# Patient Record
Sex: FEMALE | Race: BLACK OR AFRICAN AMERICAN | NOT HISPANIC OR LATINO | Employment: PART TIME | URBAN - METROPOLITAN AREA
[De-identification: names, ages, dates, MRNs, and addresses within clinical notes are randomized per-mention and may not be internally consistent; named-entity substitution may affect disease eponyms.]

---

## 2018-09-06 ENCOUNTER — OFFICE VISIT CONVERTED (OUTPATIENT)
Dept: FAMILY MEDICINE CLINIC | Facility: CLINIC | Age: 48
End: 2018-09-06
Attending: NURSE PRACTITIONER

## 2018-10-18 ENCOUNTER — OFFICE VISIT CONVERTED (OUTPATIENT)
Dept: FAMILY MEDICINE CLINIC | Facility: CLINIC | Age: 48
End: 2018-10-18
Attending: NURSE PRACTITIONER

## 2018-11-08 ENCOUNTER — CONVERSION ENCOUNTER (OUTPATIENT)
Dept: MAMMOGRAPHY | Facility: HOSPITAL | Age: 48
End: 2018-11-08

## 2018-12-06 ENCOUNTER — OFFICE VISIT CONVERTED (OUTPATIENT)
Dept: FAMILY MEDICINE CLINIC | Facility: CLINIC | Age: 48
End: 2018-12-06
Attending: NURSE PRACTITIONER

## 2019-08-08 ENCOUNTER — HOSPITAL ENCOUNTER (OUTPATIENT)
Dept: URGENT CARE | Facility: CLINIC | Age: 49
Discharge: HOME | End: 2019-08-08

## 2019-08-23 ENCOUNTER — OFFICE VISIT CONVERTED (OUTPATIENT)
Dept: FAMILY MEDICINE CLINIC | Facility: CLINIC | Age: 49
End: 2019-08-23
Attending: NURSE PRACTITIONER

## 2019-09-26 ENCOUNTER — OFFICE VISIT CONVERTED (OUTPATIENT)
Dept: FAMILY MEDICINE CLINIC | Facility: CLINIC | Age: 49
End: 2019-09-26
Attending: NURSE PRACTITIONER

## 2019-12-05 ENCOUNTER — OFFICE VISIT CONVERTED (OUTPATIENT)
Dept: FAMILY MEDICINE CLINIC | Facility: CLINIC | Age: 49
End: 2019-12-05
Attending: NURSE PRACTITIONER

## 2019-12-05 ENCOUNTER — CONVERSION ENCOUNTER (OUTPATIENT)
Dept: FAMILY MEDICINE CLINIC | Facility: CLINIC | Age: 49
End: 2019-12-05

## 2020-03-05 ENCOUNTER — OFFICE VISIT CONVERTED (OUTPATIENT)
Dept: FAMILY MEDICINE CLINIC | Facility: CLINIC | Age: 50
End: 2020-03-05
Attending: NURSE PRACTITIONER

## 2020-06-03 ENCOUNTER — HOSPITAL ENCOUNTER (OUTPATIENT)
Dept: GENERAL RADIOLOGY | Facility: HOSPITAL | Age: 50
Discharge: HOME | End: 2020-06-03
Attending: NURSE PRACTITIONER

## 2020-06-08 ENCOUNTER — OFFICE VISIT CONVERTED (OUTPATIENT)
Dept: FAMILY MEDICINE CLINIC | Facility: CLINIC | Age: 50
End: 2020-06-08
Attending: NURSE PRACTITIONER

## 2020-06-16 ENCOUNTER — HOSPITAL ENCOUNTER (OUTPATIENT)
Dept: OTHER | Facility: HOSPITAL | Age: 50
Setting detail: RECURRING SERIES
Discharge: HOME | End: 2020-09-22
Attending: NURSE PRACTITIONER

## 2020-06-29 ENCOUNTER — OFFICE VISIT CONVERTED (OUTPATIENT)
Dept: NEUROLOGY | Facility: CLINIC | Age: 50
End: 2020-06-29
Attending: NURSE PRACTITIONER

## 2020-07-08 ENCOUNTER — HOSPITAL ENCOUNTER (OUTPATIENT)
Dept: RESPIRATORY THERAPY | Facility: HOSPITAL | Age: 50
Discharge: HOME | End: 2020-07-08
Attending: NURSE PRACTITIONER

## 2020-07-09 ENCOUNTER — CONVERSION ENCOUNTER (OUTPATIENT)
Dept: FAMILY MEDICINE CLINIC | Facility: CLINIC | Age: 50
End: 2020-07-09

## 2020-07-09 ENCOUNTER — OFFICE VISIT CONVERTED (OUTPATIENT)
Dept: FAMILY MEDICINE CLINIC | Facility: CLINIC | Age: 50
End: 2020-07-09
Attending: NURSE PRACTITIONER

## 2020-07-20 ENCOUNTER — HOSPITAL ENCOUNTER (OUTPATIENT)
Dept: MRI IMAGING | Facility: HOSPITAL | Age: 50
Discharge: HOME | End: 2020-07-20
Attending: NURSE PRACTITIONER

## 2020-08-31 ENCOUNTER — OFFICE VISIT CONVERTED (OUTPATIENT)
Dept: NEUROLOGY | Facility: CLINIC | Age: 50
End: 2020-08-31
Attending: NURSE PRACTITIONER

## 2020-09-02 ENCOUNTER — HOSPITAL ENCOUNTER (OUTPATIENT)
Dept: URGENT CARE | Facility: CLINIC | Age: 50
Discharge: HOME | End: 2020-09-02
Attending: NURSE PRACTITIONER

## 2020-09-03 ENCOUNTER — TELEMEDICINE CONVERTED (OUTPATIENT)
Dept: FAMILY MEDICINE CLINIC | Facility: CLINIC | Age: 50
End: 2020-09-03
Attending: NURSE PRACTITIONER

## 2020-09-04 LAB
BACTERIA SPEC AEROBE CULT: NORMAL
SARS-COV-2 RNA SPEC QL NAA+PROBE: NOT DETECTED

## 2020-11-11 ENCOUNTER — TELEMEDICINE CONVERTED (OUTPATIENT)
Dept: GASTROENTEROLOGY | Facility: CLINIC | Age: 50
End: 2020-11-11
Attending: NURSE PRACTITIONER

## 2020-11-12 ENCOUNTER — HOSPITAL ENCOUNTER (OUTPATIENT)
Dept: FAMILY MEDICINE CLINIC | Facility: CLINIC | Age: 50
Discharge: HOME | End: 2020-11-12
Attending: NURSE PRACTITIONER

## 2020-11-12 ENCOUNTER — OFFICE VISIT CONVERTED (OUTPATIENT)
Dept: FAMILY MEDICINE CLINIC | Facility: CLINIC | Age: 50
End: 2020-11-12
Attending: NURSE PRACTITIONER

## 2020-11-12 LAB
ALBUMIN SERPL-MCNC: 4.3 G/DL (ref 3.5–5)
ALBUMIN/GLOB SERPL: 1.3 {RATIO} (ref 1.4–2.6)
ALP SERPL-CCNC: 107 U/L (ref 42–98)
ALT SERPL-CCNC: 18 U/L (ref 10–40)
ANION GAP SERPL CALC-SCNC: 14 MMOL/L (ref 8–19)
AST SERPL-CCNC: 21 U/L (ref 15–50)
BASOPHILS # BLD AUTO: 0.05 10*3/UL (ref 0–0.2)
BASOPHILS NFR BLD AUTO: 0.8 % (ref 0–3)
BILIRUB SERPL-MCNC: 0.18 MG/DL (ref 0.2–1.3)
BUN SERPL-MCNC: 11 MG/DL (ref 5–25)
BUN/CREAT SERPL: 14 {RATIO} (ref 6–20)
CALCIUM SERPL-MCNC: 9.1 MG/DL (ref 8.7–10.4)
CHLORIDE SERPL-SCNC: 102 MMOL/L (ref 99–111)
CHOLEST SERPL-MCNC: 178 MG/DL (ref 107–200)
CHOLEST/HDLC SERPL: 3.2 {RATIO} (ref 3–6)
CONV ABS IMM GRAN: 0.03 10*3/UL (ref 0–0.2)
CONV CO2: 26 MMOL/L (ref 22–32)
CONV IMMATURE GRAN: 0.5 % (ref 0–1.8)
CONV TOTAL PROTEIN: 7.6 G/DL (ref 6.3–8.2)
CREAT UR-MCNC: 0.79 MG/DL (ref 0.5–0.9)
DEPRECATED RDW RBC AUTO: 48.8 FL (ref 36.4–46.3)
EOSINOPHIL # BLD AUTO: 0.31 10*3/UL (ref 0–0.7)
EOSINOPHIL # BLD AUTO: 4.8 % (ref 0–7)
ERYTHROCYTE [DISTWIDTH] IN BLOOD BY AUTOMATED COUNT: 14.8 % (ref 11.7–14.4)
EST. AVERAGE GLUCOSE BLD GHB EST-MCNC: 131 MG/DL
GFR SERPLBLD BASED ON 1.73 SQ M-ARVRAT: >60 ML/MIN/{1.73_M2}
GLOBULIN UR ELPH-MCNC: 3.3 G/DL (ref 2–3.5)
GLUCOSE SERPL-MCNC: 103 MG/DL (ref 65–99)
HBA1C MFR BLD: 6.2 % (ref 3.5–5.7)
HCT VFR BLD AUTO: 40.5 % (ref 37–47)
HDLC SERPL-MCNC: 56 MG/DL (ref 40–60)
HGB BLD-MCNC: 12.4 G/DL (ref 12–16)
LDLC SERPL CALC-MCNC: 106 MG/DL (ref 70–100)
LYMPHOCYTES # BLD AUTO: 1.9 10*3/UL (ref 1–5)
LYMPHOCYTES NFR BLD AUTO: 29.5 % (ref 20–45)
MCH RBC QN AUTO: 27.6 PG (ref 27–31)
MCHC RBC AUTO-ENTMCNC: 30.6 G/DL (ref 33–37)
MCV RBC AUTO: 90.2 FL (ref 81–99)
MONOCYTES # BLD AUTO: 0.44 10*3/UL (ref 0.2–1.2)
MONOCYTES NFR BLD AUTO: 6.8 % (ref 3–10)
NEUTROPHILS # BLD AUTO: 3.72 10*3/UL (ref 2–8)
NEUTROPHILS NFR BLD AUTO: 57.6 % (ref 30–85)
NRBC CBCN: 0 % (ref 0–0.7)
OSMOLALITY SERPL CALC.SUM OF ELEC: 286 MOSM/KG (ref 273–304)
PLATELET # BLD AUTO: 457 10*3/UL (ref 130–400)
PMV BLD AUTO: 9.3 FL (ref 9.4–12.3)
POTASSIUM SERPL-SCNC: 4 MMOL/L (ref 3.5–5.3)
RBC # BLD AUTO: 4.49 10*6/UL (ref 4.2–5.4)
SODIUM SERPL-SCNC: 138 MMOL/L (ref 135–147)
TRIGL SERPL-MCNC: 80 MG/DL (ref 40–150)
TSH SERPL-ACNC: 0.65 M[IU]/L (ref 0.27–4.2)
VLDLC SERPL-MCNC: 16 MG/DL (ref 5–37)
WBC # BLD AUTO: 6.45 10*3/UL (ref 4.8–10.8)

## 2020-11-19 ENCOUNTER — HOSPITAL ENCOUNTER (OUTPATIENT)
Dept: OTHER | Facility: HOSPITAL | Age: 50
Setting detail: RECURRING SERIES
Discharge: HOME | End: 2020-12-11

## 2020-12-14 ENCOUNTER — HOSPITAL ENCOUNTER (OUTPATIENT)
Dept: FAMILY MEDICINE CLINIC | Facility: CLINIC | Age: 50
Discharge: HOME | End: 2020-12-14
Attending: NURSE PRACTITIONER

## 2020-12-14 ENCOUNTER — OFFICE VISIT CONVERTED (OUTPATIENT)
Dept: FAMILY MEDICINE CLINIC | Facility: CLINIC | Age: 50
End: 2020-12-14
Attending: NURSE PRACTITIONER

## 2020-12-30 ENCOUNTER — HOSPITAL ENCOUNTER (OUTPATIENT)
Dept: PREADMISSION TESTING | Facility: HOSPITAL | Age: 50
Discharge: HOME | End: 2020-12-30
Attending: INTERNAL MEDICINE

## 2020-12-31 LAB — SARS-COV-2 RNA SPEC QL NAA+PROBE: NOT DETECTED

## 2021-01-04 ENCOUNTER — HOSPITAL ENCOUNTER (OUTPATIENT)
Dept: GASTROENTEROLOGY | Facility: HOSPITAL | Age: 51
Setting detail: HOSPITAL OUTPATIENT SURGERY
Discharge: HOME | End: 2021-01-04
Attending: INTERNAL MEDICINE

## 2021-01-14 ENCOUNTER — OFFICE VISIT CONVERTED (OUTPATIENT)
Dept: FAMILY MEDICINE CLINIC | Facility: CLINIC | Age: 51
End: 2021-01-14
Attending: NURSE PRACTITIONER

## 2021-02-23 ENCOUNTER — OFFICE VISIT CONVERTED (OUTPATIENT)
Dept: GASTROENTEROLOGY | Facility: CLINIC | Age: 51
End: 2021-02-23
Attending: NURSE PRACTITIONER

## 2021-03-10 ENCOUNTER — HOSPITAL ENCOUNTER (OUTPATIENT)
Dept: LAB | Facility: HOSPITAL | Age: 51
Discharge: HOME | End: 2021-03-10
Attending: NURSE PRACTITIONER

## 2021-03-10 LAB
BASOPHILS # BLD AUTO: 0.06 10*3/UL (ref 0–0.2)
BASOPHILS NFR BLD AUTO: 0.7 % (ref 0–3)
CONV ABS IMM GRAN: 0.03 10*3/UL (ref 0–0.2)
CONV IMMATURE GRAN: 0.4 % (ref 0–1.8)
DEPRECATED RDW RBC AUTO: 52 FL (ref 36.4–46.3)
EOSINOPHIL # BLD AUTO: 0.28 10*3/UL (ref 0–0.7)
EOSINOPHIL # BLD AUTO: 3.3 % (ref 0–7)
ERYTHROCYTE [DISTWIDTH] IN BLOOD BY AUTOMATED COUNT: 15.9 % (ref 11.7–14.4)
EST. AVERAGE GLUCOSE BLD GHB EST-MCNC: 126 MG/DL
HBA1C MFR BLD: 6 % (ref 3.5–5.7)
HCT VFR BLD AUTO: 41.2 % (ref 37–47)
HGB BLD-MCNC: 12.5 G/DL (ref 12–16)
LYMPHOCYTES # BLD AUTO: 2.12 10*3/UL (ref 1–5)
LYMPHOCYTES NFR BLD AUTO: 25 % (ref 20–45)
MCH RBC QN AUTO: 27 PG (ref 27–31)
MCHC RBC AUTO-ENTMCNC: 30.3 G/DL (ref 33–37)
MCV RBC AUTO: 89 FL (ref 81–99)
MONOCYTES # BLD AUTO: 0.7 10*3/UL (ref 0.2–1.2)
MONOCYTES NFR BLD AUTO: 8.2 % (ref 3–10)
NEUTROPHILS # BLD AUTO: 5.3 10*3/UL (ref 2–8)
NEUTROPHILS NFR BLD AUTO: 62.4 % (ref 30–85)
NRBC CBCN: 0 % (ref 0–0.7)
PLATELET # BLD AUTO: 530 10*3/UL (ref 130–400)
PMV BLD AUTO: 9.6 FL (ref 9.4–12.3)
RBC # BLD AUTO: 4.63 10*6/UL (ref 4.2–5.4)
WBC # BLD AUTO: 8.49 10*3/UL (ref 4.8–10.8)

## 2021-03-15 ENCOUNTER — HOSPITAL ENCOUNTER (OUTPATIENT)
Dept: LAB | Facility: HOSPITAL | Age: 51
Discharge: HOME | End: 2021-03-15
Attending: NURSE PRACTITIONER

## 2021-03-15 LAB
C DIFF TOX B STL QL CT TISS CULT: NEGATIVE
CONV 027 TOXIN: NEGATIVE

## 2021-03-17 LAB
BACTERIA SPEC AEROBE CULT: NORMAL
O+P SPEC MICRO: NORMAL

## 2021-04-02 ENCOUNTER — HOSPITAL ENCOUNTER (OUTPATIENT)
Dept: LAB | Facility: HOSPITAL | Age: 51
Discharge: HOME | End: 2021-04-02
Attending: PODIATRIST

## 2021-04-02 LAB
ALBUMIN SERPL-MCNC: 3.9 G/DL (ref 3.5–5)
ALP SERPL-CCNC: 97 U/L (ref 42–98)
ALT SERPL-CCNC: 14 U/L (ref 10–40)
AST SERPL-CCNC: 20 U/L (ref 15–50)
BILIRUB SERPL-MCNC: <0.15 MG/DL (ref 0.2–1.3)
CONV BILI, CONJUGATED: <0.2 MG/DL (ref 0–0.6)
CONV TOTAL PROTEIN: 7.7 G/DL (ref 6.3–8.2)
CONV UNCONJUGATED BILIRUBIN: 0 MG/DL (ref 0–1.1)

## 2021-04-16 ENCOUNTER — HOSPITAL ENCOUNTER (OUTPATIENT)
Dept: PREADMISSION TESTING | Facility: HOSPITAL | Age: 51
Discharge: HOME | End: 2021-04-16
Attending: INTERNAL MEDICINE

## 2021-04-16 LAB — SARS-COV-2 RNA SPEC QL NAA+PROBE: NOT DETECTED

## 2021-04-21 ENCOUNTER — HOSPITAL ENCOUNTER (OUTPATIENT)
Dept: GASTROENTEROLOGY | Facility: HOSPITAL | Age: 51
Setting detail: HOSPITAL OUTPATIENT SURGERY
Discharge: HOME | End: 2021-04-21
Attending: INTERNAL MEDICINE

## 2021-05-07 ENCOUNTER — HOSPITAL ENCOUNTER (OUTPATIENT)
Dept: MAMMOGRAPHY | Facility: HOSPITAL | Age: 51
Discharge: HOME | End: 2021-05-07
Attending: NURSE PRACTITIONER

## 2021-05-10 NOTE — H&P
"   History and Physical      Patient Name: Bianca Lara   Patient ID: [id number]   Sex: Female   YOB: 1970    Primary Care Provider: Reina ZARATE   Referring Provider: Reina ZARATE    Visit Date: June 29, 2020    Provider: TESSA Garcia   Location: [facility]   Location Address: [address] [place] [address]  [id number]   Location Phone: [phone]          Chief Complaint     \"black out\"       History Of Present Illness  Bianca Lara is a 49 year old /Black female who presents today to [facility] today referred from Reina ZARATE. States she's been having a spell where her ear will pop and she can't hear and then will be unable to respond for a few seconds and then hearing will return and she will be back to normal. This started about 2 months ago. Only recent medication change was Viibryd increase 3 months ago. Denies urinary incontinence or tongue biting. Denies previous seizure history.       Past Medical History  Allergic rhinitis; Anxiety and depression; Blood in stool; Dysmenorrhea; Gross hematuria; Helicobacter positive gastritis; Irregular Menses; Low back pain; Meatal Stenosis; Microscopic hematuria; Night sweats; Onychomycosis of toenail; Pap smear for cervical cancer screening; Right Sided Abdominal Pain; Screening Mammogram; STD exposure; Tobacco Abuse, Chronic; Vitamin D deficiency         Past Surgical History  Breast biopsy, left breast; Cholecystectomy; Colonoscopy; Cyst Removal; Cystoscopy         Medication List  Antifungal (clotrimazole) 1 % topical cream; cyclobenzaprine 10 mg oral tablet; diclofenac potassium 50 mg oral tablet; Flonase Allergy Relief 50 mcg/actuation nasal spray,suspension; hydroxyzine pamoate 25 mg oral capsule; pantoprazole 40 mg oral tablet,delayed release (DR/EC); Viibryd 40 mg oral tablet         Allergy List  \"All Cillins\"; Cymbalta; PENICILLINS         Family [facility] " "History  Stomach Neoplasm, Malignant; Stroke; Alzheimer's Disease; Cancer, Unspecified; Cardiovascular disease; Diabetes, unspecified type; Leukemia         Reproductive History   5 Para 5 0 0 0 & Postmenopausal       Social History  Alcohol (Current some day); ; Food services; Tobacco (Current every day)         Immunizations  Name Date Admin   Influenza Refused         Review of Systems  · Constitutional  o Admits  o : excessive sweating, sycope/passing out  o Denies  o : chills, fatigue, fever, weight gain, weight loss  · Eyes  o Admits  o : blurry vision  o Denies  o : changes in vision, double vision  · HENT  o Admits  o : nasal congestion, sinus pain, seasonal allergies  o Denies  o : loss of hearing, ringing in the ears, ear aches, sore throat, nose bleeds  · Cardiovascular  o Denies  o : blood clots, swollen legs, anemia, easy burising or bleeding, transfusions  · Respiratory  o Denies  o : shortness of breath, dry cough, productive cough, pneumonia, COPD  · Gastrointestinal  o Denies  o : difficulty swallowing, reflux  · Genitourinary  o Denies  o : incontinence  · Neurologic  o Admits  o : headache, difficulty with sleep  o Denies  o : seizure, stroke, tremor, loss of balance, falls, dizziness/vertigo, numbness/tingling/paresthesia , difficulty with coordination, difficulty with dexterity, weakness  · Musculoskeletal  o Admits  o : neck stiffness/pain, muscle aches, joint pain, spasms, sciatica, pain radiating in leg, low back pain  o Denies  o : swollen lymph nodes, weakness, pain radiating in arm  · Endocrine  o Denies  o : diabetes, thyroid disorder  · Psychiatric  o Admits  o : anxiety, depression      Vitals  Date Time BP Position Site L\R Cuff Size HR RR TEMP (F) WT  HT  BMI kg/m2 BSA m2 O2 Sat HC       2020 08:09 /10 Sitting    115 - R  96.7 200lbs 4oz 5'  3\" 35.47 2.01     2020 08:38 /94 Sitting                     Physical " Examination  · Constitutional  o Appearance  o : well-nourished, well groomed, in no apparent distress  · Eyes  o Pupils and Irises  o : Pupils equal, round, and reactive to light and accommodation bilaterally  · Respiratory  o Auscultation of Lungs  o : Lungs were clear to ascultation bilaterally. No wheezes, rhonchi or rales were appreciated.  · Cardiovascular  o Heart  o :   § Auscultation of Heart  § : Regular rate and rhythm, no murmurs, gallops or rubs were appreciated.  o Peripheral Vascular System  o :   § Extremities  § : No peripheral edema was appreciated  · Musculoskeletal  o General  o : Normal bulk and normal tone throughout. 5/5 motor strength throughout and symmetric. Normal gait and station.  · Neurologic  o Mental Status Examination  o :   § Orientation  § : Alert and oriented to person, place, and time,  § Speech/Language  § : Intact naming, comprehension, and repetition. No dysarthria.  § Memory  § : Immediate recall is 3/3. Recall at 5 minutes is 3/3.   § Attention  § : Attention span is intact to serial 7 examination and finger tapping.   § Fund of Knowledge  § : Adequate fund of knowledge  o Cranial Nerves  o : Pupils are equal, round and reactive to light. Extraocular movements are intact. Visual fields are full. Fundoscopic examination reveals sharp disc bilaterally. Sensation in the V1-V3 distribution is intact and symmetric. Muscles of mastication are strong and symmetric. Muscles of facial expression are strong and symmetric. Hearing is intact. Palatal raise is intact and symmetric. Uvula is midline. Shoulder shrug is strong. Tongue protrudes in the midline.  o Motor Examination  o :   § RUE Strength  § : strength normal  § LUE Strength  § : strength normal  § RLE Strength  § : strength normal  § LLE Strength  § : strength normal  o Reflexes  o : 2+ reflexes throughout and symmetric. Negative Richards. Negative Babinski.   o Sensation  o : Intact sensation to light touch, pinprick,  vibration and proprioception throughout.  o Gait and Station  o :   § Gait Screening  § : Normal, narrow based gait, with a normal arm swing.  o Coordination  o : Intact finger to nose and heel to shin. Rapid alternating movements are intact in the upper and lower extremities.          Assessment  · Spell of altered consciousness     780.09/R40.4  Will work up for seizure like activity. Spells correlate with increase of Viibryd and could be related. Will order MRI brain and EEG. If these are normal, recommend PCP reduce Viibryd dose and see if there is improvement in spells of loss of hearing and altered consciousness. She should not drive, per [place] state law, until she is 90 days spell free.   · Loss of hearing     389.9/H91.90    Problems Reconciled  Plan  · Orders  o MRI brain wo then w contrast (15970) - 780.09/R40.4, 389.9/H91.90 - 06/29/2020  o EEG (Sleep Deprived) [facility] (71342) - 780.09/R40.4, 389.9/H91.90 - 06/29/2020  · Medications  o Medications have been Reconciled  o Transition of Care or Provider Policy  · Instructions  o Encouraged to follow-up with Primary Care Provider for preventative care.  o Call or Return if symptoms worsen or persist.   o Follow up in 2 months.  o I have informed the patient of no driving for 90 days per [place] state law. I have asked that they refrain from risky behavior including, but not limited to, taking baths, working at heights, and operating heavy machinery.   · Disposition  o Call or Return if symptoms worsen or persist.            Electronically Signed by: Libra Macdonald APRN -Author on June 30, 2020 08:58:46 AM

## 2021-05-13 NOTE — PROGRESS NOTES
Progress Note      Patient Name: Bianca Lara   Patient ID: [id number]   Sex: Female   YOB: 1970    Primary Care Provider: Reina ZARATE   Referring Provider: Reina ZARATE    Visit Date: November 12, 2020    Provider: TESSA Cerrato   Location: [facility]   Location Address: [address] [place] [address]  [id number]   Location Phone: [phone]          Chief Complaint  · Follow up for Anxiety and Depression      History Of Present Illness  Bianca Lara is a 50 year old /Black female who presents for evaluation and treatment of:      Follow up for Anxiety and Depression  Pt reported having gained weight.  Pt has gained 20 lbs since last Ov.    Anxiety severe at times. pt reports she had been taking 1.5 tabs of viibryd.     Tobacco Abuse - pt requests help with smoking cessation. pt smoking 1/4 ppd.       Lumbago chronic - improved with flexeril.    FHx of DM    BP has been elevated too.     flu shot- 10/2020           Past Medical History  Disease Name Date Onset Notes   Allergic rhinitis --  --    Anxiety and depression 09/11/2018 --    Blood in stool --  --    Dysmenorrhea --  --    Gross hematuria --  --    Helicobacter positive gastritis 05/26/2016 --    Irregular Menses --  --    Low back pain 07/09/2014 07/09/2014 07/09/2014   Meatal Stenosis --  --    Microscopic hematuria 06/10/2014 --    Night sweats --  --    Onychomycosis of toenail 07/09/2014 07/09/2014    Pap smear for cervical cancer screening 5/16/16 --    Right Sided Abdominal Pain 06/12/2014 --    Screening Mammogram 11/9/18 --    STD exposure 1993 chlamydia   Tobacco Abuse, Chronic --  --    Vitamin D deficiency --  --          Past Surgical History  Procedure Name Date Notes   Breast biopsy, left breast 1/9/12 --    Cholecystectomy --  --    Colonoscopy 8/15/16 --    Cyst Removal --  left wrist   Cystoscopy --  6/27/14-Office Cystoscopy w/  "         Medication List  Name Date Started Instructions   cyclobenzaprine 10 mg oral tablet 2020 take 1 tablet by oral route 3 times a day as needed   diclofenac potassium 50 mg oral tablet 2020 TAKE 1 TABLET (50 MG) BY ORAL ROUTE 3 TIMES PER DAY AS NEEDED FOR PAIN   Golytely 236-22.74-6.74 -5.86 gram oral recon soln 2020 take as directed   hydroxyzine pamoate 25 mg oral capsule 2020 TAKE 1 CAPSULE BY MOUTH FOUR TIMES DAILY FOR 10 DAYS AS NEEDED FOR ANXIETY   Proctozone-HC 2.5 % topical cream with perineal applicator 2020 apply a thin layer to the affected area(s) by topical route 2 times per day for 14 days   Viibryd 40 mg oral tablet 2020 take 1 tablet (40 mg) by oral route once daily with food for 30 days         Allergy List  Allergen Name Date Reaction Notes   \"All Cillins\" --  --  --    Cymbalta 10/14/2019 --  --    PENICILLINS --  --  --          Family Medical History  Disease Name Relative/Age Notes   Stomach Neoplasm, Malignant Mother/   --    Stroke Aunt/  Uncle/   --    Alzheimer's Disease Mother/   --    Cancer, Unspecified Grandmother (maternal)/   --    Cardiovascular disease Aunt/  Brother/  Sister/  Uncle/   --    Diabetes, unspecified type Aunt/  Mother/  Uncle/   Grandparent   Leukemia Father/  Grandfather (paternal)/   --          Reproductive History  Menstrual   Certainty of LMP Date:  Menopause Status: Postmenopausal Age Menopause: 40   HRT?: No   Pregnancy Summary   Total Pregnancies: 5 Full Term: 5 Premature: 0   Ab Induced: 0 Ab Spontaneous: 0 Ectopics: 0   Multiples: 0 Livin         Social History  Finding Status Start/Stop Quantity Notes   Alcohol Current some day --/-- Occasionally glass wine every other day    --  --/-- --  4 children   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 See Scanned vaccine info.         Review of Systems  · Constitutional  o Admits  o : weight gain  o Denies  o : fatigue, fever, weight loss, chills  · Cardiovascular  o Denies  o : chest Pain, palpitations, edema (swelling)  · Respiratory  o Denies  o : frequent cough, shortness of breath  · Gastrointestinal  o Denies  o : nausea, vomiting, changes in bowel habits  · Genitourinary  o Denies  o : dysuria, urinary frequency, urinary urgency, polyuria  · Neurologic  o Denies  o : headache, tingling or numbness, dizziness  · Musculoskeletal  o Admits  o : back pain  o Denies  o : joint pain, myalgias  · Psychiatric  o Admits  o : anxiety, depression  o Denies  o : mood changes, memory changes, SI/HI      Vitals  Date Time BP Position Site L\R Cuff Size HR RR TEMP (F) WT  HT  BMI kg/m2 BSA m2 O2 Sat FR L/min FiO2 HC       07/09/2020 09:06 /91 Sitting    100 - R 24 98.7 198lbs 8oz 5'  3\" 35.16 2 95 %      08/31/2020 01:07 /79 Sitting    74 - R  96.9 207lbs 3oz 5'  3\" 36.7 2.04       11/12/2020 11:52 /99 Sitting    105 - R 24 98 229lbs 4oz 5'  3\" 40.61 2.15 98 %            Physical Examination  · Constitutional  o Appearance  o : well-nourished, in no acute distress  · Eyes  o Conjunctivae  o : conjunctivae normal  o Sclerae  o : sclerae white  o Pupils and Irises  o : pupils equal and round  o Eyelids/Ocular Adnexae  o : eyelid appearance normal, no exudates present  · Neck  o Inspection/Palpation  o : normal appearance, no masses or tenderness, trachea midline  o Range of Motion  o : cervical range of motion within normal limits  o Thyroid  o : gland size normal, nontender, no nodules or masses present on palpation  · Respiratory  o Respiratory Effort  o : breathing unlabored  o Inspection of Chest  o : normal appearance  o Auscultation of Lungs  o : normal breath sounds throughout inspiration and " expiration  · Cardiovascular  o Heart  o :   § Auscultation of Heart  § : regular rate and rhythm, no murmurs, gallops or rubs  o Peripheral Vascular System  o :   § Carotid Arteries  § : normal pulses bilaterally, no bruits present  § Extremities  § : no clubbing or edema  · Gastrointestinal  o Abdominal Examination  o : abdomen nontender to palpation, tone normal without rigidity or guarding, no masses present, bowel sounds present  · Skin and Subcutaneous Tissue  o General Inspection  o : no rashes or lesions present, no areas of discoloration  o Body Hair  o : hair normal for age, general body hair distribution normal for age  o Digits and Nails  o : no clubbing, cyanosis, deformities or edema present, normal appearing nails  · Neurologic  o Mental Status Examination  o :   § Orientation  § : grossly oriented to person, place and time  o Gait and Station  o : normal gait, able to stand without difficulty  · Psychiatric  o Judgement and Insight  o : judgment and insight intact  o Mood and Affect  o : mood normal, affect appropriate          Assessment  · Screening for depression     V79.0/Z13.89  · Anxiety disorder     300.00/F41.9  · Depression     311/F32.9  · Essential hypertension     401.9/I10  · Lumbago     724.2/M54.5  · Nicotine dependence     305.1/F17.200  · Class 3 severe obesity due to excess calories with serious comorbidity and body mass index (BMI) of 40.0 to 44.9 in adult       Morbid (severe) obesity due to excess calories     278.01/E66.01  Body mass index (BMI) 40.0-44.9, adult     278.01/Z68.41  · Tobacco abuse counseling       Tobacco abuse counseling     V65.42/Z71.6  · Weight gain     783.1/R63.5  · Screening for diabetes mellitus     V77.1/Z13.1      Plan  · Orders  o ACO-17: Screened for tobacco use AND received tobacco cessation intervention (4004F) - 305.1/F17.200 - 11/12/2020  o Smoking cessation counseling, 3-10 minutes [facility] (88059) - V65.42/Z71.6 - 11/12/2020  o ACO-17: Screened for  tobacco use AND received tobacco cessation intervention (4004F) - V65.42/Z71.6 - 11/12/2020  o Hgb A1c [facility] (13858) - V77.1/Z13.1 - 11/12/2020  o Physical, Primary Care Panel (CBC, CMP, Lipid, TSH) [facility] (48225, 62033, 11953, 28334) - - 11/12/2020  o ACO-39: Current medications updated and reviewed (1159F, ) - - 11/12/2020  o ACO-18: Positive screen for clinical depression using a standardized tool and a follow-up plan documented () - - 11/12/2020  o ACO-14: Influenza immunization administered or previously received [facility] () - - 11/12/2020  · Medications  o lisinopril 10 mg oral tablet   SIG: take 1 tablet (10 mg) by oral route once daily for 30 days   DISP: (30) Tablet with 2 refills  Prescribed on 11/12/2020     o buspirone 5 mg oral tablet   SIG: take 1 tablet (5 mg) by oral route 3 times per day for 30 days   DISP: (90) Tablet with 1 refills  Prescribed on 11/12/2020     o Nicoderm CQ 7 mg/24 hr transdermal patch 24 hour   SIG: apply 1 patch (7 mg) by transdermal route once daily and remove at bedtime for 30 days   DISP: (30) Patch with 1 refills  Prescribed on 11/12/2020     o cyclobenzaprine 10 mg oral tablet   SIG: take 1 tablet by oral route 3 times a day as needed   DISP: (90) Tablet with 1 refills  Refilled on 11/12/2020     o Medications have been Reconciled  o Transition of Care or Provider Policy  · Instructions  o Depression Screen completed and scanned into the EMR under the designated folder within the patient's documents.  o Today's PHQ-9 result is _19__  o Patient advised to monitor blood pressure (B/P) at home and journal readings. Patient informed that a B/P reading at home of more than 130/80 is considered hypertension. For readings greater mbsy094/90 or higher patient is advised to follow up in the office with readings for management. Patient advised to limit sodium intake.  o *Form of nicotine being used:   o Patient was strongly encouraged to discontinue use of any nicotine  containing product or minimize the use of the product.  o Tobacco and smoking cessation counseling for more than 3 minutes was completed.  o Handouts were given to patient: 1400 malena diet and heart healthy diet.   o Take all medications as prescribed/directed.  o Patient was educated/instructed on their diagnosis, treatment and medications prior to discharge from the clinic today.  o Call the office with any concerns or questions.  o Electronically Identified Patient Education Materials Provided Electronically  · Disposition  o Return to Office in 3 months.            Electronically Signed by: TESSA Cerrato -Author on November 18, 2020 09:58:32 AM

## 2021-05-13 NOTE — PROGRESS NOTES
Progress Note      Patient Name: Bianca Lara   Patient ID: [id number]   Sex: Female   YOB: 1970    Primary Care Provider: Reina ZARATE    Visit Date: November 11, 2020    Provider: TESSA Dodson   Location: [facility]   Location Address: [address] [place] [address]  [id number]   Location Phone: [phone]          Chief Complaint  · Loose stools   · blood in stools       History Of Present Illness  Video Conferencing Visit  Bianca Lara is a 50 year old /Black female who is presenting for evaluation via video conferencing via FORMAT OF VIDEO VISIT. Verbal consent obtained before beginning visit.   The following staff were present during this visit: Nikolas Friend MA; Benita José MOISHA   Bianca Lara is a 50 year old /Black female who presents to the office today.      Patient initially presented in June 2016 with blood in the stool, right upper quadrant pain and heartburn  Last colonoscopy August 2016 by Dr. Berger was negative except for internal hemorrhoids. She has not been seen since colonoscopy.   Hx EGD in 2016 by Dr. Meza showed erosive gastritis/H. pylori, patient was treated twice.    Pt states she's had loose stools since GB removal 2014; has had blood in stool for the last month. Stools 2-3 x day, no nocturnal stools. NO abd pain, no N/V or HB. No fever. Denies painful BM or rectal sx.    Pt denies any cardio or pulmonary hx.       Past Medical History  Allergic rhinitis; Anxiety and depression; Blood in stool; Dysmenorrhea; Gross hematuria; Helicobacter positive gastritis; Irregular Menses; Low back pain; Meatal Stenosis; Microscopic hematuria; Night sweats; Onychomycosis of toenail; Pap smear for cervical cancer screening; Right Sided Abdominal Pain; Screening Mammogram; STD exposure; Tobacco Abuse, Chronic; Vitamin D deficiency         Past Surgical History  Breast biopsy, left breast;  "Cholecystectomy; Colonoscopy; Cyst Removal; Cystoscopy         Medication List  Name Date Started Instructions   cyclobenzaprine 10 mg oral tablet 2020 take 1 tablet by oral route 3 times a day as needed   diclofenac potassium 50 mg oral tablet 2020 TAKE 1 TABLET (50 MG) BY ORAL ROUTE 3 TIMES PER DAY AS NEEDED FOR PAIN   hydroxyzine pamoate 25 mg oral capsule 2020 TAKE 1 CAPSULE BY MOUTH FOUR TIMES DAILY FOR 10 DAYS AS NEEDED FOR ANXIETY   Viibryd 40 mg oral tablet 2020 take 1 tablet (40 mg) by oral route once daily with food for 30 days         Allergy List  \"All Cillins\"; Cymbalta; PENICILLINS         Family Medical History  Stomach Neoplasm, Malignant; Stroke; Alzheimer's Disease; Cancer, Unspecified; Cardiovascular disease; Diabetes, unspecified type; Leukemia         Reproductive History   5 Para 5 0 0 0 & Postmenopausal       Social History  Alcohol (Current some day); ; Food services; Tobacco (Current every day)         Immunizations  Name Date Admin   Influenza 10/29/2020   Influenza Refused         Review of Systems  · Constitutional  o Admits  o : good general health lately, no acute distress  · Gastrointestinal  o Denies  o : additional gastrointestinal symptoms except as noted in the HPI  · Psychiatric  o Admits  o : pleasant affect      Physical Examination  · Constitutional  o Appearance  o : well developed, well-nourished, in no acute distress  · Head and Face  o Head  o :   § Inspection  § : atraumatic, normocephalic  · Eyes  o Sclerae  o : sclerae white, no sclerae icterus  · Neck  o Inspection/Palpation  o : supple  · Respiratory  o Respiratory Effort  o : breathing unlabored  · Skin and Subcutaneous Tissue  o General Inspection  o : no lesions present, no rashes present-- to face/neck  · Neurologic  o Mental Status Examination  o :   § Orientation  § : grossly oriented to person, place and time  § Speech/Language  § : communication ability within normal " limits, voice quality normal, articulation of speech normal, no evidence of aphasia  § Attention  § : attention normal, concentration abilities normal  · Psychiatric  o General  o : Alert and oriented x3  o Mood and Affect  o : Mood and affect are appropriate to circumstances          Assessment  · Pre-op exam     V72.84/Z01.818  · Internal hemorrhoid     455.0/K64.8  hx of  · Loose stools     787.7/R19.5      Plan  · Orders  o [facility] Pre-Op Covid-19 Screening (54115) - - 11/11/2020  · Medications  o Golytely 236-22.74-6.74 -5.86 gram oral recon soln   SIG: take as directed   DISP: (1) Box with 0 refills  Prescribed on 11/11/2020     o Proctozone-HC 2.5 % topical cream with perineal applicator   SIG: apply a thin layer to the affected area(s) by topical route 2 times per day for 14 days   DISP: (1) Tube with 0 refills  Prescribed on 11/11/2020     · Instructions  o Please Sign Permit for: colonoscopy  o Indication: rectal bleeding , loose stools  o Surgical Risk and Benefits: Possible risks/complications, benefits, and alternatives to surgical or invasive procedure have been explained to patient and/or legal guardian; Patient has been evaluated and can tolerate anesthesia and/or sedation. Risks, benefits, and alternatives to anesthesia and sedation have been explained to patient and/or legal guardian.  o Follow Up after Procedure.  o Encouraged to follow-up with Primary Care Provider for preventative care.  o Patient was educated/instructed on their diagnosis, treatment and medications prior to discharge from the clinic today.  o Patient instructed to seek medical attention urgently for new or worsening symptoms.  o I asked pt to call back if steroid cream does not relieve rectal bleeding, will do stool studies. Pt verb understanding.             Electronically Signed by: TESSA Dodson -Author on November 11, 2020 10:09:32 AM

## 2021-05-13 NOTE — PROGRESS NOTES
Progress Note      Patient Name: Bianca Lara   Patient ID: [id number]   Sex: Female   YOB: 1970    Primary Care Provider: Reina ZARATE   Referring Provider: Reina ZARATE    Visit Date: December 14, 2020    Provider: TESSA Cerrato   Location: [facility]   Location Address: [address] [place] [address]  [id number]   Location Phone: [phone]          Chief Complaint  · Acute Visit for Rash      History Of Present Illness  Bianca Lara is a 50 year old /Black female who presents for evaluation and treatment of:      Acute visit for rash on bottom since Friday. pt denies any unprotected sex. pt admits to having increase in stress lately.     Changed laundry detergent to Tide free.     Pt wants to see if there is something else she can try to quit smoking. The Nicoderm Patch is not working. Pt has not taken Chantix or Wellbutrin. pt continues to smoke 1/4 ppd.       Past Medical History  Disease Name Date Onset Notes   Allergic rhinitis --  --    Anxiety and depression 09/11/2018 --    Blood in stool --  --    Dysmenorrhea --  --    Gross hematuria --  --    Helicobacter positive gastritis 05/26/2016 --    Irregular Menses --  --    Low back pain 07/09/2014 07/09/2014 07/09/2014   Meatal Stenosis --  --    Microscopic hematuria 06/10/2014 --    Night sweats --  --    Onychomycosis of toenail 07/09/2014 07/09/2014    Pap smear for cervical cancer screening 5/16/16 --    Right Sided Abdominal Pain 06/12/2014 --    Screening Mammogram 11/9/18 --    STD exposure 1993 chlamydia   Tobacco Abuse, Chronic --  --    Vitamin D deficiency --  --          Past Surgical History  Procedure Name Date Notes   Breast biopsy, left breast 1/9/12 --    Cholecystectomy --  --    Colonoscopy 8/15/16 --    Cyst Removal --  left wrist   Cystoscopy --  6/27/14-Office Cystoscopy w/          Medication List  Name Date Started  "Instructions   buspirone 5 mg oral tablet 2020 take 1 tablet (5 mg) by oral route 3 times per day for 30 days   cyclobenzaprine 10 mg oral tablet 2020 take 1 tablet by oral route 3 times a day as needed   diclofenac potassium 50 mg oral tablet 2020 TAKE 1 TABLET (50 MG) BY ORAL ROUTE 3 TIMES PER DAY AS NEEDED FOR PAIN   Golytely 236-22.74-6.74 -5.86 gram oral recon soln 2020 take as directed   hydroxyzine pamoate 25 mg oral capsule 2020 TAKE 1 CAPSULE BY MOUTH FOUR TIMES DAILY FOR 10 DAYS AS NEEDED FOR ANXIETY   losartan 25 mg oral tablet 2020 take 1 tablet (25 mg) by oral route once daily for 30 days   Nicoderm CQ 7 mg/24 hr transdermal patch 24 hour 2020 apply 1 patch (7 mg) by transdermal route once daily and remove at bedtime for 30 days   Proctozone-HC 2.5 % topical cream with perineal applicator 2020 apply a thin layer to the affected area(s) by topical route 2 times per day for 14 days   Viibryd 40 mg oral tablet 2020 take 1 tablet (40 mg) by oral route once daily with food for 30 days         Allergy List  Allergen Name Date Reaction Notes   \"All Cillins\" --  --  --    Cymbalta 10/14/2019 --  --    lisinopril 20 Dry Cough --    PENICILLINS --  --  --          Family Medical History  Disease Name Relative/Age Notes   Stomach Neoplasm, Malignant Mother/   --    Stroke Aunt/  Uncle/   --    Alzheimer's Disease Mother/   --    Cancer, Unspecified Grandmother (maternal)/   --    Cardiovascular disease Aunt/  Brother/  Sister/  Uncle/   --    Diabetes, unspecified type Aunt/  Mother/  Uncle/   Grandparent   Leukemia Father/  Grandfather (paternal)/   --          Reproductive History  Menstrual   Certainty of LMP Date:  Menopause Status: Postmenopausal Age Menopause: 40   HRT?: No   Pregnancy Summary   Total Pregnancies: 5 Full Term: 5 Premature: 0   Ab Induced: 0 Ab Spontaneous: 0 Ectopics: 0   Multiples: 0 Livin         Social History  Finding " "Status Start/Stop Quantity Notes   Alcohol Current some day --/-- Occasionally glass wine every other day    --  --/-- --  4 children   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 See Scanned vaccine info.         Review of Systems  · Constitutional  o Denies  o : fever, fatigue, weight loss, weight gain  · Cardiovascular  o Denies  o : lower extremity edema, claudication, chest pressure, palpitations  · Respiratory  o Denies  o : shortness of breath, wheezing, frequent cough, hemoptysis, dyspnea on exertion  · Gastrointestinal  o Denies  o : nausea, vomiting, diarrhea, constipation, abdominal pain  · Integument  o Admits  o : new skin lesions      Vitals  Date Time BP Position Site L\R Cuff Size HR RR TEMP (F) WT  HT  BMI kg/m2 BSA m2 O2 Sat FR L/min FiO2 HC       07/09/2020 09:06 /91 Sitting    100 - R 24 98.7 198lbs 8oz 5'  3\" 35.16 2 95 %      08/31/2020 01:07 /79 Sitting    74 - R  96.9 207lbs 3oz 5'  3\" 36.7 2.04       11/12/2020 11:52 /99 Sitting    105 - R 24 98 229lbs 4oz 5'  3\" 40.61 2.15 98 %      12/14/2020 03:12 /86 Sitting    82 - R 24 97.9 233lbs 6oz 5'  3\" 41.34 2.17 98 %            Physical Examination  · Constitutional  o Appearance  o : well-nourished, in no acute distress  · Eyes  o Conjunctivae  o : conjunctivae normal  o Sclerae  o : sclerae white  o Pupils and Irises  o : pupils equal and round  o Eyelids/Ocular Adnexae  o : eyelid appearance normal, no exudates present  · Respiratory  o Respiratory Effort  o : breathing unlabored  o Inspection of Chest  o : normal appearance  o Auscultation of Lungs  o : normal breath sounds throughout inspiration and expiration  · Cardiovascular  o Heart  o :   § Auscultation of Heart  § : " regular rate and rhythm, no murmurs, gallops or rubs  · Gastrointestinal  o Abdominal Examination  o : abdomen nontender to palpation, tone normal without rigidity or guarding, no masses present, bowel sounds present  · Neurologic  o Mental Status Examination  o :   § Orientation  § : grossly oriented to person, place and time  o Gait and Station  o : normal gait, able to stand without difficulty  · Psychiatric  o Judgement and Insight  o : judgment and insight intact  o Mood and Affect  o : mood normal, affect appropriate     left lower vaginal area with excoriated skin 3 open lesions.           Assessment  · Nicotine dependence     305.1/F17.200  · Tobacco abuse counseling       Tobacco abuse counseling     V65.42/Z71.6  · Rash and nonspecific skin eruption     782.1/R21  · Vaginal lesion     623.8/N89.8      Plan  · Orders  o ACO-17: Screened for tobacco use AND received tobacco cessation intervention (4004F) - 305.1/F17.200 - 12/14/2020  o ACO-17: Screened for tobacco use AND received tobacco cessation intervention (4004F) - V65.42/Z71.6 - 12/14/2020  o ACO-39: Current medications updated and reviewed (1159F, ) - - 12/14/2020  o Herpes simplex virus (HSV) nucleic acid detection by amplified probe technique (49464) - - 12/14/2020  · Medications  o Chantix Starting Month Box 0.5 mg (11)- 1 mg (42) oral tablets,dose pack   SIG: take as directed for 30 days   DISP: (1) Packet with 0 refills  Prescribed on 12/14/2020     o Medications have been Reconciled  o Transition of Care or Provider Policy  · Instructions  o *Form of nicotine being used: cigarettes  o Patient was strongly encouraged to discontinue use of any nicotine containing product or minimize the use of the product.  o Tobacco and smoking cessation counseling for more than 3 minutes was completed.  o Patient was educated/instructed on their diagnosis, treatment and medications prior to discharge from the clinic today.  o Call the office with any  concerns or questions.  · Disposition  o Follow up pending results.            Electronically Signed by: TESSA Cerrato -Author on December 14, 2020 05:42:09 PM

## 2021-05-13 NOTE — PROGRESS NOTES
"   Progress Note      Patient Name: Bianca Lara   Patient ID: [id number]   Sex: Female   YOB: 1970    Primary Care Provider: Reina ZARATE   Referring Provider: Courtney Goncalves MD    Visit Date: June 8, 2020    Provider: TESSA Cerrato   Location: [facility]   Location Address: [address] [place] [address]  [id number]   Location Phone: [phone]          Chief Complaint  · Acute visit for loss of hearing/\"black out\"      History Of Present Illness  Bianca Lara is a 49 year old /Black female who presents for evaluation and treatment of:      Acute visit for loss of hearing/\"black out\"  Happens all of the time off and on, several times a day.  Pt said she can be just talking and then will feel a loss of hearing \"ears pop\" like she is \"blacking out\". Pt said she can see but not hear everything going on.    Pt reports occurs variable times of the day.   Denies loss of bowel or bladder.   Ongoing for 2 weeks.   Pt feels it may be medicine related.       Past Medical History  Disease Name Date Onset Notes   Allergic rhinitis --  --    Anxiety and depression 09/11/2018 --    Blood in stool --  --    Dysmenorrhea --  --    Gross hematuria --  --    Helicobacter positive gastritis 05/26/2016 --    Irregular Menses --  --    Low back pain 07/09/2014 07/09/2014 07/09/2014   Meatal Stenosis --  --    Microscopic hematuria 06/10/2014 --    Night sweats --  --    Onychomycosis of toenail 07/09/2014 07/09/2014    Pap smear for cervical cancer screening 5/16/16 --    Right Sided Abdominal Pain 06/12/2014 --    Screening Mammogram 11/9/18 --    STD exposure 1993 chlamydia   Tobacco Abuse, Chronic --  --    Vitamin D deficiency --  --          Past Surgical History  Procedure Name Date Notes   Breast biopsy, left breast 1/9/12 --    Cholecystectomy --  --    Colonoscopy 8/15/16 --    Cyst Removal --  left wrist   Cystoscopy --  6/27/14-Office Cystoscopy w/ " "         Medication List  Name Date Started Instructions   Antifungal (clotrimazole) 1 % topical cream 2019 apply to the affected and surrounding areas of skin by topical route 2 times per day in the morning and evening, continue for 3-5 days after   cyclobenzaprine 10 mg oral tablet 2020 TAKE 1 TABLET BY ORAL ROUTE ONCE A DAY (AT BEDTIME) AS NEEDED FOR 90 DAYS   diclofenac potassium 50 mg oral tablet 2020 TAKE 1 TABLET (50 MG) BY ORAL ROUTE 3 TIMES PER DAY AS NEEDED FOR PAIN   Flonase Allergy Relief 50 mcg/actuation nasal spray,suspension 2019 inhale 2 puffs by nasal route daily for 30 days to each nostril   hydroxyzine pamoate 25 mg oral capsule 2020 take 1 capsule by oral route 4 times a day as needed for 10 days for anxiety   pantoprazole 40 mg oral tablet,delayed release (/EC) 2019 take 1 tablet (40 mg) by oral route once daily for 30 days   Viibryd 40 mg oral tablet 2020 take 1 tablet (40 mg) by oral route once daily with food for 30 days         Allergy List  Allergen Name Date Reaction Notes   \"All Cillins\" --  --  --    Cymbalta 10/14/2019 --  --    PENICILLINS --  --  --          Family Medical History  Disease Name Relative/Age Notes   Stomach Neoplasm, Malignant Mother/   --    Stroke Aunt/  Uncle/   --    Alzheimer's Disease Mother/   --    Cancer, Unspecified Grandmother (maternal)/   --    Cardiovascular disease Aunt/  Brother/  Sister/  Uncle/   --    Diabetes, unspecified type Aunt/  Mother/  Uncle/   Grandparent   Leukemia Father/  Grandfather (paternal)/   --          Reproductive History  Menstrual   Certainty of LMP Date:  Menopause Status: Postmenopausal Age Menopause: 40   HRT?: No   Pregnancy Summary   Total Pregnancies: 5 Full Term: 5 Premature: 0   Ab Induced: 0 Ab Spontaneous: 0 Ectopics: 0   Multiples: 0 Livin         Social History  Finding Status Start/Stop Quantity Notes   Alcohol Current some day --/-- Occasionally glass " "wine every other day    --  --/-- --  4 children   Food services --  --/-- --  Dietary aide   Tobacco Current every day 16/-- 1/4 ppd --          Immunizations  NameDate Admin Mfg Trade Name Lot Number Route Inj VIS Given VIS Publication   InfluenzaRefused 10/18/2018 NE Not Entered  NE NE     Comments:          Review of Systems  · Constitutional  o Denies  o : fatigue, fever, weight gain, weight loss, chills  · HENT  o Denies  o : ear pain, sore throat  · Cardiovascular  o Denies  o : chest Pain, palpitations, edema (swelling)  · Respiratory  o Denies  o : frequent cough, shortness of breath  · Gastrointestinal  o Denies  o : nausea, vomiting, changes in bowel habits  · Neurologic  o Admits  o : altered mental status, difficulty concentrating  o Denies  o : abnormal gait, facial weakness, headache, tingling or numbness, seizures, slurred speech , loss of balance, dizziness  · Musculoskeletal  o Denies  o : joint pain, myalgias  · Endocrine  o Denies  o : polydipsia, polyphagia  · Psychiatric  o Admits  o : anxiety  o Denies  o : mood changes, memory changes, SI/HI      Vitals  Date Time BP Position Site L\R Cuff Size HR RR TEMP (F) WT  HT  BMI kg/m2 BSA m2 O2 Sat HC       09/26/2019 02:41 /92 Sitting    83 - R 18  193lbs 16oz 5'  4\" 33.3 1.99 97 %    12/05/2019 03:29 /89 Sitting    65 - R 18 96.9 196lbs 6oz 5'  4\" 33.71 2.01 98 %    03/05/2020 10:53 /75 Sitting    90 - R 24  192lbs 0oz 5'  4\" 32.96 1.98 97 %    06/08/2020 01:35 /80 Sitting    86 - R 18 97 199lbs 8oz 5'  4\" 34.24 2.02 98 %          Physical Examination  · Constitutional  o Appearance  o : well-nourished, in no acute distress  · Eyes  o Conjunctivae  o : conjunctivae normal  o Sclerae  o : sclerae white  o Pupils and Irises  o : pupils equal and round  o Eyelids/Ocular Adnexae  o : eyelid appearance normal, no exudates present  · Ears, Nose, Mouth and Throat  o Ears  o :   § External Ears  § : external auditory canal " appearance within normal limits, no discharge present  § Otoscopic Examination  § : tympanic membrane appearance within normal limits bilaterally, cerumen not present  o Nose  o :   § External Nose  § : appearance normal  · Neck  o Inspection/Palpation  o : normal appearance, no masses or tenderness, trachea midline  o Range of Motion  o : cervical range of motion within normal limits  o Thyroid  o : gland size normal, nontender, no nodules or masses present on palpation  · Respiratory  o Respiratory Effort  o : breathing unlabored  o Inspection of Chest  o : normal appearance  o Auscultation of Lungs  o : normal breath sounds throughout inspiration and expiration  · Cardiovascular  o Heart  o :   § Auscultation of Heart  § : regular rate and rhythm, no murmurs, gallops or rubs  o Peripheral Vascular System  o :   § Carotid Arteries  § : normal pulses bilaterally, no bruits present  · Gastrointestinal  o Abdominal Examination  o : abdomen nontender to palpation, tone normal without rigidity or guarding, no masses present, bowel sounds present  · Skin and Subcutaneous Tissue  o General Inspection  o : no rashes or lesions present, no areas of discoloration  o Body Hair  o : hair normal for age, general body hair distribution normal for age  o Digits and Nails  o : no clubbing, cyanosis, deformities or edema present, normal appearing nails  · Neurologic  o Mental Status Examination  o :   § Orientation  § : grossly oriented to person, place and time  o Gait and Station  o : normal gait, able to stand without difficulty  · Psychiatric  o Judgement and Insight  o : judgment and insight intact  o Mood and Affect  o : mood normal, affect appropriate          Assessment  · Hearing loss     389.9/H91.90  · Abnormal mental state     780.97/R41.82      Plan  · Orders  o ACO-39: Current medications updated and reviewed () - - 06/08/2020  o NEUROLOGY CONSULTATION. (NEURO) - 389.9/H91.90, 780.97/R41.82 -  06/08/2020  · Medications  o Medications have been Reconciled  o Transition of Care or Provider Policy  · Instructions  o Patient was educated/instructed on their diagnosis, treatment and medications prior to discharge from the clinic today.  o Patient instructed to seek medical attention urgently for new or worsening symptoms.  o Call the office with any concerns or questions.  · Disposition  o Call or Return if symptoms worsen or persist.            Electronically Signed by: TESSA Cerrato -Author on June 21, 2020 09:35:10 PM

## 2021-05-13 NOTE — PROGRESS NOTES
Progress Note      Patient Name: Bianca Lara   Patient ID: [id number]   Sex: Female   YOB: 1970    Primary Care Provider: Reina ZARATE   Referring Provider: Reina ZARATE    Visit Date: July 9, 2020    Provider: TESSA Cerrato   Location: [facility]   Location Address: [address] [place] [address]  [id number]   Location Phone: [phone]          Chief Complaint  · Acute Visit for Shoulder Pain      History Of Present Illness  Bianca Lara is a 49 year old /Black female who presents for evaluation and treatment of:      Pt is here for Right Shoulder pain for months.  Having limited range of motion.  Taking her pain medicine given for back pain previously.      Pt also c/o headache x 1 week.  Pt denied having any N/V or vision changes.  pt c/o having band around her head.   pt has not taken any meds.   Pt has drank caffeine and quiet dark room without improvement.            Past Medical History  Disease Name Date Onset Notes   Allergic rhinitis --  --    Anxiety and depression 09/11/2018 --    Blood in stool --  --    Dysmenorrhea --  --    Gross hematuria --  --    Helicobacter positive gastritis 05/26/2016 --    Irregular Menses --  --    Low back pain 07/09/2014 07/09/2014 07/09/2014   Meatal Stenosis --  --    Microscopic hematuria 06/10/2014 --    Night sweats --  --    Onychomycosis of toenail 07/09/2014 07/09/2014    Pap smear for cervical cancer screening 5/16/16 --    Right Sided Abdominal Pain 06/12/2014 --    Screening Mammogram 11/9/18 --    STD exposure 1993 chlamydia   Tobacco Abuse, Chronic --  --    Vitamin D deficiency --  --          Past Surgical History  Procedure Name Date Notes   Breast biopsy, left breast 1/9/12 --    Cholecystectomy --  --    Colonoscopy 8/15/16 --    Cyst Removal --  left wrist   Cystoscopy --  6/27/14-Office Cystoscopy w/          Medication List  Name Date Started Instructions  "  Antifungal (clotrimazole) 1 % topical cream 2019 apply to the affected and surrounding areas of skin by topical route 2 times per day in the morning and evening, continue for 3-5 days after   cyclobenzaprine 10 mg oral tablet 2020 take 1 tablet by oral route 3 times a day as needed   diclofenac potassium 50 mg oral tablet 2020 TAKE 1 TABLET (50 MG) BY ORAL ROUTE 3 TIMES PER DAY AS NEEDED FOR PAIN   Flonase Allergy Relief 50 mcg/actuation nasal spray,suspension 2019 inhale 2 puffs by nasal route daily for 30 days to each nostril   hydroxyzine pamoate 25 mg oral capsule 2020 take 1 capsule by oral route 4 times a day as needed for 10 days for anxiety   pantoprazole 40 mg oral tablet,delayed release (DR/EC) 2019 take 1 tablet (40 mg) by oral route once daily for 30 days   Viibryd 40 mg oral tablet 2020 take 1 tablet (40 mg) by oral route once daily with food for 30 days         Allergy List  Allergen Name Date Reaction Notes   \"All Cillins\" --  --  --    Cymbalta 10/14/2019 --  --    PENICILLINS --  --  --          Family Medical History  Disease Name Relative/Age Notes   Stomach Neoplasm, Malignant Mother/   --    Stroke Aunt/  Uncle/   --    Alzheimer's Disease Mother/   --    Cancer, Unspecified Grandmother (maternal)/   --    Cardiovascular disease Aunt/  Brother/  Sister/  Uncle/   --    Diabetes, unspecified type Aunt/  Mother/  Uncle/   Grandparent   Leukemia Father/  Grandfather (paternal)/   --          Reproductive History  Menstrual   Certainty of LMP Date:  Menopause Status: Postmenopausal Age Menopause: 40   HRT?: No   Pregnancy Summary   Total Pregnancies: 5 Full Term: 5 Premature: 0   Ab Induced: 0 Ab Spontaneous: 0 Ectopics: 0   Multiples: 0 Livin         Social History  Finding Status Start/Stop Quantity Notes   Alcohol Current some day --/-- Occasionally glass wine every other day    --  --/-- --  4 children   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   Tobacco Current every day 16/-- 1/4 ppd --          Immunizations  NameDate Admin Mfg Trade Name Lot Number Route Inj VIS Given VIS Publication   InfluenzaRefused 10/18/2018 NE Not Entered  NE NE     Comments:          Review of Systems  · Constitutional  o Denies  o : fatigue, fever, weight gain, weight loss, chills  · Cardiovascular  o Denies  o : chest Pain, palpitations, edema (swelling)  · Respiratory  o Denies  o : frequent cough, shortness of breath  · Gastrointestinal  o Denies  o : nausea, vomiting, changes in bowel habits  · Genitourinary  o Denies  o : dysuria, urinary frequency, urinary urgency, polyuria  · Neurologic  o Admits  o : headache  o Denies  o : tingling or numbness, dizziness  · Musculoskeletal  o Admits  o : back pain, shoulder pain  o Denies  o : joint pain, myalgias  · Endocrine  o Denies  o : polydipsia, polyphagia  · Psychiatric  o Admits  o : anxiety, depression  o Denies  o : mood changes, memory changes, SI/HI  · Allergic-Immunologic  o Denies  o : eczema, seasonal allergies, urticaria      Vitals  Date Time BP Position Site L\R Cuff Size HR RR TEMP (F) WT  HT  BMI kg/m2 BSA m2 O2 Sat HC       06/08/2020 01:35 /80 Sitting    86 - R 18 97 199lbs 8oz 5'  4\" 34.24 2.02 98 %    06/29/2020 08:09 /10 Sitting    115 - R  96.7 200lbs 4oz 5'  3\" 35.47 2.01     07/09/2020 09:06 /91 Sitting    100 - R 24 98.7 198lbs 8oz 5'  3\" 35.16 2 95 %          Physical Examination  · Constitutional  o Appearance  o : well-nourished, in no acute distress  · Eyes  o Conjunctivae  o : conjunctivae normal  o Sclerae  o : sclerae white  o Pupils and Irises  o : pupils equal and round  o Eyelids/Ocular Adnexae  o : eyelid appearance normal, no exudates present  · Respiratory  o Respiratory Effort  o : breathing unlabored  o Inspection of Chest  o : normal appearance  o Auscultation of Lungs  o : normal breath sounds throughout inspiration and " expiration  · Cardiovascular  o Heart  o :   § Auscultation of Heart  § : regular rate and rhythm, no murmurs, gallops or rubs  · Gastrointestinal  o Abdominal Examination  o : abdomen nontender to palpation, tone normal without rigidity or guarding, no masses present, bowel sounds present  · Musculoskeletal  o Spine  o :   § Inspection/Palpation  § : moderate right tenderness to palpation present-cervical region   § Range of Motion  § : spine range of motion normal  o Right Upper Extremity  o :   § Inspection/Palpation  § : moderate tenderness to palpation present-shoulder region, discomfort noted with ABDUCTION   o Left Upper Extremity  o :   § Inspection/Palpation  § : no tenderness to palpation, ROM normal  · Skin and Subcutaneous Tissue  o General Inspection  o : no rashes or lesions present, no areas of discoloration  o Body Hair  o : hair normal for age, general body hair distribution normal for age  o Digits and Nails  o : no clubbing, cyanosis, deformities or edema present, normal appearing nails  · Neurologic  o Mental Status Examination  o :   § Orientation  § : grossly oriented to person, place and time  o Gait and Station  o : normal gait, able to stand without difficulty  · Psychiatric  o Judgement and Insight  o : judgment and insight intact  o Mood and Affect  o : mood normal, affect appropriate          Assessment  · Anxiety disorder     300.00/F41.9  · Headache     784.0/R51  · Acute pain of right shoulder     719.41/M25.511  · Tension headache     307.81/G44.209  · Sprain of right rotator cuff capsule, initial encounter     840.4/S43.421A      Plan  · Orders  o ACO-39: Current medications updated and reviewed () - - 07/09/2020  o PHYSICAL THERAPY CONSULTATION ([facility]) - 840.4/S43.421A - 07/09/2020  · Medications  o Voltaren 1 % topical gel   SIG: apply to affected area(s) by topical route 4 times a day   DISP: (1) 100 gm tube with 1 refills  Prescribed on 07/09/2020     o cyclobenzaprine 10 mg  oral tablet   SIG: take 1 tablet by oral route 3 times a day as needed   DISP: (90) tablets with 1 refills  Adjusted on 07/09/2020     o diclofenac potassium 50 mg oral tablet   SIG: TAKE 1 TABLET (50 MG) BY ORAL ROUTE 3 TIMES PER DAY AS NEEDED FOR PAIN   DISP: (90) Tablet with 1 refills  Adjusted on 07/09/2020     o hydroxyzine pamoate 25 mg oral capsule   SIG: take 1 capsule by oral route 4 times a day as needed for 10 days for anxiety   DISP: (40) capsules with 0 refills  Refilled on 07/09/2020     o Medications have been Reconciled  · Instructions  o Patient was educated/instructed on their diagnosis, treatment and medications prior to discharge from the clinic today.  o Call the office with any concerns or questions.  · Disposition  o FOLLOW UP PRN            Electronically Signed by: TESSA Cerrato -Author on July 9, 2020 05:45:27 PM

## 2021-05-13 NOTE — PROGRESS NOTES
Progress Note      Patient Name: Bianca Lara   Patient ID: [id number]   Sex: Female   YOB: 1970    Primary Care Provider: Reina ZARATE   Referring Provider: Reina ZARATE    Visit Date: September 3, 2020    Provider: TESSA Cerrato   Location: [facility]   Location Address: [address] [place] [address]  [id number]   Location Phone: [phone]          Chief Complaint  · 6 Month Follow up for Anxiety, Depression, GERD, Low Back Pain, Vitamin D Deficiency      History Of Present Illness  Video Conferencing Visit  Bianca Lara is a 49 year old /Black female who is presenting for evaluation [facility]. Verbal consent obtained before beginning visit.   The following staff were present during this visit: Enid Henry CMA   Bianca Lraa is a 49 year old /Black female who presents for evaluation and treatment of:      6 Month Follow up for Anxiety, Depression, GERD, Low Back Pain, Vitamin D Deficiency    Pt said she went to urgent care and was dx'd with viral illness. Still doesn't feel well.    Anxiety/depression - Pt scalled back on viibryd d/t neuro advice. pt very tearful. staring speels were unresolved with lowering dosage of viibryd.     GERD - well controlled.     Lumbago - improved with medication. pt is able to complete her ADL.            Past Medical History  Disease Name Date Onset Notes   Allergic rhinitis --  --    Anxiety and depression 09/11/2018 --    Blood in stool --  --    Dysmenorrhea --  --    Gross hematuria --  --    Helicobacter positive gastritis 05/26/2016 --    Irregular Menses --  --    Low back pain 07/09/2014 07/09/2014 07/09/2014   Meatal Stenosis --  --    Microscopic hematuria 06/10/2014 --    Night sweats --  --    Onychomycosis of toenail 07/09/2014 07/09/2014    Pap smear for cervical cancer screening 5/16/16 --    Right Sided Abdominal  "Pain 06/12/2014 --    Screening Mammogram 11/9/18 --    STD exposure 1993 chlamydia   Tobacco Abuse, Chronic --  --    Vitamin D deficiency --  --          Past Surgical History  Procedure Name Date Notes   Breast biopsy, left breast 1/9/12 --    Cholecystectomy --  --    Colonoscopy 8/15/16 --    Cyst Removal --  left wrist   Cystoscopy --  6/27/14-Office Cystoscopy w/          Medication List  Name Date Started Instructions   Antifungal (clotrimazole) 1 % topical cream 12/05/2019 apply to the affected and surrounding areas of skin by topical route 2 times per day in the morning and evening, continue for 3-5 days after   cyclobenzaprine 10 mg oral tablet 07/09/2020 take 1 tablet by oral route 3 times a day as needed   diclofenac potassium 50 mg oral tablet 07/09/2020 TAKE 1 TABLET (50 MG) BY ORAL ROUTE 3 TIMES PER DAY AS NEEDED FOR PAIN   Flonase Allergy Relief 50 mcg/actuation nasal spray,suspension 09/26/2019 inhale 2 puffs by nasal route daily for 30 days to each nostril   hydroxyzine pamoate 25 mg oral capsule 09/03/2020 TAKE 1 CAPSULE BY MOUTH FOUR TIMES DAILY FOR 10 DAYS AS NEEDED FOR ANXIETY   pantoprazole 40 mg oral tablet,delayed release (/EC) 09/26/2019 take 1 tablet (40 mg) by oral route once daily for 30 days   Viibryd 40 mg oral tablet 09/03/2020 take 1 tablet (40 mg) by oral route once daily with food for 30 days   Voltaren 1 % topical gel 07/09/2020 apply to affected area(s) by topical route 4 times a day         Allergy List  Allergen Name Date Reaction Notes   \"All Cillins\" --  --  --    Cymbalta 10/14/2019 --  --    PENICILLINS --  --  --          Family Medical History  Disease Name Relative/Age Notes   Stomach Neoplasm, Malignant Mother/   --    Stroke Aunt/  Uncle/   --    Alzheimer's Disease Mother/   --    Cancer, Unspecified Grandmother (maternal)/   --    Cardiovascular disease Aunt/  Brother/  Sister/  Uncle/   --    Diabetes, unspecified type Aunt/  Mother/  Uncle/   " "Grandparent   Leukemia Father/  Grandfather (paternal)/   --          Reproductive History  Menstrual   Certainty of LMP Date:  Menopause Status: Postmenopausal Age Menopause: 40   HRT?: No   Pregnancy Summary   Total Pregnancies: 5 Full Term: 5 Premature: 0   Ab Induced: 0 Ab Spontaneous: 0 Ectopics: 0   Multiples: 0 Livin         Social History  Finding Status Start/Stop Quantity Notes   Alcohol Current some day --/-- Occasionally glass wine every other day    --  --/-- --  4 children   Food services --  --/-- --  Dietary aide   Tobacco Current every day 16/--  ppd --          Immunizations  NameDate Admin Mfg Trade Name Lot Number Route Inj VIS Given VIS Publication   InfluenzaRefused 10/18/2018 NE Not Entered  NE NE     Comments:          Review of Systems  · Constitutional  o Denies  o : fatigue, fever, weight gain, weight loss, chills  · Cardiovascular  o Denies  o : chest Pain, palpitations, edema (swelling)  · Respiratory  o Admits  o : frequent cough  o Denies  o : shortness of breath  · Gastrointestinal  o Denies  o : nausea, vomiting, changes in bowel habits  · Genitourinary  o Denies  o : dysuria, urinary frequency, urinary urgency, polyuria  · Neurologic  o Admits  o : Staring spells without LOC  o Denies  o : headache, tingling or numbness, dizziness  · Musculoskeletal  o Admits  o : back pain  o Denies  o : joint pain, myalgias  · Psychiatric  o Admits  o : anxiety, irritability, depression  o Denies  o : mood changes, memory changes, SI/HI  · Allergic-Immunologic  o Denies  o : eczema, seasonal allergies, urticaria      Vitals  Date Time BP Position Site L\R Cuff Size HR RR TEMP (F) WT  HT  BMI kg/m2 BSA m2 O2 Sat HC       2020 01:07 /79 Sitting    74 - R  96.9 207lbs 3oz 5'  3\" 36.7 2.04               Assessment  · Anxiety disorder     300.00/F41.9  · Cough     786.2/R05  · Depression     311/F32.9  · GERD (gastroesophageal reflux " disease)     530.81/K21.9  · Lumbago     724.2/M54.5      Plan  · Orders  o ACO-39: Current medications updated and reviewed () - - 09/03/2020  · Medications  o hydroxyzine pamoate 25 mg oral capsule   SIG: TAKE 1 CAPSULE BY MOUTH FOUR TIMES DAILY FOR 10 DAYS AS NEEDED FOR ANXIETY   DISP: (40) Capsule with 0 refills  Refilled on 09/03/2020     o Viibryd 40 mg oral tablet   SIG: take 1 tablet (40 mg) by oral route once daily with food for 30 days   DISP: (30) tablets with 5 refills  Refilled on 09/03/2020     o Medications have been Reconciled  o Transition of Care or Provider Policy  · Instructions  o Patient was given an SSRI/SSNRI medication and warned of possible side effects of the medication including potential for increased risk of suicidal thoughts and feelings. Patient was instructed that if they begin to exhibit any of these effects they will discontinue the medication immediately and contact our office or the ER ASAP.  o Maintain a healthy weight. Avoid tight fitting clothes. Avoid fried, fatty foods, tomato sauce, chocolate, mint, garlic, onion, alcohol. caffeine. Eat smaller meals, dont lie down after a meal, dont smoke. Elevate the head of your bed 6-9 inches.  o Patient was educated/instructed on their diagnosis, treatment and medications prior to discharge from the clinic today.  o Call the office with any concerns or questions.  · Disposition  o Return Visit Request in/on 2 weeks +/- 14 days ([id number]).            Electronically Signed by: TESSA Cerrato -Author on September 3, 2020 01:26:27 PM

## 2021-05-13 NOTE — PROGRESS NOTES
Progress Note      Patient Name: Bianca Lara   Patient ID: [id number]   Sex: Female   YOB: 1970    Primary Care Provider: Reina ZARATE   Referring Provider: Reina ZARATE    Visit Date: August 31, 2020    Provider: TESSA Garcia   Location: [facility]   Location Address: [address] [place] [address]  [id number]   Location Phone: [phone]          Chief Complaint  · Follow Up Exam      History Of Present Illness  Bianca Lara is a 49 year old /Black female who presents today to [facility] today referred from Reina ZARATE. States she's been having a spell where her ear will pop and she can't hear and then will be unable to respond for a few seconds and then hearing will return and she will be back to normal. This started about 2 months ago. Only recent medication change was Viibryd increase 3 months ago. Denies urinary incontinence or tongue biting. Denies previous seizure history.   Bianca Lara is a 49 year old /Black female who presents today to [facility] today for a follow up exam. Discussed EEG, was read normal. Reviewed MRI brain, was read normal. She states she has had 2 spells since previous visit where ears popped and she was unable to respond. Remains on Viibryd at same dose.       Past Medical History  Allergic rhinitis; Anxiety and depression; Blood in stool; Dysmenorrhea; Gross hematuria; Helicobacter positive gastritis; Irregular Menses; Low back pain; Meatal Stenosis; Microscopic hematuria; Night sweats; Onychomycosis of toenail; Pap smear for cervical cancer screening; Right Sided Abdominal Pain; Screening Mammogram; STD exposure; Tobacco Abuse, Chronic; Vitamin D deficiency         Past Surgical History  Breast biopsy, left breast; Cholecystectomy; Colonoscopy; Cyst Removal; Cystoscopy         Medication List  Antifungal (clotrimazole) 1 % topical cream; cyclobenzaprine  "10 mg oral tablet; diclofenac potassium 50 mg oral tablet; Flonase Allergy Relief 50 mcg/actuation nasal spray,suspension; hydroxyzine pamoate 25 mg oral capsule; pantoprazole 40 mg oral tablet,delayed release (DR/EC); Viibryd 40 mg oral tablet; Voltaren 1 % topical gel         Allergy List  \"All Cillins\"; Cymbalta; PENICILLINS         Family Medical History  Stomach Neoplasm, Malignant; Stroke; Alzheimer's Disease; Cancer, Unspecified; Cardiovascular disease; Diabetes, unspecified type; Leukemia         Reproductive History   5 Para 5 0 0 0 & Postmenopausal       Social History  Alcohol (Current some day); ; Food services; Tobacco (Current every day)         Immunizations  Name Date Admin   Influenza Refused         Review of Systems  · Constitutional  o Admits  o : excessive sweating, weight gain  o Denies  o : chills, fatigue, fever, sycope/passing out, weight loss  · Eyes  o Denies  o : changes in vision, blurry vision, double vision  · HENT  o Admits  o : sinus pain, seasonal allergies  o Denies  o : loss of hearing, ringing in the ears, ear aches, sore throat, nasal congestion, nose bleeds  · Cardiovascular  o Denies  o : blood clots, swollen legs, anemia, easy burising or bleeding, transfusions  · Respiratory  o Denies  o : shortness of breath, dry cough, productive cough, pneumonia, COPD  · Gastrointestinal  o Denies  o : difficulty swallowing, reflux  · Genitourinary  o Denies  o : incontinence  · Neurologic  o Admits  o : headache  o Denies  o : seizure, stroke, tremor, loss of balance, falls, dizziness/vertigo, difficulty with sleep, numbness/tingling/paresthesia , difficulty with coordination, difficulty with dexterity, weakness  · Musculoskeletal  o Admits  o : muscle aches, joint pain, spasms, sciatica, pain radiating in leg, low back pain  o Denies  o : neck stiffness/pain, swollen lymph nodes, weakness, pain radiating in arm  · Endocrine  o Denies  o : diabetes, thyroid " "disorder  · Psychiatric  o Admits  o : anxiety, depression      Vitals  Date Time BP Position Site L\R Cuff Size HR RR TEMP (F) WT  HT  BMI kg/m2 BSA m2 O2 Sat HC       08/31/2020 01:07 /79 Sitting    74 - R  96.9 207lbs 3oz 5'  3\" 36.7 2.04           Physical Examination  · Constitutional  o Appearance  o : well-nourished, well groomed, in no apparent distress  · Eyes  o Pupils and Irises  o : Pupils equal, round, and reactive to light and accommodation bilaterally  · Respiratory  o Auscultation of Lungs  o : Lungs were clear to ascultation bilaterally. No wheezes, rhonchi or rales were appreciated.  · Cardiovascular  o Heart  o :   § Auscultation of Heart  § : Regular rate and rhythm, no murmurs, gallops or rubs were appreciated.  o Peripheral Vascular System  o :   § Extremities  § : No peripheral edema was appreciated  · Musculoskeletal  o General  o : Normal bulk and normal tone throughout. 5/5 motor strength throughout and symmetric. Normal gait and station.  · Neurologic  o Mental Status Examination  o :   § Orientation  § : Alert and oriented to person, place, and time,  § Speech/Language  § : Intact naming, comprehension, and repetition. No dysarthria.  § Memory  § : Immediate recall is 3/3. Recall at 5 minutes is 3/3.   § Attention  § : Attention span is intact to serial 7 examination and finger tapping.   § Fund of Knowledge  § : Adequate fund of knowledge  o Cranial Nerves  o : Pupils are equal, round and reactive to light. Extraocular movements are intact. Visual fields are full. Fundoscopic examination reveals sharp disc bilaterally. Sensation in the V1-V3 distribution is intact and symmetric. Muscles of mastication are strong and symmetric. Muscles of facial expression are strong and symmetric. Hearing is intact. Palatal raise is intact and symmetric. Uvula is midline. Shoulder shrug is strong. Tongue protrudes in the midline.  o Motor Examination  o :   § RUE Strength  § : strength " normal  § LUE Strength  § : strength normal  § RLE Strength  § : strength normal  § LLE Strength  § : strength normal  o Reflexes  o : 2+ reflexes throughout and symmetric. Negative Richards. Negative Babinski.   o Sensation  o : Intact sensation to light touch, pinprick, vibration and proprioception throughout.  o Gait and Station  o :   § Gait Screening  § : Normal, narrow based gait, with a normal arm swing.  o Cerebellar Function  o : intact finger to nose and heel to shin. Rapid alternating movements are intact in the upper and lower extremities.   o Coordination  o : Intact finger to nose and heel to shin. Rapid alternating movements are intact in the upper and lower extremities.          Assessment  · Spell of altered consciousness     780.09/R40.4  Spells correlate with increase of Viibryd and could be related. MRI brain and EEG are both normal. I am not concerned about seizure activity. I recommend PCP reduce Viibryd dose and see if there is improvement in spells of loss of hearing and altered consciousness. An ENT referral could also be beneficial in the future.   · Loss of hearing     389.9/H91.90      Plan  · Medications  o Medications have been Reconciled  o Transition of Care or Provider Policy  · Instructions  o Call or Return if symptoms worsen or persist.             Electronically Signed by: TESSA Garcia -Author on August 31, 2020 02:36:55 PM

## 2021-05-14 VITALS
RESPIRATION RATE: 24 BRPM | OXYGEN SATURATION: 98 % | BODY MASS INDEX: 40.62 KG/M2 | HEART RATE: 105 BPM | HEIGHT: 63 IN | DIASTOLIC BLOOD PRESSURE: 99 MMHG | SYSTOLIC BLOOD PRESSURE: 151 MMHG | TEMPERATURE: 98 F | WEIGHT: 229.25 LBS

## 2021-05-14 VITALS
OXYGEN SATURATION: 97 % | BODY MASS INDEX: 40.31 KG/M2 | HEIGHT: 63 IN | RESPIRATION RATE: 24 BRPM | HEART RATE: 89 BPM | SYSTOLIC BLOOD PRESSURE: 126 MMHG | TEMPERATURE: 98.8 F | WEIGHT: 227.5 LBS | DIASTOLIC BLOOD PRESSURE: 97 MMHG

## 2021-05-14 VITALS
SYSTOLIC BLOOD PRESSURE: 113 MMHG | BODY MASS INDEX: 36.71 KG/M2 | WEIGHT: 207.19 LBS | TEMPERATURE: 96.9 F | DIASTOLIC BLOOD PRESSURE: 79 MMHG | HEART RATE: 74 BPM | HEIGHT: 63 IN

## 2021-05-14 VITALS
WEIGHT: 229.25 LBS | SYSTOLIC BLOOD PRESSURE: 147 MMHG | HEART RATE: 87 BPM | HEIGHT: 63 IN | BODY MASS INDEX: 40.62 KG/M2 | DIASTOLIC BLOOD PRESSURE: 110 MMHG | TEMPERATURE: 99.1 F

## 2021-05-14 VITALS
RESPIRATION RATE: 24 BRPM | OXYGEN SATURATION: 98 % | WEIGHT: 233.37 LBS | TEMPERATURE: 97.9 F | SYSTOLIC BLOOD PRESSURE: 147 MMHG | HEIGHT: 63 IN | HEART RATE: 82 BPM | DIASTOLIC BLOOD PRESSURE: 86 MMHG | BODY MASS INDEX: 41.35 KG/M2

## 2021-05-14 NOTE — PROGRESS NOTES
Progress Note      Patient Name: Bianca Lara   Patient ID: [id number]   Sex: Female   YOB: 1970    Primary Care Provider: Reina ZARATE   Referring Provider: Reina ZARATE    Visit Date: January 14, 2021    Provider: TESSA Cerrato   Location: [facility]   Location Address: [address] [place] [address]  [id number]   Location Phone: [phone]          Chief Complaint  · 3 Month Follow up for Anxiety, Depression, Lumbago, HTN, Obesity, and Nicotine Dependence      History Of Present Illness  Bianca Lara is a 50 year old /Black female who presents for evaluation and treatment of:      3 Month Follow up for Anxiety, Depression, Lumbago, HTN, Obesity, and Nicotine Dependence  Last routine lab work done in 11/12/20  Med refills needed    Pt reported she has stopped taking all of her meds. Pt said she doesn't want the meds filtering thru her body.    HTN - b/p wnl. pt stopped medications at as.     Anxiety/depression - resolved per pt.    Lumbago - gone pt can walk thru store without problems.    GErd - stopped med. denies any reflux. pt reports she will continue with gastro.     flu shot- 10/2020  Pap-5/17/16  Mammo-11/9/18  Colon- 1/4/21  Smoker- 1/4PPD since 16 yrs ago, No longer taking either Nicoderm Patch or Chantix.       Past Medical History  Disease Name Date Onset Notes   Allergic rhinitis --  --    Anxiety and depression 09/11/2018 --    Blood in stool --  --    Dysmenorrhea --  --    Gross hematuria --  --    Helicobacter positive gastritis 05/26/2016 --    Irregular Menses --  --    Low back pain 07/09/2014 07/09/2014 07/09/2014   Meatal Stenosis --  --    Microscopic hematuria 06/10/2014 --    Night sweats --  --    Onychomycosis of toenail 07/09/2014 07/09/2014    Pap smear for cervical cancer screening 5/16/16 --    Right Sided Abdominal Pain 06/12/2014 --    Screening Mammogram 11/9/18 --    STD  exposure 1993 chlamydia   Tobacco Abuse, Chronic --  --    Vitamin D deficiency --  --          Past Surgical History  Procedure Name Date Notes   Breast biopsy, left breast 1/9/12 --    Cholecystectomy --  --    Colonoscopy 8/15/16 --    Cyst Removal --  left wrist   Cystoscopy --  6/27/14-Office Cystoscopy w/          Medication List  Name Date Started Instructions   buspirone 5 mg oral tablet 11/12/2020 take 1 tablet (5 mg) by oral route 3 times per day for 30 days   Chantix Starting Month Box 0.5 mg (11)- 1 mg (42) oral tablets,dose pack 12/14/2020 take as directed for 30 days   cyclobenzaprine 10 mg oral tablet 11/12/2020 take 1 tablet by oral route 3 times a day as needed   diclofenac potassium 50 mg oral tablet 11/04/2020 TAKE 1 TABLET (50 MG) BY ORAL ROUTE 3 TIMES PER DAY AS NEEDED FOR PAIN   Golytely 236-22.74-6.74 -5.86 gram oral recon soln 11/11/2020 take as directed   hydroxyzine pamoate 25 mg oral capsule 11/04/2020 TAKE 1 CAPSULE BY MOUTH FOUR TIMES DAILY FOR 10 DAYS AS NEEDED FOR ANXIETY   losartan 25 mg oral tablet 12/21/2020 take 1 tablet (25 mg) by oral route once daily for 30 days   Nicoderm CQ 7 mg/24 hr transdermal patch 24 hour 11/12/2020 apply 1 patch (7 mg) by transdermal route once daily and remove at bedtime for 30 days   omeprazole 40 mg oral capsule,delayed release(/EC) 12/21/2020 take 1 capsule (40 mg) by oral route once daily in the morning   Pepcid 40 mg oral tablet 12/21/2020 take 1 tablet (40 mg) by oral route once daily at bedtime for 30 days   Proctozone-HC 2.5 % topical cream with perineal applicator 11/11/2020 apply a thin layer to the affected area(s) by topical route 2 times per day for 14 days   valacyclovir 1 gram oral tablet 12/17/2020 take 1 tablet (1,000 mg) by oral route 2 times per day for 10 days   Viibryd 40 mg oral tablet 09/03/2020 take 1 tablet (40 mg) by oral route once daily with food for 30 days         Allergy List  Allergen Name Date Reaction Notes  "  \"All Cillins\" --  --  --    Cymbalta 10/14/2019 --  --    lisinopril 20 Dry Cough --    PENICILLINS --  --  --          Family Medical History  Disease Name Relative/Age Notes   Stomach Neoplasm, Malignant Mother/   --    Stroke Aunt/  Uncle/   --    Alzheimer's Disease Mother/   --    Cancer, Unspecified Grandmother (maternal)/   --    Cardiovascular disease Aunt/  Brother/  Sister/  Uncle/   --    Diabetes, unspecified type Aunt/  Mother/  Uncle/   Grandparent   Leukemia Father/  Grandfather (paternal)/   --          Reproductive History  Menstrual   Certainty of LMP Date:  Menopause Status: Postmenopausal Age Menopause: 40   HRT?: No   Pregnancy Summary   Total Pregnancies: 5 Full Term: 5 Premature: 0   Ab Induced: 0 Ab Spontaneous: 0 Ectopics: 0   Multiples: 0 Livin         Social History  Finding Status Start/Stop Quantity Notes   Alcohol Current some day --/-- Occasionally glass wine every other day    --  --/-- --  4 children   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 See Scanned vaccine info.         Review of Systems  · Constitutional  o Denies  o : fatigue, fever, weight gain, weight loss, chills  · Cardiovascular  o Denies  o : chest Pain, palpitations, edema (swelling)  · Respiratory  o Denies  o : frequent cough, shortness of breath  · Gastrointestinal  o Denies  o : nausea, vomiting, changes in bowel habits  · Genitourinary  o Denies  o : dysuria, urinary frequency, urinary urgency, polyuria  · Neurologic  o Denies  o : headache, tingling or numbness, dizziness  · Musculoskeletal  o Denies  o : joint pain, myalgias  · Endocrine  o Denies  o : polydipsia, " "polyphagia  · Psychiatric  o Denies  o : mood changes, memory changes, SI/HI      Vitals  Date Time BP Position Site L\R Cuff Size HR RR TEMP (F) WT  HT  BMI kg/m2 BSA m2 O2 Sat FR L/min FiO2 HC       11/12/2020 11:52 /99 Sitting    105 - R 24 98 229lbs 4oz 5'  3\" 40.61 2.15 98 %      12/14/2020 03:12 /86 Sitting    82 - R 24 97.9 233lbs 6oz 5'  3\" 41.34 2.17 98 %      01/14/2021 02:38 /97 Sitting    89 - R 24 98.8 227lbs 8oz 5'  3\" 40.3 2.14 97 %      01/14/2021 02:41 /82 Sitting    88 - R                   Physical Examination  · Constitutional  o Appearance  o : well-nourished, in no acute distress  · Eyes  o Conjunctivae  o : conjunctivae normal  o Sclerae  o : sclerae white  o Pupils and Irises  o : pupils equal and round  o Eyelids/Ocular Adnexae  o : eyelid appearance normal, no exudates present  · Neck  o Inspection/Palpation  o : normal appearance, no masses or tenderness, trachea midline  o Range of Motion  o : cervical range of motion within normal limits  o Thyroid  o : gland size normal, nontender, no nodules or masses present on palpation  · Respiratory  o Respiratory Effort  o : breathing unlabored  o Inspection of Chest  o : normal appearance  o Auscultation of Lungs  o : normal breath sounds throughout inspiration and expiration  · Cardiovascular  o Heart  o :   § Auscultation of Heart  § : regular rate and rhythm, no murmurs, gallops or rubs  o Peripheral Vascular System  o :   § Carotid Arteries  § : normal pulses bilaterally, no bruits present  § Extremities  § : no clubbing or edema  · Gastrointestinal  o Abdominal Examination  o : abdomen nontender to palpation, tone normal without rigidity or guarding, no masses present, bowel sounds present  · Skin and Subcutaneous Tissue  o General Inspection  o : no rashes or lesions present, no areas of discoloration  o Body Hair  o : hair normal for age, general body hair distribution normal for age  o Digits and Nails  o : no " clubbing, cyanosis, deformities or edema present, normal appearing nails  · Neurologic  o Mental Status Examination  o :   § Orientation  § : grossly oriented to person, place and time  o Gait and Station  o : normal gait, able to stand without difficulty  · Psychiatric  o Judgement and Insight  o : judgment and insight intact  o Mood and Affect  o : mood normal, affect appropriate          Assessment  · Visit for screening mammogram     V76.12/Z12.31  · Anxiety disorder     300.00/F41.9  · Depression     311/F32.9  · Essential hypertension     401.9/I10  · Lumbago     724.2/M54.5  · Nicotine dependence     305.1/F17.200  · Obesity     278.00/E66.9  · Herpes genitalia     054.10/A60.00      Plan  · Orders  o Screening Mammography; Bilateral 3D (41611, 33865, ) - V76.12/Z12.31 - 01/14/2021  o ACO-17: Screened for tobacco use AND received tobacco cessation intervention (4004F) - 305.1/F17.200 - 01/14/2021  o ACO-14: Influenza immunization administered or previously received [facility] () - - 01/14/2021  o ACO-19: Colorectal cancer screening results documented and reviewed (3017F) - - 01/14/2021  o ACO-39: Current medications updated and reviewed (1159F, ) - - 01/14/2021  · Medications  o valacyclovir 1 gram oral tablet   SIG: take 1 tablet (1,000 mg) by oral route 2 times per day for 10 days   DISP: (20) Tablet with 0 refills  Refilled on 01/14/2021     o buspirone 5 mg oral tablet   SIG: take 1 tablet (5 mg) by oral route 3 times per day for 30 days   DISP: (90) Tablet with 1 refills  Discontinued on 01/14/2021     o Chantix Starting Month Box 0.5 mg (11)- 1 mg (42) oral tablets,dose pack   SIG: take as directed for 30 days   DISP: (1) Packet with 0 refills  Discontinued on 01/14/2021     o Golytely 236-22.74-6.74 -5.86 gram oral recon soln   SIG: take as directed   DISP: (1) Box with 0 refills  Discontinued on 01/14/2021     o hydroxyzine pamoate 25 mg oral capsule   SIG: TAKE 1 CAPSULE BY MOUTH FOUR TIMES  DAILY FOR 10 DAYS AS NEEDED FOR ANXIETY   DISP: (40) Capsule with 0 refills  Discontinued on 01/14/2021     o losartan 25 mg oral tablet   SIG: take 1 tablet (25 mg) by oral route once daily for 30 days   DISP: (30) Tablet with 1 refills  Discontinued on 01/14/2021     o Nicoderm CQ 7 mg/24 hr transdermal patch 24 hour   SIG: apply 1 patch (7 mg) by transdermal route once daily and remove at bedtime for 30 days   DISP: (30) Patch with 1 refills  Discontinued on 01/14/2021     o omeprazole 40 mg oral capsule,delayed release(DR/EC)   SIG: take 1 capsule (40 mg) by oral route once daily in the morning   DISP: (30) Capsule with 2 refills  Discontinued on 01/14/2021     o Pepcid 40 mg oral tablet   SIG: take 1 tablet (40 mg) by oral route once daily at bedtime for 30 days   DISP: (30) Tablet with 2 refills  Discontinued on 01/14/2021     o Proctozone-HC 2.5 % topical cream with perineal applicator   SIG: apply a thin layer to the affected area(s) by topical route 2 times per day for 14 days   DISP: (1) Tube with 0 refills  Discontinued on 01/14/2021     o Viibryd 40 mg oral tablet   SIG: take 1 tablet (40 mg) by oral route once daily with food for 30 days   DISP: (30) tablets with 5 refills  Discontinued on 01/14/2021     o Medications have been Reconciled  o Transition of Care or Provider Policy  · Instructions  o Patient was given an SSRI/SSNRI medication and warned of possible side effects of the medication including potential for increased risk of suicidal thoughts and feelings. Patient was instructed that if they begin to exhibit any of these effects they will discontinue the medication immediately and contact our office or the ER ASAP.  o *Form of nicotine being used: cigarette  o Patient was strongly encouraged to discontinue use of any nicotine containing product or minimize the use of the product.  o Patient was educated/instructed on their diagnosis, treatment and medications prior to discharge from the clinic  today.  o Call the office with any concerns or questions.  · Disposition  o FOLLOW UP PRN            Electronically Signed by: TESSA Cerrato -Author on January 14, 2021 03:01:55 PM

## 2021-05-14 NOTE — PROGRESS NOTES
Progress Note      Patient Name: Bianca Lara   Patient ID: [id number]   Sex: Female   YOB: 1970    Primary Care Provider: Reina ZARAET   Referring Provider: Reina ZARATE    Visit Date: February 23, 2021    Provider: TESSA Dodson   Location: [facility] [address]   Location Address: [address] [place] [address]  [id number]   Location Phone: [phone]          Chief Complaint  · F/U      History Of Present Illness  Bianca Lara is a 50 year old /Black female who presents to the office today.      Patient initially presented in 2016 with blood in the stool, RUQ pain and heartburn.  She had a colonoscopy in 2016 by Dr. Gonzalez that was negative except for internal hemorrhoids, history of H. pylori in 2016, EGD was done by Dr. Montana at that time.    Patient was not seen from 2016 until she presented again in November 2020 with complaints of loose stools, noted as a problem since gallbladder removal in 2014, but she had also seen blood in the stool.  No other GI symptoms, colonoscopy was ordered.  Pt states she felt she was having abd gas c/o that she couldn't pass and was causing upper abd and chest pressure, so EGD was added.     EGD colonoscopy 1/4/2021: Schatzki's ring found, nonerosive esophagitis, small hiatal hernia, diffuse erythema in the antrum--increased mucosal eosinophils otherwise negative, normal duodenum; adequate prep, 2 small HP polyps in the sigmoid completely removed, grade 1 internal hemorrhoids Anusol was given, random colon biopsies negative    Pt states she is doing well now. NO c/o abd gas, no blood in stool, pt states BM's have been much more regular since colonoscopy. Pt states she's not been given a PPI and is not taking one. She does report hx of dysphagia, states resolved after scope. Denies HB , abd pain, or dysphagia.       Past Medical History  Allergic rhinitis; Anxiety and depression; Blood in stool;  "Dysmenorrhea; Gross hematuria; Helicobacter positive gastritis; Irregular Menses; Low back pain; Meatal Stenosis; Microscopic hematuria; Night sweats; Onychomycosis of toenail; Pap smear for cervical cancer screening; Right Sided Abdominal Pain; Screening Mammogram; STD exposure; Tobacco Abuse, Chronic; Vitamin D deficiency         Past Surgical History  Breast biopsy, left breast; Cholecystectomy; Colonoscopy; Cyst Removal; Cystoscopy         Medication List  VALACYCLOVIR 1GM TABLETS         Allergy List  \"All Cillins\"; Cymbalta; lisinopril; PENICILLINS         Family Medical History  Stomach Neoplasm, Malignant; Stroke; Alzheimer's Disease; Cancer, Unspecified; Cardiovascular disease; Diabetes, unspecified type; Leukemia         Reproductive History   5 Para 5 0 0 0 & Postmenopausal       Social History  Alcohol (Current some day); ; Food services; Tobacco (Current every day)         Immunizations  Name Date Admin   COVID Moderna 2021   Influenza 10/29/2020   Influenza Refused         Review of Systems  · Constitutional  o Admits  o : good general health lately, no acute distress  · Gastrointestinal  o Denies  o : additional gastrointestinal symptoms except as noted in the HPI  · Psychiatric  o Admits  o : pleasant affect      Vitals  Date Time BP Position Site L\R Cuff Size HR RR TEMP (F) WT  HT  BMI kg/m2 BSA m2 O2 Sat FR L/min FiO2 HC       2021 03:59 /110 Sitting    87 - R  99.1 229lbs 4oz 5'  3\" 40.61 2.15             Physical Examination  · Constitutional  o Appearance  o : well developed, well-nourished, in no acute distress  · Head and Face  o Head  o :   § Inspection  § : atraumatic, normocephalic  · Eyes  o Sclerae  o : sclerae white, no sclerae icterus  · Neck  o Inspection/Palpation  o : supple  · Respiratory  o Respiratory Effort  o : breathing unlabored  o Inspection of Chest  o : normal appearance, no retractions  · Cardiovascular  o Peripheral Vascular System  o : "   § Extremities  § : no cyanosis, clubbing or edema  · Gastrointestinal  o Abdominal Examination  o : soft, nontender to palpation  · Skin and Subcutaneous Tissue  o General Inspection  o : no lesions present, no rashes present  · Neurologic  o Mental Status Examination  o :   § Orientation  § : grossly oriented to person, place and time  § Speech/Language  § : communication ability within normal limits, voice quality normal, articulation of speech normal, no evidence of aphasia  § Attention  § : attention normal, concentration abilities normal  o Sensation  o : grossly intact  o Gait and Station  o :   § Gait Screening  § : normal gait  · Psychiatric  o Mood and Affect  o : Mood and affect are appropriate to circumstances          Assessment  · Hemorrhoids, internal     455.0/K64.8  · Hiatal hernia     553.3/K44.9  · Reflux esophagitis     530.11/K21.0  +eosinophils on bx  · Schatzki's ring     750.3/K22.2  · Loose stools     787.7/R19.5  Resolved  · Polyp of sigmoid colon     211.3/K63.5  · Gastritis     535.50/K29.70  +eosinophils on stomach bx      Plan  · Medications  o Medications have been Reconciled  o Transition of Care or Provider Policy  · Instructions  o Patient was educated/instructed on their diagnosis, treatment and medications prior to discharge from the clinic today.  o Patient instructed to seek medical attention urgently for new or worsening symptoms.  o Recall: 5 years Colon  o F/U PRN. Advised pt to call if any GI symptoms such as change in bowel pattern, abd pain, wt loss, or blood in stool.   o I r/w DR Gonzalez increased eosinophils noted on bx of stomach and GE jxn; he requested CBC w diff and further plan to go from there.             Electronically Signed by: TESSA Dodson -Author on February 23, 2021 05:11:33 PM

## 2021-05-15 VITALS
WEIGHT: 199.5 LBS | DIASTOLIC BLOOD PRESSURE: 80 MMHG | HEART RATE: 86 BPM | SYSTOLIC BLOOD PRESSURE: 124 MMHG | TEMPERATURE: 97 F | HEIGHT: 64 IN | OXYGEN SATURATION: 98 % | BODY MASS INDEX: 34.06 KG/M2 | RESPIRATION RATE: 18 BRPM

## 2021-05-15 VITALS
HEIGHT: 63 IN | TEMPERATURE: 98.7 F | DIASTOLIC BLOOD PRESSURE: 91 MMHG | HEART RATE: 100 BPM | WEIGHT: 198.5 LBS | OXYGEN SATURATION: 95 % | BODY MASS INDEX: 35.17 KG/M2 | RESPIRATION RATE: 24 BRPM | SYSTOLIC BLOOD PRESSURE: 129 MMHG

## 2021-05-15 VITALS
WEIGHT: 194 LBS | DIASTOLIC BLOOD PRESSURE: 92 MMHG | HEART RATE: 83 BPM | RESPIRATION RATE: 18 BRPM | SYSTOLIC BLOOD PRESSURE: 148 MMHG | OXYGEN SATURATION: 97 % | HEIGHT: 64 IN | BODY MASS INDEX: 33.12 KG/M2

## 2021-05-15 VITALS
BODY MASS INDEX: 33.52 KG/M2 | HEART RATE: 65 BPM | HEIGHT: 64 IN | WEIGHT: 196.37 LBS | TEMPERATURE: 96.9 F | SYSTOLIC BLOOD PRESSURE: 137 MMHG | DIASTOLIC BLOOD PRESSURE: 89 MMHG | OXYGEN SATURATION: 98 % | RESPIRATION RATE: 18 BRPM

## 2021-05-15 VITALS
DIASTOLIC BLOOD PRESSURE: 75 MMHG | BODY MASS INDEX: 32.78 KG/M2 | OXYGEN SATURATION: 97 % | SYSTOLIC BLOOD PRESSURE: 107 MMHG | RESPIRATION RATE: 24 BRPM | WEIGHT: 192 LBS | HEIGHT: 64 IN | HEART RATE: 90 BPM

## 2021-05-15 VITALS
WEIGHT: 190 LBS | HEIGHT: 64 IN | RESPIRATION RATE: 22 BRPM | TEMPERATURE: 98.1 F | HEART RATE: 88 BPM | DIASTOLIC BLOOD PRESSURE: 95 MMHG | OXYGEN SATURATION: 97 % | BODY MASS INDEX: 32.44 KG/M2 | SYSTOLIC BLOOD PRESSURE: 145 MMHG

## 2021-05-15 VITALS
HEART RATE: 115 BPM | DIASTOLIC BLOOD PRESSURE: 10 MMHG | BODY MASS INDEX: 35.48 KG/M2 | TEMPERATURE: 96.7 F | WEIGHT: 200.25 LBS | HEIGHT: 63 IN | SYSTOLIC BLOOD PRESSURE: 156 MMHG

## 2021-05-16 VITALS
DIASTOLIC BLOOD PRESSURE: 90 MMHG | OXYGEN SATURATION: 98 % | RESPIRATION RATE: 23 BRPM | HEIGHT: 64 IN | BODY MASS INDEX: 30.56 KG/M2 | WEIGHT: 179 LBS | HEART RATE: 86 BPM | SYSTOLIC BLOOD PRESSURE: 115 MMHG | TEMPERATURE: 97.8 F

## 2021-05-16 VITALS
TEMPERATURE: 97.7 F | OXYGEN SATURATION: 100 % | HEART RATE: 75 BPM | HEIGHT: 64 IN | BODY MASS INDEX: 29.19 KG/M2 | RESPIRATION RATE: 21 BRPM | SYSTOLIC BLOOD PRESSURE: 120 MMHG | WEIGHT: 171 LBS | DIASTOLIC BLOOD PRESSURE: 88 MMHG

## 2021-05-16 VITALS
HEART RATE: 91 BPM | TEMPERATURE: 97.7 F | RESPIRATION RATE: 21 BRPM | WEIGHT: 181 LBS | HEIGHT: 64 IN | OXYGEN SATURATION: 99 % | BODY MASS INDEX: 30.9 KG/M2

## 2021-06-18 ENCOUNTER — TELEPHONE (OUTPATIENT)
Dept: GASTROENTEROLOGY | Facility: CLINIC | Age: 51
End: 2021-06-18

## 2021-06-18 DIAGNOSIS — K52.81 EOSINOPHILIC GASTROENTERITIS: Primary | ICD-10-CM

## 2021-06-18 NOTE — TELEPHONE ENCOUNTER
[place] Task: Pt needs second opinion with Dr. Perales for stomach bx increased eosinophilic, eosinophilic gastroenteritis. Spoke with pt, pt agreeable. Please create order and send back so we can schedule.

## 2021-08-19 ENCOUNTER — OFFICE VISIT (OUTPATIENT)
Dept: FAMILY MEDICINE CLINIC | Facility: CLINIC | Age: 51
End: 2021-08-19

## 2021-08-19 VITALS
SYSTOLIC BLOOD PRESSURE: 136 MMHG | DIASTOLIC BLOOD PRESSURE: 88 MMHG | BODY MASS INDEX: 37.03 KG/M2 | TEMPERATURE: 97.5 F | HEART RATE: 76 BPM | HEIGHT: 63 IN | OXYGEN SATURATION: 98 % | RESPIRATION RATE: 18 BRPM | WEIGHT: 209 LBS

## 2021-08-19 DIAGNOSIS — M51.36 DDD (DEGENERATIVE DISC DISEASE), LUMBAR: ICD-10-CM

## 2021-08-19 DIAGNOSIS — E66.09 CLASS 2 OBESITY DUE TO EXCESS CALORIES WITHOUT SERIOUS COMORBIDITY WITH BODY MASS INDEX (BMI) OF 37.0 TO 37.9 IN ADULT: Primary | ICD-10-CM

## 2021-08-19 PROBLEM — K92.1 HEMATOCHEZIA: Status: ACTIVE | Noted: 2021-08-19

## 2021-08-19 PROBLEM — Z20.2 STD EXPOSURE: Status: ACTIVE | Noted: 2021-08-19

## 2021-08-19 PROBLEM — IMO0002 STENOSIS OF URINARY MEATUS: Status: ACTIVE | Noted: 2021-08-19

## 2021-08-19 PROBLEM — J30.9 ALLERGIC RHINITIS: Status: ACTIVE | Noted: 2021-08-19

## 2021-08-19 PROBLEM — R61 NIGHT SWEATS: Status: ACTIVE | Noted: 2021-08-19

## 2021-08-19 PROBLEM — F41.9 ANXIETY AND DEPRESSION: Status: ACTIVE | Noted: 2018-09-11

## 2021-08-19 PROBLEM — N92.6 IRREGULAR MENSES: Status: ACTIVE | Noted: 2021-08-19

## 2021-08-19 PROBLEM — E55.9 VITAMIN D DEFICIENCY: Status: ACTIVE | Noted: 2021-08-19

## 2021-08-19 PROBLEM — F32.A ANXIETY AND DEPRESSION: Status: ACTIVE | Noted: 2018-09-11

## 2021-08-19 PROBLEM — R31.0 GROSS HEMATURIA: Status: ACTIVE | Noted: 2021-08-19

## 2021-08-19 PROBLEM — N94.6 DYSMENORRHEA: Status: ACTIVE | Noted: 2021-08-19

## 2021-08-19 PROCEDURE — 99213 OFFICE O/P EST LOW 20 MIN: CPT | Performed by: NURSE PRACTITIONER

## 2021-08-19 RX ORDER — VALACYCLOVIR HYDROCHLORIDE 500 MG/1
TABLET, FILM COATED ORAL
COMMUNITY
Start: 2021-08-18 | End: 2021-11-12

## 2021-08-19 RX ORDER — TERBINAFINE HYDROCHLORIDE 250 MG/1
TABLET ORAL
COMMUNITY
Start: 2021-06-15 | End: 2023-03-08

## 2021-09-02 ENCOUNTER — TELEPHONE (OUTPATIENT)
Dept: FAMILY MEDICINE CLINIC | Facility: CLINIC | Age: 51
End: 2021-09-02

## 2021-09-02 DIAGNOSIS — B35.1 ONYCHOMYCOSIS OF TOENAIL: Primary | ICD-10-CM

## 2021-09-02 NOTE — TELEPHONE ENCOUNTER
Patient called states tried to make an appt with the Podiatrist but they states she needed a new referral before they can schedule.  She goes to [facility]. I told her that they probably just need a new order that [employer] does not require referrals.  She is seeing them for a foot fungus

## 2021-09-03 ENCOUNTER — TELEPHONE (OUTPATIENT)
Dept: FAMILY MEDICINE CLINIC | Facility: CLINIC | Age: 51
End: 2021-09-03

## 2021-09-03 NOTE — TELEPHONE ENCOUNTER
Caller: Bianca Lara    Relationship: Self    Best call back number: [phone]    Who are you requesting to speak with (clinical staff, provider,  specific staff member): MEDICAL STAFF    What was the call regarding: PATIENT WOULD LIKE AN UPDATE ON [facility]. SHE STATED THAT OFFICE WAS SUPPOSED TO BE TRYING TO GET IT APPROVED THROUGH INSURANCE. PLEASE CALL TO ADVISE.

## 2021-09-03 NOTE — TELEPHONE ENCOUNTER
Caller: Bianca Lara    Relationship: Self    Best call back number: [phone]    What is the medical concern/diagnosis: TOE FUNGUS    What specialty or service is being requested: PODIATRY    What is the provider, practice or medical service name: [facility]     What is the office location: [address] [place] [address]    What is the office phone number: [phone]    Any additional details: PATIENT SPOKE WITH [facility] AND THEY STATED THEY NEEDED A REFERRAL.

## 2021-09-07 ENCOUNTER — HOSPITAL ENCOUNTER (OUTPATIENT)
Dept: MRI IMAGING | Facility: HOSPITAL | Age: 51
Discharge: HOME | End: 2021-09-07
Admitting: NURSE PRACTITIONER

## 2021-09-07 DIAGNOSIS — M51.36 DDD (DEGENERATIVE DISC DISEASE), LUMBAR: ICD-10-CM

## 2021-09-07 PROCEDURE — 72148 MRI LUMBAR SPINE W/O DYE: CPT

## 2021-09-08 NOTE — TELEPHONE ENCOUNTER
Referral placed, faxed to [phone]. Advised pt via voicemail (ok per ROSALINA) that she can call to schedule appt now.

## 2021-09-09 ENCOUNTER — TELEPHONE (OUTPATIENT)
Dept: FAMILY MEDICINE CLINIC | Facility: CLINIC | Age: 51
End: 2021-09-09

## 2021-09-10 ENCOUNTER — TELEPHONE (OUTPATIENT)
Dept: FAMILY MEDICINE CLINIC | Facility: CLINIC | Age: 51
End: 2021-09-10

## 2021-09-10 DIAGNOSIS — M51.36 DDD (DEGENERATIVE DISC DISEASE), LUMBAR: ICD-10-CM

## 2021-09-10 DIAGNOSIS — M48.061 FORAMINAL STENOSIS OF LUMBAR REGION: Primary | ICD-10-CM

## 2021-09-24 ENCOUNTER — OFFICE VISIT (OUTPATIENT)
Dept: NEUROSURGERY | Facility: CLINIC | Age: 51
End: 2021-09-24

## 2021-09-24 VITALS
WEIGHT: 209 LBS | SYSTOLIC BLOOD PRESSURE: 149 MMHG | BODY MASS INDEX: 37.03 KG/M2 | DIASTOLIC BLOOD PRESSURE: 86 MMHG | HEIGHT: 63 IN

## 2021-09-24 DIAGNOSIS — G89.29 CHRONIC MIDLINE LOW BACK PAIN WITHOUT SCIATICA: Primary | ICD-10-CM

## 2021-09-24 DIAGNOSIS — M47.816 FACET ARTHRITIS OF LUMBAR REGION: ICD-10-CM

## 2021-09-24 DIAGNOSIS — M54.50 CHRONIC MIDLINE LOW BACK PAIN WITHOUT SCIATICA: Primary | ICD-10-CM

## 2021-09-24 PROCEDURE — 99203 OFFICE O/P NEW LOW 30 MIN: CPT | Performed by: NEUROLOGICAL SURGERY

## 2021-09-24 NOTE — PROGRESS NOTES
"Chief Complaint  Back Pain    Subjective          Bianca Lara who is a 50 y.o. year old female who presents to [facility] for Evaluation of the Spine.     The patient complains of pain located in the Lumbar Spine.  Patients states the pain has been present for 25 years.  The pain came on acutely.  The pain scale level is 8-10/10 in severity.  The pain radiates into the bilateral thigh.  The pain is waxing/waning and described as throbbing.  The pain is worse at no particular time of day. Patient states nothing improves the pain.  Patient states walking, staying in one position for an extended time makes the pain worse.    Associated Symptoms Include: No numbness or tingling  Conservative Interventions Include: Pain killers not very helpful. Chiropractor helps some, but briefly.    Was this the result of an injury or accident?: Yes, Work Injury bending over to  package    History of Previous Spinal Surgery?: No    She reports that she has been smoking. She has been smoking about 0.25 packs per day. She has never used smokeless tobacco.    Review of Systems   Musculoskeletal: Positive for back pain and myalgias.        Objective   Vital Signs:   /86 (BP Location: Left arm, Patient Position: Sitting)   Ht 160 cm (63\")   Wt 94.8 kg (209 lb)   BMI 37.02 kg/m²       Physical Exam  Cardiovascular:      Comments: No edema  Pulmonary:      Effort: Pulmonary effort is normal.   Musculoskeletal:      Comments: TTP in the lower lumbar  SLR-     Neurological:      Mental Status: She is alert.      Sensory: No sensory deficit.      Motor: No weakness.      Deep Tendon Reflexes: Reflexes normal.   Psychiatric:         Mood and Affect: Mood normal.          Result Review :   I personally reviewed the patient's MRI scan which shows DDD at L4-5 with mild to moderate foraminal narrowing. There is some facet arthritis most notable at L4-5.       Assessment and Plan  "   Diagnoses and all orders for this visit:    1. Chronic midline low back pain without sciatica (Primary)    2. Facet arthritis of lumbar region      She will work on weight loss, core exercises. She would potentially benefit from a facet injection/ablation.    We discussed the importance of smoking/nicotine cessation. Smoking/nicotine use has multiple health risks. In particular related to the spine, nicotine increases the incidence of lower back pain, speeds up the progression of degenerative disc disease and dramatically reduces healing after spine surgery (particularly a fusion operation).       Follow Up   No follow-ups on file.  Patient was given instructions and counseling regarding her condition or for health maintenance advice. Please see specific information pulled into the AVS if appropriate.

## 2021-11-04 RX ORDER — VILAZODONE HYDROCHLORIDE 40 MG/1
TABLET ORAL
Qty: 90 TABLET | Refills: 0 | Status: SHIPPED | OUTPATIENT
Start: 2021-11-04 | End: 2022-12-14

## 2021-11-12 RX ORDER — VALACYCLOVIR HYDROCHLORIDE 500 MG/1
TABLET, FILM COATED ORAL
Qty: 90 TABLET | Refills: 0 | Status: SHIPPED | OUTPATIENT
Start: 2021-11-12 | End: 2022-08-15 | Stop reason: SDUPTHER

## 2022-08-15 RX ORDER — VALACYCLOVIR HYDROCHLORIDE 500 MG/1
500 TABLET, FILM COATED ORAL DAILY
Qty: 60 TABLET | Refills: 0 | Status: SHIPPED | OUTPATIENT
Start: 2022-08-15 | End: 2022-11-29 | Stop reason: SDUPTHER

## 2022-08-15 NOTE — TELEPHONE ENCOUNTER
Caller: Bianca Lara    Relationship: Self    Best call back number: [phone]    Requested Prescriptions:   Requested Prescriptions     Pending Prescriptions Disp Refills   • valACYclovir (VALTREX) 500 MG tablet 90 tablet 0        Pharmacy where request should be sent: [id number] - [place], [place] - [address] [facility] AT [facility] OF [address] & [place] - [phone] [facility] [phone] FX     Additional details provided by patient: PATIENT IS FULLY OUT OF THE MEDICATION.    Does the patient have less than a 3 day supply:  [x] Yes  [] No    Jonatan Lira Rep   08/15/22 15:53 EDT

## 2022-11-29 ENCOUNTER — TELEPHONE (OUTPATIENT)
Dept: FAMILY MEDICINE CLINIC | Facility: CLINIC | Age: 52
End: 2022-11-29

## 2022-11-29 DIAGNOSIS — B00.9 HERPES: Primary | ICD-10-CM

## 2022-11-29 RX ORDER — VALACYCLOVIR HYDROCHLORIDE 500 MG/1
500 TABLET, FILM COATED ORAL DAILY
Qty: 60 TABLET | Refills: 0 | Status: SHIPPED | OUTPATIENT
Start: 2022-11-29 | End: 2022-12-14 | Stop reason: SDUPTHER

## 2022-12-14 ENCOUNTER — OFFICE VISIT (OUTPATIENT)
Dept: FAMILY MEDICINE CLINIC | Facility: CLINIC | Age: 52
End: 2022-12-14

## 2022-12-14 VITALS
SYSTOLIC BLOOD PRESSURE: 158 MMHG | HEART RATE: 81 BPM | WEIGHT: 192 LBS | OXYGEN SATURATION: 99 % | BODY MASS INDEX: 34.01 KG/M2 | DIASTOLIC BLOOD PRESSURE: 100 MMHG | TEMPERATURE: 97.7 F

## 2022-12-14 DIAGNOSIS — F17.218 CIGARETTE NICOTINE DEPENDENCE WITH OTHER NICOTINE-INDUCED DISORDER: ICD-10-CM

## 2022-12-14 DIAGNOSIS — Z13.29 SCREENING FOR THYROID DISORDER: ICD-10-CM

## 2022-12-14 DIAGNOSIS — Z13.6 ENCOUNTER FOR SCREENING FOR CARDIOVASCULAR DISORDERS: ICD-10-CM

## 2022-12-14 DIAGNOSIS — M51.36 DDD (DEGENERATIVE DISC DISEASE), LUMBAR: ICD-10-CM

## 2022-12-14 DIAGNOSIS — F32.A ANXIETY AND DEPRESSION: Primary | ICD-10-CM

## 2022-12-14 DIAGNOSIS — F41.9 ANXIETY AND DEPRESSION: Primary | ICD-10-CM

## 2022-12-14 DIAGNOSIS — I10 PRIMARY HYPERTENSION: ICD-10-CM

## 2022-12-14 DIAGNOSIS — B00.9 HERPES: ICD-10-CM

## 2022-12-14 DIAGNOSIS — E55.9 VITAMIN D DEFICIENCY: ICD-10-CM

## 2022-12-14 DIAGNOSIS — R73.01 IMPAIRED FASTING GLUCOSE: ICD-10-CM

## 2022-12-14 DIAGNOSIS — B00.1 COLD SORE: ICD-10-CM

## 2022-12-14 PROBLEM — M51.369 DDD (DEGENERATIVE DISC DISEASE), LUMBAR: Status: ACTIVE | Noted: 2022-12-14

## 2022-12-14 LAB
25(OH)D3 SERPL-MCNC: 18.7 NG/ML (ref 30–100)
ALBUMIN SERPL-MCNC: 4.8 G/DL (ref 3.5–5.2)
ALBUMIN/GLOB SERPL: 1.7 G/DL
ALP SERPL-CCNC: 98 U/L (ref 39–117)
ALT SERPL W P-5'-P-CCNC: 12 U/L (ref 1–33)
ANION GAP SERPL CALCULATED.3IONS-SCNC: 7.5 MMOL/L (ref 5–15)
AST SERPL-CCNC: 16 U/L (ref 1–32)
BILIRUB SERPL-MCNC: <0.2 MG/DL (ref 0–1.2)
BUN SERPL-MCNC: 16 MG/DL (ref 6–20)
BUN/CREAT SERPL: 21.6 (ref 7–25)
CALCIUM SPEC-SCNC: 10.3 MG/DL (ref 8.6–10.5)
CHLORIDE SERPL-SCNC: 102 MMOL/L (ref 98–107)
CHOLEST SERPL-MCNC: 188 MG/DL (ref 0–200)
CO2 SERPL-SCNC: 30.5 MMOL/L (ref 22–29)
CREAT SERPL-MCNC: 0.74 MG/DL (ref 0.57–1)
DEPRECATED RDW RBC AUTO: 44 FL (ref 37–54)
EGFRCR SERPLBLD CKD-EPI 2021: 97.5 ML/MIN/1.73
ERYTHROCYTE [DISTWIDTH] IN BLOOD BY AUTOMATED COUNT: 13.3 % (ref 12.3–15.4)
GLOBULIN UR ELPH-MCNC: 2.8 GM/DL
GLUCOSE SERPL-MCNC: 80 MG/DL (ref 65–99)
HBA1C MFR BLD: 5.9 % (ref 4.8–5.6)
HCT VFR BLD AUTO: 45.9 % (ref 34–46.6)
HDLC SERPL-MCNC: 61 MG/DL (ref 40–60)
HGB BLD-MCNC: 14.3 G/DL (ref 12–15.9)
LDLC SERPL CALC-MCNC: 112 MG/DL (ref 0–100)
LDLC/HDLC SERPL: 1.81 {RATIO}
MCH RBC QN AUTO: 27.9 PG (ref 26.6–33)
MCHC RBC AUTO-ENTMCNC: 31.2 G/DL (ref 31.5–35.7)
MCV RBC AUTO: 89.6 FL (ref 79–97)
PLATELET # BLD AUTO: 411 10*3/MM3 (ref 140–450)
PMV BLD AUTO: 9.7 FL (ref 6–12)
POTASSIUM SERPL-SCNC: 4.3 MMOL/L (ref 3.5–5.2)
PROT SERPL-MCNC: 7.6 G/DL (ref 6–8.5)
RBC # BLD AUTO: 5.12 10*6/MM3 (ref 3.77–5.28)
SODIUM SERPL-SCNC: 140 MMOL/L (ref 136–145)
TRIGL SERPL-MCNC: 83 MG/DL (ref 0–150)
TSH SERPL DL<=0.05 MIU/L-ACNC: 0.92 UIU/ML (ref 0.27–4.2)
VLDLC SERPL-MCNC: 15 MG/DL (ref 5–40)
WBC NRBC COR # BLD: 5.43 10*3/MM3 (ref 3.4–10.8)

## 2022-12-14 PROCEDURE — 99214 OFFICE O/P EST MOD 30 MIN: CPT | Performed by: NURSE PRACTITIONER

## 2022-12-14 PROCEDURE — 83036 HEMOGLOBIN GLYCOSYLATED A1C: CPT | Performed by: NURSE PRACTITIONER

## 2022-12-14 PROCEDURE — 85027 COMPLETE CBC AUTOMATED: CPT | Performed by: NURSE PRACTITIONER

## 2022-12-14 PROCEDURE — 80061 LIPID PANEL: CPT | Performed by: NURSE PRACTITIONER

## 2022-12-14 PROCEDURE — 80053 COMPREHEN METABOLIC PANEL: CPT | Performed by: NURSE PRACTITIONER

## 2022-12-14 PROCEDURE — 84443 ASSAY THYROID STIM HORMONE: CPT | Performed by: NURSE PRACTITIONER

## 2022-12-14 PROCEDURE — 82306 VITAMIN D 25 HYDROXY: CPT | Performed by: NURSE PRACTITIONER

## 2022-12-14 RX ORDER — VARENICLINE TARTRATE 1 MG/1
1 TABLET, FILM COATED ORAL 2 TIMES DAILY
Qty: 60 TABLET | Refills: 1 | Status: SHIPPED | OUTPATIENT
Start: 2022-12-14

## 2022-12-14 RX ORDER — CYCLOBENZAPRINE HCL 10 MG
10 TABLET ORAL 3 TIMES DAILY PRN
Qty: 30 TABLET | Refills: 2 | Status: SHIPPED | OUTPATIENT
Start: 2022-12-14

## 2022-12-14 RX ORDER — VARENICLINE TARTRATE 0.5 MG/1
0.5 TABLET, FILM COATED ORAL 2 TIMES DAILY
Qty: 60 TABLET | Refills: 0 | Status: SHIPPED | OUTPATIENT
Start: 2022-12-14

## 2022-12-14 RX ORDER — VALACYCLOVIR HYDROCHLORIDE 500 MG/1
500 TABLET, FILM COATED ORAL DAILY
Qty: 60 TABLET | Refills: 0 | Status: SHIPPED | OUTPATIENT
Start: 2022-12-14

## 2022-12-14 RX ORDER — ADHESIVE BANDAGE 3/4"
1 BANDAGE TOPICAL TAKE AS DIRECTED
Qty: 1 EACH | Refills: 0 | Status: SHIPPED | OUTPATIENT
Start: 2022-12-14

## 2022-12-14 RX ORDER — LOSARTAN POTASSIUM 25 MG/1
25 TABLET ORAL DAILY
Qty: 90 TABLET | Refills: 1 | Status: SHIPPED | OUTPATIENT
Start: 2022-12-14

## 2022-12-14 RX ORDER — CELECOXIB 100 MG/1
CAPSULE ORAL
COMMUNITY
Start: 2022-11-28 | End: 2022-12-14

## 2022-12-14 RX ORDER — VILAZODONE HYDROCHLORIDE 20 MG/1
20 TABLET ORAL DAILY
Qty: 90 TABLET | Refills: 1 | Status: SHIPPED | OUTPATIENT
Start: 2022-12-14

## 2022-12-14 NOTE — PATIENT INSTRUCTIONS
For more information:    Quit Now [place]  1-800-QUIT-NOW  https://[place].quitlogix.org/en-[place]/  Steps to Quit Smoking  Smoking tobacco can be harmful to your health and can affect almost every organ in your body. Smoking puts you, and those around you, at risk for developing many serious chronic diseases. Quitting smoking is difficult, but it is one of the best things that you can do for your health. It is never too late to quit.  What are the benefits of quitting smoking?  When you quit smoking, you lower your risk of developing serious diseases and conditions, such as:  Lung cancer or lung disease, such as COPD.  Heart disease.  Stroke.  Heart attack.  Infertility.  Osteoporosis and bone fractures.  Additionally, symptoms such as coughing, wheezing, and shortness of breath may get better when you quit. You may also find that you get sick less often because your body is stronger at fighting off colds and infections. If you are pregnant, quitting smoking can help to reduce your chances of having a baby of low birth weight.  How do I get ready to quit?  When you decide to quit smoking, create a plan to make sure that you are successful. Before you quit:  Pick a date to quit. Set a date within the next two weeks to give you time to prepare.  Write down the reasons why you are quitting. Keep this list in places where you will see it often, such as on your bathroom mirror or in your car or wallet.  Identify the people, places, things, and activities that make you want to smoke (triggers) and avoid them. Make sure to take these actions:  Throw away all cigarettes at home, at work, and in your car.  Throw away smoking accessories, such as ashtrays and lighters.  Clean your car and make sure to empty the ashtray.  Clean your home, including curtains and carpets.  Tell your family, friends, and coworkers that you are quitting. Support from your loved ones can make quitting easier.  Talk with your health care provider  about your options for quitting smoking.  Find out what treatment options are covered by your health insurance.  What strategies can I use to quit smoking?  Talk with your healthcare provider about different strategies to quit smoking. Some strategies include:  Quitting smoking altogether instead of gradually lessening how much you smoke over a period of time. Research shows that quitting “cold turkey” is more successful than gradually quitting.  Attending in-person counseling to help you build problem-solving skills. You are more likely to have success in quitting if you attend several counseling sessions. Even short sessions of 10 minutes can be effective.  Finding resources and support systems that can help you to quit smoking and remain smoke-free after you quit. These resources are most helpful when you use them often. They can include:  Online chats with a counselor.  Telephone quitlines.  Printed self-help materials.  Support groups or group counseling.  Text messaging programs.  Mobile phone applications.  Taking medicines to help you quit smoking. (If you are pregnant or breastfeeding, talk with your health care provider first.) Some medicines contain nicotine and some do not. Both types of medicines help with cravings, but the medicines that include nicotine help to relieve withdrawal symptoms. Your health care provider may recommend:  Nicotine patches, gum, or lozenges.  Nicotine inhalers or sprays.  Non-nicotine medicine that is taken by mouth.  Talk with your health care provider about combining strategies, such as taking medicines while you are also receiving in-person counseling. Using these two strategies together makes you more likely to succeed in quitting than if you used either strategy on its own.  If you are pregnant or breastfeeding, talk with your health care provider about finding counseling or other support strategies to quit smoking. Do not take medicine to help you quit smoking unless  told to do so by your health care provider.  What things can I do to make it easier to quit?  Quitting smoking might feel overwhelming at first, but there is a lot that you can do to make it easier. Take these important actions:  Reach out to your family and friends and ask that they support and encourage you during this time. Call telephone quitlines, reach out to support groups, or work with a counselor for support.  Ask people who smoke to avoid smoking around you.  Avoid places that trigger you to smoke, such as bars, parties, or smoke-break areas at work.  Spend time around people who do not smoke.  Lessen stress in your life, because stress can be a smoking trigger for some people. To lessen stress, try:  Exercising regularly.  Deep-breathing exercises.  Yoga.  Meditating.  Performing a body scan. This involves closing your eyes, scanning your body from head to toe, and noticing which parts of your body are particularly tense. Purposefully relax the muscles in those areas.  Download or purchase mobile phone or tablet apps (applications) that can help you stick to your quit plan by providing reminders, tips, and encouragement. There are many free apps, such as QuitGuide from the CDC (Centers for Disease Control and Prevention). You can find other support for quitting smoking (smoking cessation) through smokefree.gov and other websites.  How will I feel when I quit smoking?  Within the first 24 hours of quitting smoking, you may start to feel some withdrawal symptoms. These symptoms are usually most noticeable 2-3 days after quitting, but they usually do not last beyond 2-3 weeks. Changes or symptoms that you might experience include:  Mood swings.  Restlessness, anxiety, or irritation.  Difficulty concentrating.  Dizziness.  Strong cravings for sugary foods in addition to nicotine.  Mild weight gain.  Constipation.  Nausea.  Coughing or a sore throat.  Changes in how your medicines work in your body.  A  "depressed mood.  Difficulty sleeping (insomnia).  After the first 2-3 weeks of quitting, you may start to notice more positive results, such as:  Improved sense of smell and taste.  Decreased coughing and sore throat.  Slower heart rate.  Lower blood pressure.  Clearer skin.  The ability to breathe more easily.  Fewer sick days.  Quitting smoking is very challenging for most people. Do not get discouraged if you are not successful the first time. Some people need to make many attempts to quit before they achieve long-term success. Do your best to stick to your quit plan, and talk with your health care provider if you have any questions or concerns.  This information is not intended to replace advice given to you by your health care provider. Make sure you discuss any questions you have with your health care provider.  Document Released: 12/12/2002 Document Revised: 08/15/2017 Document Reviewed: 05/03/2016  [employer] Interactive Patient Education © 2017 [employer]  Hypertension, Adult  High blood pressure (hypertension) is when the force of blood pumping through the arteries is too strong. The arteries are the blood vessels that carry blood from the heart throughout the body. Hypertension forces the heart to work harder to pump blood and may cause arteries to become narrow or stiff. Untreated or uncontrolled hypertension can cause a heart attack, heart failure, a stroke, kidney disease, and other problems.  A blood pressure reading consists of a higher number over a lower number. Ideally, your blood pressure should be below 120/80. The first (\"top\") number is called the systolic pressure. It is a measure of the pressure in your arteries as your heart beats. The second (\"bottom\") number is called the diastolic pressure. It is a measure of the pressure in your arteries as the heart relaxes.  What are the causes?  The exact cause of this condition is not known. There are some conditions that result in or are related " to high blood pressure.  What increases the risk?  Some risk factors for high blood pressure are under your control. The following factors may make you more likely to develop this condition:  Smoking.  Having type 2 diabetes mellitus, high cholesterol, or both.  Not getting enough exercise or physical activity.  Being overweight.  Having too much fat, sugar, calories, or salt (sodium) in your diet.  Drinking too much alcohol.  Some risk factors for high blood pressure may be difficult or impossible to change. Some of these factors include:  Having chronic kidney disease.  Having a family history of high blood pressure.  Age. Risk increases with age.  Race. You may be at higher risk if you are .  Gender. Men are at higher risk than women before age 45. After age 65, women are at higher risk than men.  Having obstructive sleep apnea.  Stress.  What are the signs or symptoms?  High blood pressure may not cause symptoms. Very high blood pressure (hypertensive crisis) may cause:  Headache.  Anxiety.  Shortness of breath.  Nosebleed.  Nausea and vomiting.  Vision changes.  Severe chest pain.  Seizures.  How is this diagnosed?  This condition is diagnosed by measuring your blood pressure while you are seated, with your arm resting on a flat surface, your legs uncrossed, and your feet flat on the floor. The cuff of the blood pressure monitor will be placed directly against the skin of your upper arm at the level of your heart. It should be measured at least twice using the same arm. Certain conditions can cause a difference in blood pressure between your right and left arms.  Certain factors can cause blood pressure readings to be lower or higher than normal for a short period of time:  When your blood pressure is higher when you are in a health care provider's office than when you are at home, this is called white coat hypertension. Most people with this condition do not need medicines.  When your blood  pressure is higher at home than when you are in a health care provider's office, this is called masked hypertension. Most people with this condition may need medicines to control blood pressure.  If you have a high blood pressure reading during one visit or you have normal blood pressure with other risk factors, you may be asked to:  Return on a different day to have your blood pressure checked again.  Monitor your blood pressure at home for 1 week or longer.  If you are diagnosed with hypertension, you may have other blood or imaging tests to help your health care provider understand your overall risk for other conditions.  How is this treated?  This condition is treated by making healthy lifestyle changes, such as eating healthy foods, exercising more, and reducing your alcohol intake. Your health care provider may prescribe medicine if lifestyle changes are not enough to get your blood pressure under control, and if:  Your systolic blood pressure is above 130.  Your diastolic blood pressure is above 80.  Your personal target blood pressure may vary depending on your medical conditions, your age, and other factors.  Follow these instructions at home:  Eating and drinking    Eat a diet that is high in fiber and potassium, and low in sodium, added sugar, and fat. An example eating plan is called the DASH (Dietary Approaches to Stop Hypertension) diet. To eat this way:  Eat plenty of fresh fruits and vegetables. Try to fill one half of your plate at each meal with fruits and vegetables.  Eat whole grains, such as whole-wheat pasta, brown rice, or whole-grain bread. Fill about one fourth of your plate with whole grains.  Eat or drink low-fat dairy products, such as skim milk or low-fat yogurt.  Avoid fatty cuts of meat, processed or cured meats, and poultry with skin. Fill about one fourth of your plate with lean proteins, such as fish, chicken without skin, beans, eggs, or tofu.  Avoid pre-made and processed foods.  These tend to be higher in sodium, added sugar, and fat.  Reduce your daily sodium intake. Most people with hypertension should eat less than 1,500 mg of sodium a day.  Do not drink alcohol if:  Your health care provider tells you not to drink.  You are pregnant, may be pregnant, or are planning to become pregnant.  If you drink alcohol:  Limit how much you use to:  0-1 drink a day for women.  0-2 drinks a day for men.  Be aware of how much alcohol is in your drink. In the [place] one drink equals one 12 oz bottle of beer (355 mL), one 5 oz glass of wine (148 mL), or one 1½ oz glass of hard liquor (44 mL).  Lifestyle    Work with your health care provider to maintain a healthy body weight or to lose weight. Ask what an ideal weight is for you.  Get at least 30 minutes of exercise most days of the week. Activities may include walking, swimming, or biking.  Include exercise to strengthen your muscles (resistance exercise), such as Pilates or lifting weights, as part of your weekly exercise routine. Try to do these types of exercises for 30 minutes at least 3 days a week.  Do not use any products that contain nicotine or tobacco, such as cigarettes, e-cigarettes, and chewing tobacco. If you need help quitting, ask your health care provider.  Monitor your blood pressure at home as told by your health care provider.  Keep all follow-up visits as told by your health care provider. This is important.  Medicines  Take over-the-counter and prescription medicines only as told by your health care provider. Follow directions carefully. Blood pressure medicines must be taken as prescribed.  Do not skip doses of blood pressure medicine. Doing this puts you at risk for problems and can make the medicine less effective.  Ask your health care provider about side effects or reactions to medicines that you should watch for.  Contact a health care provider if you:  Think you are having a reaction to a medicine you are taking.  Have  headaches that keep coming back (recurring).  Feel dizzy.  Have swelling in your ankles.  Have trouble with your vision.  Get help right away if you:  Develop a severe headache or confusion.  Have unusual weakness or numbness.  Feel faint.  Have severe pain in your chest or abdomen.  Vomit repeatedly.  Have trouble breathing.  Summary  Hypertension is when the force of blood pumping through your arteries is too strong. If this condition is not controlled, it may put you at risk for serious complications.  Your personal target blood pressure may vary depending on your medical conditions, your age, and other factors. For most people, a normal blood pressure is less than 120/80.  Hypertension is treated with lifestyle changes, medicines, or a combination of both. Lifestyle changes include losing weight, eating a healthy, low-sodium diet, exercising more, and limiting alcohol.  This information is not intended to replace advice given to you by your health care provider. Make sure you discuss any questions you have with your health care provider.  Document Revised: 08/28/2019 Document Reviewed: 08/28/2019  [employer]vier Patient Education © 2022 [employer]

## 2022-12-14 NOTE — PROGRESS NOTES
Chief Complaint  Anxiety and Back Pain    Subjective          Bianca Lara is a 52 y.o. 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  Patient was referred to neurosurgery.  She has been seeing pain management and had injections and RFA.  Patient continues with pain management.  Patient has taken Celebrex without relief.    Patient reports smoking half pack per day patient would like to quit smoking.  Patient previously took Chantix but then it was taken off the market.    Hypertension-patient is not on her medication blood pressure significantly elevated in the office today.    Anxiety-well-controlled patient stopped taking her medicine previously on Viibryd 40.  Patient is not sleeping at night patient has trouble falling asleep.    PHQ-2 Total Score: 20   PHQ-9 Total Score: 20        Review of Systems   Constitutional: Negative for chills, fatigue and fever.   Respiratory: Negative for cough and shortness of breath.    Cardiovascular: Negative for chest pain and palpitations.   Gastrointestinal: Negative for constipation, diarrhea, nausea and vomiting.   Musculoskeletal: Positive for back pain. Negative for neck pain.   Skin: Negative for rash.   Neurological: Negative for dizziness and headaches.          Medical History: has a past medical history of Allergic rhinitis, Anxiety (09/11/2018), Blood in stool, Condition not found, Depression (09/11/2018), Dysmenorrhea, Gross hematuria, Helicobacter positive gastritis (05/26/2016), Irregular menses, Low back pain (07/09/2014), Microscopic hematuria (06/10/2014), Night sweats, Onychomycosis of toenail (07/09/2014), Pap smear for cervical cancer screening (05/16/2016), Right sided abdominal pain (06/12/2014), Screening mammogram, encounter for (11/09/2018), STD exposure (1993), Tobacco abuse, and Vitamin D  deficiency.     Surgical History: has a past surgical history that includes Breast biopsy (Left, 01/09/2012); Cholecystectomy; Colonoscopy (08/15/2016); Cyst Removal; and Cystoscopy (06/27/2014).     Family History: family history includes Alzheimer's disease in her mother; Cancer in her maternal grandmother; Diabetes in her mother and another family member; Heart disease in her brother, sister, and another family member; Leukemia in her father and paternal grandfather; Stomach cancer in her mother; Stroke in an other family member.     Social History: reports that she has been smoking cigarettes. She has been smoking an average of .25 packs per day. She has never used smokeless tobacco. She reports current alcohol use. She reports that she does not use drugs.    Allergies: Duloxetine hcl, Lisinopril, and Penicillins      Health Maintenance Due   Topic Date Due   • Pneumococcal Vaccine 0-64 (1 - PCV) Never done   • TDAP/TD VACCINES (1 - Tdap) Never done   • ZOSTER VACCINE (1 of 2) Never done   • HEPATITIS C SCREENING  Never done   • PAP SMEAR  06/18/2021            Current Outpatient Medications:   •  terbinafine (lamiSIL) 250 MG tablet, , Disp: , Rfl:   •  valACYclovir (VALTREX) 500 MG tablet, Take 1 tablet by mouth Daily., Disp: 60 tablet, Rfl: 0  •  vilazodone (Viibryd) 20 MG tablet tablet, Take 1 tablet by mouth Daily., Disp: 90 tablet, Rfl: 1  •  Blood Pressure Monitoring (Blood Pressure Cuff) misc, 1 each by Other route Take As Directed., Disp: 1 each, Rfl: 0  •  cyclobenzaprine (FLEXERIL) 10 MG tablet, Take 1 tablet by mouth 3 (Three) Times a Day As Needed for Muscle Spasms., Disp: 30 tablet, Rfl: 2  •  diclofenac (VOLTAREN) 50 MG EC tablet, Take 1 tablet by mouth 2 (Two) Times a Day As Needed (Back pain)., Disp: 90 tablet, Rfl: 1  •  losartan (Cozaar) 25 MG tablet, Take 1 tablet by mouth Daily., Disp: 90 tablet, Rfl: 1  •  varenicline (Chantix Continuing Month Ryan) 1 MG tablet, Take 1 tablet by mouth 2 (Two)  Times a Day., Disp: 60 tablet, Rfl: 1  •  varenicline (Chantix) 0.5 MG tablet, Take 1 tablet by mouth 2 (Two) Times a Day., Disp: 60 tablet, Rfl: 0      Immunization History   Administered Date(s) Administered   • COVID-19 (MODERNA) 1st, 2nd, 3rd Dose Only 01/07/2021, 02/04/2021, 10/28/2021   • COVID-19 (MODERNA) BIVALENT BOOSTER 12+YRS 10/17/2022   • Fluzone Quad >6mos (Multi-dose) 10/29/2020   • Influenza, Unspecified 10/17/2022         Objective       Vitals:    12/14/22 1515 12/14/22 1522   BP: (!) 164/129 158/100   Pulse: 81    Temp: 97.7 °F (36.5 °C)    SpO2: 99%    Weight: 87.1 kg (192 lb)       Body mass index is 34.01 kg/m².   Wt Readings from Last 3 Encounters:   12/14/22 87.1 kg (192 lb)   09/24/21 94.8 kg (209 lb)   08/19/21 94.8 kg (209 lb)      BP Readings from Last 3 Encounters:   12/14/22 158/100   09/24/21 149/86   08/19/21 136/88        Physical Exam  Vitals reviewed.   Constitutional:       Appearance: Normal appearance. She is well-developed.   HENT:      Head: Normocephalic and atraumatic.   Eyes:      Conjunctiva/sclera: Conjunctivae normal.      Pupils: Pupils are equal, round, and reactive to light.   Cardiovascular:      Rate and Rhythm: Normal rate and regular rhythm.      Heart sounds: Normal heart sounds. No murmur heard.  Pulmonary:      Effort: Pulmonary effort is normal.      Breath sounds: Normal breath sounds. No wheezing or rhonchi.   Abdominal:      General: Bowel sounds are normal. There is no distension.      Palpations: Abdomen is soft.      Tenderness: There is no abdominal tenderness.   Skin:     General: Skin is warm and dry.   Neurological:      Mental Status: She is alert and oriented to person, place, and time.   Psychiatric:         Mood and Affect: Mood and affect normal.         Behavior: Behavior normal.         Thought Content: Thought content normal.         Judgment: Judgment normal.             Result Review :                          Assessment and Plan         Diagnoses and all orders for this visit:    1. Anxiety and depression (Primary)  -     vilazodone (Viibryd) 20 MG tablet tablet; Take 1 tablet by mouth Daily.  Dispense: 90 tablet; Refill: 1    2. Cold sore    3. Herpes  -     valACYclovir (VALTREX) 500 MG tablet; Take 1 tablet by mouth Daily.  Dispense: 60 tablet; Refill: 0    4. DDD (degenerative disc disease), lumbar  -     cyclobenzaprine (FLEXERIL) 10 MG tablet; Take 1 tablet by mouth 3 (Three) Times a Day As Needed for Muscle Spasms.  Dispense: 30 tablet; Refill: 2  -     diclofenac (VOLTAREN) 50 MG EC tablet; Take 1 tablet by mouth 2 (Two) Times a Day As Needed (Back pain).  Dispense: 90 tablet; Refill: 1    5. Primary hypertension  -     losartan (Cozaar) 25 MG tablet; Take 1 tablet by mouth Daily.  Dispense: 90 tablet; Refill: 1  -     Comprehensive Metabolic Panel  -     CBC (No Diff)  -     Blood Pressure Monitoring (Blood Pressure Cuff) misc; 1 each by Other route Take As Directed.  Dispense: 1 each; Refill: 0    6. Cigarette nicotine dependence with other nicotine-induced disorder  -     varenicline (Chantix) 0.5 MG tablet; Take 1 tablet by mouth 2 (Two) Times a Day.  Dispense: 60 tablet; Refill: 0  -     varenicline (Chantix Continuing Month Ryan) 1 MG tablet; Take 1 tablet by mouth 2 (Two) Times a Day.  Dispense: 60 tablet; Refill: 1    7. Impaired fasting glucose  -     Hemoglobin A1c    8. Vitamin D deficiency  -     Vitamin D,25-Hydroxy    9. Screening for thyroid disorder  -     TSH    10. Encounter for screening for cardiovascular disorders  -     Lipid Panel        Patient advised to monitor blood pressure at home and journal readings.  Patient informed that a blood pressure reading at home of more than 130/80 is considered hypertension.  For readings greater than 140/90 or higher patient is advised to follow-up in the office with readings for management.  Patient advised to limit sodium intake.        Follow Up     Return in about 4 weeks  (around 1/11/2023) for Next scheduled follow up.    Patient was given instructions and counseling regarding her condition or for health maintenance advice. Please see specific information pulled into the AVS if appropriate.     TESSA Cerrato

## 2023-03-07 ENCOUNTER — TELEPHONE (OUTPATIENT)
Dept: FAMILY MEDICINE CLINIC | Facility: CLINIC | Age: 53
End: 2023-03-07

## 2023-03-07 RX ORDER — TERBINAFINE HYDROCHLORIDE 250 MG/1
TABLET ORAL
Status: CANCELLED | OUTPATIENT
Start: 2023-03-07

## 2023-03-07 NOTE — TELEPHONE ENCOUNTER
Caller: Bianca Lara    Relationship: Self    Best call back number: [phone]    Requested Prescriptions:   Requested Prescriptions     Pending Prescriptions Disp Refills   • terbinafine (lamiSIL) 250 MG tablet          Pharmacy where request should be sent: [id number] - [place], [place] - [address] [facility] AT [facility] OF [address] & [place] - [phone] [facility] [phone] FX     Additional details provided by patient: PATIENT IS COMPLETELY OUT OF THIS MEDICATION. THIS ONE WAS NOT SENT. PLEASE SEND NEW PRESCRIPTION WITH REFILLS TO PHARMACY ASAP.    Does the patient have less than a 3 day supply:  [x] Yes  [] No    Would you like a call back once the refill request has been completed: [] Yes [] No    If the office needs to give you a call back, can they leave a voicemail: [] Yes [] No    Jonatan Cole Rep   03/07/23 10:48 EST

## 2023-03-08 ENCOUNTER — OFFICE VISIT (OUTPATIENT)
Dept: FAMILY MEDICINE CLINIC | Facility: CLINIC | Age: 53
End: 2023-03-08
Payer: COMMERCIAL

## 2023-03-08 VITALS
SYSTOLIC BLOOD PRESSURE: 131 MMHG | BODY MASS INDEX: 35.64 KG/M2 | WEIGHT: 201.2 LBS | DIASTOLIC BLOOD PRESSURE: 84 MMHG | HEART RATE: 71 BPM | OXYGEN SATURATION: 100 % | TEMPERATURE: 97 F

## 2023-03-08 DIAGNOSIS — B35.1 ONYCHOMYCOSIS OF TOENAIL: ICD-10-CM

## 2023-03-08 DIAGNOSIS — R43.0 LOSS OF SMELL: Primary | ICD-10-CM

## 2023-03-08 DIAGNOSIS — J30.1 SEASONAL ALLERGIC RHINITIS DUE TO POLLEN: ICD-10-CM

## 2023-03-08 LAB
EXPIRATION DATE: NORMAL
INTERNAL CONTROL: NORMAL
Lab: NORMAL
SARS-COV-2 AG UPPER RESP QL IA.RAPID: NOT DETECTED

## 2023-03-08 PROCEDURE — 87426 SARSCOV CORONAVIRUS AG IA: CPT | Performed by: NURSE PRACTITIONER

## 2023-03-08 PROCEDURE — 3075F SYST BP GE 130 - 139MM HG: CPT | Performed by: NURSE PRACTITIONER

## 2023-03-08 PROCEDURE — 3079F DIAST BP 80-89 MM HG: CPT | Performed by: NURSE PRACTITIONER

## 2023-03-08 PROCEDURE — 1159F MED LIST DOCD IN RCRD: CPT | Performed by: NURSE PRACTITIONER

## 2023-03-08 PROCEDURE — 99213 OFFICE O/P EST LOW 20 MIN: CPT | Performed by: NURSE PRACTITIONER

## 2023-03-08 PROCEDURE — 96372 THER/PROPH/DIAG INJ SC/IM: CPT | Performed by: NURSE PRACTITIONER

## 2023-03-08 RX ORDER — TERBINAFINE HYDROCHLORIDE 250 MG/1
250 TABLET ORAL DAILY
Qty: 90 TABLET | Refills: 0 | Status: SHIPPED | OUTPATIENT
Start: 2023-03-08

## 2023-03-08 RX ORDER — FLUTICASONE PROPIONATE 50 MCG
2 SPRAY, SUSPENSION (ML) NASAL DAILY
Qty: 16 G | Refills: 5 | Status: SHIPPED | OUTPATIENT
Start: 2023-03-08

## 2023-03-08 RX ORDER — METHYLPREDNISOLONE ACETATE 80 MG/ML
80 INJECTION, SUSPENSION INTRA-ARTICULAR; INTRALESIONAL; INTRAMUSCULAR; SOFT TISSUE ONCE
Status: COMPLETED | OUTPATIENT
Start: 2023-03-08 | End: 2023-03-08

## 2023-03-08 RX ORDER — LORATADINE 10 MG/1
10 TABLET ORAL DAILY
Qty: 90 TABLET | Refills: 3 | Status: SHIPPED | OUTPATIENT
Start: 2023-03-08

## 2023-03-08 RX ADMIN — METHYLPREDNISOLONE ACETATE 80 MG: 80 INJECTION, SUSPENSION INTRA-ARTICULAR; INTRALESIONAL; INTRAMUSCULAR; SOFT TISSUE at 11:12

## 2023-03-08 NOTE — PROGRESS NOTES
Chief Complaint  Loss Of Smell (Since friday), Headache, Cough, and Nasal Congestion    Subjective          Bianca Lara is a 52 y.o. female who presents to [facility]    History of Present Illness    Congestion and loss of smell since Friday. Cough productive with clear/white.  sputum    PHQ-2 Total Score:     PHQ-9 Total Score:          Review of Systems   Constitutional: Negative for fever.   HENT:        Loss of smell   Respiratory: Positive for cough. Negative for shortness of breath.    Musculoskeletal: Negative for myalgias.          Medical History: has a past medical history of Allergic rhinitis, Anxiety (09/11/2018), Blood in stool, Condition not found, Depression (09/11/2018), Dysmenorrhea, Gross hematuria, Helicobacter positive gastritis (05/26/2016), Irregular menses, Low back pain (07/09/2014), Microscopic hematuria (06/10/2014), Night sweats, Onychomycosis of toenail (07/09/2014), Pap smear for cervical cancer screening (05/16/2016), Right sided abdominal pain (06/12/2014), Screening mammogram, encounter for (11/09/2018), STD exposure (1993), Tobacco abuse, and Vitamin D deficiency.     Surgical History: has a past surgical history that includes Breast biopsy (Left, 01/09/2012); Cholecystectomy; Colonoscopy (08/15/2016); Cyst Removal; and Cystoscopy (06/27/2014).     Family History: family history includes Alzheimer's disease in her mother; Cancer in her maternal grandmother; Diabetes in her mother and another family member; Heart disease in her brother, sister, and another family member; Leukemia in her father and paternal grandfather; Stomach cancer in her mother; Stroke in an other family member.     Social History: reports that she has been smoking cigarettes. She has been smoking an average of .25 packs per day. She has never used smokeless tobacco. She reports current alcohol use. She reports that she does not use drugs.    Allergies: Azithromycin,  Duloxetine hcl, Lisinopril, and Penicillins      Health Maintenance Due   Topic Date Due   • Pneumococcal Vaccine 0-64 (1 - PCV) Never done   • TDAP/TD VACCINES (1 - Tdap) Never done   • ZOSTER VACCINE (1 of 2) Never done   • HEPATITIS C SCREENING  Never done   • ANNUAL PHYSICAL  Never done   • PAP SMEAR  06/18/2021            Current Outpatient Medications:   •  Blood Pressure Monitoring (Blood Pressure Cuff) misc, 1 each by Other route Take As Directed., Disp: 1 each, Rfl: 0  •  cyclobenzaprine (FLEXERIL) 10 MG tablet, Take 1 tablet by mouth 3 (Three) Times a Day As Needed for Muscle Spasms., Disp: 30 tablet, Rfl: 2  •  diclofenac (VOLTAREN) 50 MG EC tablet, Take 1 tablet by mouth 2 (Two) Times a Day As Needed (Back pain)., Disp: 90 tablet, Rfl: 1  •  losartan (Cozaar) 25 MG tablet, Take 1 tablet by mouth Daily., Disp: 90 tablet, Rfl: 1  •  terbinafine (lamiSIL) 250 MG tablet, Take 1 tablet by mouth Daily., Disp: 90 tablet, Rfl: 0  •  valACYclovir (VALTREX) 500 MG tablet, Take 1 tablet by mouth Daily., Disp: 60 tablet, Rfl: 0  •  varenicline (Chantix Continuing Month Ryan) 1 MG tablet, Take 1 tablet by mouth 2 (Two) Times a Day., Disp: 60 tablet, Rfl: 1  •  vilazodone (Viibryd) 20 MG tablet tablet, Take 1 tablet by mouth Daily., Disp: 90 tablet, Rfl: 1  •  fluticasone (FLONASE) 50 MCG/ACT nasal spray, 2 sprays into the nostril(s) as directed by provider Daily., Disp: 16 g, Rfl: 5  •  loratadine (Claritin) 10 MG tablet, Take 1 tablet by mouth Daily., Disp: 90 tablet, Rfl: 3  •  varenicline (Chantix) 0.5 MG tablet, Take 1 tablet by mouth 2 (Two) Times a Day., Disp: 60 tablet, Rfl: 0      Immunization History   Administered Date(s) Administered   • COVID-19 (MODERNA) 1st, 2nd, 3rd Dose Only 01/07/2021, 02/04/2021, 10/28/2021   • COVID-19 (MODERNA) BIVALENT BOOSTER 12+YRS 10/17/2022   • Fluzone Quad >6mos (Multi-dose) 10/29/2020   • Influenza, Unspecified 10/17/2022         Objective       Vitals:    03/08/23 1036    BP: 131/84   Pulse: 71   Temp: 97 °F (36.1 °C)   TempSrc: Temporal   SpO2: 100%   Weight: 91.3 kg (201 lb 3.2 oz)      Body mass index is 35.64 kg/m².   Wt Readings from Last 3 Encounters:   03/08/23 91.3 kg (201 lb 3.2 oz)   12/14/22 87.1 kg (192 lb)   09/24/21 94.8 kg (209 lb)      BP Readings from Last 3 Encounters:   03/08/23 131/84   12/14/22 158/100   09/24/21 149/86        Physical Exam  Vitals reviewed.   Constitutional:       Appearance: She is well-developed. She is not ill-appearing or toxic-appearing.   HENT:      Head: Normocephalic and atraumatic.      Right Ear: Hearing, ear canal and external ear normal. Tympanic membrane is scarred.      Left Ear: Hearing, ear canal and external ear normal. A middle ear effusion (clear) is present.      Nose: Congestion present. No nasal deformity, mucosal edema or rhinorrhea.      Right Sinus: No maxillary sinus tenderness or frontal sinus tenderness.      Left Sinus: No maxillary sinus tenderness or frontal sinus tenderness.      Mouth/Throat:      Dentition: Normal dentition.      Pharynx: Posterior oropharyngeal erythema present. No oropharyngeal exudate.      Tonsils: No tonsillar abscesses.   Cardiovascular:      Rate and Rhythm: Normal rate and regular rhythm.   Pulmonary:      Effort: No tachypnea, bradypnea or respiratory distress.      Breath sounds: Normal breath sounds. No wheezing or rhonchi.   Skin:     General: Skin is warm and dry.   Neurological:      General: No focal deficit present.      Mental Status: She is oriented to person, place, and time.   Psychiatric:         Mood and Affect: Mood normal.         Behavior: Behavior normal.         Thought Content: Thought content normal.         Judgment: Judgment normal.             Result Review :       Common labs    Common Labs 12/14/22 12/14/22 12/14/22 12/14/22    1551 1551 1551 1551   Glucose    80   BUN    16   Creatinine    0.74   Sodium    140   Potassium    4.3   Chloride    102   Calcium     10.3   Albumin    4.80   Total Bilirubin    <0.2   Alkaline Phosphatase    98   AST (SGOT)    16   ALT (SGPT)    12   WBC  5.43     Hemoglobin  14.3     Hematocrit  45.9     Platelets  411     Total Cholesterol   188    Triglycerides   83    HDL Cholesterol   61 (A)    LDL Cholesterol    112 (A)    Hemoglobin A1C 5.90 (A)      (A) Abnormal value            Lab Results   Component Value Date    SARSANTIGEN Not Detected 03/08/2023    COVID19 NOT DETECTED 04/16/2021                      Assessment and Plan        Diagnoses and all orders for this visit:    1. Loss of smell (Primary)  -     POCT DEVAN SARS-CoV-2 Antigen JUSTIN    2. Seasonal allergic rhinitis due to pollen  -     fluticasone (FLONASE) 50 MCG/ACT nasal spray; 2 sprays into the nostril(s) as directed by provider Daily.  Dispense: 16 g; Refill: 5  -     loratadine (Claritin) 10 MG tablet; Take 1 tablet by mouth Daily.  Dispense: 90 tablet; Refill: 3  -     methylPREDNISolone acetate (DEPO-medrol) injection 80 mg    3. Onychomycosis of toenail  Comments:  left great toe  Orders:  -     terbinafine (lamiSIL) 250 MG tablet; Take 1 tablet by mouth Daily.  Dispense: 90 tablet; Refill: 0      Take medication as required for pain or fever.    Diagnosis and course explained.    Increase fluids and monitor output.          Follow Up     Return if symptoms worsen or fail to improve.    Patient was given instructions and counseling regarding her condition or for health maintenance advice. Please see specific information pulled into the AVS if appropriate.     TESSA Cerrato

## 2023-04-07 ENCOUNTER — TELEPHONE (OUTPATIENT)
Dept: FAMILY MEDICINE CLINIC | Facility: CLINIC | Age: 53
End: 2023-04-07
Payer: COMMERCIAL

## 2023-04-07 NOTE — TELEPHONE ENCOUNTER
Patient states she got better after last office visit but that the symptoms have returned. She is spitting up brown phlem and has developed a cough.  I advised patient to go urgent care.

## 2023-04-07 NOTE — TELEPHONE ENCOUNTER
Caller: Bianca Lara    Relationship: Self    Best call back number: [phone]    What is the best time to reach you: ANY    Who are you requesting to speak with (clinical staff, provider,  specific staff member): CLINICAL    What was the call regarding: PATIENT WOULD LIKE A CALL BACK TO DISCUSS STEROID SHOT SHE HAD AT HER MOST RECENT PRESCRIPTION. SHE IS STILL HAVING BODY ACHES, FEVER, AND PRODUCTIVE COUGH.    Do you require a callback: YES

## 2023-04-17 DIAGNOSIS — I10 PRIMARY HYPERTENSION: ICD-10-CM

## 2023-04-17 DIAGNOSIS — J30.1 SEASONAL ALLERGIC RHINITIS DUE TO POLLEN: ICD-10-CM

## 2023-04-17 RX ORDER — LOSARTAN POTASSIUM 25 MG/1
25 TABLET ORAL DAILY
Qty: 90 TABLET | Refills: 1 | Status: SHIPPED | OUTPATIENT
Start: 2023-04-17

## 2023-04-26 ENCOUNTER — TELEPHONE (OUTPATIENT)
Dept: FAMILY MEDICINE CLINIC | Facility: CLINIC | Age: 53
End: 2023-04-26
Payer: COMMERCIAL

## 2023-04-26 DIAGNOSIS — F41.9 ANXIETY AND DEPRESSION: ICD-10-CM

## 2023-04-26 DIAGNOSIS — B00.9 HERPES: ICD-10-CM

## 2023-04-26 DIAGNOSIS — F32.A ANXIETY AND DEPRESSION: ICD-10-CM

## 2023-04-26 RX ORDER — VILAZODONE HYDROCHLORIDE 20 MG/1
20 TABLET ORAL DAILY
Qty: 90 TABLET | Refills: 1 | Status: SHIPPED | OUTPATIENT
Start: 2023-04-26

## 2023-04-26 RX ORDER — VALACYCLOVIR HYDROCHLORIDE 500 MG/1
500 TABLET, FILM COATED ORAL DAILY
Qty: 60 TABLET | Refills: 0 | Status: SHIPPED | OUTPATIENT
Start: 2023-04-26

## 2023-04-26 NOTE — TELEPHONE ENCOUNTER
Please call patient's pharmacy to see why they did not allow her to  her Vilazodone 20 mg. It was not on covermymeds, so I don't think it needs a PA.

## 2023-04-26 NOTE — TELEPHONE ENCOUNTER
Caller: Bianca Lara    Relationship: Self    Best call back number: [phone]    Requested Prescriptions:   Requested Prescriptions     Pending Prescriptions Disp Refills   • valACYclovir (VALTREX) 500 MG tablet 60 tablet 0     Sig: Take 1 tablet by mouth Daily.   • vilazodone (Viibryd) 20 MG tablet tablet 90 tablet 1     Sig: Take 1 tablet by mouth Daily.        Pharmacy where request should be sent: [id number] - [place], [place] - [address] [facility] AT [facility] OF [address] & [place] - [phone] [facility] [phone] [place]     Last office visit with prescribing clinician: 3/8/2023   Last telemedicine visit with prescribing clinician: Visit date not found   Next office visit with prescribing clinician: Visit date not found     Does the patient have less than a 3 day supply:  [x] Yes  [] No    Would you like a call back once the refill request has been completed: [] Yes [x] No    If the office needs to give you a call back, can they leave a voicemail: [] Yes [x] No    Jonatan Meade Rep   04/26/23 09:02 EDT

## 2023-04-26 NOTE — TELEPHONE ENCOUNTER
Spoke to pharmacy which said patient had picked up a 90 day supply on 3/20.  Spoke to patient and relayed what pharmacy had said and let her know she could contact the pharmacy to contact her insurance regarding a one time fill if she is unable to find the medication she previously picked up.

## 2023-05-10 ENCOUNTER — TELEPHONE (OUTPATIENT)
Dept: FAMILY MEDICINE CLINIC | Facility: CLINIC | Age: 53
End: 2023-05-10
Payer: COMMERCIAL

## 2023-05-10 NOTE — TELEPHONE ENCOUNTER
Caller: Bianca Lara    Relationship: Self    Best call back number: [phone]    What is the best time to reach you: ANY     Who are you requesting to speak with (clinical staff, provider,  specific staff member): CLINICAL     What was the call regarding: PATIENT REQUESTING A CALL BACK FROM SHEREEN OR HER NURSE REGARDING UPPING THE DOSAGE ON ONE OF HER MEDICATIONS. PLEASE ADVISE.     Do you require a callback: YES

## 2023-05-16 NOTE — PROGRESS NOTES
Chief Complaint  Anxiety (Follow up on medication)    Subjective          Bianca Lara is a 52 y.o. female who presents to [facility]    History of Present Illness    Anxiety/depression - moods are not stable. Pt was fired from her job d/t having covid. Pt reports she has been crying more lately.  Pt has si thoughts but denies plan. No guns in the home. Pt reports difficulty with concentrating on things. Pt has psychiatry evaluation on 5. 22. 23 with Jermaine Iqbal, [place], [place].    Chronic low back pain improved with flexeril.     HTN - well controlled with med.       PHQ-2 Total Score: 13   PHQ-9 Total Score: 13        Review of Systems   Constitutional: Negative for fever.   Psychiatric/Behavioral: Positive for dysphoric mood and suicidal ideas. Negative for self-injury and sleep disturbance. The patient is not nervous/anxious.           Medical History: has a past medical history of Allergic rhinitis, Anxiety (09/11/2018), Blood in stool, Condition not found, Depression (09/11/2018), Dysmenorrhea, Gross hematuria, Helicobacter positive gastritis (05/26/2016), Irregular menses, Low back pain (07/09/2014), Microscopic hematuria (06/10/2014), Night sweats, Onychomycosis of toenail (07/09/2014), Pap smear for cervical cancer screening (05/16/2016), Right sided abdominal pain (06/12/2014), Screening mammogram, encounter for (11/09/2018), STD exposure (1993), Tobacco abuse, and Vitamin D deficiency.     Surgical History: has a past surgical history that includes Breast biopsy (Left, 01/09/2012); Cholecystectomy; Colonoscopy (08/15/2016); Cyst Removal; and Cystoscopy (06/27/2014).     Family History: family history includes Alzheimer's disease in her mother; Cancer in her maternal grandmother; Diabetes in her mother and another family member; Heart disease in her brother, sister, and another family member; Leukemia in her father and paternal grandfather; Stomach cancer in  her mother; Stroke in an other family member.     Social History: reports that she quit smoking about 4 months ago. Her smoking use included cigarettes. She started smoking about 36 years ago. She smoked an average of .25 packs per day. She has never used smokeless tobacco. She reports current alcohol use. She reports that she does not use drugs.    Allergies: Azithromycin, Duloxetine hcl, Lisinopril, and Penicillins      Health Maintenance Due   Topic Date Due   • TDAP/TD VACCINES (1 - Tdap) Never done   • ZOSTER VACCINE (1 of 2) Never done   • HEPATITIS C SCREENING  Never done   • ANNUAL PHYSICAL  Never done   • PAP SMEAR  06/18/2021   • MAMMOGRAM  05/07/2023            Current Outpatient Medications:   •  Blood Pressure Monitoring (Blood Pressure Cuff) misc, 1 each by Other route Take As Directed., Disp: 1 each, Rfl: 0  •  celecoxib (CeleBREX) 100 MG capsule, Every 12 (Twelve) Hours., Disp: , Rfl:   •  cyclobenzaprine (FLEXERIL) 10 MG tablet, Take 1 tablet by mouth 3 (Three) Times a Day As Needed for Muscle Spasms., Disp: 30 tablet, Rfl: 2  •  diclofenac (VOLTAREN) 50 MG EC tablet, Take 1 tablet by mouth 2 (Two) Times a Day As Needed (Back pain)., Disp: 90 tablet, Rfl: 1  •  fluticasone (FLONASE) 50 MCG/ACT nasal spray, 2 sprays into the nostril(s) as directed by provider Daily., Disp: 16 g, Rfl: 5  •  loratadine (Claritin) 10 MG tablet, Take 1 tablet by mouth Daily., Disp: 90 tablet, Rfl: 3  •  losartan (Cozaar) 25 MG tablet, Take 1 tablet by mouth Daily., Disp: 90 tablet, Rfl: 1  •  terbinafine (lamiSIL) 250 MG tablet, Take 1 tablet by mouth Daily., Disp: 90 tablet, Rfl: 0  •  valACYclovir (VALTREX) 500 MG tablet, Take 1 tablet by mouth Daily., Disp: 60 tablet, Rfl: 0  •  vilazodone (Viibryd) 40 MG tablet tablet, Take 1 tablet by mouth Daily., Disp: 90 tablet, Rfl: 1  •  guanFACINE HCl ER (Intuniv) 1 MG tablet sustained-release 24 hour, Take 1 mg by mouth Every Night., Disp: 30 tablet, Rfl: 2      Immunization  History   Administered Date(s) Administered   • COVID-19 (MODERNA) 1st,2nd,3rd Dose Monovalent 01/07/2021, 02/04/2021, 10/28/2021   • COVID-19 (MODERNA) BIVALENT 12+YRS 10/17/2022   • Fluzone Quad >6mos (Multi-dose) 10/29/2020   • Influenza, Unspecified 10/17/2022         Objective       Vitals:    05/17/23 1335   BP: 130/83   Pulse: 64   Temp: 97.1 °F (36.2 °C)   TempSrc: Temporal   SpO2: 100%   Weight: 95.6 kg (210 lb 12.8 oz)      Body mass index is 37.34 kg/m².   Wt Readings from Last 3 Encounters:   05/17/23 95.6 kg (210 lb 12.8 oz)   03/08/23 91.3 kg (201 lb 3.2 oz)   12/14/22 87.1 kg (192 lb)      BP Readings from Last 3 Encounters:   05/17/23 130/83   03/08/23 131/84   12/14/22 158/100        Physical Exam  Vitals reviewed.   Constitutional:       Appearance: Normal appearance. She is well-developed.   HENT:      Head: Normocephalic and atraumatic.   Eyes:      Conjunctiva/sclera: Conjunctivae normal.      Pupils: Pupils are equal, round, and reactive to light.   Cardiovascular:      Rate and Rhythm: Normal rate and regular rhythm.      Heart sounds: Normal heart sounds. No murmur heard.  Pulmonary:      Effort: Pulmonary effort is normal.      Breath sounds: Normal breath sounds. No wheezing or rhonchi.   Abdominal:      General: Bowel sounds are normal. There is no distension.      Palpations: Abdomen is soft.      Tenderness: There is no abdominal tenderness.   Musculoskeletal:      Lumbar back: Tenderness and bony tenderness present.        Back:    Skin:     General: Skin is warm and dry.   Neurological:      Mental Status: She is alert and oriented to person, place, and time.   Psychiatric:         Mood and Affect: Mood and affect normal.         Behavior: Behavior normal.         Thought Content: Thought content normal.         Judgment: Judgment normal.             Result Review :       Common labs        12/14/2022    15:51   Common Labs   Glucose 80     BUN 16     Creatinine 0.74     Sodium 140      Potassium 4.3     Chloride 102     Calcium 10.3     Albumin 4.80     Total Bilirubin <0.2     Alkaline Phosphatase 98     AST (SGOT) 16     ALT (SGPT) 12     WBC 5.43     Hemoglobin 14.3     Hematocrit 45.9     Platelets 411     Total Cholesterol 188     Triglycerides 83     HDL Cholesterol 61     LDL Cholesterol  112     Hemoglobin A1C 5.90                        Assessment and Plan        Diagnoses and all orders for this visit:    1. Inattention (Primary)  -     guanFACINE HCl ER (Intuniv) 1 MG tablet sustained-release 24 hour; Take 1 mg by mouth Every Night.  Dispense: 30 tablet; Refill: 2    2. Seasonal allergic rhinitis due to pollen  -     loratadine (Claritin) 10 MG tablet; Take 1 tablet by mouth Daily.  Dispense: 90 tablet; Refill: 3    3. DDD (degenerative disc disease), lumbar  -     cyclobenzaprine (FLEXERIL) 10 MG tablet; Take 1 tablet by mouth 3 (Three) Times a Day As Needed for Muscle Spasms.  Dispense: 30 tablet; Refill: 2    4. Anxiety and depression  -     vilazodone (Viibryd) 40 MG tablet tablet; Take 1 tablet by mouth Daily.  Dispense: 90 tablet; Refill: 1    5. Impaired fasting glucose  -     Comprehensive Metabolic Panel  -     Lipid Panel  -     Hemoglobin A1c          Follow Up     Return in about 4 weeks (around 6/14/2023) for Next scheduled follow up.    Patient was given instructions and counseling regarding her condition or for health maintenance advice. Please see specific information pulled into the AVS if appropriate.     TESSA Cerrato

## 2023-05-17 ENCOUNTER — OFFICE VISIT (OUTPATIENT)
Dept: FAMILY MEDICINE CLINIC | Facility: CLINIC | Age: 53
End: 2023-05-17
Payer: COMMERCIAL

## 2023-05-17 VITALS
TEMPERATURE: 97.1 F | BODY MASS INDEX: 37.34 KG/M2 | HEART RATE: 64 BPM | OXYGEN SATURATION: 100 % | WEIGHT: 210.8 LBS | SYSTOLIC BLOOD PRESSURE: 130 MMHG | DIASTOLIC BLOOD PRESSURE: 83 MMHG

## 2023-05-17 DIAGNOSIS — R41.840 INATTENTION: Primary | ICD-10-CM

## 2023-05-17 DIAGNOSIS — M51.36 DDD (DEGENERATIVE DISC DISEASE), LUMBAR: ICD-10-CM

## 2023-05-17 DIAGNOSIS — Z11.59 NEED FOR HEPATITIS C SCREENING TEST: ICD-10-CM

## 2023-05-17 DIAGNOSIS — F32.A ANXIETY AND DEPRESSION: ICD-10-CM

## 2023-05-17 DIAGNOSIS — F41.9 ANXIETY AND DEPRESSION: ICD-10-CM

## 2023-05-17 DIAGNOSIS — R73.01 IMPAIRED FASTING GLUCOSE: ICD-10-CM

## 2023-05-17 DIAGNOSIS — J30.1 SEASONAL ALLERGIC RHINITIS DUE TO POLLEN: ICD-10-CM

## 2023-05-17 LAB
ALBUMIN SERPL-MCNC: 4.5 G/DL (ref 3.5–5.2)
ALBUMIN/GLOB SERPL: 1.6 G/DL
ALP SERPL-CCNC: 110 U/L (ref 39–117)
ALT SERPL W P-5'-P-CCNC: 46 U/L (ref 1–33)
ANION GAP SERPL CALCULATED.3IONS-SCNC: 7.2 MMOL/L (ref 5–15)
AST SERPL-CCNC: 35 U/L (ref 1–32)
BILIRUB SERPL-MCNC: <0.2 MG/DL (ref 0–1.2)
BUN SERPL-MCNC: 13 MG/DL (ref 6–20)
BUN/CREAT SERPL: 15.9 (ref 7–25)
CALCIUM SPEC-SCNC: 9.6 MG/DL (ref 8.6–10.5)
CHLORIDE SERPL-SCNC: 106 MMOL/L (ref 98–107)
CHOLEST SERPL-MCNC: 196 MG/DL (ref 0–200)
CO2 SERPL-SCNC: 28.8 MMOL/L (ref 22–29)
CREAT SERPL-MCNC: 0.82 MG/DL (ref 0.57–1)
EGFRCR SERPLBLD CKD-EPI 2021: 86.2 ML/MIN/1.73
GLOBULIN UR ELPH-MCNC: 2.9 GM/DL
GLUCOSE SERPL-MCNC: 93 MG/DL (ref 65–99)
HBA1C MFR BLD: 5.9 % (ref 4.8–5.6)
HCV AB SER DONR QL: NORMAL
HDLC SERPL-MCNC: 60 MG/DL (ref 40–60)
LDLC SERPL CALC-MCNC: 101 MG/DL (ref 0–100)
LDLC/HDLC SERPL: 1.57 {RATIO}
POTASSIUM SERPL-SCNC: 4.1 MMOL/L (ref 3.5–5.2)
PROT SERPL-MCNC: 7.4 G/DL (ref 6–8.5)
SODIUM SERPL-SCNC: 142 MMOL/L (ref 136–145)
TRIGL SERPL-MCNC: 208 MG/DL (ref 0–150)
VLDLC SERPL-MCNC: 35 MG/DL (ref 5–40)

## 2023-05-17 PROCEDURE — 83036 HEMOGLOBIN GLYCOSYLATED A1C: CPT | Performed by: NURSE PRACTITIONER

## 2023-05-17 PROCEDURE — 86803 HEPATITIS C AB TEST: CPT | Performed by: NURSE PRACTITIONER

## 2023-05-17 PROCEDURE — 80053 COMPREHEN METABOLIC PANEL: CPT | Performed by: NURSE PRACTITIONER

## 2023-05-17 PROCEDURE — 80061 LIPID PANEL: CPT | Performed by: NURSE PRACTITIONER

## 2023-05-17 RX ORDER — VILAZODONE HYDROCHLORIDE 40 MG/1
40 TABLET ORAL DAILY
Qty: 90 TABLET | Refills: 1 | Status: SHIPPED | OUTPATIENT
Start: 2023-05-17

## 2023-05-17 RX ORDER — LORATADINE 10 MG/1
10 TABLET ORAL DAILY
Qty: 90 TABLET | Refills: 3 | Status: SHIPPED | OUTPATIENT
Start: 2023-05-17

## 2023-05-17 RX ORDER — CYCLOBENZAPRINE HCL 10 MG
10 TABLET ORAL 3 TIMES DAILY PRN
Qty: 30 TABLET | Refills: 2 | Status: SHIPPED | OUTPATIENT
Start: 2023-05-17

## 2023-05-17 RX ORDER — CELECOXIB 100 MG/1
CAPSULE ORAL EVERY 12 HOURS SCHEDULED
COMMUNITY
Start: 2022-12-25

## 2023-05-17 RX ORDER — GUANFACINE 1 MG/1
1 TABLET, EXTENDED RELEASE ORAL NIGHTLY
Qty: 30 TABLET | Refills: 2 | Status: SHIPPED | OUTPATIENT
Start: 2023-05-17

## 2023-06-06 DIAGNOSIS — M51.36 DDD (DEGENERATIVE DISC DISEASE), LUMBAR: ICD-10-CM

## 2023-06-07 ENCOUNTER — TELEPHONE (OUTPATIENT)
Dept: FAMILY MEDICINE CLINIC | Facility: CLINIC | Age: 53
End: 2023-06-07
Payer: COMMERCIAL

## 2023-06-07 DIAGNOSIS — M51.36 DDD (DEGENERATIVE DISC DISEASE), LUMBAR: ICD-10-CM

## 2023-06-07 RX ORDER — CYCLOBENZAPRINE HCL 10 MG
10 TABLET ORAL 3 TIMES DAILY PRN
Qty: 30 TABLET | Refills: 2 | Status: CANCELLED | OUTPATIENT
Start: 2023-06-07

## 2023-06-07 NOTE — TELEPHONE ENCOUNTER
PATIENT AWARE TO SWITCH DOSAGE TO MORNING. STATES UNDERSTANDING AND WILL TRY THAT. ALSO ADVISED TO KEEP UPCOMING APPOINTMENTS, PATIENT AGREEABLE.

## 2023-06-07 NOTE — TELEPHONE ENCOUNTER
Radiology called and wanted to make sure you viewed the addendum to the MRI report 9/7/21 on Bianca Lara.

## 2023-06-07 NOTE — TELEPHONE ENCOUNTER
----- Message from TESSA Cerrato sent at 6/7/2023 11:27 AM EDT -----  Addendum noted.  If patient continues with back pain she should have another neurosurgery consult for Modic type I changes as well as pain management.

## 2023-06-07 NOTE — TELEPHONE ENCOUNTER
Patient returning a call to the office.     States that she is still having trouble with her back pain. She states she is still going to Pain Management for this, was denied by insurance when tried to get injections for her back. States that the office is getting ready to try to get that approved again for her. Patient does not desire a new referral at this time.    Patient requesting a refill on Diclofenac as well as Flexeril. Diclofenac was sent to the pharmacy yesterday, Flexeril is pended to provider.    Patient also concerned about Guanfacine. States that she takes 1 tablet at night and is suppose to help her not only sleep, but focus as well. Patient states that it is doing the opposite of this, states she cannot sleep and she is all over the place and not focused, pt asking for recommendation on what to do with this issue.

## 2023-06-16 ENCOUNTER — OFFICE VISIT (OUTPATIENT)
Dept: FAMILY MEDICINE CLINIC | Facility: CLINIC | Age: 53
End: 2023-06-16
Payer: COMMERCIAL

## 2023-06-16 VITALS
OXYGEN SATURATION: 100 % | DIASTOLIC BLOOD PRESSURE: 94 MMHG | TEMPERATURE: 97.1 F | SYSTOLIC BLOOD PRESSURE: 133 MMHG | WEIGHT: 208.2 LBS | HEART RATE: 56 BPM | BODY MASS INDEX: 36.88 KG/M2

## 2023-06-16 DIAGNOSIS — B35.1 ONYCHOMYCOSIS OF TOENAIL: ICD-10-CM

## 2023-06-16 DIAGNOSIS — R73.01 IMPAIRED FASTING GLUCOSE: ICD-10-CM

## 2023-06-16 DIAGNOSIS — Z12.31 VISIT FOR SCREENING MAMMOGRAM: ICD-10-CM

## 2023-06-16 DIAGNOSIS — F32.A ANXIETY AND DEPRESSION: Primary | ICD-10-CM

## 2023-06-16 DIAGNOSIS — J30.1 SEASONAL ALLERGIC RHINITIS DUE TO POLLEN: ICD-10-CM

## 2023-06-16 DIAGNOSIS — F41.9 ANXIETY AND DEPRESSION: Primary | ICD-10-CM

## 2023-06-16 DIAGNOSIS — I10 PRIMARY HYPERTENSION: ICD-10-CM

## 2023-06-16 RX ORDER — FLUTICASONE PROPIONATE 50 MCG
2 SPRAY, SUSPENSION (ML) NASAL DAILY
Qty: 16 G | Refills: 5 | Status: SHIPPED | OUTPATIENT
Start: 2023-06-16

## 2023-06-16 RX ORDER — LOSARTAN POTASSIUM 50 MG/1
50 TABLET ORAL DAILY
Qty: 90 TABLET | Refills: 1 | Status: SHIPPED | OUTPATIENT
Start: 2023-06-16

## 2023-06-16 RX ORDER — LORATADINE 10 MG/1
10 TABLET ORAL DAILY
Qty: 90 TABLET | Refills: 3 | Status: SHIPPED | OUTPATIENT
Start: 2023-06-16

## 2023-06-16 RX ORDER — TERBINAFINE HYDROCHLORIDE 250 MG/1
250 TABLET ORAL DAILY
Qty: 90 TABLET | Refills: 0 | Status: SHIPPED | OUTPATIENT
Start: 2023-06-16

## 2023-06-16 NOTE — PATIENT INSTRUCTIONS
"Hypertension, Adult  High blood pressure (hypertension) is when the force of blood pumping through the arteries is too strong. The arteries are the blood vessels that carry blood from the heart throughout the body. Hypertension forces the heart to work harder to pump blood and may cause arteries to become narrow or stiff. Untreated or uncontrolled hypertension can cause a heart attack, heart failure, a stroke, kidney disease, and other problems.  A blood pressure reading consists of a higher number over a lower number. Ideally, your blood pressure should be below 120/80. The first (\"top\") number is called the systolic pressure. It is a measure of the pressure in your arteries as your heart beats. The second (\"bottom\") number is called the diastolic pressure. It is a measure of the pressure in your arteries as the heart relaxes.  What are the causes?  The exact cause of this condition is not known. There are some conditions that result in or are related to high blood pressure.  What increases the risk?  Some risk factors for high blood pressure are under your control. The following factors may make you more likely to develop this condition:  Smoking.  Having type 2 diabetes mellitus, high cholesterol, or both.  Not getting enough exercise or physical activity.  Being overweight.  Having too much fat, sugar, calories, or salt (sodium) in your diet.  Drinking too much alcohol.  Some risk factors for high blood pressure may be difficult or impossible to change. Some of these factors include:  Having chronic kidney disease.  Having a family history of high blood pressure.  Age. Risk increases with age.  Race. You may be at higher risk if you are .  Gender. Men are at higher risk than women before age 45. After age 65, women are at higher risk than men.  Having obstructive sleep apnea.  Stress.  What are the signs or symptoms?  High blood pressure may not cause symptoms. Very high blood pressure " (hypertensive crisis) may cause:  Headache.  Anxiety.  Shortness of breath.  Nosebleed.  Nausea and vomiting.  Vision changes.  Severe chest pain.  Seizures.  How is this diagnosed?  This condition is diagnosed by measuring your blood pressure while you are seated, with your arm resting on a flat surface, your legs uncrossed, and your feet flat on the floor. The cuff of the blood pressure monitor will be placed directly against the skin of your upper arm at the level of your heart. It should be measured at least twice using the same arm. Certain conditions can cause a difference in blood pressure between your right and left arms.  Certain factors can cause blood pressure readings to be lower or higher than normal for a short period of time:  When your blood pressure is higher when you are in a health care provider's office than when you are at home, this is called white coat hypertension. Most people with this condition do not need medicines.  When your blood pressure is higher at home than when you are in a health care provider's office, this is called masked hypertension. Most people with this condition may need medicines to control blood pressure.  If you have a high blood pressure reading during one visit or you have normal blood pressure with other risk factors, you may be asked to:  Return on a different day to have your blood pressure checked again.  Monitor your blood pressure at home for 1 week or longer.  If you are diagnosed with hypertension, you may have other blood or imaging tests to help your health care provider understand your overall risk for other conditions.  How is this treated?  This condition is treated by making healthy lifestyle changes, such as eating healthy foods, exercising more, and reducing your alcohol intake. Your health care provider may prescribe medicine if lifestyle changes are not enough to get your blood pressure under control, and if:  Your systolic blood pressure is above  130.  Your diastolic blood pressure is above 80.  Your personal target blood pressure may vary depending on your medical conditions, your age, and other factors.  Follow these instructions at home:  Eating and drinking    Eat a diet that is high in fiber and potassium, and low in sodium, added sugar, and fat. An example eating plan is called the DASH (Dietary Approaches to Stop Hypertension) diet. To eat this way:  Eat plenty of fresh fruits and vegetables. Try to fill one half of your plate at each meal with fruits and vegetables.  Eat whole grains, such as whole-wheat pasta, brown rice, or whole-grain bread. Fill about one fourth of your plate with whole grains.  Eat or drink low-fat dairy products, such as skim milk or low-fat yogurt.  Avoid fatty cuts of meat, processed or cured meats, and poultry with skin. Fill about one fourth of your plate with lean proteins, such as fish, chicken without skin, beans, eggs, or tofu.  Avoid pre-made and processed foods. These tend to be higher in sodium, added sugar, and fat.  Reduce your daily sodium intake. Most people with hypertension should eat less than 1,500 mg of sodium a day.  Do not drink alcohol if:  Your health care provider tells you not to drink.  You are pregnant, may be pregnant, or are planning to become pregnant.  If you drink alcohol:  Limit how much you use to:  0-1 drink a day for women.  0-2 drinks a day for men.  Be aware of how much alcohol is in your drink. In the [place] one drink equals one 12 oz bottle of beer (355 mL), one 5 oz glass of wine (148 mL), or one 1½ oz glass of hard liquor (44 mL).  Lifestyle    Work with your health care provider to maintain a healthy body weight or to lose weight. Ask what an ideal weight is for you.  Get at least 30 minutes of exercise most days of the week. Activities may include walking, swimming, or biking.  Include exercise to strengthen your muscles (resistance exercise), such as Pilates or lifting weights, as  part of your weekly exercise routine. Try to do these types of exercises for 30 minutes at least 3 days a week.  Do not use any products that contain nicotine or tobacco, such as cigarettes, e-cigarettes, and chewing tobacco. If you need help quitting, ask your health care provider.  Monitor your blood pressure at home as told by your health care provider.  Keep all follow-up visits as told by your health care provider. This is important.  Medicines  Take over-the-counter and prescription medicines only as told by your health care provider. Follow directions carefully. Blood pressure medicines must be taken as prescribed.  Do not skip doses of blood pressure medicine. Doing this puts you at risk for problems and can make the medicine less effective.  Ask your health care provider about side effects or reactions to medicines that you should watch for.  Contact a health care provider if you:  Think you are having a reaction to a medicine you are taking.  Have headaches that keep coming back (recurring).  Feel dizzy.  Have swelling in your ankles.  Have trouble with your vision.  Get help right away if you:  Develop a severe headache or confusion.  Have unusual weakness or numbness.  Feel faint.  Have severe pain in your chest or abdomen.  Vomit repeatedly.  Have trouble breathing.  Summary  Hypertension is when the force of blood pumping through your arteries is too strong. If this condition is not controlled, it may put you at risk for serious complications.  Your personal target blood pressure may vary depending on your medical conditions, your age, and other factors. For most people, a normal blood pressure is less than 120/80.  Hypertension is treated with lifestyle changes, medicines, or a combination of both. Lifestyle changes include losing weight, eating a healthy, low-sodium diet, exercising more, and limiting alcohol.  This information is not intended to replace advice given to you by your health care  provider. Make sure you discuss any questions you have with your health care provider.  Document Revised: 08/28/2019 Document Reviewed: 08/28/2019  [employer] Patient Education © 2022 [employer]  Generalized Anxiety Disorder, Adult  Generalized anxiety disorder (TINO) is a mental health condition. Unlike normal worries, anxiety related to TINO is not triggered by a specific event. These worries do not fade or get better with time. TINO interferes with relationships, work, and school.  TINO symptoms can vary from mild to severe. People with severe TINO can have intense waves of anxiety with physical symptoms that are similar to panic attacks.  What are the causes?  The exact cause of TINO is not known, but the following are believed to have an impact:  Differences in natural brain chemicals.  Genes passed down from parents to children.  Differences in the way threats are perceived.  Development and stress during childhood.  Personality.  What increases the risk?  The following factors may make you more likely to develop this condition:  Being female.  Having a family history of anxiety disorders.  Being very shy.  Experiencing very stressful life events, such as the death of a loved one.  Having a very stressful family environment.  What are the signs or symptoms?  People with TINO often worry excessively about many things in their lives, such as their health and family. Symptoms may also include:  Mental and emotional symptoms:  Worrying excessively about natural disasters.  Fear of being late.  Difficulty concentrating.  Fears that others are judging your performance.  Physical symptoms:  Fatigue.  Headaches, muscle tension, muscle twitches, trembling, or feeling shaky.  Feeling like your heart is pounding or beating very fast.  Feeling out of breath or like you cannot take a deep breath.  Having trouble falling asleep or staying asleep, or experiencing restlessness.  Sweating.  Nausea, diarrhea, or irritable bowel  syndrome (IBS).  Behavioral symptoms:  Experiencing erratic moods or irritability.  Avoidance of new situations.  Avoidance of people.  Extreme difficulty making decisions.  How is this diagnosed?  This condition is diagnosed based on your symptoms and medical history. You will also have a physical exam. Your health care provider may perform tests to rule out other possible causes of your symptoms.  To be diagnosed with TINO, a person must have anxiety that:  Is out of his or her control.  Affects several different aspects of his or her life, such as work and relationships.  Causes distress that makes him or her unable to take part in normal activities.  Includes at least three symptoms of TINO, such as restlessness, fatigue, trouble concentrating, irritability, muscle tension, or sleep problems.  Before your health care provider can confirm a diagnosis of TINO, these symptoms must be present more days than they are not, and they must last for 6 months or longer.  How is this treated?  This condition may be treated with:  Medicine. Antidepressant medicine is usually prescribed for long-term daily control. Anti-anxiety medicines may be added in severe cases, especially when panic attacks occur.  Talk therapy (psychotherapy). Certain types of talk therapy can be helpful in treating TINO by providing support, education, and guidance. Options include:  Cognitive behavioral therapy (CBT). People learn coping skills and self-calming techniques to ease their physical symptoms. They learn to identify unrealistic thoughts and behaviors and to replace them with more appropriate thoughts and behaviors.  Acceptance and commitment therapy (ACT). This treatment teaches people how to be mindful as a way to cope with unwanted thoughts and feelings.  Biofeedback. This process trains you to manage your body's response (physiological response) through breathing techniques and relaxation methods. You will work with a therapist while  machines are used to monitor your physical symptoms.  Stress management techniques. These include yoga, meditation, and exercise.  A mental health specialist can help determine which treatment is best for you. Some people see improvement with one type of therapy. However, other people require a combination of therapies.  Follow these instructions at home:  Lifestyle  Maintain a consistent routine and schedule.  Anticipate stressful situations. Create a plan and allow extra time to work with your plan.  Practice stress management or self-calming techniques that you have learned from your therapist or your health care provider.  Exercise regularly and spend time outdoors.  Eat a healthy diet that includes plenty of vegetables, fruits, whole grains, low-fat dairy products, and lean protein.  Do not eat a lot of foods that are high in fat, added sugar, or salt (sodium).  Drink plenty of water.  Avoid alcohol. Alcohol can increase anxiety.  Avoid caffeine and certain over-the-counter cold medicines. These may make you feel worse. Ask your pharmacist which medicines to avoid.  General instructions  Take over-the-counter and prescription medicines only as told by your health care provider.  Understand that you are likely to have setbacks. Accept this and be kind to yourself as you persist to take better care of yourself.  Anticipate stressful situations. Create a plan and allow extra time to work with your plan.  Recognize and accept your accomplishments, even if you  them as small.  Spend time with people who care about you.  Keep all follow-up visits. This is important.  Where to find more information  National [place] of Mental Health: www.nimh.nih.gov  Substance Abuse and Mental Health Services: www.samhsa.gov  Contact a health care provider if:  Your symptoms do not get better.  Your symptoms get worse.  You have signs of depression, such as:  A persistently sad or irritable mood.  Loss of enjoyment in  activities that used to bring you michelle.  Change in weight or eating.  Changes in sleeping habits.  Get help right away if:  You have thoughts about hurting yourself or others.  If you ever feel like you may hurt yourself or others, or have thoughts about taking your own life, get help right away. Go to your nearest emergency department or:  Call your local emergency services (001 in the [place]  Call a suicide crisis helpline, such as the National Suicide Prevention Lifeline at 1-537.518.8138 or 023 in the [place] This is open 24 hours a day in the [place]  Text the Crisis Text Line at 239770 (in the [place]  Summary  Generalized anxiety disorder (TINO) is a mental health condition that involves worry that is not triggered by a specific event.  People with TINO often worry excessively about many things in their lives, such as their health and family.  TINO may cause symptoms such as restlessness, trouble concentrating, sleep problems, frequent sweating, nausea, diarrhea, headaches, and trembling or muscle twitching.  A mental health specialist can help determine which treatment is best for you. Some people see improvement with one type of therapy. However, other people require a combination of therapies.  This information is not intended to replace advice given to you by your health care provider. Make sure you discuss any questions you have with your health care provider.  Document Revised: 07/13/2022 Document Reviewed: 04/10/2022  [employer] Patient Education © 2022 [employer]

## 2023-06-16 NOTE — PROGRESS NOTES
Chief Complaint  Anxiety (Follow up)    Subjective          Bianca Lara is a 52 y.o. female who presents to [facility]    History of Present Illness    Anxiety - Patient states focus is okay and the medicine is helping. Anxiety is better most days. Sleeping well. Patient started back at work.     Hypertension - BP slightly elevated this morning. No headaches or chest pain. Patient is compliant with medications.     Allergies - better with medications.     Onychomycosis - Patient states her toenails look better. Growing out at the base clear.     Impaired fasting glucose - hemoglobin A1c on 5.17.23 was 5.9. Recommended a low carb diet and exercising 150 minutes a week.       PHQ-2 Total Score:     PHQ-9 Total Score:          Review of Systems   Constitutional:  Negative for chills, fatigue and fever.   Respiratory:  Negative for cough and shortness of breath.    Cardiovascular:  Negative for chest pain and palpitations.   Gastrointestinal:  Negative for constipation, diarrhea, nausea and vomiting.   Musculoskeletal:  Negative for back pain and neck pain.   Skin:  Negative for rash.   Neurological:  Negative for dizziness and headaches.   Psychiatric/Behavioral:  Positive for dysphoric mood. Negative for self-injury, sleep disturbance and suicidal ideas. The patient is nervous/anxious.         Medical History: has a past medical history of Allergic rhinitis, Anxiety (09/11/2018), Blood in stool, Condition not found, Depression (09/11/2018), Dysmenorrhea, Gross hematuria, Helicobacter positive gastritis (05/26/2016), Irregular menses, Low back pain (07/09/2014), Microscopic hematuria (06/10/2014), Night sweats, Onychomycosis of toenail (07/09/2014), Pap smear for cervical cancer screening (05/16/2016), Right sided abdominal pain (06/12/2014), Screening mammogram, encounter for (11/09/2018), STD exposure (1993), Tobacco abuse, and Vitamin D deficiency.     Surgical History:  has a past surgical history that includes Breast biopsy (Left, 01/09/2012); Cholecystectomy; Colonoscopy (08/15/2016); Cyst Removal; and Cystoscopy (06/27/2014).     Family History: family history includes Alzheimer's disease in her mother; Cancer in her maternal grandmother; Diabetes in her mother and another family member; Heart disease in her brother, sister, and another family member; Leukemia in her father and paternal grandfather; Stomach cancer in her mother; Stroke in an other family member.     Social History: reports that she quit smoking about 5 months ago. Her smoking use included cigarettes. She started smoking about 36 years ago. She smoked an average of .25 packs per day. She has never used smokeless tobacco. She reports current alcohol use. She reports that she does not use drugs.    Allergies: Azithromycin, Duloxetine hcl, Lisinopril, and Penicillins      Health Maintenance Due   Topic Date Due    TDAP/TD VACCINES (1 - Tdap) Never done    ZOSTER VACCINE (1 of 2) Never done    ANNUAL PHYSICAL  Never done    PAP SMEAR  06/18/2021    MAMMOGRAM  05/07/2023            Current Outpatient Medications:     Blood Pressure Monitoring (Blood Pressure Cuff) misc, 1 each by Other route Take As Directed., Disp: 1 each, Rfl: 0    celecoxib (CeleBREX) 100 MG capsule, Every 12 (Twelve) Hours., Disp: , Rfl:     cyclobenzaprine (FLEXERIL) 10 MG tablet, Take 1 tablet by mouth 3 (Three) Times a Day As Needed for Muscle Spasms., Disp: 30 tablet, Rfl: 2    diclofenac (VOLTAREN) 50 MG EC tablet, Take 1 tablet by mouth 2 (Two) Times a Day As Needed (Back pain)., Disp: 90 tablet, Rfl: 1    fluticasone (FLONASE) 50 MCG/ACT nasal spray, 2 sprays into the nostril(s) as directed by provider Daily., Disp: 16 g, Rfl: 5    guanFACINE HCl ER (Intuniv) 1 MG tablet sustained-release 24 hour, Take 1 mg by mouth Every Night., Disp: 30 tablet, Rfl: 2    loratadine (Claritin) 10 MG tablet, Take 1 tablet by mouth Daily., Disp: 90 tablet,  Rfl: 3    losartan (Cozaar) 50 MG tablet, Take 1 tablet by mouth Daily., Disp: 90 tablet, Rfl: 1    terbinafine (lamiSIL) 250 MG tablet, Take 1 tablet by mouth Daily., Disp: 90 tablet, Rfl: 0    valACYclovir (VALTREX) 500 MG tablet, Take 1 tablet by mouth Daily., Disp: 60 tablet, Rfl: 0    vilazodone (Viibryd) 40 MG tablet tablet, Take 1 tablet by mouth Daily., Disp: 90 tablet, Rfl: 1      Immunization History   Administered Date(s) Administered    COVID-19 (MODERNA) 1st,2nd,3rd Dose Monovalent 01/07/2021, 02/04/2021, 10/28/2021    COVID-19 (MODERNA) BIVALENT 12+YRS 10/17/2022    Fluzone Quad >6mos (Multi-dose) 10/29/2020    Influenza, Unspecified 10/17/2022         Objective       Vitals:    06/16/23 0836 06/16/23 0840   BP: 143/98 133/94   Pulse: 56    Temp: 97.1 °F (36.2 °C)    TempSrc: Temporal    SpO2: 100%    Weight: 94.4 kg (208 lb 3.2 oz)       Body mass index is 36.88 kg/m².   Wt Readings from Last 3 Encounters:   06/16/23 94.4 kg (208 lb 3.2 oz)   05/17/23 95.6 kg (210 lb 12.8 oz)   03/08/23 91.3 kg (201 lb 3.2 oz)      BP Readings from Last 3 Encounters:   06/16/23 133/94   05/17/23 130/83   03/08/23 131/84        Physical Exam  Vitals reviewed.   Constitutional:       Appearance: Normal appearance. She is well-developed.   HENT:      Head: Normocephalic and atraumatic.   Eyes:      Conjunctiva/sclera: Conjunctivae normal.      Pupils: Pupils are equal, round, and reactive to light.   Cardiovascular:      Rate and Rhythm: Normal rate and regular rhythm.      Heart sounds: Normal heart sounds. No murmur heard.  Pulmonary:      Effort: Pulmonary effort is normal.      Breath sounds: Normal breath sounds. No wheezing or rhonchi.   Abdominal:      General: Bowel sounds are normal. There is no distension.      Palpations: Abdomen is soft.      Tenderness: There is no abdominal tenderness.   Skin:     General: Skin is warm and dry.   Neurological:      Mental Status: She is alert and oriented to person, place,  and time.   Psychiatric:         Mood and Affect: Mood and affect normal.         Behavior: Behavior normal.         Thought Content: Thought content normal.         Judgment: Judgment normal.           Result Review :       Common labs          12/14/2022    15:51 5/17/2023    14:16   Common Labs   Glucose 80  93    BUN 16  13    Creatinine 0.74  0.82    Sodium 140  142    Potassium 4.3  4.1    Chloride 102  106    Calcium 10.3  9.6    Albumin 4.80  4.5    Total Bilirubin <0.2  <0.2    Alkaline Phosphatase 98  110    AST (SGOT) 16  35    ALT (SGPT) 12  46    WBC 5.43     Hemoglobin 14.3     Hematocrit 45.9     Platelets 411     Total Cholesterol 188  196    Triglycerides 83  208    HDL Cholesterol 61  60    LDL Cholesterol  112  101    Hemoglobin A1C 5.90  5.90                       Assessment and Plan        Diagnoses and all orders for this visit:    1. Anxiety and depression (Primary)  Comments:  Cotninue with viibryd    2. Seasonal allergic rhinitis due to pollen  -     loratadine (Claritin) 10 MG tablet; Take 1 tablet by mouth Daily.  Dispense: 90 tablet; Refill: 3  -     fluticasone (FLONASE) 50 MCG/ACT nasal spray; 2 sprays into the nostril(s) as directed by provider Daily.  Dispense: 16 g; Refill: 5    3. Onychomycosis of toenail  Comments:  left great toe  Orders:  -     terbinafine (lamiSIL) 250 MG tablet; Take 1 tablet by mouth Daily.  Dispense: 90 tablet; Refill: 0    4. Primary hypertension  -     losartan (Cozaar) 50 MG tablet; Take 1 tablet by mouth Daily.  Dispense: 90 tablet; Refill: 1    5. Visit for screening mammogram  -     Mammo Screening Digital Tomosynthesis Bilateral With CAD; Future    6. Impaired fasting glucose  Comments:  Diet and exercise modifications.          Follow Up     Return in about 6 months (around 12/16/2023) for Next scheduled follow up.    Patient was given instructions and counseling regarding her condition or for health maintenance advice. Please see specific information  pulled into the AVS if appropriate.     TESSA Cerrato

## 2023-06-27 ENCOUNTER — TELEPHONE (OUTPATIENT)
Dept: FAMILY MEDICINE CLINIC | Facility: CLINIC | Age: 53
End: 2023-06-27

## 2023-06-27 NOTE — TELEPHONE ENCOUNTER
Caller: Bianca Lara    Relationship to patient: Self    Best call back number: [phone]    Chief complaint: PAIN IN BACK AND KNEES WITH HEADACHES    Type of visit: OFFICE VISIT    Requested date: 06/27/2023    Additional notes:PATIENT IS HAVING BACK SPASMS TO KNEES AND HAS HEADACHES WITH IT AND WOULD LIKE TO TRY TO COME IN TODAY, 06/27/2023, TO GET CORTISONE SHOT.

## 2023-08-28 DIAGNOSIS — B00.9 HERPES: ICD-10-CM

## 2023-08-28 DIAGNOSIS — J30.1 SEASONAL ALLERGIC RHINITIS DUE TO POLLEN: ICD-10-CM

## 2023-08-28 DIAGNOSIS — I10 PRIMARY HYPERTENSION: ICD-10-CM

## 2023-08-28 DIAGNOSIS — F41.9 ANXIETY AND DEPRESSION: ICD-10-CM

## 2023-08-28 DIAGNOSIS — R21 RASH AND NONSPECIFIC SKIN ERUPTION: ICD-10-CM

## 2023-08-28 DIAGNOSIS — F32.A ANXIETY AND DEPRESSION: ICD-10-CM

## 2023-08-28 DIAGNOSIS — R41.840 INATTENTION: ICD-10-CM

## 2023-08-28 DIAGNOSIS — M51.36 DDD (DEGENERATIVE DISC DISEASE), LUMBAR: ICD-10-CM

## 2023-08-28 RX ORDER — VALACYCLOVIR HYDROCHLORIDE 500 MG/1
500 TABLET, FILM COATED ORAL DAILY
Qty: 60 TABLET | Refills: 0 | Status: SHIPPED | OUTPATIENT
Start: 2023-08-28

## 2023-08-28 RX ORDER — LORATADINE 10 MG/1
10 TABLET ORAL DAILY
Qty: 90 TABLET | Refills: 3 | Status: SHIPPED | OUTPATIENT
Start: 2023-08-28

## 2023-08-28 RX ORDER — VILAZODONE HYDROCHLORIDE 40 MG/1
40 TABLET ORAL DAILY
Qty: 90 TABLET | Refills: 1 | Status: SHIPPED | OUTPATIENT
Start: 2023-08-28

## 2023-08-28 RX ORDER — HYDROXYZINE HYDROCHLORIDE 25 MG/1
25 TABLET, FILM COATED ORAL EVERY 6 HOURS PRN
Qty: 30 TABLET | Refills: 0 | Status: SHIPPED | OUTPATIENT
Start: 2023-08-28

## 2023-08-28 RX ORDER — GUANFACINE 1 MG/1
1 TABLET, EXTENDED RELEASE ORAL NIGHTLY
Qty: 30 TABLET | Refills: 2 | Status: SHIPPED | OUTPATIENT
Start: 2023-08-28

## 2023-08-28 RX ORDER — LOSARTAN POTASSIUM 50 MG/1
50 TABLET ORAL DAILY
Qty: 90 TABLET | Refills: 1 | Status: SHIPPED | OUTPATIENT
Start: 2023-08-28

## 2023-08-28 RX ORDER — CYCLOBENZAPRINE HCL 10 MG
10 TABLET ORAL 3 TIMES DAILY PRN
Qty: 90 TABLET | Refills: 2 | Status: SHIPPED | OUTPATIENT
Start: 2023-08-28

## 2023-08-28 RX ORDER — FLUTICASONE PROPIONATE 50 MCG
2 SPRAY, SUSPENSION (ML) NASAL DAILY
Qty: 16 G | Refills: 5 | Status: SHIPPED | OUTPATIENT
Start: 2023-08-28

## 2023-08-28 NOTE — TELEPHONE ENCOUNTER
Caller: Bianca Lara    Relationship: Self    Best call back number: [phone]    Requested Prescriptions:   Requested Prescriptions     Pending Prescriptions Disp Refills    cyclobenzaprine (FLEXERIL) 10 MG tablet 90 tablet 2     Sig: Take 1 tablet by mouth 3 (Three) Times a Day As Needed for Muscle Spasms.    fluticasone (FLONASE) 50 MCG/ACT nasal spray 16 g 5     Si sprays into the nostril(s) as directed by provider Daily.    losartan (Cozaar) 50 MG tablet 90 tablet 1     Sig: Take 1 tablet by mouth Daily.    valACYclovir (VALTREX) 500 MG tablet 60 tablet 0     Sig: Take 1 tablet by mouth Daily.    vilazodone (Viibryd) 40 MG tablet tablet 90 tablet 1     Sig: Take 1 tablet by mouth Daily.    loratadine (Claritin) 10 MG tablet 90 tablet 3     Sig: Take 1 tablet by mouth Daily.    guanFACINE HCl ER (Intuniv) 1 MG tablet sustained-release 24 hour 30 tablet 2     Sig: Take 1 mg by mouth Every Night.    hydrOXYzine (ATARAX) 25 MG tablet 30 tablet 0     Sig: Take 1 tablet by mouth Every 6 (Six) Hours As Needed for Itching.      [facility]      Pharmacy where request should be sent: [id number] [place] [facility] [address] AT [facility] [phone] [facility] [phone] FX     Last office visit with prescribing clinician: 2023   Last telemedicine visit with prescribing clinician: Visit date not found   Next office visit with prescribing clinician: 2023     Additional details provided by patient:      Does the patient have less than a 3 day supply:  [x] Yes  [] No    Would you like a call back once the refill request has been completed: [] Yes [x] No    If the office needs to give you a call back, can they leave a voicemail: [] Yes [x] No    Jonatan Camargo   23 09:07 EDT

## 2023-09-26 NOTE — PROGRESS NOTES
Chief Complaint  Back Pain and Follow-up (6 mo f/u) pap      Subjective          Bianca Lara is a 53 y.o. female who presents to [facility]    History of Present Illness    Well women    Hypertension-patient's blood pressure is slightly elevated diastolic.  Patient just took medicine 30 to 40 minutes ago.    Anxiety/depression-patient reports that she accidentally omitted her Viibryd for a week and has had headaches subsequently.      PHQ-2 Total Score: 13   PHQ-9 Total Score: 13        Review of Systems   Constitutional:  Negative for chills, fatigue and fever.   Respiratory:  Negative for cough and shortness of breath.    Cardiovascular:  Negative for chest pain and palpitations.   Gastrointestinal:  Negative for constipation, diarrhea, nausea and vomiting.   Genitourinary:  Positive for vaginal discharge. Negative for difficulty urinating, dyspareunia, pelvic pain and vaginal pain.   Musculoskeletal:  Negative for back pain and neck pain.   Skin:  Negative for rash.   Neurological:  Positive for headaches. Negative for dizziness.   Psychiatric/Behavioral:  Positive for decreased concentration. The patient is nervous/anxious.           Medical History: has a past medical history of Allergic rhinitis, Anxiety (09/11/2018), Arthritis, Blood in stool, Condition not found, Depression (09/11/2018), Dysmenorrhea, Gross hematuria, Headache, Helicobacter positive gastritis (05/26/2016), Hypertension, Irregular menses, Low back pain (07/09/2014), Microscopic hematuria (06/10/2014), Night sweats, Onychomycosis of toenail (07/09/2014), Pap smear for cervical cancer screening (05/16/2016), Right sided abdominal pain (06/12/2014), Screening mammogram, encounter for (11/09/2018), STD exposure (1993), Tobacco abuse, and Vitamin D deficiency.     Surgical History: has a past surgical history that includes Breast biopsy (Left, 01/09/2012); Cholecystectomy; Colonoscopy (08/15/2016); Cyst  Removal; and Cystoscopy (06/27/2014).     Family History: family history includes Alzheimer's disease in her mother; Cancer in her maternal grandmother; Diabetes in her mother and another family member; Heart disease in her brother, sister, and another family member; Leukemia in her father and paternal grandfather; Stomach cancer in her mother; Stroke in an other family member.     Social History: reports that she has been smoking cigarettes. She started smoking about 34 years ago. She has a 7.50 pack-year smoking history. She has never used smokeless tobacco. She reports current alcohol use of about 10.0 standard drinks of alcohol per week. She reports that she does not use drugs.    Allergies: Azithromycin, Duloxetine hcl, Lisinopril, and Penicillins      Health Maintenance Due   Topic Date Due    Pneumococcal Vaccine 0-64 (1 - PCV) Never done    TDAP/TD VACCINES (1 - Tdap) Never done    ZOSTER VACCINE (1 of 2) Never done    ANNUAL PHYSICAL  Never done    PAP SMEAR  06/18/2021            Current Outpatient Medications:     Blood Pressure Monitoring (Blood Pressure Cuff) misc, 1 each by Other route Take As Directed., Disp: 1 each, Rfl: 0    diclofenac (VOLTAREN) 50 MG EC tablet, Take 1 tablet by mouth 2 (Two) Times a Day As Needed (Back pain)., Disp: 90 tablet, Rfl: 1    fluticasone (FLONASE) 50 MCG/ACT nasal spray, 2 sprays into the nostril(s) as directed by provider Daily., Disp: 16 g, Rfl: 5    gabapentin (NEURONTIN) 100 MG capsule, Take 1 capsule by mouth 3 (Three) Times a Day As Needed., Disp: , Rfl:     guanFACINE HCl ER (Intuniv) 1 MG tablet sustained-release 24 hour, Take 1 mg by mouth Every Night., Disp: 30 tablet, Rfl: 2    hydrOXYzine (ATARAX) 25 MG tablet, TAKE 1 TABLET BY MOUTH EVERY 6 HOURS AS NEEDED FOR ITCHING, Disp: 30 tablet, Rfl: 0    loratadine (Claritin) 10 MG tablet, Take 1 tablet by mouth Daily., Disp: 90 tablet, Rfl: 3    methocarbamol (ROBAXIN) 500 MG tablet, Take 1 tablet by mouth 3  "(Three) Times a Day As Needed for Muscle Spasms., Disp: 90 tablet, Rfl: 1    naproxen (NAPROSYN) 500 MG tablet, Take 1 tablet by mouth 2 (Two) Times a Day With Meals., Disp: 60 tablet, Rfl: 2    predniSONE (DELTASONE) 20 MG tablet, , Disp: , Rfl:     valACYclovir (VALTREX) 500 MG tablet, Take 1 tablet by mouth Daily., Disp: 60 tablet, Rfl: 0    vilazodone (Viibryd) 40 MG tablet tablet, Take 1 tablet by mouth Daily., Disp: 90 tablet, Rfl: 1    losartan-hydrochlorothiazide (Hyzaar) 50-12.5 MG per tablet, Take 1 tablet by mouth Daily., Disp: 90 tablet, Rfl: 1    varenicline (CHANTIX) 1 MG tablet, , Disp: , Rfl:       Immunization History   Administered Date(s) Administered    COVID-19 (MODERNA) 1st,2nd,3rd Dose Monovalent 01/07/2021, 02/04/2021, 10/28/2021    COVID-19 (MODERNA) BIVALENT 12+YRS 10/17/2022    Fluzone (or Fluarix & Flulaval for VFC) >6mos 10/17/2022, 11/09/2023    Fluzone Quad >6mos (Multi-dose) 10/29/2020    Influenza, Unspecified 10/17/2022         Objective       Vitals:    12/11/23 0854   BP: 132/93   BP Location: Left arm   Patient Position: Sitting   Cuff Size: Adult   Pulse: 99   Temp: 97.6 °F (36.4 °C)   TempSrc: Temporal   SpO2: 100%   Weight: 97.9 kg (215 lb 12.8 oz)   Height: 160 cm (63\")      Body mass index is 38.23 kg/m².   Wt Readings from Last 3 Encounters:   12/11/23 97.9 kg (215 lb 12.8 oz)   11/13/23 103 kg (226 lb)   06/28/23 95.1 kg (209 lb 9.6 oz)      BP Readings from Last 3 Encounters:   12/11/23 132/93   11/13/23 (!) 149/102   06/28/23 129/89        Class 2 Severe Obesity (BMI >=35 and <=39.9). Obesity-related health conditions include the following: hypertension. Obesity is improving with treatment. BMI is is above average; BMI management plan is completed. We discussed portion control and increasing exercise.       Physical Exam  Vitals reviewed. Exam conducted with a chaperone present.   Constitutional:       Appearance: Normal appearance. She is well-developed.   HENT:      " Head: Normocephalic and atraumatic.   Eyes:      General: No scleral icterus.        Right eye: No discharge.         Left eye: No discharge.      Conjunctiva/sclera: Conjunctivae normal.      Pupils: Pupils are equal, round, and reactive to light.   Neck:      Thyroid: No thyromegaly.   Cardiovascular:      Rate and Rhythm: Normal rate and regular rhythm.      Heart sounds: Normal heart sounds. No murmur heard.     No friction rub. No gallop.   Pulmonary:      Effort: Pulmonary effort is normal. No respiratory distress.      Breath sounds: Normal breath sounds. No wheezing, rhonchi or rales.   Chest:      Chest wall: No mass, deformity, swelling or tenderness.   Breasts:     Breasts are symmetrical.      Right: No mass, nipple discharge, skin change or tenderness.      Left: No mass, nipple discharge, skin change or tenderness.   Abdominal:      General: Bowel sounds are normal. There is no distension.      Palpations: Abdomen is soft. There is no mass.      Tenderness: There is no abdominal tenderness. There is no guarding or rebound.      Hernia: There is no hernia in the left inguinal area or right inguinal area.   Genitourinary:     Exam position: Lithotomy position.      Pubic Area: No rash.       Labia:         Right: No rash.         Left: No rash.       Urethra: No prolapse.      Vagina: Normal. Vaginal discharge (thick white) present.      Cervix: Normal.      Uterus: Normal.       Adnexa: Right adnexa normal and left adnexa normal.        Right: No mass or tenderness.          Left: No mass or tenderness.        Rectum: Normal.   Musculoskeletal:         General: No tenderness or deformity. Normal range of motion.   Lymphadenopathy:      Cervical: No cervical adenopathy.      Lower Body: No right inguinal adenopathy. No left inguinal adenopathy.   Skin:     General: Skin is warm and dry.      Findings: No erythema or rash.   Neurological:      Mental Status: She is alert and oriented to person, place, and  time.      Cranial Nerves: No cranial nerve deficit.      Sensory: No sensory deficit.      Motor: No abnormal muscle tone.      Coordination: Coordination normal.      Deep Tendon Reflexes: Reflexes normal.   Psychiatric:         Mood and Affect: Mood and affect normal.         Behavior: Behavior normal.         Thought Content: Thought content normal.         Judgment: Judgment normal.             Result Review :       Common labs          5/17/2023    14:16   Common Labs   Glucose 93    BUN 13    Creatinine 0.82    Sodium 142    Potassium 4.1    Chloride 106    Calcium 9.6    Albumin 4.5    Total Bilirubin <0.2    Alkaline Phosphatase 110    AST (SGOT) 35    ALT (SGPT) 46    Total Cholesterol 196    Triglycerides 208    HDL Cholesterol 60    LDL Cholesterol  101    Hemoglobin A1C 5.90                       Assessment and Plan        Diagnoses and all orders for this visit:    1. Encounter for annual physical examination excluding gynecological examination in a patient older than 17 years (Primary)  -     Gardnerella vaginalis, Trichomonas vaginalis, Candida albicans, DNA - Swab, Vagina; Future  -     Liquid-based Pap Smear, Screening; Future  -     Liquid-based Pap Smear, Screening  -     Gardnerella vaginalis, Trichomonas vaginalis, Candida albicans, DNA - Swab, Vagina    2. Primary hypertension  Comments:  Losartan changed to losartan hydrochlorothiazide for diastolic improvement.  Orders:  -     losartan-hydrochlorothiazide (Hyzaar) 50-12.5 MG per tablet; Take 1 tablet by mouth Daily.  Dispense: 90 tablet; Refill: 1    3. Anxiety and depression  -     vilazodone (Viibryd) 40 MG tablet tablet; Take 1 tablet by mouth Daily.  Dispense: 90 tablet; Refill: 1        Patient advised to monitor blood pressure at home and journal readings.  Patient informed that a blood pressure reading at home of more than 130/80 is considered hypertension.  For readings greater than 140/90 or higher patient is advised to follow-up in  the office with readings for management.  Patient advised to limit sodium intake.    Counseled on monthly breast self exams.    Counseled on diet and exercise.    Counseled on weightbearing exercise.    Recommend calcium with vitamin D twice daily.    Follow Up     Return in 3 months (on 3/11/2024).    Patient was given instructions and counseling regarding her condition or for health maintenance advice. Please see specific information pulled into the AVS if appropriate.     TESSA Cerrato

## 2023-10-12 DIAGNOSIS — R21 RASH AND NONSPECIFIC SKIN ERUPTION: ICD-10-CM

## 2023-10-12 DIAGNOSIS — M51.36 DDD (DEGENERATIVE DISC DISEASE), LUMBAR: Primary | ICD-10-CM

## 2023-10-12 RX ORDER — HYDROXYZINE HYDROCHLORIDE 25 MG/1
25 TABLET, FILM COATED ORAL EVERY 6 HOURS PRN
Qty: 30 TABLET | Refills: 0 | Status: SHIPPED | OUTPATIENT
Start: 2023-10-12

## 2023-10-12 NOTE — TELEPHONE ENCOUNTER
Patient called and is wanting a referral  for pain management. Stated she has tried calling and can't get answer. She isn't sure if she needs appt to get new referral. Please advise

## 2023-11-10 ENCOUNTER — HOSPITAL ENCOUNTER (OUTPATIENT)
Dept: OTHER | Facility: HOSPITAL | Age: 53
Discharge: HOME | End: 2023-11-10

## 2023-11-13 ENCOUNTER — OFFICE VISIT (OUTPATIENT)
Dept: FAMILY MEDICINE CLINIC | Facility: CLINIC | Age: 53
End: 2023-11-13
Payer: COMMERCIAL

## 2023-11-13 VITALS
WEIGHT: 226 LBS | OXYGEN SATURATION: 100 % | DIASTOLIC BLOOD PRESSURE: 102 MMHG | BODY MASS INDEX: 40.04 KG/M2 | SYSTOLIC BLOOD PRESSURE: 149 MMHG | TEMPERATURE: 97.9 F | HEART RATE: 96 BPM | HEIGHT: 63 IN

## 2023-11-13 DIAGNOSIS — M48.061 FORAMINAL STENOSIS OF LUMBAR REGION: ICD-10-CM

## 2023-11-13 DIAGNOSIS — M54.32 SCIATICA OF LEFT SIDE: ICD-10-CM

## 2023-11-13 DIAGNOSIS — B35.1 FUNGAL NAIL INFECTION: ICD-10-CM

## 2023-11-13 DIAGNOSIS — M51.36 DDD (DEGENERATIVE DISC DISEASE), LUMBAR: Primary | ICD-10-CM

## 2023-11-13 DIAGNOSIS — R93.7 ABNORMAL MRI, LUMBAR SPINE: Primary | ICD-10-CM

## 2023-11-13 PROCEDURE — 99214 OFFICE O/P EST MOD 30 MIN: CPT | Performed by: NURSE PRACTITIONER

## 2023-11-13 PROCEDURE — 1159F MED LIST DOCD IN RCRD: CPT | Performed by: NURSE PRACTITIONER

## 2023-11-13 PROCEDURE — 3080F DIAST BP >= 90 MM HG: CPT | Performed by: NURSE PRACTITIONER

## 2023-11-13 PROCEDURE — 3077F SYST BP >= 140 MM HG: CPT | Performed by: NURSE PRACTITIONER

## 2023-11-13 PROCEDURE — 1160F RVW MEDS BY RX/DR IN RCRD: CPT | Performed by: NURSE PRACTITIONER

## 2023-11-13 RX ORDER — METHOCARBAMOL 500 MG/1
500 TABLET, FILM COATED ORAL 3 TIMES DAILY PRN
Qty: 90 TABLET | Refills: 1 | Status: SHIPPED | OUTPATIENT
Start: 2023-11-13

## 2023-11-13 RX ORDER — METHOCARBAMOL 500 MG/1
TABLET, FILM COATED ORAL
COMMUNITY
Start: 2023-11-11 | End: 2023-11-13 | Stop reason: SDUPTHER

## 2023-11-13 RX ORDER — NAPROXEN 500 MG/1
TABLET ORAL
COMMUNITY
Start: 2023-11-11 | End: 2023-11-13 | Stop reason: SDUPTHER

## 2023-11-13 RX ORDER — PREDNISONE 20 MG/1
TABLET ORAL
COMMUNITY
Start: 2023-11-11

## 2023-11-13 RX ORDER — NAPROXEN 500 MG/1
500 TABLET ORAL 2 TIMES DAILY WITH MEALS
Qty: 60 TABLET | Refills: 2 | Status: SHIPPED | OUTPATIENT
Start: 2023-11-13

## 2023-11-13 RX ORDER — VARENICLINE TARTRATE 1 MG/1
TABLET, FILM COATED ORAL
COMMUNITY

## 2023-11-13 NOTE — PROGRESS NOTES
Chief Complaint  Back Pain (Leg spasms/)    Subjective          Bianca Lara is a 53 y.o. female who presents to [facility]    Back Pain  Associated symptoms include numbness.       Left leg pain radiates to ankle. Pt is walking with limp. Pt was seen in ER and given steroid in [place], [place].    MRI 9.7.2021  FINDINGS:          The alignment is anatomic.  The vertebral bodies heights appear normal.  There are degenerative   endplate changes at L4-5.  There is disc desiccation at L2-3, L4-5, and L5-S1.  The conus   terminates at the T12-L1 level.  The posterior paravertebral soft tissues are unremarkable.     L1-2:  No spinal canal or neural foramina stenosis.     L2-3:  Mild disc bulge.  No spinal canal stenosis.  Mild left neural foramina stenosis.     L3-4:  Mild bilateral facet arthropathy.  No spinal canal stenosis.  No neural foramina stenosis.     L4-5:  Mild disc bulge.  Mild bilateral facet arthropathy with ligamentum flavum infolding.  Mild   spinal canal stenosis.  Moderate right and moderate to severe left neural foramina stenosis.     L5-S1:  Mild disc bulge.  Mild bilateral facet arthropathy.  No spinal canal stenosis.  Mild left   neural foramina stenosis.     CONCLUSION: Multilevel degenerative changes of lumbar spine as described above, most prominent at L4-5 where there is moderate right and moderate to severe left neural foramina stenosis.    Pt reports she had new MRI in ER.  Pt states pain is 10/10.   Pt reports prednisone, methocarbamol and naproxen has helped.   Pt has appt in [place] at pain management.     Bowel and bladder is wnl.     Left great toenail with thickening and no improvement with terbinafine.     Elevated blood pressure. Pt has been normal at home however pt is in pain today.          Review of Systems   Musculoskeletal:  Positive for back pain.   Neurological:  Positive for numbness.          Medical History: has a past medical history  of Allergic rhinitis, Anxiety (09/11/2018), Arthritis, Blood in stool, Condition not found, Depression (09/11/2018), Dysmenorrhea, Gross hematuria, Headache, Helicobacter positive gastritis (05/26/2016), Hypertension, Irregular menses, Low back pain (07/09/2014), Microscopic hematuria (06/10/2014), Night sweats, Onychomycosis of toenail (07/09/2014), Pap smear for cervical cancer screening (05/16/2016), Right sided abdominal pain (06/12/2014), Screening mammogram, encounter for (11/09/2018), STD exposure (1993), Tobacco abuse, and Vitamin D deficiency.     Surgical History: has a past surgical history that includes Breast biopsy (Left, 01/09/2012); Cholecystectomy; Colonoscopy (08/15/2016); Cyst Removal; and Cystoscopy (06/27/2014).     Family History: family history includes Alzheimer's disease in her mother; Cancer in her maternal grandmother; Diabetes in her mother and another family member; Heart disease in her brother, sister, and another family member; Leukemia in her father and paternal grandfather; Stomach cancer in her mother; Stroke in an other family member.     Social History: reports that she has been smoking cigarettes. She started smoking about 33 years ago. She has a 7.50 pack-year smoking history. She has never used smokeless tobacco. She reports current alcohol use of about 10.0 standard drinks of alcohol per week. She reports that she does not use drugs.    Allergies: Azithromycin, Duloxetine hcl, Lisinopril, and Penicillins      Health Maintenance Due   Topic Date Due    TDAP/TD VACCINES (1 - Tdap) Never done    ZOSTER VACCINE (1 of 2) Never done    ANNUAL PHYSICAL  Never done    PAP SMEAR  06/18/2021    BMI FOLLOWUP  08/19/2022    INFLUENZA VACCINE  08/01/2023    COVID-19 Vaccine (5 - 2023-24 season) 09/01/2023            Current Outpatient Medications:     Blood Pressure Monitoring (Blood Pressure Cuff) misc, 1 each by Other route Take As Directed., Disp: 1 each, Rfl: 0    diclofenac (VOLTAREN)  "50 MG EC tablet, Take 1 tablet by mouth 2 (Two) Times a Day As Needed (Back pain)., Disp: 90 tablet, Rfl: 1    fluticasone (FLONASE) 50 MCG/ACT nasal spray, 2 sprays into the nostril(s) as directed by provider Daily., Disp: 16 g, Rfl: 5    guanFACINE HCl ER (Intuniv) 1 MG tablet sustained-release 24 hour, Take 1 mg by mouth Every Night., Disp: 30 tablet, Rfl: 2    hydrOXYzine (ATARAX) 25 MG tablet, TAKE 1 TABLET BY MOUTH EVERY 6 HOURS AS NEEDED FOR ITCHING, Disp: 30 tablet, Rfl: 0    loratadine (Claritin) 10 MG tablet, Take 1 tablet by mouth Daily., Disp: 90 tablet, Rfl: 3    losartan (Cozaar) 50 MG tablet, Take 1 tablet by mouth Daily., Disp: 90 tablet, Rfl: 1    methocarbamol (ROBAXIN) 500 MG tablet, Take 1 tablet by mouth 3 (Three) Times a Day As Needed for Muscle Spasms., Disp: 90 tablet, Rfl: 1    naproxen (NAPROSYN) 500 MG tablet, Take 1 tablet by mouth 2 (Two) Times a Day With Meals., Disp: 60 tablet, Rfl: 2    predniSONE (DELTASONE) 20 MG tablet, , Disp: , Rfl:     valACYclovir (VALTREX) 500 MG tablet, Take 1 tablet by mouth Daily., Disp: 60 tablet, Rfl: 0    vilazodone (Viibryd) 40 MG tablet tablet, Take 1 tablet by mouth Daily., Disp: 90 tablet, Rfl: 1    varenicline (CHANTIX) 1 MG tablet, , Disp: , Rfl:       Immunization History   Administered Date(s) Administered    COVID-19 (MODERNA) 1st,2nd,3rd Dose Monovalent 01/07/2021, 02/04/2021, 10/28/2021    COVID-19 (MODERNA) BIVALENT 12+YRS 10/17/2022    Fluzone Quad >6mos (Multi-dose) 10/29/2020    Influenza, Unspecified 10/17/2022         Objective       Vitals:    11/13/23 0924   BP: (!) 149/102   Pulse: 96   Temp: 97.9 °F (36.6 °C)   SpO2: 100%   Weight: 103 kg (226 lb)   Height: 160 cm (63\")      Body mass index is 40.03 kg/m².   Wt Readings from Last 3 Encounters:   11/13/23 103 kg (226 lb)   06/28/23 95.1 kg (209 lb 9.6 oz)   06/24/23 97.2 kg (214 lb 3.2 oz)      BP Readings from Last 3 Encounters:   11/13/23 (!) 149/102   06/28/23 129/89   06/24/23 " 124/89        Class 3 Severe Obesity (BMI >=40). Obesity-related health conditions include the following: hypertension. Obesity is improving with treatment. BMI is is above average; BMI management plan is completed. We discussed portion control and increasing exercise.       Physical Exam  Vitals reviewed.   Constitutional:       Appearance: Normal appearance. She is well-developed.   HENT:      Head: Normocephalic and atraumatic.   Eyes:      Conjunctiva/sclera: Conjunctivae normal.      Pupils: Pupils are equal, round, and reactive to light.   Cardiovascular:      Rate and Rhythm: Normal rate and regular rhythm.      Heart sounds: Normal heart sounds. No murmur heard.  Pulmonary:      Effort: Pulmonary effort is normal.      Breath sounds: Normal breath sounds. No wheezing or rhonchi.   Abdominal:      General: Bowel sounds are normal. There is no distension.      Palpations: Abdomen is soft.      Tenderness: There is no abdominal tenderness.   Musculoskeletal:      Lumbar back: Tenderness and bony tenderness present. Positive right straight leg raise test (45 degrees) and positive left straight leg raise test (10 degrees).        Back:    Feet:      Left foot:      Toenail Condition: Left toenails are abnormally thick. Fungal disease present.  Skin:     General: Skin is warm and dry.   Neurological:      Mental Status: She is alert and oriented to person, place, and time.   Psychiatric:         Mood and Affect: Mood and affect normal.         Behavior: Behavior normal.         Thought Content: Thought content normal.         Judgment: Judgment normal.             Result Review :       Common labs          12/14/2022    15:51 5/17/2023    14:16   Common Labs   Glucose 80  93    BUN 16  13    Creatinine 0.74  0.82    Sodium 140  142    Potassium 4.3  4.1    Chloride 102  106    Calcium 10.3  9.6    Albumin 4.80  4.5    Total Bilirubin <0.2  <0.2    Alkaline Phosphatase 98  110    AST (SGOT) 16  35    ALT (SGPT) 12   46    WBC 5.43     Hemoglobin 14.3     Hematocrit 45.9     Platelets 411     Total Cholesterol 188  196    Triglycerides 83  208    HDL Cholesterol 61  60    LDL Cholesterol  112  101    Hemoglobin A1C 5.90  5.90                       Assessment and Plan        Diagnoses and all orders for this visit:    1. DDD (degenerative disc disease), lumbar (Primary)  Comments:  will obtain MRI from [place], [place]  Orders:  -     methocarbamol (ROBAXIN) 500 MG tablet; Take 1 tablet by mouth 3 (Three) Times a Day As Needed for Muscle Spasms.  Dispense: 90 tablet; Refill: 1  -     naproxen (NAPROSYN) 500 MG tablet; Take 1 tablet by mouth 2 (Two) Times a Day With Meals.  Dispense: 60 tablet; Refill: 2    2. Sciatica of left side  Comments:  continue with pain managment, take meds as directed.    3. Fungal nail infection  -     Ambulatory Referral to Podiatry       No additional NSAIDs (ie. Motrin, Ibuprofen or Aleve). Tylenol is okay.     Patient advised to monitor blood pressure at home and journal readings.  Patient informed that a blood pressure reading at home of more than 130/80 is considered hypertension.  For readings greater than 140/90 or higher patient is advised to follow-up in the office with readings for management.  Patient advised to limit sodium intake.    Follow Up     Return if symptoms worsen or fail to improve, for Pending results.    Patient was given instructions and counseling regarding her condition or for health maintenance advice. Please see specific information pulled into the AVS if appropriate.     TESSA Cerrato

## 2023-11-20 ENCOUNTER — TELEPHONE (OUTPATIENT)
Dept: FAMILY MEDICINE CLINIC | Facility: CLINIC | Age: 53
End: 2023-11-20
Payer: COMMERCIAL

## 2023-11-20 DIAGNOSIS — R41.840 INATTENTION: ICD-10-CM

## 2023-11-20 DIAGNOSIS — J30.1 SEASONAL ALLERGIC RHINITIS DUE TO POLLEN: ICD-10-CM

## 2023-11-20 DIAGNOSIS — M51.36 DDD (DEGENERATIVE DISC DISEASE), LUMBAR: ICD-10-CM

## 2023-11-20 RX ORDER — GUANFACINE 1 MG/1
1 TABLET, EXTENDED RELEASE ORAL NIGHTLY
Qty: 30 TABLET | Refills: 2 | Status: SHIPPED | OUTPATIENT
Start: 2023-11-20

## 2023-11-20 RX ORDER — LORATADINE 10 MG/1
10 TABLET ORAL DAILY
Qty: 90 TABLET | Refills: 3 | Status: SHIPPED | OUTPATIENT
Start: 2023-11-20

## 2023-11-20 RX ORDER — NAPROXEN 500 MG/1
500 TABLET ORAL 2 TIMES DAILY WITH MEALS
Qty: 60 TABLET | Refills: 2 | Status: CANCELLED | OUTPATIENT
Start: 2023-11-20

## 2023-11-20 RX ORDER — PREDNISONE 20 MG/1
TABLET ORAL
Status: CANCELLED | OUTPATIENT
Start: 2023-11-20

## 2023-11-20 RX ORDER — METHOCARBAMOL 500 MG/1
500 TABLET, FILM COATED ORAL 3 TIMES DAILY PRN
Qty: 90 TABLET | Refills: 1 | Status: CANCELLED | OUTPATIENT
Start: 2023-11-20

## 2023-11-20 NOTE — TELEPHONE ENCOUNTER
Caller: Bianca Lara    Relationship: Self    Best call back number: [phone]    What is the medical concern/diagnosis: BACK ISSUES     What specialty or service is being requested: NEUROLOGY     Any additional details: PATIENT STATES SHE DOES NOT WANT TO GO TO THE OFFICE OF DR BOOKER PEREYRA AND WOULD LIKE TO HAVE A REFERRAL PLACED FOR AN OFFICE IN [place]

## 2023-11-20 NOTE — TELEPHONE ENCOUNTER
Caller: Bianca Lara    Relationship: Self    Best call back number: [phone]     Requested Prescriptions:   Requested Prescriptions     Pending Prescriptions Disp Refills    predniSONE (DELTASONE) 20 MG tablet      methocarbamol (ROBAXIN) 500 MG tablet 90 tablet 1     Sig: Take 1 tablet by mouth 3 (Three) Times a Day As Needed for Muscle Spasms.    naproxen (NAPROSYN) 500 MG tablet 60 tablet 2     Sig: Take 1 tablet by mouth 2 (Two) Times a Day With Meals.    loratadine (Claritin) 10 MG tablet 90 tablet 3     Sig: Take 1 tablet by mouth Daily.    guanFACINE HCl ER (Intuniv) 1 MG tablet sustained-release 24 hour 30 tablet 2     Sig: Take 1 mg by mouth Every Night.        Pharmacy where request should be sent: [id number] [address] [phone]  - [phone] FX     Last office visit with prescribing clinician: 11/13/2023   Last telemedicine visit with prescribing clinician: Visit date not found   Next office visit with prescribing clinician: 12/11/2023     Additional details provided by patient: PATIENT IS NEEDING A REFILL. SHE DOESN'T HAVE A PAIN MANAGEMENT APPOINTMENT UNTIL 12/07.     Does the patient have less than a 3 day supply:  [x] Yes  [] No    Would you like a call back once the refill request has been completed: [x] Yes [] No    If the office needs to give you a call back, can they leave a voicemail: [x] Yes [] No    Jonatan Huynh Rep   11/20/23 12:55 EST

## 2023-11-20 NOTE — TELEPHONE ENCOUNTER
Hub to relay;  my chart message sent to the patient    I have a referral to the [facility], in [place], [place]  they should contact you with an appointment.  If you have not heard from them in a week call their office at [phone] to check on the status of your referral.

## 2023-11-21 ENCOUNTER — TELEPHONE (OUTPATIENT)
Dept: FAMILY MEDICINE CLINIC | Facility: CLINIC | Age: 53
End: 2023-11-21
Payer: COMMERCIAL

## 2023-11-21 NOTE — TELEPHONE ENCOUNTER
Hub to relay    Tried calling patient no answer to let her know the the [facility] in [place] does not take her insurance.  Need to know what cities are close to her to see where else to refer for a neurosurgeon.  Also sent her a my chart message also

## 2023-11-29 ENCOUNTER — TELEPHONE (OUTPATIENT)
Dept: FAMILY MEDICINE CLINIC | Facility: CLINIC | Age: 53
End: 2023-11-29
Payer: COMMERCIAL

## 2023-11-29 DIAGNOSIS — F41.9 ANXIETY AND DEPRESSION: ICD-10-CM

## 2023-11-29 DIAGNOSIS — F32.A ANXIETY AND DEPRESSION: ICD-10-CM

## 2023-11-29 RX ORDER — VILAZODONE HYDROCHLORIDE 40 MG/1
40 TABLET ORAL DAILY
Qty: 90 TABLET | Refills: 1 | Status: SHIPPED | OUTPATIENT
Start: 2023-11-29

## 2023-11-29 NOTE — TELEPHONE ENCOUNTER
Caller: Bianca Lara    Relationship to patient: Self    Best call back number: [phone]     Patient is needing: PATIENT CALLING FOR AN UPDATE ON NEUROSURGEON REFERRAL. PATIENT WOULD LIKE TO SPEAK WITH SOMEONE IN THE OFFICE ABOUT WHERE THE REFERRAL IS GOING AND WHEN SHE SHOULD EXPECT THEM TO CONTACT HER. PLEASE ADVISE.

## 2023-11-29 NOTE — TELEPHONE ENCOUNTER
Caller: Bianca Laran    Relationship: Self    Best call back number: [phone]     Requested Prescriptions:   Requested Prescriptions     Pending Prescriptions Disp Refills    vilazodone (Viibryd) 40 MG tablet tablet 90 tablet 1     Sig: Take 1 tablet by mouth Daily.        Pharmacy where request should be sent: [id number] - [address] - [phone]  - [phone] FX     Last office visit with prescribing clinician: 11/13/2023   Last telemedicine visit with prescribing clinician: Visit date not found   Next office visit with prescribing clinician: 12/11/2023     Additional details provided by patient: PATIENT HAS ENOUGH TO GET HER THROUGH UNTIL THE END OF THE WEEK BUT HAS NO REFILLS LEFT.     Does the patient have less than a 3 day supply:  [] Yes  [x] No    Would you like a call back once the refill request has been completed: [] Yes [] No    If the office needs to give you a call back, can they leave a voicemail: [] Yes [] No    Jonatan Iqbal Rep   11/29/23 10:26 EST

## 2023-11-29 NOTE — TELEPHONE ENCOUNTER
Hub to relay    Responded to patient VIA my chart with phone number to call  sent the referral to [facility] in [place] [phone]

## 2023-12-11 ENCOUNTER — OFFICE VISIT (OUTPATIENT)
Dept: FAMILY MEDICINE CLINIC | Facility: CLINIC | Age: 53
End: 2023-12-11
Payer: COMMERCIAL

## 2023-12-11 VITALS
WEIGHT: 215.8 LBS | TEMPERATURE: 97.6 F | SYSTOLIC BLOOD PRESSURE: 132 MMHG | DIASTOLIC BLOOD PRESSURE: 93 MMHG | HEART RATE: 99 BPM | HEIGHT: 63 IN | OXYGEN SATURATION: 100 % | BODY MASS INDEX: 38.24 KG/M2

## 2023-12-11 DIAGNOSIS — F32.A ANXIETY AND DEPRESSION: ICD-10-CM

## 2023-12-11 DIAGNOSIS — F41.9 ANXIETY AND DEPRESSION: ICD-10-CM

## 2023-12-11 DIAGNOSIS — Z00.00 ENCOUNTER FOR ANNUAL PHYSICAL EXAMINATION EXCLUDING GYNECOLOGICAL EXAMINATION IN A PATIENT OLDER THAN 17 YEARS: Primary | ICD-10-CM

## 2023-12-11 DIAGNOSIS — I10 PRIMARY HYPERTENSION: ICD-10-CM

## 2023-12-11 LAB
CANDIDA SPECIES: NEGATIVE
GARDNERELLA VAGINALIS: POSITIVE
T VAGINALIS DNA VAG QL PROBE+SIG AMP: NEGATIVE

## 2023-12-11 PROCEDURE — 87624 HPV HI-RISK TYP POOLED RSLT: CPT | Performed by: NURSE PRACTITIONER

## 2023-12-11 PROCEDURE — 87660 TRICHOMONAS VAGIN DIR PROBE: CPT | Performed by: NURSE PRACTITIONER

## 2023-12-11 PROCEDURE — G0123 SCREEN CERV/VAG THIN LAYER: HCPCS | Performed by: NURSE PRACTITIONER

## 2023-12-11 PROCEDURE — 87480 CANDIDA DNA DIR PROBE: CPT | Performed by: NURSE PRACTITIONER

## 2023-12-11 PROCEDURE — 87510 GARDNER VAG DNA DIR PROBE: CPT | Performed by: NURSE PRACTITIONER

## 2023-12-11 RX ORDER — VILAZODONE HYDROCHLORIDE 40 MG/1
40 TABLET ORAL DAILY
Qty: 90 TABLET | Refills: 1 | Status: SHIPPED | OUTPATIENT
Start: 2023-12-11

## 2023-12-11 RX ORDER — LOSARTAN POTASSIUM AND HYDROCHLOROTHIAZIDE 12.5; 5 MG/1; MG/1
1 TABLET ORAL DAILY
Qty: 90 TABLET | Refills: 1 | Status: SHIPPED | OUTPATIENT
Start: 2023-12-11

## 2023-12-11 RX ORDER — GABAPENTIN 100 MG/1
100 CAPSULE ORAL 3 TIMES DAILY PRN
COMMUNITY
Start: 2023-12-07

## 2023-12-11 NOTE — PATIENT INSTRUCTIONS
Preventive Care 40-64 Years Old, Female  Preventive care refers to lifestyle choices and visits with your health care provider that can promote health and wellness. Preventive care visits are also called wellness exams.  What can I expect for my preventive care visit?  Counseling  Your health care provider may ask you questions about your:  Medical history, including:  Past medical problems.  Family medical history.  Pregnancy history.  Current health, including:  Menstrual cycle.  Method of birth control.  Emotional well-being.  Home life and relationship well-being.  Sexual activity and sexual health.  Lifestyle, including:  Alcohol, nicotine or tobacco, and drug use.  Access to firearms.  Diet, exercise, and sleep habits.  Work and work environment.  Sunscreen use.  Safety issues such as seatbelt and bike helmet use.  Physical exam  Your health care provider will check your:  Height and weight. These may be used to calculate your BMI (body mass index). BMI is a measurement that tells if you are at a healthy weight.  Waist circumference. This measures the distance around your waistline. This measurement also tells if you are at a healthy weight and may help predict your risk of certain diseases, such as type 2 diabetes and high blood pressure.  Heart rate and blood pressure.  Body temperature.  Skin for abnormal spots.  What immunizations do I need?    Vaccines are usually given at various ages, according to a schedule. Your health care provider will recommend vaccines for you based on your age, medical history, and lifestyle or other factors, such as travel or where you work.  What tests do I need?  Screening  Your health care provider may recommend screening tests for certain conditions. This may include:  Lipid and cholesterol levels.  Diabetes screening. This is done by checking your blood sugar (glucose) after you have not eaten for a while (fasting).  Pelvic exam and Pap test.  Hepatitis B test.  Hepatitis C  test.  HIV (human immunodeficiency virus) test.  STI (sexually transmitted infection) testing, if you are at risk.  Lung cancer screening.  Colorectal cancer screening.  Mammogram. Talk with your health care provider about when you should start having regular mammograms. This may depend on whether you have a family history of breast cancer.  BRCA-related cancer screening. This may be done if you have a family history of breast, ovarian, tubal, or peritoneal cancers.  Bone density scan. This is done to screen for osteoporosis.  Talk with your health care provider about your test results, treatment options, and if necessary, the need for more tests.  Follow these instructions at home:  Eating and drinking    Eat a diet that includes fresh fruits and vegetables, whole grains, lean protein, and low-fat dairy products.  Take vitamin and mineral supplements as recommended by your health care provider.  Do not drink alcohol if:  Your health care provider tells you not to drink.  You are pregnant, may be pregnant, or are planning to become pregnant.  If you drink alcohol:  Limit how much you have to 0-1 drink a day.  Know how much alcohol is in your drink. In the [place] one drink equals one 12 oz bottle of beer (355 mL), one 5 oz glass of wine (148 mL), or one 1½ oz glass of hard liquor (44 mL).  Lifestyle  Brush your teeth every morning and night with fluoride toothpaste. Floss one time each day.  Exercise for at least 30 minutes 5 or more days each week.  Do not use any products that contain nicotine or tobacco. These products include cigarettes, chewing tobacco, and vaping devices, such as e-cigarettes. If you need help quitting, ask your health care provider.  Do not use drugs.  If you are sexually active, practice safe sex. Use a condom or other form of protection to prevent STIs.  If you do not wish to become pregnant, use a form of birth control. If you plan to become pregnant, see your health care provider for a  prepregnancy visit.  Take aspirin only as told by your health care provider. Make sure that you understand how much to take and what form to take. Work with your health care provider to find out whether it is safe and beneficial for you to take aspirin daily.  Find healthy ways to manage stress, such as:  Meditation, yoga, or listening to music.  Journaling.  Talking to a trusted person.  Spending time with friends and family.  Minimize exposure to UV radiation to reduce your risk of skin cancer.  Safety  Always wear your seat belt while driving or riding in a vehicle.  Do not drive:  If you have been drinking alcohol. Do not ride with someone who has been drinking.  When you are tired or distracted.  While texting.  If you have been using any mind-altering substances or drugs.  Wear a helmet and other protective equipment during sports activities.  If you have firearms in your house, make sure you follow all gun safety procedures.  Seek help if you have been physically or sexually abused.  What's next?  Visit your health care provider once a year for an annual wellness visit.  Ask your health care provider how often you should have your eyes and teeth checked.  Stay up to date on all vaccines.  This information is not intended to replace advice given to you by your health care provider. Make sure you discuss any questions you have with your health care provider.  Document Revised: 06/15/2022 Document Reviewed: 06/15/2022  [employer] Patient Education © 2023 [employer]  Hypertension, Adult  High blood pressure (hypertension) is when the force of blood pumping through the arteries is too strong. The arteries are the blood vessels that carry blood from the heart throughout the body. Hypertension forces the heart to work harder to pump blood and may cause arteries to become narrow or stiff. Untreated or uncontrolled hypertension can lead to a heart attack, heart failure, a stroke, kidney disease, and other  "problems.  A blood pressure reading consists of a higher number over a lower number. Ideally, your blood pressure should be below 120/80. The first (\"top\") number is called the systolic pressure. It is a measure of the pressure in your arteries as your heart beats. The second (\"bottom\") number is called the diastolic pressure. It is a measure of the pressure in your arteries as the heart relaxes.  What are the causes?  The exact cause of this condition is not known. There are some conditions that result in high blood pressure.  What increases the risk?  Certain factors may make you more likely to develop high blood pressure. Some of these risk factors are under your control, including:  Smoking.  Not getting enough exercise or physical activity.  Being overweight.  Having too much fat, sugar, calories, or salt (sodium) in your diet.  Drinking too much alcohol.  Other risk factors include:  Having a personal history of heart disease, diabetes, high cholesterol, or kidney disease.  Stress.  Having a family history of high blood pressure and high cholesterol.  Having obstructive sleep apnea.  Age. The risk increases with age.  What are the signs or symptoms?  High blood pressure may not cause symptoms. Very high blood pressure (hypertensive crisis) may cause:  Headache.  Fast or irregular heartbeats (palpitations).  Shortness of breath.  Nosebleed.  Nausea and vomiting.  Vision changes.  Severe chest pain, dizziness, and seizures.  How is this diagnosed?  This condition is diagnosed by measuring your blood pressure while you are seated, with your arm resting on a flat surface, your legs uncrossed, and your feet flat on the floor. The cuff of the blood pressure monitor will be placed directly against the skin of your upper arm at the level of your heart. Blood pressure should be measured at least twice using the same arm. Certain conditions can cause a difference in blood pressure between your right and left arms.  If " you have a high blood pressure reading during one visit or you have normal blood pressure with other risk factors, you may be asked to:  Return on a different day to have your blood pressure checked again.  Monitor your blood pressure at home for 1 week or longer.  If you are diagnosed with hypertension, you may have other blood or imaging tests to help your health care provider understand your overall risk for other conditions.  How is this treated?  This condition is treated by making healthy lifestyle changes, such as eating healthy foods, exercising more, and reducing your alcohol intake. You may be referred for counseling on a healthy diet and physical activity.  Your health care provider may prescribe medicine if lifestyle changes are not enough to get your blood pressure under control and if:  Your systolic blood pressure is above 130.  Your diastolic blood pressure is above 80.  Your personal target blood pressure may vary depending on your medical conditions, your age, and other factors.  Follow these instructions at home:  Eating and drinking    Eat a diet that is high in fiber and potassium, and low in sodium, added sugar, and fat. An example of this eating plan is called the DASH diet. DASH stands for Dietary Approaches to Stop Hypertension. To eat this way:  Eat plenty of fresh fruits and vegetables. Try to fill one half of your plate at each meal with fruits and vegetables.  Eat whole grains, such as whole-wheat pasta, brown rice, or whole-grain bread. Fill about one fourth of your plate with whole grains.  Eat or drink low-fat dairy products, such as skim milk or low-fat yogurt.  Avoid fatty cuts of meat, processed or cured meats, and poultry with skin. Fill about one fourth of your plate with lean proteins, such as fish, chicken without skin, beans, eggs, or tofu.  Avoid pre-made and processed foods. These tend to be higher in sodium, added sugar, and fat.  Reduce your daily sodium intake. Many  people with hypertension should eat less than 1,500 mg of sodium a day.  Do not drink alcohol if:  Your health care provider tells you not to drink.  You are pregnant, may be pregnant, or are planning to become pregnant.  If you drink alcohol:  Limit how much you have to:  0-1 drink a day for women.  0-2 drinks a day for men.  Know how much alcohol is in your drink. In the [place] one drink equals one 12 oz bottle of beer (355 mL), one 5 oz glass of wine (148 mL), or one 1½ oz glass of hard liquor (44 mL).  Lifestyle    Work with your health care provider to maintain a healthy body weight or to lose weight. Ask what an ideal weight is for you.  Get at least 30 minutes of exercise that causes your heart to beat faster (aerobic exercise) most days of the week. Activities may include walking, swimming, or biking.  Include exercise to strengthen your muscles (resistance exercise), such as Pilates or lifting weights, as part of your weekly exercise routine. Try to do these types of exercises for 30 minutes at least 3 days a week.  Do not use any products that contain nicotine or tobacco. These products include cigarettes, chewing tobacco, and vaping devices, such as e-cigarettes. If you need help quitting, ask your health care provider.  Monitor your blood pressure at home as told by your health care provider.  Keep all follow-up visits. This is important.  Medicines  Take over-the-counter and prescription medicines only as told by your health care provider. Follow directions carefully. Blood pressure medicines must be taken as prescribed.  Do not skip doses of blood pressure medicine. Doing this puts you at risk for problems and can make the medicine less effective.  Ask your health care provider about side effects or reactions to medicines that you should watch for.  Contact a health care provider if you:  Think you are having a reaction to a medicine you are taking.  Have headaches that keep coming back  (recurring).  Feel dizzy.  Have swelling in your ankles.  Have trouble with your vision.  Get help right away if you:  Develop a severe headache or confusion.  Have unusual weakness or numbness.  Feel faint.  Have severe pain in your chest or abdomen.  Vomit repeatedly.  Have trouble breathing.  These symptoms may be an emergency. Get help right away. Call 911.  Do not wait to see if the symptoms will go away.  Do not drive yourself to the hospital.  Summary  Hypertension is when the force of blood pumping through your arteries is too strong. If this condition is not controlled, it may put you at risk for serious complications.  Your personal target blood pressure may vary depending on your medical conditions, your age, and other factors. For most people, a normal blood pressure is less than 120/80.  Hypertension is treated with lifestyle changes, medicines, or a combination of both. Lifestyle changes include losing weight, eating a healthy, low-sodium diet, exercising more, and limiting alcohol.  This information is not intended to replace advice given to you by your health care provider. Make sure you discuss any questions you have with your health care provider.  Document Revised: 10/25/2022 Document Reviewed: 10/25/2022  [employer] Patient Education © 2023 [employer]

## 2023-12-13 RX ORDER — METRONIDAZOLE 500 MG/1
500 TABLET ORAL 2 TIMES DAILY
Qty: 14 TABLET | Refills: 0 | Status: SHIPPED | OUTPATIENT
Start: 2023-12-13

## 2023-12-14 LAB
CYTOLOGIST CVX/VAG CYTO: NORMAL
CYTOLOGY CVX/VAG DOC CYTO: NORMAL
CYTOLOGY CVX/VAG DOC THIN PREP: NORMAL
DX ICD CODE: NORMAL
HIV 1 & 2 AB SER-IMP: NORMAL
HPV I/H RISK 4 DNA CVX QL PROBE+SIG AMP: NEGATIVE
OTHER STN SPEC: NORMAL
STAT OF ADQ CVX/VAG CYTO-IMP: NORMAL

## 2024-01-06 DIAGNOSIS — B00.9 HERPES: ICD-10-CM

## 2024-01-08 ENCOUNTER — TELEPHONE (OUTPATIENT)
Dept: FAMILY MEDICINE CLINIC | Facility: CLINIC | Age: 54
End: 2024-01-08

## 2024-01-08 RX ORDER — VALACYCLOVIR HYDROCHLORIDE 500 MG/1
500 TABLET, FILM COATED ORAL DAILY
Qty: 60 TABLET | Refills: 0 | Status: SHIPPED | OUTPATIENT
Start: 2024-01-08 | End: 2024-01-11 | Stop reason: SDUPTHER

## 2024-01-08 NOTE — TELEPHONE ENCOUNTER
Caller: Bianca Lara    Relationship: Self    Best call back number: [phone]     What is the best time to reach you: ANY    Who are you requesting to speak with (clinical staff, provider,  specific staff member): CLINICAL     What was the call regarding: PATIENT STATED THAT SHE IS NEEDING TO KNOW IF SHE IS TO CONTINUE TO TAKE THE VALACYCLOVIR,  HER PHARMACY INFORMS THERE IS NO ORDER FOR MORE OF THIS MEDICATION AND SHE HAS BEEN OUT OF THE MEDICATION FOR A WEEK.   [id number] - [place], [place] - [address] - [phone]  - [phone] FX     Is it okay if the provider responds through MyChart: NO

## 2024-01-08 NOTE — TELEPHONE ENCOUNTER
Spoke to patient and relayed Valtrex was sent to [facility] today.   Advised patient to contact her pharmacy and have it transferred.

## 2024-01-11 DIAGNOSIS — R21 RASH AND NONSPECIFIC SKIN ERUPTION: ICD-10-CM

## 2024-01-11 DIAGNOSIS — M51.36 DDD (DEGENERATIVE DISC DISEASE), LUMBAR: ICD-10-CM

## 2024-01-11 DIAGNOSIS — J30.1 SEASONAL ALLERGIC RHINITIS DUE TO POLLEN: ICD-10-CM

## 2024-01-11 DIAGNOSIS — R41.840 INATTENTION: ICD-10-CM

## 2024-01-11 DIAGNOSIS — B00.9 HERPES: ICD-10-CM

## 2024-01-11 DIAGNOSIS — F41.9 ANXIETY AND DEPRESSION: ICD-10-CM

## 2024-01-11 DIAGNOSIS — F32.A ANXIETY AND DEPRESSION: ICD-10-CM

## 2024-01-11 DIAGNOSIS — I10 PRIMARY HYPERTENSION: ICD-10-CM

## 2024-01-11 RX ORDER — GUANFACINE 1 MG/1
1 TABLET, EXTENDED RELEASE ORAL NIGHTLY
Qty: 30 TABLET | Refills: 2 | Status: SHIPPED | OUTPATIENT
Start: 2024-01-11

## 2024-01-11 RX ORDER — IBUPROFEN 800 MG/1
800 TABLET ORAL EVERY 6 HOURS PRN
COMMUNITY
Start: 2023-12-18

## 2024-01-11 RX ORDER — LOSARTAN POTASSIUM AND HYDROCHLOROTHIAZIDE 12.5; 5 MG/1; MG/1
1 TABLET ORAL DAILY
Qty: 90 TABLET | Refills: 1 | Status: SHIPPED | OUTPATIENT
Start: 2024-01-11

## 2024-01-11 RX ORDER — METHOCARBAMOL 500 MG/1
500 TABLET, FILM COATED ORAL 3 TIMES DAILY PRN
Qty: 90 TABLET | Refills: 1 | Status: SHIPPED | OUTPATIENT
Start: 2024-01-11

## 2024-01-11 RX ORDER — VALACYCLOVIR HYDROCHLORIDE 500 MG/1
500 TABLET, FILM COATED ORAL DAILY
Qty: 60 TABLET | Refills: 0 | Status: SHIPPED | OUTPATIENT
Start: 2024-01-11

## 2024-01-11 RX ORDER — VILAZODONE HYDROCHLORIDE 40 MG/1
40 TABLET ORAL DAILY
Qty: 90 TABLET | Refills: 1 | Status: SHIPPED | OUTPATIENT
Start: 2024-01-11

## 2024-01-11 RX ORDER — GABAPENTIN 300 MG/1
300 CAPSULE ORAL 2 TIMES DAILY
COMMUNITY
Start: 2023-12-13

## 2024-01-11 RX ORDER — FLUTICASONE PROPIONATE 50 MCG
2 SPRAY, SUSPENSION (ML) NASAL DAILY
Qty: 16 G | Refills: 5 | Status: SHIPPED | OUTPATIENT
Start: 2024-01-11

## 2024-01-11 RX ORDER — NAPROXEN 500 MG/1
500 TABLET ORAL 2 TIMES DAILY WITH MEALS
Qty: 60 TABLET | Refills: 2 | Status: SHIPPED | OUTPATIENT
Start: 2024-01-11

## 2024-01-11 RX ORDER — HYDROXYZINE HYDROCHLORIDE 25 MG/1
25 TABLET, FILM COATED ORAL EVERY 6 HOURS PRN
Qty: 30 TABLET | Refills: 0 | Status: SHIPPED | OUTPATIENT
Start: 2024-01-11

## 2024-01-11 RX ORDER — AMMONIUM LACTATE 12 G/100G
1 LOTION TOPICAL AS NEEDED
COMMUNITY
Start: 2023-12-18

## 2024-01-11 RX ORDER — LORATADINE 10 MG/1
10 TABLET ORAL DAILY
Qty: 90 TABLET | Refills: 3 | Status: SHIPPED | OUTPATIENT
Start: 2024-01-11

## 2024-01-23 ENCOUNTER — TELEPHONE (OUTPATIENT)
Dept: FAMILY MEDICINE CLINIC | Facility: CLINIC | Age: 54
End: 2024-01-23
Payer: COMMERCIAL

## 2024-01-23 NOTE — TELEPHONE ENCOUNTER
Caller: Bianca Lara    Relationship to patient: Self    Best call back number [phone]     Patient is needing: PATIENT IS REQUESTING A CALL BACK FROM NICOLE REGAN. SHE CANCELED HER APPOINTMENT BECAUSE SHE DON'T HAVE ENOUGH GAS BUT HAS SOME QUESTIONS. PLEASE CALL AND ADVISE.

## 2024-01-23 NOTE — TELEPHONE ENCOUNTER
Spoke to patient; she is requesting to get lab work done prior to her 3/7/24 appointment; orders placed.

## 2024-03-14 ENCOUNTER — OFFICE VISIT (OUTPATIENT)
Dept: ORTHOPEDIC SURGERY | Facility: CLINIC | Age: 54
End: 2024-03-14
Payer: COMMERCIAL

## 2024-03-14 VITALS
DIASTOLIC BLOOD PRESSURE: 93 MMHG | SYSTOLIC BLOOD PRESSURE: 138 MMHG | OXYGEN SATURATION: 97 % | HEIGHT: 63 IN | BODY MASS INDEX: 38.8 KG/M2 | WEIGHT: 219 LBS | HEART RATE: 85 BPM

## 2024-03-14 DIAGNOSIS — M51.16 LUMBAR DISC DISEASE WITH RADICULOPATHY: ICD-10-CM

## 2024-03-14 DIAGNOSIS — M25.552 LEFT HIP PAIN: ICD-10-CM

## 2024-03-14 DIAGNOSIS — M16.0 OSTEOARTHRITIS OF BOTH HIPS, UNSPECIFIED OSTEOARTHRITIS TYPE: ICD-10-CM

## 2024-03-14 DIAGNOSIS — M25.551 RIGHT HIP PAIN: Primary | ICD-10-CM

## 2024-03-14 DIAGNOSIS — M70.62 TROCHANTERIC BURSITIS OF LEFT HIP: ICD-10-CM

## 2024-03-14 RX ORDER — TRIAMCINOLONE ACETONIDE 40 MG/ML
40 INJECTION, SUSPENSION INTRA-ARTICULAR; INTRAMUSCULAR
Status: COMPLETED | OUTPATIENT
Start: 2024-03-14 | End: 2024-03-14

## 2024-03-14 RX ORDER — LIDOCAINE HYDROCHLORIDE 10 MG/ML
5 INJECTION, SOLUTION INFILTRATION; PERINEURAL
Status: COMPLETED | OUTPATIENT
Start: 2024-03-14 | End: 2024-03-14

## 2024-03-14 RX ADMIN — TRIAMCINOLONE ACETONIDE 40 MG: 40 INJECTION, SUSPENSION INTRA-ARTICULAR; INTRAMUSCULAR at 11:06

## 2024-03-14 RX ADMIN — LIDOCAINE HYDROCHLORIDE 5 ML: 10 INJECTION, SOLUTION INFILTRATION; PERINEURAL at 11:06

## 2024-03-14 NOTE — PROGRESS NOTES
"Chief Complaint  Pain and Initial Evaluation of the Left Hip and Pain and Initial Evaluation of the Right Hip     Subjective      Bianca Lara presents to [facility] for evaluation of the bilateral hips. The patient reports bilateral hip pain. She reports back pain as well. She reports hip symptoms for about 3 months. She has been getting treatment for her back and gets injections.     Allergies   Allergen Reactions    Azithromycin Nausea Only    Duloxetine Hcl Unknown - Low Severity    Lisinopril Cough    Penicillins Unknown - Low Severity        Social History     Socioeconomic History    Marital status:    Tobacco Use    Smoking status: Every Day     Current packs/day: 0.00     Average packs/day: 0.3 packs/day for 30.0 years (7.5 ttl pk-yrs)     Types: Cigarettes     Start date: 1989     Last attempt to quit: 2000     Years since quittin.8    Smokeless tobacco: Never   Vaping Use    Vaping status: Never Used   Substance and Sexual Activity    Alcohol use: Yes     Alcohol/week: 10.0 standard drinks of alcohol     Types: 4 Glasses of wine, 4 Shots of liquor, 2 Drinks containing 0.5 oz of alcohol per week     Comment: glass of wine every other day    Drug use: Never    Sexual activity: Yes     Partners: Male     Birth control/protection: Condom        I reviewed the patient's chief complaint, history of present illness, review of systems, past medical history, surgical history, family history, social history, medications, and allergy list.     Review of Systems     Constitutional: Denies fevers, chills, weight loss  Cardiovascular: Denies chest pain, shortness of breath  Skin: Denies rashes, acute skin changes  Neurologic: Denies headache, loss of consciousness  MSK: bilateral hip pain      Vital Signs:   /93   Pulse 85   Ht 160 cm (63\")   Wt 99.3 kg (219 lb)   SpO2 97%   BMI 38.79 kg/m²          Physical Exam  General: Alert. No acute " distress    Ortho Exam      Right hip- Positive EHL, FHL, GS and TA. Sensation intact to all 5 nerves of the foot. Positive pulses. Flexion 90. External Rotation 35. Internal rotation 30. Negative straight leg raise. Non-tender to the lateral hip. Tender to the lower lumbar    Left hip-  Flexion 90, External Rotation 40, internal rotation 25. Negative straight leg raise. Tender to the lateral hip over the bursa. Tender to the lower lumbar, Positive EHL, FHL, GS and TA. Sensation intact to all 5 nerves of the foot. Positive pulses.     Left hip: L greater trochanteric bursa  Date/Time: 3/14/2024 11:06 AM  Consent given by: patient  Site marked: site marked  Timeout: Immediately prior to procedure a time out was called to verify the correct patient, procedure, equipment, support staff and site/side marked as required   Supporting Documentation  Indications: pain   Procedure Details  Location: hip - L greater trochanteric bursa  Needle size: 22 G  Medications administered: 5 mL lidocaine 1 %; 40 mg triamcinolone acetonide 40 MG/ML  Patient tolerance: patient tolerated the procedure well with no immediate complications          X-Ray Report:  Bilateral hip X-Ray  Indication: Evaluation of bilateral hip pain  AP/Lateral view(s)  Findings: mild degenerative changes, no acute abnormality.   Prior studies available for comparison: no       Imaging Results (Most Recent)       Procedure Component Value Units Date/Time    XR Hips Bilateral With or Without Pelvis 3-4 View [[id number]] Resulted: 03/14/24 0839     Updated: 03/14/24 0842             Result Review :       XR Spine Lumbar 2 or 3 View    Result Date: 2/29/2024  Narrative: CLINICAL INDICATION: back pain TECHNIQUE: XR LUMBAR SPINE 4 VIEWS TO INCLUDE FLEXION EXTENSION COMPARISON: None. FINDINGS: Overlying stool, soft tissue and bowel gas obscures bone detail. Lateral view is limited by overlapping soft tissues. Mild L4-5 disc space narrowing. There is likely lower lumbar  facet arthrosis. Grade 1 anterolisthesis at L4-5. No abnormal motion..    Impression: Mild L4-5 disc space narrowing with grade 1 anterolisthesis. No abnormal motion. CRITICAL RESULT:   No. COMMUNICATION: Per this written report. Drafted by Joe Adames MD on 2/29/2024 2:52 PM Final report signed by Joe Adames MD on 2/29/2024 2:54 PM            Assessment and Plan     Diagnoses and all orders for this visit:    1. Right hip pain (Primary)  -     XR Hips Bilateral With or Without Pelvis 3-4 View    2. Left hip pain  -     XR Hips Bilateral With or Without Pelvis 3-4 View    3. Osteoarthritis of both hips, unspecified osteoarthritis type    4. Trochanteric bursitis of left hip    5. Lumbar disc disease with radiculopathy        Discussed the treatment plan with the patient.  I reviewed the x-rays that were obtained today with the patient. Plan for conservative treatment at this time. Discussed the risks and benefits of a left hip bursa injection. The patient expressed understanding and wished to proceed. She tolerated the injection well. Order for physical therapy given today.     Call or return if worsening symptoms.    Follow Up     6 weeks      Patient was given instructions and counseling regarding her condition or for health maintenance advice. Please see specific information pulled into the AVS if appropriate.     Scribed for Skip Shane MD by Gardenia Crain.  03/14/24   08:47 EDT    I have personally performed the services described in this document as scribed by the above individual and it is both accurate and complete. Skip Shane MD 03/14/24

## 2024-03-20 ENCOUNTER — OFFICE VISIT (OUTPATIENT)
Dept: FAMILY MEDICINE CLINIC | Facility: CLINIC | Age: 54
End: 2024-03-20
Payer: COMMERCIAL

## 2024-03-20 VITALS
HEART RATE: 81 BPM | BODY MASS INDEX: 38.09 KG/M2 | SYSTOLIC BLOOD PRESSURE: 140 MMHG | HEIGHT: 63 IN | TEMPERATURE: 96.7 F | DIASTOLIC BLOOD PRESSURE: 85 MMHG | OXYGEN SATURATION: 96 % | WEIGHT: 215 LBS

## 2024-03-20 DIAGNOSIS — Z13.220 SCREENING FOR LIPID DISORDERS: ICD-10-CM

## 2024-03-20 LAB
ALBUMIN SERPL-MCNC: 4.3 G/DL (ref 3.5–5.2)
ALBUMIN/GLOB SERPL: 1.2 G/DL
ALP SERPL-CCNC: 87 U/L (ref 39–117)
ALT SERPL W P-5'-P-CCNC: 19 U/L (ref 1–33)
ANION GAP SERPL CALCULATED.3IONS-SCNC: 14.2 MMOL/L (ref 5–15)
AST SERPL-CCNC: 23 U/L (ref 1–32)
BILIRUB SERPL-MCNC: 0.3 MG/DL (ref 0–1.2)
BUN SERPL-MCNC: 24 MG/DL (ref 6–20)
BUN/CREAT SERPL: 26.4 (ref 7–25)
CALCIUM SPEC-SCNC: 10.6 MG/DL (ref 8.6–10.5)
CHLORIDE SERPL-SCNC: 99 MMOL/L (ref 98–107)
CHOLEST SERPL-MCNC: 200 MG/DL (ref 0–200)
CO2 SERPL-SCNC: 26.8 MMOL/L (ref 22–29)
CREAT SERPL-MCNC: 0.91 MG/DL (ref 0.57–1)
EGFRCR SERPLBLD CKD-EPI 2021: 75.6 ML/MIN/1.73
GLOBULIN UR ELPH-MCNC: 3.7 GM/DL
GLUCOSE SERPL-MCNC: 109 MG/DL (ref 65–99)
HDLC SERPL-MCNC: 60 MG/DL (ref 40–60)
LDLC SERPL CALC-MCNC: 129 MG/DL (ref 0–100)
LDLC/HDLC SERPL: 2.13 {RATIO}
POTASSIUM SERPL-SCNC: 3.9 MMOL/L (ref 3.5–5.2)
PROT SERPL-MCNC: 8 G/DL (ref 6–8.5)
SODIUM SERPL-SCNC: 140 MMOL/L (ref 136–145)
TRIGL SERPL-MCNC: 60 MG/DL (ref 0–150)
VLDLC SERPL-MCNC: 11 MG/DL (ref 5–40)

## 2024-03-20 PROCEDURE — 85027 COMPLETE CBC AUTOMATED: CPT | Performed by: NURSE PRACTITIONER

## 2024-03-20 PROCEDURE — 80053 COMPREHEN METABOLIC PANEL: CPT | Performed by: NURSE PRACTITIONER

## 2024-03-20 PROCEDURE — 83036 HEMOGLOBIN GLYCOSYLATED A1C: CPT | Performed by: NURSE PRACTITIONER

## 2024-03-20 PROCEDURE — 80061 LIPID PANEL: CPT | Performed by: NURSE PRACTITIONER

## 2024-03-20 RX ORDER — CICLOPIROX 80 MG/ML
SOLUTION TOPICAL
COMMUNITY
Start: 2024-03-11

## 2024-03-20 RX ORDER — PREGABALIN 75 MG/1
1 CAPSULE ORAL 3 TIMES DAILY
COMMUNITY
Start: 2024-03-14

## 2024-03-20 NOTE — PROGRESS NOTES
"Chief Complaint  Medication Problem (Questions about what she's supposed to be taking and how she's taking them ) and Follow-up (On MRI sees pain mgmt )    Subjective      Bianca Lara is a 53 y.o. female who presents to [facility]     Patient was asking about what meds she should be taking. DC all the NSAIDs and gabapentin. Follow with pain management and PCP.     Objective   Vital Signs:   Vitals:    03/20/24 1458   BP: 140/85   BP Location: Left arm   Patient Position: Sitting   Cuff Size: Large Adult   Pulse: 81   Temp: 96.7 °F (35.9 °C)   SpO2: 96%   Weight: 97.5 kg (215 lb)   Height: 160 cm (63\")     Body mass index is 38.09 kg/m².    Wt Readings from Last 3 Encounters:   03/20/24 97.5 kg (215 lb)   03/14/24 99.3 kg (219 lb)   12/11/23 97.9 kg (215 lb 12.8 oz)     BP Readings from Last 3 Encounters:   03/20/24 140/85   03/14/24 138/93   12/11/23 132/93       Health Maintenance   Topic Date Due    Pneumococcal Vaccine 0-64 (1 of 2 - PCV) Never done    TDAP/TD VACCINES (1 - Tdap) Never done    ZOSTER VACCINE (1 of 2) Never done    COVID-19 Vaccine (5 - 2023-24 season) 09/01/2023    ANNUAL PHYSICAL  12/11/2024    BMI FOLLOWUP  03/14/2025    MAMMOGRAM  06/30/2025    COLORECTAL CANCER SCREENING  01/01/2026    PAP SMEAR  12/11/2026    HEPATITIS C SCREENING  Completed    INFLUENZA VACCINE  Completed       Physical Exam  Vitals reviewed.   HENT:      Head: Normocephalic.      Mouth/Throat:      Mouth: Mucous membranes are moist.   Eyes:      Pupils: Pupils are equal, round, and reactive to light.   Cardiovascular:      Rate and Rhythm: Normal rate.   Abdominal:      General: Abdomen is flat.   Musculoskeletal:         General: Normal range of motion.      Cervical back: Normal range of motion.   Skin:     General: Skin is warm.      Capillary Refill: Capillary refill takes less than 2 seconds.   Neurological:      Mental Status: She is alert.        Assessment & " Plan  Screening for lipid disorders                       I spent 10 minutes caring for [place] on this date of service. This time includes time spent by me in the following activities:preparing for the visit, reviewing tests, obtaining and/or reviewing a separately obtained history, performing a medically appropriate examination and/or evaluation , counseling and educating the patient/family/caregiver, ordering medications, tests, or procedures, referring and communicating with other health care professionals , documenting information in the medical record, independently interpreting results and communicating that information with the patient/family/caregiver, and care coordination  FOLLOW UP  No follow-ups on file.  Patient was given instructions and counseling regarding her condition or for health maintenance advice. Please see specific information pulled into the AVS if appropriate.       Dwayne Gregory MD  03/20/24  15:22 EDT    CURRENT & DISCONTINUED MEDICATIONS  Current Outpatient Medications   Medication Instructions    ammonium lactate (LAC-HYDRIN) 12 % lotion 1 application , Topical, As Needed    Blood Pressure Monitoring (Blood Pressure Cuff) misc 1 each, Other, Take As Directed    ciclopirox (PENLAC) 8 % solution APPLY TO THE AFFECTED AREA(S) TOPICALLY ONCE DAILY PREFERABLY AT BEDTIME OR 8 HOURS BEFORE WASHING    fluticasone (FLONASE) 50 MCG/ACT nasal spray 2 sprays, Nasal, Daily    guanFACINE HCl ER (INTUNIV) 1 mg, Oral, Nightly    hydrOXYzine (ATARAX) 25 mg, Oral, Every 6 Hours PRN    loratadine (CLARITIN) 10 mg, Oral, Daily    losartan-hydrochlorothiazide (Hyzaar) 50-12.5 MG per tablet 1 tablet, Oral, Daily    methocarbamol (ROBAXIN) 500 mg, Oral, 3 Times Daily PRN    predniSONE (DELTASONE) 20 MG tablet     pregabalin (LYRICA) 75 MG capsule 1 capsule, Oral, 3 times daily    valACYclovir (VALTREX) 500 mg, Oral, Daily    vilazodone (VIIBRYD) 40 mg, Oral, Daily       Medications  Discontinued During This Encounter   Medication Reason    diclofenac (VOLTAREN) 50 MG EC tablet *Therapy completed    ibuprofen (ADVIL,MOTRIN) 800 MG tablet *Therapy completed    naproxen (NAPROSYN) 500 MG tablet *Therapy completed    gabapentin (NEURONTIN) 100 MG capsule *Therapy completed    gabapentin (NEURONTIN) 300 MG capsule *Therapy completed    metroNIDAZOLE (Flagyl) 500 MG tablet *Therapy completed

## 2024-03-21 ENCOUNTER — OFFICE VISIT (OUTPATIENT)
Dept: ORTHOPEDIC SURGERY | Facility: CLINIC | Age: 54
End: 2024-03-21
Payer: COMMERCIAL

## 2024-03-21 VITALS
HEIGHT: 63 IN | WEIGHT: 214.95 LBS | SYSTOLIC BLOOD PRESSURE: 123 MMHG | OXYGEN SATURATION: 96 % | DIASTOLIC BLOOD PRESSURE: 91 MMHG | BODY MASS INDEX: 38.09 KG/M2 | HEART RATE: 86 BPM

## 2024-03-21 DIAGNOSIS — M51.16 LUMBAR DISC DISEASE WITH RADICULOPATHY: ICD-10-CM

## 2024-03-21 DIAGNOSIS — M25.552 LEFT HIP PAIN: ICD-10-CM

## 2024-03-21 DIAGNOSIS — M70.62 TROCHANTERIC BURSITIS OF LEFT HIP: ICD-10-CM

## 2024-03-21 DIAGNOSIS — M16.0 OSTEOARTHRITIS OF BOTH HIPS, UNSPECIFIED OSTEOARTHRITIS TYPE: ICD-10-CM

## 2024-03-21 DIAGNOSIS — M25.551 RIGHT HIP PAIN: Primary | ICD-10-CM

## 2024-03-21 DIAGNOSIS — E83.52 HYPERCALCEMIA: Primary | ICD-10-CM

## 2024-03-21 NOTE — PROGRESS NOTES
Chief Complaint  Pain and Follow-up of the Left Hip    Subjective          History of Present Illness      Bianca Lara is a 53 y.o. female  presents to [facility] for     Patient presents for follow-up evaluation of left hip pain she saw Dr. Shane on 3/14/2024 for bilateral hips and received a left hip bursitis injection.  She states her she thought the injection would help her left lower extremity pain and numbness and tingling which she experiences due to her back pain but she states that she is here sooner than expected because the injection only helped the pain in the lateral hip but did not resolve her left lower extremity numbness and tingling and pain.  She states it was harder to walk before the injection but states it is easier to walk due to the decreased hip pain.  She does have a history of back pain and issues she saw [facility] neurosurgery they advised her to have a nerve test as well as start therapy she states she thought that we would be taking care of these things with her but she states that no one has called her from the  office to schedule therapy or the nerve test.  Otherwise for the hip she is happy with her progress      Allergies   Allergen Reactions    Azithromycin Nausea Only    Duloxetine Hcl Unknown - Low Severity    Lisinopril Cough    Penicillins Unknown - Low Severity        Social History     Socioeconomic History    Marital status:    Tobacco Use    Smoking status: Every Day     Current packs/day: 0.00     Average packs/day: 0.3 packs/day for 30.0 years (7.5 ttl pk-yrs)     Types: Cigarettes     Start date: 1989     Last attempt to quit: 2000     Years since quittin.8    Smokeless tobacco: Never   Vaping Use    Vaping status: Never Used   Substance and Sexual Activity    Alcohol use: Yes     Alcohol/week: 10.0 standard drinks of alcohol     Types: 4 Glasses of wine, 4 Shots of liquor, 2 Drinks  "containing 0.5 oz of alcohol per week     Comment: glass of wine every other day    Drug use: Never    Sexual activity: Yes     Partners: Male     Birth control/protection: Condom        REVIEW OF SYSTEMS    Constitutional: Awake alert and oriented x3, no acute distress, denies fevers, chills, weight loss  Respiratory: No respiratory distress  Vascular: Brisk cap refill, Intact distal pulses, No cyanosis, compartments soft with no signs or symptoms of compartment syndrome or DVT.   Cardiovascular: Denies chest pain, shortness of breath  Skin: Denies rashes, acute skin changes  Neurologic: Denies headache, loss of consciousness  MSK: Left hip pain      Objective   Vital Signs:   /91   Pulse 86   Ht 160 cm (63\")   Wt 97.5 kg (214 lb 15.2 oz)   SpO2 96%   BMI 38.08 kg/m²     Body mass index is 38.08 kg/m².    Physical Exam       Left hip-  Flexion 90, External Rotation 40, internal rotation 25.  Pain with straight leg raise. Tender to the lateral hip over the bursa. Tender to the lower lumbar, Positive EHL, FHL, GS and TA. Sensation intact to all 5 nerves of the foot. Positive pulses.          Procedures    Imaging Results (Most Recent)       None             Result Review :   The following data was reviewed by: LETY Maxwell on 03/21/2024:               Assessment and Plan    Diagnoses and all orders for this visit:    1. Right hip pain (Primary)    2. Left hip pain    3. Osteoarthritis of both hips, unspecified osteoarthritis type    4. Trochanteric bursitis of left hip    5. Lumbar disc disease with radiculopathy        Discussed diagnosis and treatment options with the patient she was advised the symptoms for left lower extremity pain numbness and tingling are related to her low back issues she was advised to follow-up with [facility] neurosurgery for checking on status of nerve testing and physical therapy orders.  We discussed future injections for hip bursitis she will " follow-up as needed with our office.    Call or return if worsening symptoms.    Follow Up   Return if symptoms worsen or fail to improve.  Patient was given instructions and counseling regarding her condition or for health maintenance advice. Please see specific information pulled into the AVS if appropriate.       EMR Dragon/Transcription disclaimer:  Much of this encounter note is an electronic transcription/translation of spoken language to printed text, aka voice recognition.  The electronic translation of spoken language may permit erroneous or at times nonsensical words or phrases to be inadvertently transcribed; although I have reviewed the note for such errors, some may still exist so please interpret based on surrounding text content.

## 2024-03-23 VITALS
HEIGHT: 63 IN | SYSTOLIC BLOOD PRESSURE: 114 MMHG | BODY MASS INDEX: 38.08 KG/M2 | TEMPERATURE: 98.5 F | HEART RATE: 82 BPM | OXYGEN SATURATION: 99 % | RESPIRATION RATE: 20 BRPM | DIASTOLIC BLOOD PRESSURE: 75 MMHG

## 2024-03-23 PROCEDURE — 99283 EMERGENCY DEPT VISIT LOW MDM: CPT

## 2024-03-23 RX ORDER — DEXAMETHASONE SODIUM PHOSPHATE 10 MG/ML
10 INJECTION, SOLUTION INTRAMUSCULAR; INTRAVENOUS ONCE
Status: COMPLETED | OUTPATIENT
Start: 2024-03-23 | End: 2024-03-24

## 2024-03-23 RX ORDER — KETOROLAC TROMETHAMINE 30 MG/ML
30 INJECTION, SOLUTION INTRAMUSCULAR; INTRAVENOUS ONCE
Status: COMPLETED | OUTPATIENT
Start: 2024-03-23 | End: 2024-03-24

## 2024-03-24 ENCOUNTER — HOSPITAL ENCOUNTER (EMERGENCY)
Facility: HOSPITAL | Age: 54
Discharge: HOME | End: 2024-03-24
Attending: EMERGENCY MEDICINE | Admitting: EMERGENCY MEDICINE
Payer: COMMERCIAL

## 2024-03-24 DIAGNOSIS — M54.42 CHRONIC MIDLINE LOW BACK PAIN WITH BILATERAL SCIATICA: Primary | ICD-10-CM

## 2024-03-24 DIAGNOSIS — G89.29 CHRONIC MIDLINE LOW BACK PAIN WITH BILATERAL SCIATICA: Primary | ICD-10-CM

## 2024-03-24 DIAGNOSIS — M54.41 CHRONIC MIDLINE LOW BACK PAIN WITH BILATERAL SCIATICA: Primary | ICD-10-CM

## 2024-03-24 PROCEDURE — 96372 THER/PROPH/DIAG INJ SC/IM: CPT

## 2024-03-24 PROCEDURE — 25010000002 DEXAMETHASONE SODIUM PHOSPHATE 10 MG/ML SOLUTION

## 2024-03-24 PROCEDURE — 25010000002 KETOROLAC TROMETHAMINE PER 15 MG

## 2024-03-24 RX ORDER — PREDNISONE 50 MG/1
50 TABLET ORAL DAILY
Qty: 4 TABLET | Refills: 0 | Status: SHIPPED | OUTPATIENT
Start: 2024-03-24 | End: 2024-03-28

## 2024-03-24 RX ORDER — HYDROCODONE BITARTRATE AND ACETAMINOPHEN 7.5; 325 MG/1; MG/1
1 TABLET ORAL ONCE
Status: COMPLETED | OUTPATIENT
Start: 2024-03-24 | End: 2024-03-24

## 2024-03-24 RX ORDER — METHOCARBAMOL 750 MG/1
750 TABLET, FILM COATED ORAL 3 TIMES DAILY
Qty: 30 TABLET | Refills: 0 | Status: SHIPPED | OUTPATIENT
Start: 2024-03-24 | End: 2024-03-28

## 2024-03-24 RX ADMIN — DEXAMETHASONE SODIUM PHOSPHATE 10 MG: 10 INJECTION INTRAMUSCULAR; INTRAVENOUS at 01:17

## 2024-03-24 RX ADMIN — KETOROLAC TROMETHAMINE 30 MG: 30 INJECTION, SOLUTION INTRAMUSCULAR; INTRAVENOUS at 01:17

## 2024-03-24 RX ADMIN — HYDROCODONE BITARTRATE AND ACETAMINOPHEN 1 TABLET: 7.5; 325 TABLET ORAL at 02:53

## 2024-03-24 NOTE — ED PROVIDER NOTES
Time: 11:23 PM EDT  Date of encounter:  3/23/2024  Independent Historian/Clinical History and Information was obtained by:   Patient    History is limited by: N/A    Chief Complaint   Patient presents with    Back Pain         History of Present Illness:  The patient is a 53 y.o. year old female who presents to the emergency department for evaluation of right lower back and right leg pain.  Patient states she has a history of sciatica and she feels that this is a flareup.  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  She does see pain management for her last appointment was last week and she is not due for a few more weeks. Pain is a 10. (TESSA Schultz)      Patient Care Team  Primary Care Provider: Reina Putnam APRN    Past Medical History:     Allergies   Allergen Reactions    Azithromycin Nausea Only    Duloxetine Hcl Unknown - Low Severity    Lisinopril Cough    Penicillins Unknown - Low Severity     Past Medical History:   Diagnosis Date    Allergic     Allergic rhinitis     Anxiety 09/11/2018    Arthritis     Blood in stool     Condition not found     MEATAL STENOSIS, UNSPECIFIED    Depression 09/11/2018    Dysmenorrhea     Gross hematuria     Headache     Helicobacter positive gastritis 05/26/2016    Hypertension     Irregular menses     Low back pain 07/09/2014    Microscopic hematuria 06/10/2014    Night sweats     Onychomycosis of toenail 07/09/2014    Pap smear for cervical cancer screening 05/16/2016    Right sided abdominal pain 06/12/2014    Screening mammogram, encounter for 11/09/2018    STD exposure 1993    chlamydia    Tobacco abuse     chronic    Vitamin D deficiency      Past Surgical History:   Procedure Laterality Date    BREAST BIOPSY Left 01/09/2012    CHOLECYSTECTOMY      COLONOSCOPY  08/15/2016    CYST REMOVAL      left wrist    CYSTOSCOPY  06/27/2014    Office cystoscopy w/ Dr. Ivanna Ramires  History   Problem Relation Age of Onset    Stomach cancer Mother     Alzheimer's disease Mother     Diabetes Mother     Leukemia Father     Heart disease Sister     Heart disease Brother     Cancer Maternal Grandmother     Leukemia Paternal Grandfather     Stroke Other         AUNT;UNCLE    Heart disease Other         AUNT,UNCLE    Diabetes Other         AUNT,UNCLE    Hyperlipidemia Sister     Kidney disease Sister        Home Medications:  Prior to Admission medications    Medication Sig Start Date End Date Taking? Authorizing Provider   ammonium lactate (LAC-HYDRIN) 12 % lotion Apply 1 application  topically to the appropriate area as directed As Needed for Irritation. 12/18/23   Danish Brown MD   Blood Pressure Monitoring (Blood Pressure Cuff) misc 1 each by Other route Take As Directed. 12/14/22   Reina Putnam APRN   ciclopirox (PENLAC) 8 % solution APPLY TO THE AFFECTED AREA(S) TOPICALLY ONCE DAILY PREFERABLY AT BEDTIME OR 8 HOURS BEFORE WASHING 3/11/24   Danish Brown MD   fluticasone (FLONASE) 50 MCG/ACT nasal spray 2 sprays into the nostril(s) as directed by provider Daily. 1/11/24   Reina Putnam APRN   guanFACINE HCl ER (Intuniv) 1 MG tablet sustained-release 24 hour tablet Take 1 tablet by mouth Every Night. 1/11/24   Reina Putnam APRN   hydrOXYzine (ATARAX) 25 MG tablet Take 1 tablet by mouth Every 6 (Six) Hours As Needed for Itching. 1/11/24   Reina Putnam APRN   loratadine (Claritin) 10 MG tablet Take 1 tablet by mouth Daily. 1/11/24   Reina Putnam APRN   losartan-hydrochlorothiazide (Hyzaar) 50-12.5 MG per tablet Take 1 tablet by mouth Daily. 1/11/24   Reina Putnam APRN   methocarbamol (ROBAXIN) 500 MG tablet Take 1 tablet by mouth 3 (Three) Times a Day As Needed for Muscle Spasms. 1/11/24   Reina Putnam APRN   predniSONE (DELTASONE) 20 MG tablet  11/11/23   Danish Brown MD   pregabalin (LYRICA) 75 MG capsule Take 1 capsule by mouth 3 times a day.  "3/14/24   Provider, MD Danish   valACYclovir (VALTREX) 500 MG tablet Take 1 tablet by mouth Daily. 24   Reina Putnam APRN   vilazodone (Viibryd) 40 MG tablet tablet Take 1 tablet by mouth Daily. 24   Reina Putnam APRN        Social History:   Social History     Tobacco Use    Smoking status: Every Day     Current packs/day: 0.00     Average packs/day: 0.3 packs/day for 30.0 years (7.5 ttl pk-yrs)     Types: Cigarettes     Start date: 1989     Last attempt to quit: 2000     Years since quittin.9    Smokeless tobacco: Never   Vaping Use    Vaping status: Never Used   Substance Use Topics    Alcohol use: Yes     Alcohol/week: 10.0 standard drinks of alcohol     Types: 4 Glasses of wine, 4 Shots of liquor, 2 Drinks containing 0.5 oz of alcohol per week     Comment: glass of wine every other day    Drug use: Never         Review of Systems:  Review of Systems   Constitutional:  Negative for chills and fever.   HENT:  Negative for congestion, ear pain and sore throat.    Eyes:  Negative for pain.   Respiratory:  Negative for cough, chest tightness and shortness of breath.    Cardiovascular:  Negative for chest pain.   Gastrointestinal:  Negative for abdominal pain, diarrhea, nausea and vomiting.   Genitourinary:  Negative for dysuria, flank pain, frequency, hematuria and urgency.   Musculoskeletal:  Positive for back pain, gait problem and myalgias. Negative for joint swelling, neck pain and neck stiffness.   Skin:  Negative for pallor and rash.   Neurological:  Negative for seizures and headaches.   All other systems reviewed and are negative.       Physical Exam:  /75 (BP Location: Left arm, Patient Position: Sitting)   Pulse 82   Temp 98.5 °F (36.9 °C) (Oral)   Resp 20   Ht 160 cm (63\")   LMP  (LMP Unknown)   SpO2 99%   BMI 38.08 kg/m²         Physical Exam  Vitals and nursing note reviewed.   Constitutional:       General: She is not in acute distress.     Appearance: " Normal appearance. She is not ill-appearing or toxic-appearing.   HENT:      Head: Normocephalic and atraumatic.   Eyes:      General: No scleral icterus.     Conjunctiva/sclera: Conjunctivae normal.      Pupils: Pupils are equal, round, and reactive to light.   Cardiovascular:      Rate and Rhythm: Normal rate and regular rhythm.      Pulses: Normal pulses.   Pulmonary:      Effort: Pulmonary effort is normal. No respiratory distress.   Abdominal:      General: Abdomen is flat. There is no distension.   Musculoskeletal:         General: Normal range of motion.      Cervical back: Normal range of motion and neck supple. No rigidity or tenderness.   Lymphadenopathy:      Cervical: No cervical adenopathy.   Skin:     General: Skin is warm and dry.      Capillary Refill: Capillary refill takes less than 2 seconds.      Findings: No rash.   Neurological:      General: No focal deficit present.      Mental Status: She is alert and oriented to person, place, and time. Mental status is at baseline.   Psychiatric:         Mood and Affect: Mood normal.         Behavior: Behavior normal.                  Procedures:  Procedures      Medical Decision Making:      Comorbidities that affect care:    Allergic rhinitis, depression, dysmenorrhea H. pylori, low back pain, night sweats, cervical Pap smear abnormality, tobacco abuse, headaches, anxiety, blood in stools, gross hematuria, irregular menses, right-sided abdominal pain, STD exposure, and vitamin D deficiency, hypertension    External Notes reviewed:    None      The following orders were placed and all results were independently analyzed by me:  No orders of the defined types were placed in this encounter.      Medications Given in the Emergency Department:  Medications   ketorolac (TORADOL) injection 30 mg (30 mg Intramuscular Given 3/24/24 0117)   dexAMETHasone sodium phosphate injection 10 mg (10 mg Intramuscular Given 3/24/24 0117)   HYDROcodone-acetaminophen (NORCO)  7.5-325 MG per tablet 1 tablet (1 tablet Oral Given 3/24/24 0253)        ED Course:    The patient was initially evaluated in the triage area where orders were placed. The patient was later dispositioned by TESSA See.      The patient was advised to stay for completion of workup which includes but is not limited to communication of labs and radiological results, reassessment and plan. The patient was advised that leaving prior to disposition by a provider could result in critical findings that are not communicated to the patient.     ED Course as of 03/24/24 0638   Sat Mar 23, 2024   0979   --- PROVIDER IN TRIAGE NOTE ---    Patient was seen and evaluated in triage by TESSA Singletary.  Orders were written and the patient is currently awaiting disposition.   [MS]      ED Course User Index  [MS] Elva Rob APRN       Labs:    Lab Results (last 24 hours)       ** No results found for the last 24 hours. **             Imaging:    No Radiology Exams Resulted Within Past 24 Hours      Differential Diagnosis and Discussion:      Back Pain: The patient presents with back pain. My differential diagnosis includes but is not limited to acute spinal epidural abscess, acute spinal epidural bleed, cauda equina syndrome, abdominal aortic aneurysm, aortic dissection, kidney stone, pyelonephritis, musculoskeletal back pain, spinal fracture, and osteoarthritis.     All X-rays impressions were independently interpreted by me.    MDM  Number of Diagnoses or Management Options  Chronic midline low back pain with bilateral sciatica: established and worsening  Risk of Complications, Morbidity, and/or Mortality  Presenting problems: low  Diagnostic procedures: low  Management options: low    Patient Progress  Patient progress: stable         Patient Care Considerations:    NARCOTICS: I considered prescribing opiate pain medication as an outpatient, however the patient is in pain management and is currently on  Lyrica.      Consultants/Shared Management Plan:    None    Social Determinants of Health:    Patient is independent, reliable, and has access to care.       Disposition and Care Coordination:    Discharged: The patient is suitable and stable for discharge with no need for consideration of admission.    I have explained the patient´s condition, diagnoses and treatment plan based on the information available to me at this time. I have answered questions and addressed any concerns. The patient has a good  understanding of the patient´s diagnosis, condition, and treatment plan as can be expected at this point. The vital signs have been stable. The patient´s condition is stable and appropriate for discharge from the emergency department.      The patient will pursue further outpatient evaluation with the primary care physician or other designated or consulting physician as outlined in the discharge instructions. They are agreeable to this plan of care and follow-up instructions have been explained in detail. The patient has received these instructions in written format and has expressed an understanding of the discharge instructions. The patient is aware that any significant change in condition or worsening of symptoms should prompt an immediate return to this or the closest emergency department or call to 911.  I have explained discharge medications and the need for follow up with the patient/caretakers. This was also printed in the discharge instructions. Patient was discharged with the following medications and follow up:      Medication List        New Prescriptions      predniSONE 50 MG tablet  Commonly known as: DELTASONE  Take 1 tablet by mouth Daily for 4 doses.            Changed      * methocarbamol 500 MG tablet  Commonly known as: ROBAXIN  Take 1 tablet by mouth 3 (Three) Times a Day As Needed for Muscle Spasms.  What changed: Another medication with the same name was added. Make sure you understand how and  when to take each.     * methocarbamol 750 MG tablet  Commonly known as: ROBAXIN  Take 1 tablet by mouth 3 (Three) Times a Day.  What changed: You were already taking a medication with the same name, and this prescription was added. Make sure you understand how and when to take each.           * This list has 2 medication(s) that are the same as other medications prescribed for you. Read the directions carefully, and ask your doctor or other care provider to review them with you.                   Where to Get Your Medications        These medications were sent to [facility]/pharmacy #[id number] - [place], [place] - [address] - [phone]  - [phone]   [address] [place] [address]      Hours: 24-hours Phone: [phone]   methocarbamol 750 MG tablet  predniSONE 50 MG tablet      [address] [address]  [phone]  Call   MONDAY, FOR FOLLOW UP    Reina Putnam APRN  [address]  [facility] [address]  [phone]    Call   FOR FOLLOW UP       Final diagnoses:   Chronic midline low back pain with bilateral sciatica        ED Disposition       ED Disposition   Discharge    Condition   Stable    Comment   --               This medical record created using voice recognition software.             Patti Carl APRN  03/24/24 0636       Patti Carl APRN  03/24/24 0638

## 2024-03-24 NOTE — DISCHARGE INSTRUCTIONS
Rest, drink plenty of fluids.  Take your meds as prescribed.  Continue with your home Lyrica as previously prescribed and stop your muscle relaxer at home to take the Robaxin that was prescribed today.  Gentle range of motion stretches followed by 20 minutes of ice several times a day.  Call your pain management office on Monday and ReinaTESSA Richard advised them of your ER visit and for follow-up for further evaluation and treatment.  Return to the emergency department immediately for any acutely developing neurological symptoms, any bladder or bowel incontinence, any pelvic numbness or any new or worse concerns.

## 2024-03-24 NOTE — Clinical Note
[facility] EMERGENCY ROOM  [address] [place] [address]  Phone: [phone]  Fax: [phone]    Bianca Lara was seen and treated in our emergency department on 3/23/2024.  She may return to work on 03/26/2024.         Thank you for choosing [facility].    Patti Carl APRN

## 2024-03-24 NOTE — ED TRIAGE NOTES
Pt comes to the [facility] for chronic right lower back pain that is radiating down her leg.

## 2024-03-27 VITALS
RESPIRATION RATE: 20 BRPM | HEIGHT: 63 IN | WEIGHT: 214.95 LBS | SYSTOLIC BLOOD PRESSURE: 106 MMHG | BODY MASS INDEX: 38.09 KG/M2 | TEMPERATURE: 99 F | OXYGEN SATURATION: 96 % | DIASTOLIC BLOOD PRESSURE: 75 MMHG | HEART RATE: 71 BPM

## 2024-03-27 PROCEDURE — 96372 THER/PROPH/DIAG INJ SC/IM: CPT

## 2024-03-27 PROCEDURE — 99283 EMERGENCY DEPT VISIT LOW MDM: CPT

## 2024-03-28 ENCOUNTER — HOSPITAL ENCOUNTER (EMERGENCY)
Facility: HOSPITAL | Age: 54
Discharge: HOME | End: 2024-03-28
Attending: EMERGENCY MEDICINE
Payer: COMMERCIAL

## 2024-03-28 DIAGNOSIS — M54.42 ACUTE LEFT-SIDED LOW BACK PAIN WITH LEFT-SIDED SCIATICA: Primary | ICD-10-CM

## 2024-03-28 PROCEDURE — 96372 THER/PROPH/DIAG INJ SC/IM: CPT

## 2024-03-28 PROCEDURE — 25010000002 KETOROLAC TROMETHAMINE PER 15 MG: Performed by: EMERGENCY MEDICINE

## 2024-03-28 RX ORDER — OXYCODONE HYDROCHLORIDE AND ACETAMINOPHEN 5; 325 MG/1; MG/1
1 TABLET ORAL ONCE
Status: COMPLETED | OUTPATIENT
Start: 2024-03-28 | End: 2024-03-28

## 2024-03-28 RX ORDER — KETOROLAC TROMETHAMINE 30 MG/ML
60 INJECTION, SOLUTION INTRAMUSCULAR; INTRAVENOUS ONCE
Status: COMPLETED | OUTPATIENT
Start: 2024-03-28 | End: 2024-03-28

## 2024-03-28 RX ORDER — ORPHENADRINE CITRATE 100 MG/1
100 TABLET, EXTENDED RELEASE ORAL 2 TIMES DAILY
Qty: 30 TABLET | Refills: 0 | Status: SHIPPED | OUTPATIENT
Start: 2024-03-28

## 2024-03-28 RX ORDER — KETOROLAC TROMETHAMINE 10 MG/1
10 TABLET, FILM COATED ORAL EVERY 6 HOURS PRN
Qty: 15 TABLET | Refills: 0 | Status: SHIPPED | OUTPATIENT
Start: 2024-03-28

## 2024-03-28 RX ADMIN — OXYCODONE AND ACETAMINOPHEN 1 TABLET: 5; 325 TABLET ORAL at 00:54

## 2024-03-28 RX ADMIN — KETOROLAC TROMETHAMINE 60 MG: 30 INJECTION, SOLUTION INTRAMUSCULAR at 00:54

## 2024-03-28 NOTE — ED PROVIDER NOTES
Time: 11:15 PM EDT  Date of encounter:  3/27/2024  Independent Historian/Clinical History and Information was obtained by:   Patient    History is limited by: N/A    Chief Complaint   Patient presents with    Back Pain    Sciatica         History of Present Illness:  The patient is a 53 y.o. year old female who presents to the emergency department for evaluation of low back pain with rating symptoms to her left lower leg.  She denies any recent falls or traumas.  The patient states that she was here on 3/24/2024 for similar issue.  She reports that her symptoms are not getting better with the medications prescribed and cannot follow-up with her pain management until April 17.  She states that she cannot follow-up with her family doctor until April 19.  The patient states that her pain has not gotten any better.  She denies any loss of bowel or bladder functions.  She denies any saddle anesthesia.  She states that she has been using heat but has not been icing the area.      Patient Care Team  Primary Care Provider: Reina Putnam APRN    Past Medical History:     Allergies   Allergen Reactions    Azithromycin Nausea Only    Duloxetine Hcl Unknown - Low Severity    Lisinopril Cough    Penicillins Unknown - Low Severity     Past Medical History:   Diagnosis Date    Allergic     Allergic rhinitis     Anxiety 09/11/2018    Arthritis     Blood in stool     Condition not found     MEATAL STENOSIS, UNSPECIFIED    Depression 09/11/2018    Dysmenorrhea     Gross hematuria     Headache     Helicobacter positive gastritis 05/26/2016    Hypertension     Irregular menses     Low back pain 07/09/2014    Microscopic hematuria 06/10/2014    Night sweats     Onychomycosis of toenail 07/09/2014    Pap smear for cervical cancer screening 05/16/2016    Right sided abdominal pain 06/12/2014    Screening mammogram, encounter for 11/09/2018    STD exposure 1993    chlamydia    Tobacco abuse     chronic    Vitamin D deficiency      Past  Surgical History:   Procedure Laterality Date    BREAST BIOPSY Left 01/09/2012    CHOLECYSTECTOMY      COLONOSCOPY  08/15/2016    CYST REMOVAL      left wrist    CYSTOSCOPY  06/27/2014    Office cystoscopy w/ Dr. Goncalves     Family History   Problem Relation Age of Onset    Stomach cancer Mother     Alzheimer's disease Mother     Diabetes Mother     Leukemia Father     Heart disease Sister     Heart disease Brother     Cancer Maternal Grandmother     Leukemia Paternal Grandfather     Stroke Other         AUNT;UNCLE    Heart disease Other         AUNT,UNCLE    Diabetes Other         AUNT,UNCLE    Hyperlipidemia Sister     Kidney disease Sister        Home Medications:  Prior to Admission medications    Medication Sig Start Date End Date Taking? Authorizing Provider   ammonium lactate (LAC-HYDRIN) 12 % lotion Apply 1 application  topically to the appropriate area as directed As Needed for Irritation. 12/18/23   Danish Brown MD   Blood Pressure Monitoring (Blood Pressure Cuff) misc 1 each by Other route Take As Directed. 12/14/22   Reina Putnam APRN   ciclopirox (PENLAC) 8 % solution APPLY TO THE AFFECTED AREA(S) TOPICALLY ONCE DAILY PREFERABLY AT BEDTIME OR 8 HOURS BEFORE WASHING 3/11/24   ProviderDanish MD   fluticasone (FLONASE) 50 MCG/ACT nasal spray 2 sprays into the nostril(s) as directed by provider Daily. 1/11/24   Reina Putnam APRN   guanFACINE HCl ER (Intuniv) 1 MG tablet sustained-release 24 hour tablet Take 1 tablet by mouth Every Night. 1/11/24   Reina Putnam APRN   hydrOXYzine (ATARAX) 25 MG tablet Take 1 tablet by mouth Every 6 (Six) Hours As Needed for Itching. 1/11/24   Reina Putnam APRN   loratadine (Claritin) 10 MG tablet Take 1 tablet by mouth Daily. 1/11/24   Reina Putnam APRN   losartan-hydrochlorothiazide (Hyzaar) 50-12.5 MG per tablet Take 1 tablet by mouth Daily. 1/11/24   Reina Putnam APRN   methocarbamol (ROBAXIN) 500 MG tablet Take 1 tablet by mouth 3  (Three) Times a Day As Needed for Muscle Spasms. 24   Reina Putnam APRN   methocarbamol (ROBAXIN) 750 MG tablet Take 1 tablet by mouth 3 (Three) Times a Day. 3/24/24   Patti Carl APRN   predniSONE (DELTASONE) 50 MG tablet Take 1 tablet by mouth Daily for 4 doses. 3/24/24 3/28/24  Patti Carl APRN   pregabalin (LYRICA) 75 MG capsule Take 1 capsule by mouth 3 times a day. 3/14/24   Provider, MD Danish   valACYclovir (VALTREX) 500 MG tablet Take 1 tablet by mouth Daily. 24   Reina Putnam APRN   vilazodone (Viibryd) 40 MG tablet tablet Take 1 tablet by mouth Daily. 24   Reina Putnam APRN        Social History:   Social History     Tobacco Use    Smoking status: Every Day     Current packs/day: 0.00     Average packs/day: 0.3 packs/day for 30.0 years (7.5 ttl pk-yrs)     Types: Cigarettes     Start date: 1989     Last attempt to quit: 2000     Years since quittin.9    Smokeless tobacco: Never   Vaping Use    Vaping status: Never Used   Substance Use Topics    Alcohol use: Yes     Alcohol/week: 10.0 standard drinks of alcohol     Types: 4 Glasses of wine, 4 Shots of liquor, 2 Drinks containing 0.5 oz of alcohol per week     Comment: glass of wine every other day    Drug use: Never         Review of Systems:  Review of Systems   Constitutional:  Negative for chills and fever.   HENT:  Negative for congestion, ear pain and sore throat.    Eyes:  Negative for pain.   Respiratory:  Negative for cough, chest tightness, shortness of breath and wheezing.    Cardiovascular:  Negative for chest pain.   Gastrointestinal:  Negative for abdominal pain, diarrhea, nausea and vomiting.   Genitourinary:  Negative for dysuria, flank pain, frequency, hematuria and urgency.   Musculoskeletal:  Positive for back pain. Negative for gait problem, joint swelling, neck pain and neck stiffness.   Skin:  Negative for pallor and rash.   Neurological:  Negative for seizures and headaches.   All  "other systems reviewed and are negative.       Physical Exam:  /75 (BP Location: Left arm, Patient Position: Sitting)   Pulse 71   Temp 99 °F (37.2 °C) (Oral)   Resp 20   Ht 160 cm (63\")   Wt 97.5 kg (214 lb 15.2 oz)   LMP  (LMP Unknown)   SpO2 96%   BMI 38.08 kg/m²         Physical Exam  Vitals and nursing note reviewed.   Constitutional:       General: She is not in acute distress.     Appearance: Normal appearance. She is not ill-appearing or toxic-appearing.   HENT:      Head: Normocephalic and atraumatic.   Eyes:      General: No scleral icterus.     Conjunctiva/sclera: Conjunctivae normal.      Pupils: Pupils are equal, round, and reactive to light.   Cardiovascular:      Rate and Rhythm: Normal rate and regular rhythm.      Pulses: Normal pulses.   Pulmonary:      Effort: Pulmonary effort is normal. No respiratory distress.   Musculoskeletal:         General: Normal range of motion.      Cervical back: Normal range of motion and neck supple. No rigidity or tenderness.   Lymphadenopathy:      Cervical: No cervical adenopathy.   Skin:     General: Skin is warm and dry.      Capillary Refill: Capillary refill takes less than 2 seconds.      Findings: No rash.   Neurological:      General: No focal deficit present.      Mental Status: She is alert and oriented to person, place, and time. Mental status is at baseline.   Psychiatric:         Mood and Affect: Mood normal.         Behavior: Behavior normal.                  Procedures:  Procedures      Medical Decision Making:      Comorbidities that affect care:    Allergic rhinitis, depression, dysmenorrhea, H. pylori, low back pain, night sweats, abnormal cervical Pap smear, tobacco use, anxiety, blood in stools, gross hematuria, irregular menses, STD exposure, vitamin D deficiency, hypertension, arthritis    External Notes reviewed:    Previous ED Note: ED visit from 3/24/2024.      The following orders were placed and all results were independently " analyzed by me:  No orders of the defined types were placed in this encounter.      Medications Given in the Emergency Department:  Medications   ketorolac (TORADOL) injection 60 mg (60 mg Intramuscular Given 3/28/24 0054)   oxyCODONE-acetaminophen (PERCOCET) 5-325 MG per tablet 1 tablet (1 tablet Oral Given 3/28/24 0054)        ED Course:    The patient was initially evaluated in the triage area where orders were placed. The patient was later dispositioned by TESSA See.      The patient was advised to stay for completion of workup which includes but is not limited to communication of labs and radiological results, reassessment and plan. The patient was advised that leaving prior to disposition by a provider could result in critical findings that are not communicated to the patient.     ED Course as of 03/28/24 0215   Wed Mar 27, 2024   2316 PROVIDER IN TRIAGE  Patient was evaluated by Ryan delcid PA-C. Orders were placed and awaiting final results and disposition.   [MV]      ED Course User Index  [MV] Ryan Cabral PA       Labs:    Lab Results (last 24 hours)       ** No results found for the last 24 hours. **             Imaging:    No Radiology Exams Resulted Within Past 24 Hours      Differential Diagnosis and Discussion:      Back Pain: The patient presents with back pain. My differential diagnosis includes but is not limited to acute spinal epidural abscess, acute spinal epidural bleed, cauda equina syndrome, abdominal aortic aneurysm, aortic dissection, kidney stone, pyelonephritis, musculoskeletal back pain, spinal fracture, and osteoarthritis.       MDM  Number of Diagnoses or Management Options  Acute left-sided low back pain with left-sided sciatica: established and worsening  Risk of Complications, Morbidity, and/or Mortality  Presenting problems: low  Diagnostic procedures: low  Management options: low    Patient Progress  Patient progress: stable         Patient Care  Considerations:    NARCOTICS: I considered prescribing opiate pain medication as an outpatient, however the patient is in pain management at this time.      Consultants/Shared Management Plan:    None    Social Determinants of Health:    Patient is independent, reliable, and has access to care.       Disposition and Care Coordination:    Discharged: The patient is suitable and stable for discharge with no need for consideration of admission.    I have explained the patient´s condition, diagnoses and treatment plan based on the information available to me at this time. I have answered questions and addressed any concerns. The patient has a good  understanding of the patient´s diagnosis, condition, and treatment plan as can be expected at this point. The vital signs have been stable. The patient´s condition is stable and appropriate for discharge from the emergency department.      The patient will pursue further outpatient evaluation with the primary care physician or other designated or consulting physician as outlined in the discharge instructions. They are agreeable to this plan of care and follow-up instructions have been explained in detail. The patient has received these instructions in written format and has expressed an understanding of the discharge instructions. The patient is aware that any significant change in condition or worsening of symptoms should prompt an immediate return to this or the closest emergency department or call to 911.  I have explained discharge medications and the need for follow up with the patient/caretakers. This was also printed in the discharge instructions. Patient was discharged with the following medications and follow up:      Medication List        New Prescriptions      ketorolac 10 MG tablet  Commonly known as: TORADOL  Take 1 tablet by mouth Every 6 (Six) Hours As Needed for Moderate Pain.     orphenadrine 100 MG 12 hr tablet  Commonly known as: NORFLEX  Take 1 tablet by  mouth 2 (Two) Times a Day.               Where to Get Your Medications        These medications were sent to [facility]/pharmacy #[id number] - [place], [place] - [address] - [phone]  - [phone] FX  [address] [place] [address]      Hours: 24-hours Phone: [phone]   ketorolac 10 MG tablet  orphenadrine 100 MG 12 hr tablet      Reina Putnam, TESSA  [address] [place] [id number] [phone]    Call today  FOR FOLLOW UP    your pain management office    Call today  FOR FOLLOW UP       Final diagnoses:   Acute left-sided low back pain with left-sided sciatica        ED Disposition       ED Disposition   Discharge    Condition   Stable    Comment   --               This medical record created using voice recognition software.             Patti Carl, TESSA  03/28/24 0211

## 2024-03-28 NOTE — DISCHARGE INSTRUCTIONS
Rest, drink plenty of fluids.  If you use heat to the area then do your gentle range of motion exercises and stretches please follow-up with 15 to 20 minutes worth of ice to the area afterwards.  Stop the previously prescribed Robaxin and take the Norflex instead.  Take your other meds as prescribed.  You may also take over-the-counter acetaminophen with these medications as needed for pain.  Call your family doctor office today and advise them that you cannot get in with your pain management until 17 April and follow-up with them as soon as possible to discuss possibly getting into some physical therapy to help with your symptoms until pain management can get it but under better pain control.  Follow-up with them as directed.  Return to the emergency department immediately for any acutely developing neurological symptoms, any bladder or bowel incontinence, any pelvic numbness or any new or worse concerns.

## 2024-03-28 NOTE — Clinical Note
[facility] EMERGENCY ROOM  [address] [place] [address]  Phone: [phone]  Fax: [phone]    Bianca Lara was seen and treated in our emergency department on 3/27/2024.  She may return to work on 03/31/2024.         Thank you for choosing [facility].    Patti Carl APRN

## 2024-03-29 ENCOUNTER — TREATMENT (OUTPATIENT)
Dept: PHYSICAL THERAPY | Facility: CLINIC | Age: 54
End: 2024-03-29
Payer: COMMERCIAL

## 2024-03-29 DIAGNOSIS — M54.42 CHRONIC LEFT-SIDED LOW BACK PAIN WITH LEFT-SIDED SCIATICA: Primary | ICD-10-CM

## 2024-03-29 DIAGNOSIS — G89.29 CHRONIC LEFT-SIDED LOW BACK PAIN WITH LEFT-SIDED SCIATICA: Primary | ICD-10-CM

## 2024-03-29 NOTE — PROGRESS NOTES
Physical Therapy Initial Evaluation and Plan of Care                       Patient: Bianca Lara   : 1970  Diagnosis/ICD-10 Code:  Chronic left-sided low back pain with left-sided sciatica [M54.42, G89.29]  Referring practitioner: Skip Shane MD  Date of Initial Visit: 3/29/2024  Today's Date: 3/29/2024  Patient seen for 1 sessions           Subjective Questionnaire: Oswestry: 43      Subjective Evaluation    History of Present Illness  Mechanism of injury: Patient reports in 2023 she fell down her steps and has had worsening back and left leg pain since then. She has had repeated injections with no success. She is taking pain medication and reports 1.5 hours before pain relief after administering. She reports severe radicular pain into her left leg to her toes described as stabbing, throbbing, numbness, and spasms. She reports some relief laying on her stomach and with moving the leg.     Pain  Current pain ratin  At best pain ratin  At worst pain rating: 10  Relieving factors: ice, heat and rest    Treatments  Previous treatment: injection treatment and medication  Patient Goals  Patient goals for therapy: increased strength, return to sport/leisure activities, independence with ADLs/IADLs, return to work, increased motion and decreased pain             Objective          Active Range of Motion     Lumbar   Flexion: 10 degrees with pain  Extension: 5 degrees with pain  Left lateral flexion: 5 degrees with pain  Right lateral flexion: 10 degrees with pain    Tests       Thoracic   Positive slump.     Lumbar     Left   Positive passive SLR.     Functional Assessment   Squat   Pain, trunk lean right and sitting toward right side.     Single Leg Stance   Left single leg stance time: unable to perform due to pain.      See Exercise, Manual, and Modality Logs for complete treatment.     Assessment/Plan    Visit Diagnoses:    ICD-10-CM ICD-9-CM   1. Chronic left-sided low  back pain with left-sided sciatica  M54.42 724.2    G89.29 724.3     338.29       History # of Personal Factors and/or Comorbidities: LOW (0)  Examination of Body System(s): # of elements: LOW (1-2)  Clinical Presentation: STABLE   Clinical Decision Making: LOW       Timed:         Manual Therapy:    0     mins  83150;     Therapeutic Exercise:     0    mins  85206;     Neuromuscular Amber:    0    mins  73832;    Therapeutic Activity:     0     mins  99810;     Gait Trainin     mins  57075;     Ultrasound:     0     mins  69572;    Ionto                               0    mins   68106  Self Care                       0     mins   69995  Canalith Repos    0     mins 81120      Un-Timed:  Electrical Stimulation:    0     mins  24213 ( );  Dry Needling     0     mins self-pay  Traction     0     mins 47439  Low Eval       59   Mins  90452  Mod Eval     0   Mins  29228  High Eval                       0     Mins  42530  Re-Eval                           0    mins  08522    Timed Treatment:   0   mins   Total Treatment:     59   mins    PT SIGNATURE: Agustin Mcclain PT    Electronically signed 3/29/2024    [place] License: PT - [id number]      Initial Certification  Certification Period: 3/29/2024 thru 2024  I certify that the therapy services are furnished while this patient is under my care.  The services outlined above are required by this patient, and will be reviewed every 90 days.     PHYSICIAN: Skip Shane MD  NPI: [id number]      DATE:     Please sign and return via fax to [phone]. Thank you, [facility].

## 2024-04-01 ENCOUNTER — TREATMENT (OUTPATIENT)
Dept: PHYSICAL THERAPY | Facility: CLINIC | Age: 54
End: 2024-04-01
Payer: COMMERCIAL

## 2024-04-01 DIAGNOSIS — G89.29 CHRONIC LEFT-SIDED LOW BACK PAIN WITH LEFT-SIDED SCIATICA: Primary | ICD-10-CM

## 2024-04-01 DIAGNOSIS — M54.42 CHRONIC LEFT-SIDED LOW BACK PAIN WITH LEFT-SIDED SCIATICA: Primary | ICD-10-CM

## 2024-04-01 NOTE — PROGRESS NOTES
"Physical Therapy Daily Treatment Note  [address] [place] [address]        Patient: Bianca Lara   : 1970  Diagnosis/ICD-10 Code:  Chronic left-sided low back pain with left-sided sciatica [M54.42, G89.29]  Referring practitioner: Skip Shane MD  Date of Initial Visit: Type: THERAPY  Noted: 3/29/2024  Today's Date: 2024  Patient seen for 2 sessions             Subjective   Bianca Lara reports having 10/10 pain in her lower back and left hip- She reports that the pain runs all the way to her toes on the left.   she describes as \"Sciatica\".  She arrived walking with single tip cane today.  She reports that she has contacted pain management for statement to be off work.  She states that she is unable to work because of pain.  She reports currently working at \"[employer]\".    Objective     + Pain and apparent muscle spasms- left lower extremity  + Muscle guarding-Left Lower extremity    See Exercise and Manual Logs for complete treatment.       Assessment/Plan    Overall decreased tolerance to therapy  progression today.  Pain and muscle spasm limited progression of  therapeutic exercises, Functional activities, and Manual therapy.  She continues to have deficits in her Trunk-Core and Left Lower Extremity ROM,  Strength, and Stability; limiting functional mobility and ability to perform ADLs without increased pain and radicular symptoms at time.  Symptoms  continue to be easily exacerbated into Left lower extremity- with apparent spasms in left leg noted.  Positive for pain and muscle guarding- pt has difficulty relaxing to allow for any manual therapy.  She ambulates with antalgic - unsteady gait with single tip cane- shifted to right.  Increased risk for left knee buckling noted secondary to pain and functional weakness noted.    Progress per Plan of Care - as tolerated.           Timed:  Manual Therapy:    15     mins  71216;  Therapeutic " Exercise:    15     mins  74091;     Neuromuscular Amber:    0    mins  80250;    Therapeutic Activity:     8     mins  31342;     Gait Trainin     mins  71726;     Ultrasound:     0     mins  07639;    Electrical Stimulation:    0     mins  38744;  Iontophoresis     0     mins  90440    Untimed:  Electrical Stimulation:    0     mins  50841 ( );  Mechanical Traction:    0     mins  20919;   Fluidotherapy     0     mins  19750  Hot pack     0     mins  22488  Cold pack     0     mins  35719    Timed Treatment:   38   mins   Total Treatment:     38   mins        Christine Boo PTA  Physical Therapist Assistant

## 2024-04-04 ENCOUNTER — PATIENT ROUNDING (BHMG ONLY) (OUTPATIENT)
Dept: URGENT CARE | Facility: CLINIC | Age: 54
End: 2024-04-04

## 2024-04-04 NOTE — ED NOTES
Thank you for letting us care for you in your recent visit to our urgent care center. We would love to hear about your experience with us. Was this the first time you have visited our location?    We’re always looking for ways to make our patients’ experiences even better. Do you have any recommendations on ways we may improve?     I appreciate you taking the time to respond. Please be on the lookout for a survey about your recent visit from [employer] via text or email. We would greatly appreciate if you could fill that out and turn it back in. We want your voice to be heard and we value your feedback.   Thank you for choosing [facility] for your healthcare needs.

## 2024-04-10 ENCOUNTER — TELEPHONE (OUTPATIENT)
Dept: FAMILY MEDICINE CLINIC | Facility: CLINIC | Age: 54
End: 2024-04-10
Payer: COMMERCIAL

## 2024-04-30 NOTE — PROGRESS NOTES
Answers submitted by the patient for this visit:  Primary Reason for Visit (Submitted on 5/24/2024)  What is the primary reason for your visit?: Depression  Chief Complaint  Hyperlipidemia (Follow up) and Nicotine Dependence (Recently stopped smoking ; would like chantix )    Subjective          Bianca Lara is a 53 y.o. female who presents to [facility]    History of Present Illness  History of Present Illness  The patient is a 53-year-old female who presents for evaluation of multiple medical concerns.    The patient's anxiety is well-managed with Viibryd, however, she continues to experience symptoms indicative of anxiety and depression, particularly exacerbated due to her unemployment and financial constraints. She reports a lack of desire to engage in activities such as fishing or visiting the lake, but denies any suicidal ideation.  Despite her attempts to maintain a positive outlook, she continues to experience mood fluctuations. 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 A neuropathy test is scheduled for 06/17/2024 to address sciatic nerve pain radiating down her legs. She expresses dissatisfaction with her current situation, and has had suicidal ideation but no specific plan. Three months ago, she sought help from [facility] due to feelings of being followed and harmed by others. Her financial constraints have negatively impacted her ability to pay bills. She has applied to a women's homes for assistance.    The patient ceased smoking this week, a habit she previously consumed 3 to 4 cigarettes daily. She expresses interest in trying Chantix to aid in smoking cessation, but expresses concern over an urge to smoke. She has previously taken Chantix for smoking cessation.    Supplemental Information  She has dizziness and confusion because she does not know what to do. It happened because she was  off her medication for a couple of days. She is not dizzy now.     She has received her Prevnar vaccine.      PHQ-2 Total Score:     PHQ-9 Total Score:          Review of Systems   Constitutional:  Negative for fever.   Musculoskeletal:  Positive for back pain.   Neurological:  Negative for dizziness.   Psychiatric/Behavioral:  Positive for dysphoric mood and suicidal ideas (no plan). The patient is nervous/anxious.           Medical History: has a past medical history of Allergic, Allergic rhinitis, Anxiety (09/11/2018), Arthritis, Blood in stool, Condition not found, Depression (09/11/2018), Dysmenorrhea, Gross hematuria, Headache, Helicobacter positive gastritis (05/26/2016), Hypertension, Irregular menses, Low back pain (07/09/2014), Microscopic hematuria (06/10/2014), Night sweats, Onychomycosis of toenail (07/09/2014), Pap smear for cervical cancer screening (05/16/2016), Right sided abdominal pain (06/12/2014), Screening mammogram, encounter for (11/09/2018), STD exposure (1993), Tobacco abuse, and Vitamin D deficiency.     Surgical History: has a past surgical history that includes Breast biopsy (Left, 01/09/2012); Cholecystectomy; Colonoscopy (08/15/2016); Cyst Removal; and Cystoscopy (06/27/2014).     Family History: family history includes Alzheimer's disease in her mother; Cancer in her maternal grandmother; Diabetes in her mother and another family member; Heart disease in her brother, sister, and another family member; Hyperlipidemia in her sister; Kidney disease in her sister; Leukemia in her father and paternal grandfather; Stomach cancer in her mother; Stroke in an other family member.     Social History: reports that she quit smoking 4 days ago. Her smoking use included cigarettes. She started smoking about 34 years ago. She has a 13.5 pack-year smoking history. She has never used smokeless tobacco. She reports current alcohol use of about 10.0 standard drinks of alcohol per week. She reports that  she does not use drugs.    Allergies: Azithromycin, Duloxetine hcl, Lisinopril, and Penicillins      Health Maintenance Due   Topic Date Due    TDAP/TD VACCINES (1 - Tdap) Never done    ZOSTER VACCINE (1 of 2) Never done    COVID-19 Vaccine (5 - 2023-24 season) 09/01/2023            Current Outpatient Medications:     ammonium lactate (LAC-HYDRIN) 12 % lotion, Apply 1 application  topically to the appropriate area as directed As Needed for Irritation., Disp: , Rfl:     ciclopirox (PENLAC) 8 % solution, APPLY TO THE AFFECTED AREA(S) TOPICALLY ONCE DAILY PREFERABLY AT BEDTIME OR 8 HOURS BEFORE WASHING, Disp: , Rfl:     fluticasone (FLONASE) 50 MCG/ACT nasal spray, 2 sprays into the nostril(s) as directed by provider Daily., Disp: 16 g, Rfl: 5    loratadine (Claritin) 10 MG tablet, Take 1 tablet by mouth Daily., Disp: 90 tablet, Rfl: 3    losartan-hydrochlorothiazide (Hyzaar) 50-12.5 MG per tablet, Take 1 tablet by mouth Daily., Disp: 90 tablet, Rfl: 1    pregabalin (LYRICA) 100 MG capsule, Take 1 capsule by mouth 3 times a day., Disp: , Rfl:     valACYclovir (VALTREX) 500 MG tablet, TAKE 1 TABLET BY MOUTH EVERY DAY, Disp: 30 tablet, Rfl: 1    vilazodone (Viibryd) 40 MG tablet tablet, Take 1 tablet by mouth Daily., Disp: 90 tablet, Rfl: 1    lamoTRIgine (LaMICtal) 25 MG tablet, Take 1 tab qhs x 7 days then bid thereafter., Disp: 60 tablet, Rfl: 1    [START ON 6/28/2024] varenicline (Chantix Continuing Month Ryan) 1 MG tablet, Take 1 tablet by mouth 2 (Two) Times a Day for 56 days., Disp: 56 tablet, Rfl: 1    Varenicline Tartrate, Starter, 0.5 MG X 11 & 1 MG X 42 tablet therapy pack, Take 0.5 mg by mouth Daily for 3 days, THEN 0.5 mg 2 (Two) Times a Day for 4 days, THEN 1 mg 2 (Two) Times a Day for 21 days. Take 0.5 mg po daily x 3 days, then 0.5 mg po bid x 4 days, then 1 mg po bid, Disp: 1 each, Rfl: 0      Immunization History   Administered Date(s) Administered    COVID-19 (MODERNA) 1st,2nd,3rd Dose Monovalent  "01/07/2021, 02/04/2021, 10/28/2021    COVID-19 (MODERNA) BIVALENT 12+YRS 10/17/2022    Fluzone (or Fluarix & Flulaval for VFC) >6mos 10/17/2022, 11/09/2023    Fluzone Quad >6mos (Multi-dose) 10/29/2020    Influenza, Unspecified 10/17/2022    Pneumococcal Conjugate 20-Valent (PCV20) 05/31/2024         Objective       Vitals:    05/31/24 0841   BP: 128/83   Pulse: 68   Temp: 97.6 °F (36.4 °C)   TempSrc: Temporal   SpO2: 98%   Weight: 97.3 kg (214 lb 6.4 oz)   Height: 160 cm (62.99\")      Body mass index is 37.99 kg/m².   Wt Readings from Last 3 Encounters:   05/31/24 97.3 kg (214 lb 6.4 oz)   05/17/24 95 kg (209 lb 7 oz)   05/09/24 94.8 kg (208 lb 15.9 oz)      BP Readings from Last 3 Encounters:   05/31/24 128/83   05/17/24 120/84   05/09/24 138/91             Physical Exam  Vitals reviewed.   Constitutional:       Appearance: Normal appearance. She is well-developed.   HENT:      Head: Normocephalic and atraumatic.   Eyes:      Conjunctiva/sclera: Conjunctivae normal.      Pupils: Pupils are equal, round, and reactive to light.   Cardiovascular:      Rate and Rhythm: Normal rate and regular rhythm.      Heart sounds: Normal heart sounds. No murmur heard.  Pulmonary:      Effort: Pulmonary effort is normal.      Breath sounds: Normal breath sounds. No wheezing or rhonchi.   Abdominal:      General: Bowel sounds are normal. There is no distension.      Palpations: Abdomen is soft.      Tenderness: There is no abdominal tenderness.   Skin:     General: Skin is warm and dry.   Neurological:      Mental Status: She is alert and oriented to person, place, and time.   Psychiatric:         Mood and Affect: Mood and affect normal.         Behavior: Behavior normal.         Thought Content: Thought content normal.         Judgment: Judgment normal.         Physical Exam        Result Review :   Results  Laboratory Studies  Hemoglobin A1c was 6.4. White blood cell count was elevated.       Common labs          3/20/2024    " 15:22   Common Labs   Glucose 109    BUN 24    Creatinine 0.91    Sodium 140    Potassium 3.9    Chloride 99    Calcium 10.6    Albumin 4.3    Total Bilirubin 0.3    Alkaline Phosphatase 87    AST (SGOT) 23    ALT (SGPT) 19    WBC 14.94    Hemoglobin 13.2    Hematocrit 41.0    Platelets 583    Total Cholesterol 200    Triglycerides 60    HDL Cholesterol 60    LDL Cholesterol  129    Hemoglobin A1C 6.40                     Assessment and Plan        Diagnoses and all orders for this visit:    1. Need for pneumococcal 20-valent conjugate vaccination (Primary)  -     Pneumococcal Conjugate Vaccine 20-Valent (PCV20)    2. Seasonal allergic rhinitis due to pollen  -     loratadine (Claritin) 10 MG tablet; Take 1 tablet by mouth Daily.  Dispense: 90 tablet; Refill: 3    3. Primary hypertension  Comments:  Losartan changed to losartan hydrochlorothiazide for diastolic improvement.  Orders:  -     losartan-hydrochlorothiazide (Hyzaar) 50-12.5 MG per tablet; Take 1 tablet by mouth Daily.  Dispense: 90 tablet; Refill: 1    4. Anxiety and depression  -     vilazodone (Viibryd) 40 MG tablet tablet; Take 1 tablet by mouth Daily.  Dispense: 90 tablet; Refill: 1  -     lamoTRIgine (LaMICtal) 25 MG tablet; Take 1 tab qhs x 7 days then bid thereafter.  Dispense: 60 tablet; Refill: 1    5. Personal history of nicotine dependence  -     Varenicline Tartrate, Starter, 0.5 MG X 11 & 1 MG X 42 tablet therapy pack; Take 0.5 mg by mouth Daily for 3 days, THEN 0.5 mg 2 (Two) Times a Day for 4 days, THEN 1 mg 2 (Two) Times a Day for 21 days. Take 0.5 mg po daily x 3 days, then 0.5 mg po bid x 4 days, then 1 mg po bid  Dispense: 1 each; Refill: 0  -     varenicline (Chantix Continuing Month Ryan) 1 MG tablet; Take 1 tablet by mouth 2 (Two) Times a Day for 56 days.  Dispense: 56 tablet; Refill: 1        Assessment & Plan  1. Anxiety and depression.  A prescription for Lamictal 25 mg, has been issued. Subsequently, the dosage will be increased  to twice daily. Should the patient experience a plan to self-harm, she is advised to seek help from [facility].    2. Hypertension.  The patient's blood pressure is well-regulated. A prescription refill for losartan and hydrochlorothiazide has been issued.    3. Smoking.  Chantix has been prescribed to aid in smoking cessation.    Follow-up  The patient is scheduled for a follow-up visit in 30 days.    Follow Up     Return in about 4 weeks (around 6/28/2024) for Next scheduled follow up.    Patient was given instructions and counseling regarding her condition or for health maintenance advice. Please see specific information pulled into the AVS if appropriate.     TESSA Cerrato    Patient or patient representative verbalized consent for the use of Ambient Listening during the visit with  TESSA Cerrato for chart documentation. 5/31/2024  11:23 EDT

## 2024-05-01 ENCOUNTER — TREATMENT (OUTPATIENT)
Dept: PHYSICAL THERAPY | Facility: CLINIC | Age: 54
End: 2024-05-01
Payer: COMMERCIAL

## 2024-05-01 DIAGNOSIS — M54.42 CHRONIC LEFT-SIDED LOW BACK PAIN WITH LEFT-SIDED SCIATICA: Primary | ICD-10-CM

## 2024-05-01 DIAGNOSIS — G89.29 CHRONIC LEFT-SIDED LOW BACK PAIN WITH LEFT-SIDED SCIATICA: Primary | ICD-10-CM

## 2024-05-01 NOTE — PROGRESS NOTES
Progress Note      Patient: Bianca Lara   : 1970  Diagnosis/ICD-10 Code:  Chronic left-sided low back pain with left-sided sciatica [M54.42, G89.29]  Referring practitioner: Skip Shane MD  Date of Initial Visit: Type: THERAPY  Noted: 3/29/2024  Today's Date: 2024  Patient seen for 3 sessions      Subjective:   Subjective Questionnaire: Oswestry: 31  Clinical Progress: improved  Home Program Compliance: Yes  Treatment has included: therapeutic exercise, neuromuscular re-education, manual therapy, therapeutic activity, and gait training    Subjective   Patient states she had a steroid dose pack that helped a lot with the pain and spasms and has continued to do her exercises which she states has helped improve her back from where it was. She states she has been unable to work in this time due to the pain still.   Objective          Active Range of Motion     Lumbar   Flexion: 55 (%, unable to stand upright from flexion, had to sit) degrees with pain  Extension: 5 (%) degrees with pain  Left lateral flexion: 15 (%) degrees with pain  Right lateral flexion: 70 (%) degrees with pain    Strength/Myotome Testing     Left Hip   Planes of Motion   Flexion: 4-  Abduction: 3+    Left Knee   Flexion: 3+  Extension: 4-    Left Ankle/Foot   Dorsiflexion: 4-    Tests       Thoracic   Positive slump.     Lumbar     Left   Positive passive SLR.     Functional Assessment   Squat   Pain, trunk lean right and sitting toward right side.     Single Leg Stance   Left single leg stance time: unable to perform due to pain.      See Exercise, Manual, and Modality Logs for complete treatment.     Assessment/Plan  Limited progress made since IE as only 1 follow up visit was performed due to insurance changes. She is displaying some progress in ROM but presence of muscular spasms in the paraspinals and musculature affected by left sciatic nerve are still prevalent. Deficits since eval are still present and  significantly impact every aspect of patient's life and her ability to perform ADLs or work tasks. Plan to resume initial POC at this time.     Progress toward previous goals: Not Met   LOW BACK PROBLEMS:    1. The patient complains of low back pain.  LTG 1: 12 weeks:  The patient will report a pain rating of 2 or better in order to improve  tolerance to activities of daily living and improve sleep quality.  STATUS:  New  STG 1a: 6 weeks:  The patient will report a pain rating of 4 or better.  STATUS:  New    2. The patient demonstrates weakness of the left hip.  LTG 2: 12 weeks:  The patient will demonstrate 5 /5 strength for left hip flexion, abduction,  and extension in order to improve hip stability.  STATUS:  New  STG 2a: 6 weeks:  The patient will demonstrate 4 /5 strength for left hip flexion, abduction,  and extension.  STATUS:  New    3. The patient has gait dysfunction.  LTG 3: 12 weeks:  The patient will ambulate without assistive device, independently, for   community distances with minimal limp to the left lower extremity in order to improve mobility and allow   patient to perform activities such as grocery shopping with greater ease.  STATUS:  New    4. The patient has limited lumbar AROM  LTG 4: 12 weeks:  The patient will demonstrate lumbar AROM as follows: 85% of flexion and 85% of extension.  STATUS:  New  STG 4a: 6 weeks: The patient will demonstrate lumbar AROM as follows: 70% of flexion and 70% of extension.  STATUS:  New    5. Mobility: Walking/Moving Around Functional Limitation    LTG 5: 12 weeks:  The patient will demonstrate improved function for ADLs by achieving a score of 5 on the LEISA.  STATUS:  New  STG 5a a: 6 weeks:  The patient will demonstrate improved function for ADLs by achieving a score of 15 on the LEISA.    STATUS:  New    Recommendations: Continue as planned  Timeframe: 8weeks  Prognosis to achieve goals: good    PT Signature: Agustin Mcclain, PT    Electronically signed  2024    [place] License: PT - [id number]       Timed:  Manual Therapy:    14     mins  24618;  Therapeutic Exercise:    12     mins  61969;     Neuromuscular Amber:    12    mins  95737;    Therapeutic Activity:     0     mins  00116;     Gait Trainin     mins  13043;     Aquatics                         0      mins  37537    Un-timed:  Mechanical Traction      0     mins  87986  Dry Needling     0     mins self-pay  Electrical Stimulation:    0     mins  92823 ( );    Timed Treatment:   38   mins   Total Treatment:     38   mins

## 2024-05-08 ENCOUNTER — TREATMENT (OUTPATIENT)
Dept: PHYSICAL THERAPY | Facility: CLINIC | Age: 54
End: 2024-05-08
Payer: COMMERCIAL

## 2024-05-08 DIAGNOSIS — M54.42 CHRONIC LEFT-SIDED LOW BACK PAIN WITH LEFT-SIDED SCIATICA: Primary | ICD-10-CM

## 2024-05-08 DIAGNOSIS — G89.29 CHRONIC LEFT-SIDED LOW BACK PAIN WITH LEFT-SIDED SCIATICA: Primary | ICD-10-CM

## 2024-05-09 ENCOUNTER — OFFICE VISIT (OUTPATIENT)
Dept: ORTHOPEDIC SURGERY | Facility: CLINIC | Age: 54
End: 2024-05-09
Payer: COMMERCIAL

## 2024-05-09 VITALS
DIASTOLIC BLOOD PRESSURE: 91 MMHG | BODY MASS INDEX: 37.03 KG/M2 | SYSTOLIC BLOOD PRESSURE: 138 MMHG | HEIGHT: 63 IN | OXYGEN SATURATION: 92 % | WEIGHT: 209 LBS | HEART RATE: 69 BPM

## 2024-05-09 DIAGNOSIS — M16.0 OSTEOARTHRITIS OF BOTH HIPS, UNSPECIFIED OSTEOARTHRITIS TYPE: ICD-10-CM

## 2024-05-09 DIAGNOSIS — M51.16 LUMBAR DISC DISEASE WITH RADICULOPATHY: ICD-10-CM

## 2024-05-09 DIAGNOSIS — M25.551 RIGHT HIP PAIN: Primary | ICD-10-CM

## 2024-05-09 DIAGNOSIS — M70.62 TROCHANTERIC BURSITIS OF LEFT HIP: ICD-10-CM

## 2024-05-09 DIAGNOSIS — M25.552 LEFT HIP PAIN: ICD-10-CM

## 2024-05-09 RX ORDER — METHYLPREDNISOLONE 4 MG/1
TABLET ORAL
Qty: 21 TABLET | Refills: 0 | Status: SHIPPED | OUTPATIENT
Start: 2024-05-09

## 2024-05-09 RX ORDER — METHOCARBAMOL 500 MG/1
500 TABLET, FILM COATED ORAL 3 TIMES DAILY
Qty: 30 TABLET | Refills: 0 | Status: SHIPPED | OUTPATIENT
Start: 2024-05-09 | End: 2024-05-19

## 2024-05-09 RX ORDER — PREGABALIN 100 MG/1
1 CAPSULE ORAL 3 TIMES DAILY
COMMUNITY
Start: 2024-05-02

## 2024-05-10 ENCOUNTER — TREATMENT (OUTPATIENT)
Dept: PHYSICAL THERAPY | Facility: CLINIC | Age: 54
End: 2024-05-10
Payer: COMMERCIAL

## 2024-05-10 DIAGNOSIS — G89.29 CHRONIC LEFT-SIDED LOW BACK PAIN WITH LEFT-SIDED SCIATICA: Primary | ICD-10-CM

## 2024-05-10 DIAGNOSIS — M54.42 CHRONIC LEFT-SIDED LOW BACK PAIN WITH LEFT-SIDED SCIATICA: Primary | ICD-10-CM

## 2024-05-10 PROCEDURE — 97110 THERAPEUTIC EXERCISES: CPT | Performed by: PHYSICAL THERAPIST

## 2024-05-10 PROCEDURE — 97140 MANUAL THERAPY 1/> REGIONS: CPT | Performed by: PHYSICAL THERAPIST

## 2024-05-15 ENCOUNTER — TREATMENT (OUTPATIENT)
Dept: PHYSICAL THERAPY | Facility: CLINIC | Age: 54
End: 2024-05-15
Payer: COMMERCIAL

## 2024-05-15 DIAGNOSIS — G89.29 CHRONIC LEFT-SIDED LOW BACK PAIN WITH LEFT-SIDED SCIATICA: Primary | ICD-10-CM

## 2024-05-15 DIAGNOSIS — M54.42 CHRONIC LEFT-SIDED LOW BACK PAIN WITH LEFT-SIDED SCIATICA: Primary | ICD-10-CM

## 2024-05-15 NOTE — PROGRESS NOTES
Physical Therapy Daily Treatment Note                          Patient: Bianca Lara   : 1970  Diagnosis/ICD-10 Code:  Chronic left-sided low back pain with left-sided sciatica [M54.42, G89.29]  Referring practitioner: Skip Shane MD  Date of Initial Visit: Type: THERAPY  Noted: 3/29/2024  Today's Date: 5/15/2024  Patient seen for 6 sessions           Subjective   The patient reported feeling more discomfort in the back, she states the pulling on the leg has been the biggest thing in therapy that changes her symptoms for the better.     Objective   See Exercise, Manual, and Modality Logs for complete treatment.     Assessment/Plan     Plan to work away from flexion base activities due to minimal effects. LAD continues to be effective for symptom reduction. Plan to trial passive abduction next visit. Further skilled care required to decrease pain at this time.     Timed:  Manual Therapy:    12     mins  24728;  Therapeutic Exercise:    14     mins  54117;     Neuromuscular mAber:   0    mins  24475;    Therapeutic Activity:     0     mins  88531;     Gait Trainin     mins  42745;     Aquatics                         0      mins  85139    Un-timed:  Mechanical Traction      0     mins  24058  Dry Needling     0     mins self-pay  Electrical Stimulation:    0     mins  97928 ( );      Timed Treatment:   26   mins   Total Treatment:     26   mins    Agustin Mcclain PT  Physical Therapist    Electronically signed 5/15/2024    [place] License: PT - [id number]

## 2024-05-17 ENCOUNTER — HOSPITAL ENCOUNTER (EMERGENCY)
Facility: HOSPITAL | Age: 54
Discharge: HOME | End: 2024-05-17
Attending: EMERGENCY MEDICINE
Payer: COMMERCIAL

## 2024-05-17 ENCOUNTER — TREATMENT (OUTPATIENT)
Dept: PHYSICAL THERAPY | Facility: CLINIC | Age: 54
End: 2024-05-17
Payer: COMMERCIAL

## 2024-05-17 VITALS
OXYGEN SATURATION: 100 % | SYSTOLIC BLOOD PRESSURE: 120 MMHG | BODY MASS INDEX: 37.11 KG/M2 | WEIGHT: 209.44 LBS | HEIGHT: 63 IN | HEART RATE: 71 BPM | TEMPERATURE: 98.7 F | RESPIRATION RATE: 19 BRPM | DIASTOLIC BLOOD PRESSURE: 84 MMHG

## 2024-05-17 DIAGNOSIS — G89.29 CHRONIC BILATERAL LOW BACK PAIN WITH LEFT-SIDED SCIATICA: Primary | ICD-10-CM

## 2024-05-17 DIAGNOSIS — M54.42 CHRONIC BILATERAL LOW BACK PAIN WITH LEFT-SIDED SCIATICA: Primary | ICD-10-CM

## 2024-05-17 DIAGNOSIS — G89.29 CHRONIC LEFT-SIDED LOW BACK PAIN WITH LEFT-SIDED SCIATICA: Primary | ICD-10-CM

## 2024-05-17 DIAGNOSIS — M54.42 CHRONIC LEFT-SIDED LOW BACK PAIN WITH LEFT-SIDED SCIATICA: Primary | ICD-10-CM

## 2024-05-17 PROCEDURE — 99283 EMERGENCY DEPT VISIT LOW MDM: CPT

## 2024-05-17 PROCEDURE — 96372 THER/PROPH/DIAG INJ SC/IM: CPT

## 2024-05-17 PROCEDURE — 25010000002 KETOROLAC TROMETHAMINE PER 15 MG

## 2024-05-17 RX ORDER — KETOROLAC TROMETHAMINE 30 MG/ML
30 INJECTION, SOLUTION INTRAMUSCULAR; INTRAVENOUS ONCE
Status: COMPLETED | OUTPATIENT
Start: 2024-05-17 | End: 2024-05-17

## 2024-05-17 RX ORDER — PREDNISONE 20 MG/1
60 TABLET ORAL DAILY
Qty: 15 TABLET | Refills: 0 | Status: SHIPPED | OUTPATIENT
Start: 2024-05-17 | End: 2024-05-22

## 2024-05-17 RX ADMIN — KETOROLAC TROMETHAMINE 30 MG: 30 INJECTION, SOLUTION INTRAMUSCULAR; INTRAVENOUS at 13:54

## 2024-05-17 NOTE — PROGRESS NOTES
Physical Therapy Daily Treatment Note                          Patient: Bianca Lara   : 1970  Diagnosis/ICD-10 Code:  Chronic left-sided low back pain with left-sided sciatica [M54.42, G89.29]  Referring practitioner: Skip Shane MD  Date of Initial Visit: Type: THERAPY  Noted: 3/29/2024  Today's Date: 2024  Patient seen for 7 sessions           Subjective   The patient reported she has still been having near constant pain, she took twice the recommended dose of her pain medications this morning.     Objective   See Exercise, Manual, and Modality Logs for complete treatment.   Positive for severe muscle spasm.   Assessment/Plan     While attempting to ambulate across the clinic patient had onset of severe muscle spasm of the paraspinals and required mod-max A to stay standing and transfer to a chair. Due to extremely poor tolerance, placing PT on hold at this time until MRI is obtained. Patient required assistance to her car, and was instructed to not drive due to the current state of her condition due to safety concerns, and to wait for someone to come and pick her up. Will resume care should orthopedics recommend it and pending MRI resulted indicate continuing conservative management.     Timed:  Manual Therapy:    0     mins  10869;  Therapeutic Exercise:    15     mins  13206;     Neuromuscular Amber:   0    mins  03767;    Therapeutic Activity:     0     mins  93580;     Gait Trainin     mins  56263;     Aquatics                         0      mins  39877    Un-timed:  Mechanical Traction      0     mins  64226  Dry Needling     0     mins self-pay  Electrical Stimulation:    0     mins  85188 ( );      Timed Treatment:   15   mins   Total Treatment:     15   mins    Agustin Mcclain PT  Physical Therapist    Electronically signed 2024    [place] License: PT - [id number]

## 2024-05-17 NOTE — ED PROVIDER NOTES
Time: 1:15 PM EDT  Date of encounter:  5/17/2024  Independent Historian/Clinical History and Information was obtained by:   Patient    History is limited by: N/A    Chief Complaint: Back pain      History of Present Illness:  Patient is a 53 y.o. year old female who presents to the emergency department for evaluation of lower back and hip pain, which has been a chronic issue, reports pain is worse today.  Patient was at her physical therapy appointment when she experienced an increase in the amount of pain, therapy session was ended and she was sent to the ED by EMS.  Patient reports she took a double dose of her pain medication this morning and took a muscle relaxer prior to arrival to the ED.  Patient reports she was recently prescribed steroid pack by her orthopedic specialist, however she took 1 day of the steroids and then lost the rest of the pack.  No saddle anesthesia.  Patient is able to ambulate to the bathroom with her cane prior to assessment.    HPI    Patient Care Team  Primary Care Provider: Reina Putnam APRN    Past Medical History:     Allergies   Allergen Reactions    Azithromycin Nausea Only    Duloxetine Hcl Unknown - Low Severity    Lisinopril Cough    Penicillins Unknown - Low Severity     Past Medical History:   Diagnosis Date    Allergic     Allergic rhinitis     Anxiety 09/11/2018    Arthritis     Blood in stool     Condition not found     MEATAL STENOSIS, UNSPECIFIED    Depression 09/11/2018    Dysmenorrhea     Gross hematuria     Headache     Helicobacter positive gastritis 05/26/2016    Hypertension     Irregular menses     Low back pain 07/09/2014    Microscopic hematuria 06/10/2014    Night sweats     Onychomycosis of toenail 07/09/2014    Pap smear for cervical cancer screening 05/16/2016    Right sided abdominal pain 06/12/2014    Screening mammogram, encounter for 11/09/2018    STD exposure 1993    chlamydia    Tobacco abuse     chronic    Vitamin D deficiency      Past Surgical  History:   Procedure Laterality Date    BREAST BIOPSY Left 01/09/2012    CHOLECYSTECTOMY      COLONOSCOPY  08/15/2016    CYST REMOVAL      left wrist    CYSTOSCOPY  06/27/2014    Office cystoscopy w/ Dr. Goncalves     Family History   Problem Relation Age of Onset    Stomach cancer Mother     Alzheimer's disease Mother     Diabetes Mother     Leukemia Father     Heart disease Sister     Heart disease Brother     Cancer Maternal Grandmother     Leukemia Paternal Grandfather     Stroke Other         AUNT;UNCLE    Heart disease Other         AUNT,UNCLE    Diabetes Other         AUNT,UNCLE    Hyperlipidemia Sister     Kidney disease Sister        Home Medications:  Prior to Admission medications    Medication Sig Start Date End Date Taking? Authorizing Provider   ammonium lactate (LAC-HYDRIN) 12 % lotion Apply 1 application  topically to the appropriate area as directed As Needed for Irritation. 12/18/23   Danish Brown MD   ciclopirox (PENLAC) 8 % solution APPLY TO THE AFFECTED AREA(S) TOPICALLY ONCE DAILY PREFERABLY AT BEDTIME OR 8 HOURS BEFORE WASHING 3/11/24   Danish Brown MD   fluticasone (FLONASE) 50 MCG/ACT nasal spray 2 sprays into the nostril(s) as directed by provider Daily. 1/11/24   Reina Putnam APRN   loratadine (Claritin) 10 MG tablet Take 1 tablet by mouth Daily. 1/11/24   Reina Putnam APRN   losartan-hydrochlorothiazide (Hyzaar) 50-12.5 MG per tablet Take 1 tablet by mouth Daily. 1/11/24   Reina Putnam APRN   methocarbamol (ROBAXIN) 500 MG tablet Take 1 tablet by mouth 3 (Three) Times a Day for 10 days. 5/9/24 5/19/24  Waldemar Solorzano PA   methylPREDNISolone (MEDROL) 4 MG dose pack Use as directed by package instructions 5/9/24   Waldemar Solorzano PA   pregabalin (LYRICA) 100 MG capsule Take 1 capsule by mouth 3 times a day. 5/2/24   Danish Brown MD   valACYclovir (VALTREX) 500 MG tablet Take 1 tablet by mouth Daily. 1/11/24   Reina Putnam APRN  "  vilazodone (Viibryd) 40 MG tablet tablet Take 1 tablet by mouth Daily. 24   Reina PutnamTESSA        Social History:   Social History     Tobacco Use    Smoking status: Every Day     Current packs/day: 0.00     Average packs/day: 0.3 packs/day for 30.0 years (7.5 ttl pk-yrs)     Types: Cigarettes     Start date: 1989     Last attempt to quit: 2000     Years since quittin.0    Smokeless tobacco: Never   Vaping Use    Vaping status: Never Used   Substance Use Topics    Alcohol use: Yes     Alcohol/week: 10.0 standard drinks of alcohol     Types: 4 Glasses of wine, 4 Shots of liquor, 2 Drinks containing 0.5 oz of alcohol per week     Comment: glass of wine every other day    Drug use: Never         Review of Systems:  Review of Systems   Constitutional:  Negative for chills and fever.   Gastrointestinal:  Negative for abdominal pain, nausea and vomiting.        Negative for bowel incontinence   Genitourinary:  Negative for dysuria, flank pain and hematuria.        Negative for bladder incontinence   Musculoskeletal:  Positive for back pain.   Neurological:  Negative for weakness and numbness.        Negative for saddle anesthesia   All other systems reviewed and are negative.       Physical Exam:  /83 (BP Location: Right arm, Patient Position: Lying)   Pulse 61   Temp 98.2 °F (36.8 °C) (Oral)   Resp 20   Ht 160 cm (63\")   Wt 95 kg (209 lb 7 oz)   LMP  (LMP Unknown)   SpO2 98%   BMI 37.10 kg/m²     Physical Exam  Vitals and nursing note reviewed.   Constitutional:       Appearance: Normal appearance. She is not ill-appearing or toxic-appearing.   HENT:      Head: Normocephalic.      Nose: Nose normal.   Eyes:      Extraocular Movements: Extraocular movements intact.      Conjunctiva/sclera: Conjunctivae normal.      Pupils: Pupils are equal, round, and reactive to light.   Cardiovascular:      Rate and Rhythm: Normal rate.      Pulses: Normal pulses.   Pulmonary:      Effort: " Pulmonary effort is normal.   Abdominal:      General: Abdomen is flat. There is no distension.      Palpations: Abdomen is soft.   Musculoskeletal:      Cervical back: Normal range of motion and neck supple.      Lumbar back: Tenderness present. No swelling or deformity.      Right hip: Tenderness present. No deformity.      Left hip: Tenderness present. No deformity.      Comments: Patient is very sensitive to palpation, expresses pain with minimal light touching and was unable to tolerate deeper palpation.   Skin:     General: Skin is warm and dry.      Capillary Refill: Capillary refill takes less than 2 seconds.   Neurological:      General: No focal deficit present.      Mental Status: She is alert and oriented to person, place, and time.   Psychiatric:         Mood and Affect: Mood normal.                Procedures:  Procedures      Medical Decision Making:      Comorbidities that affect care:    Hypertension    External Notes reviewed:    Previous Clinic Note: Reviewed physical therapy note from earlier today, patient had increased pain this morning and had to stop the physical therapy session due to increased pain.  Also reviewed orthopedics office visit from 5/9/2024 where patient was seen for pain of the right hip and low back, order was placed for MRI of the left hip without contrast, which has not been completed yet      The following orders were placed and all results were independently analyzed by me:  No orders of the defined types were placed in this encounter.      Medications Given in the Emergency Department:  Medications   ketorolac (TORADOL) injection 30 mg (30 mg Intramuscular Given 5/17/24 3384)        ED Course:         Labs:    Lab Results (last 24 hours)       ** No results found for the last 24 hours. **             Imaging:    No Radiology Exams Resulted Within Past 24 Hours      Differential Diagnosis and Discussion:    Back Pain: The patient presents with back pain. My differential  diagnosis includes but is not limited to acute spinal epidural abscess, acute spinal epidural bleed, cauda equina syndrome, abdominal aortic aneurysm, aortic dissection, kidney stone, pyelonephritis, musculoskeletal back pain, spinal fracture, and osteoarthritis.         MDM       The patient´s symptoms are consistent with musculoskeletal back pain. The patient is now resting comfortably, feels better, is alert, talkative, interactive and in no distress. The repeat examination is unremarkable and benign. The patient is neurologically intact and is ambulatory in the ED. The patient has no fever, no bowel or bladder incontinence, no saddle anesthesia, and is otherwise alert and well appearing. The history, physical exam, and diagnostics (if any) do not suggest the presence of acute spinal epidural abscess, acute spinal epidural bleed, cauda equina syndrome, abdominal aortic aneurysm, aortic dissection or other process requiring further testing, treatment or consultation in the emergency department. The vital signs have been stable. The patient's condition is stable and appropriate for discharge. The patient will pursue further outpatient evaluation with the primary care physician or other designated for consulting position as indicated in the discharge instructions.          Patient Care Considerations:    MRI: I considered ordering an MRI however no saddle anesthesia, no neurological deficits.  Patient already has an outpatient MRI scheduled for her left hip in June.  Patient is appropriate for follow-up as an outpatient.      Consultants/Shared Management Plan:    None    Social Determinants of Health:    Patient is independent, reliable, and has access to care.       Disposition and Care Coordination:    Discharged: The patient is suitable and stable for discharge with no need for consideration of admission.    I have explained the patient´s condition, diagnoses and treatment plan based on the information available  to me at this time. I have answered questions and addressed any concerns. The patient has a good  understanding of the patient´s diagnosis, condition, and treatment plan as can be expected at this point. The vital signs have been stable. The patient´s condition is stable and appropriate for discharge from the emergency department.      The patient will pursue further outpatient evaluation with the primary care physician or other designated or consulting physician as outlined in the discharge instructions. They are agreeable to this plan of care and follow-up instructions have been explained in detail. The patient has received these instructions in written format and has expressed an understanding of the discharge instructions. The patient is aware that any significant change in condition or worsening of symptoms should prompt an immediate return to this or the closest emergency department or call to 911.  I have explained discharge medications and the need for follow up with the patient/caretakers. This was also printed in the discharge instructions. Patient was discharged with the following medications and follow up:      Medication List        New Prescriptions      predniSONE 20 MG tablet  Commonly known as: DELTASONE  Take 3 tablets by mouth Daily for 5 days.            Stop      methylPREDNISolone 4 MG dose pack  Commonly known as: MEDROL               Where to Get Your Medications        These medications were sent to [facility]/pharmacy #[id number] - [place], [place] - [address] - [phone]  - [phone] FX  [address] [place] [address]      Hours: 24-hours Phone: [phone]   predniSONE 20 MG tablet      Reina Putnam APRN  [address] [place] [address]  [phone]    Call in 2 days         Final diagnoses:   Chronic bilateral low back pain with left-sided sciatica        ED Disposition       ED Disposition   Discharge    Condition   Stable    Comment   --                This medical record created using voice recognition software.             Radha Pagan, APRN  05/17/24 150

## 2024-05-17 NOTE — DISCHARGE INSTRUCTIONS
Please make sure you follow-up with your orthopedic specialist.  Your outpatient MRI of the left hip (ordered by orthopedics) is scheduled for June 12 at 4:30 PM here at the hospital.  Continue taking your medications as prescribed.  You should also consider applying ice packs to your sore areas to help with pain and inflammation.  Avoid any strenuous activity or heavy lifting, pushing, or pulling.

## 2024-05-20 DIAGNOSIS — B00.9 HERPES: ICD-10-CM

## 2024-05-20 RX ORDER — VALACYCLOVIR HYDROCHLORIDE 500 MG/1
500 TABLET, FILM COATED ORAL DAILY
Qty: 30 TABLET | Refills: 1 | Status: SHIPPED | OUTPATIENT
Start: 2024-05-20

## 2024-05-31 ENCOUNTER — OFFICE VISIT (OUTPATIENT)
Dept: FAMILY MEDICINE CLINIC | Facility: CLINIC | Age: 54
End: 2024-05-31
Payer: COMMERCIAL

## 2024-05-31 ENCOUNTER — LAB (OUTPATIENT)
Dept: LAB | Facility: HOSPITAL | Age: 54
End: 2024-05-31
Payer: COMMERCIAL

## 2024-05-31 VITALS
OXYGEN SATURATION: 98 % | TEMPERATURE: 97.6 F | BODY MASS INDEX: 37.99 KG/M2 | DIASTOLIC BLOOD PRESSURE: 83 MMHG | WEIGHT: 214.4 LBS | SYSTOLIC BLOOD PRESSURE: 128 MMHG | HEIGHT: 63 IN | HEART RATE: 68 BPM

## 2024-05-31 DIAGNOSIS — J30.1 SEASONAL ALLERGIC RHINITIS DUE TO POLLEN: ICD-10-CM

## 2024-05-31 DIAGNOSIS — Z87.891 PERSONAL HISTORY OF NICOTINE DEPENDENCE: ICD-10-CM

## 2024-05-31 DIAGNOSIS — I10 PRIMARY HYPERTENSION: ICD-10-CM

## 2024-05-31 DIAGNOSIS — E83.52 HYPERCALCEMIA: ICD-10-CM

## 2024-05-31 DIAGNOSIS — F41.9 ANXIETY AND DEPRESSION: ICD-10-CM

## 2024-05-31 DIAGNOSIS — F32.A ANXIETY AND DEPRESSION: ICD-10-CM

## 2024-05-31 DIAGNOSIS — Z23 NEED FOR PNEUMOCOCCAL 20-VALENT CONJUGATE VACCINATION: Primary | ICD-10-CM

## 2024-05-31 LAB
25(OH)D3 SERPL-MCNC: 18.3 NG/ML (ref 30–100)
ALBUMIN SERPL-MCNC: 4.2 G/DL (ref 3.5–5.2)
ALBUMIN/GLOB SERPL: 1.6 G/DL
ALP SERPL-CCNC: 68 U/L (ref 39–117)
ALT SERPL W P-5'-P-CCNC: 17 U/L (ref 1–33)
ANION GAP SERPL CALCULATED.3IONS-SCNC: 11 MMOL/L (ref 5–15)
AST SERPL-CCNC: 12 U/L (ref 1–32)
BASOPHILS # BLD AUTO: 0.06 10*3/MM3 (ref 0–0.2)
BASOPHILS NFR BLD AUTO: 0.6 % (ref 0–1.5)
BILIRUB SERPL-MCNC: 0.2 MG/DL (ref 0–1.2)
BUN SERPL-MCNC: 16 MG/DL (ref 6–20)
BUN/CREAT SERPL: 16.7 (ref 7–25)
CALCIUM SPEC-SCNC: 9.5 MG/DL (ref 8.6–10.5)
CHLORIDE SERPL-SCNC: 101 MMOL/L (ref 98–107)
CO2 SERPL-SCNC: 31 MMOL/L (ref 22–29)
CREAT SERPL-MCNC: 0.96 MG/DL (ref 0.57–1)
DEPRECATED RDW RBC AUTO: 44.2 FL (ref 37–54)
EGFRCR SERPLBLD CKD-EPI 2021: 70.9 ML/MIN/1.73
EOSINOPHIL # BLD AUTO: 0.2 10*3/MM3 (ref 0–0.4)
EOSINOPHIL NFR BLD AUTO: 2 % (ref 0.3–6.2)
ERYTHROCYTE [DISTWIDTH] IN BLOOD BY AUTOMATED COUNT: 13.8 % (ref 12.3–15.4)
GLOBULIN UR ELPH-MCNC: 2.6 GM/DL
GLUCOSE SERPL-MCNC: 137 MG/DL (ref 65–99)
HCT VFR BLD AUTO: 39.3 % (ref 34–46.6)
HGB BLD-MCNC: 12.6 G/DL (ref 12–15.9)
IMM GRANULOCYTES # BLD AUTO: 0.03 10*3/MM3 (ref 0–0.05)
IMM GRANULOCYTES NFR BLD AUTO: 0.3 % (ref 0–0.5)
LYMPHOCYTES # BLD AUTO: 4.09 10*3/MM3 (ref 0.7–3.1)
LYMPHOCYTES NFR BLD AUTO: 40.5 % (ref 19.6–45.3)
MCH RBC QN AUTO: 28.4 PG (ref 26.6–33)
MCHC RBC AUTO-ENTMCNC: 32.1 G/DL (ref 31.5–35.7)
MCV RBC AUTO: 88.5 FL (ref 79–97)
MONOCYTES # BLD AUTO: 0.68 10*3/MM3 (ref 0.1–0.9)
MONOCYTES NFR BLD AUTO: 6.7 % (ref 5–12)
NEUTROPHILS NFR BLD AUTO: 49.9 % (ref 42.7–76)
NEUTROPHILS NFR BLD AUTO: 5.05 10*3/MM3 (ref 1.7–7)
NRBC BLD AUTO-RTO: 0 /100 WBC (ref 0–0.2)
PLATELET # BLD AUTO: 455 10*3/MM3 (ref 140–450)
PMV BLD AUTO: 9.4 FL (ref 6–12)
POTASSIUM SERPL-SCNC: 3.3 MMOL/L (ref 3.5–5.2)
PROT SERPL-MCNC: 6.8 G/DL (ref 6–8.5)
PTH-INTACT SERPL-MCNC: 45.2 PG/ML (ref 15–65)
RBC # BLD AUTO: 4.44 10*6/MM3 (ref 3.77–5.28)
SODIUM SERPL-SCNC: 143 MMOL/L (ref 136–145)
WBC NRBC COR # BLD AUTO: 10.11 10*3/MM3 (ref 3.4–10.8)

## 2024-05-31 PROCEDURE — 3079F DIAST BP 80-89 MM HG: CPT | Performed by: NURSE PRACTITIONER

## 2024-05-31 PROCEDURE — 82306 VITAMIN D 25 HYDROXY: CPT

## 2024-05-31 PROCEDURE — 1160F RVW MEDS BY RX/DR IN RCRD: CPT | Performed by: NURSE PRACTITIONER

## 2024-05-31 PROCEDURE — 3074F SYST BP LT 130 MM HG: CPT | Performed by: NURSE PRACTITIONER

## 2024-05-31 PROCEDURE — 90471 IMMUNIZATION ADMIN: CPT | Performed by: NURSE PRACTITIONER

## 2024-05-31 PROCEDURE — 83970 ASSAY OF PARATHORMONE: CPT

## 2024-05-31 PROCEDURE — 85025 COMPLETE CBC W/AUTO DIFF WBC: CPT

## 2024-05-31 PROCEDURE — 36415 COLL VENOUS BLD VENIPUNCTURE: CPT

## 2024-05-31 PROCEDURE — 1125F AMNT PAIN NOTED PAIN PRSNT: CPT | Performed by: NURSE PRACTITIONER

## 2024-05-31 PROCEDURE — 1159F MED LIST DOCD IN RCRD: CPT | Performed by: NURSE PRACTITIONER

## 2024-05-31 PROCEDURE — 99214 OFFICE O/P EST MOD 30 MIN: CPT | Performed by: NURSE PRACTITIONER

## 2024-05-31 PROCEDURE — 80053 COMPREHEN METABOLIC PANEL: CPT

## 2024-05-31 PROCEDURE — 90677 PCV20 VACCINE IM: CPT | Performed by: NURSE PRACTITIONER

## 2024-05-31 RX ORDER — VARENICLINE TARTRATE 0.5 (11)-1
KIT ORAL
Qty: 1 EACH | Refills: 0 | Status: SHIPPED | OUTPATIENT
Start: 2024-05-31 | End: 2024-06-28

## 2024-05-31 RX ORDER — LAMOTRIGINE 25 MG/1
TABLET ORAL
Qty: 60 TABLET | Refills: 1 | Status: SHIPPED | OUTPATIENT
Start: 2024-05-31

## 2024-05-31 RX ORDER — VARENICLINE TARTRATE 1 MG/1
1 TABLET, FILM COATED ORAL 2 TIMES DAILY
Qty: 56 TABLET | Refills: 1 | Status: SHIPPED | OUTPATIENT
Start: 2024-06-28 | End: 2024-08-23

## 2024-05-31 RX ORDER — LORATADINE 10 MG/1
10 TABLET ORAL DAILY
Qty: 90 TABLET | Refills: 3 | Status: SHIPPED | OUTPATIENT
Start: 2024-05-31

## 2024-05-31 RX ORDER — VILAZODONE HYDROCHLORIDE 40 MG/1
40 TABLET ORAL DAILY
Qty: 90 TABLET | Refills: 1 | Status: SHIPPED | OUTPATIENT
Start: 2024-05-31

## 2024-05-31 RX ORDER — LOSARTAN POTASSIUM AND HYDROCHLOROTHIAZIDE 12.5; 5 MG/1; MG/1
1 TABLET ORAL DAILY
Qty: 90 TABLET | Refills: 1 | Status: SHIPPED | OUTPATIENT
Start: 2024-05-31

## 2024-06-05 DIAGNOSIS — R79.89 ELEVATED PLATELET COUNT: Primary | ICD-10-CM

## 2024-06-05 RX ORDER — POTASSIUM CHLORIDE 750 MG/1
10 TABLET, FILM COATED, EXTENDED RELEASE ORAL DAILY
Qty: 90 TABLET | Refills: 1 | Status: SHIPPED | OUTPATIENT
Start: 2024-06-05

## 2024-06-05 RX ORDER — ERGOCALCIFEROL 1.25 MG/1
50000 CAPSULE ORAL WEEKLY
Qty: 13 CAPSULE | Refills: 0 | Status: SHIPPED | OUTPATIENT
Start: 2024-06-05

## 2024-06-12 ENCOUNTER — HOSPITAL ENCOUNTER (OUTPATIENT)
Dept: MRI IMAGING | Facility: HOSPITAL | Age: 54
Discharge: HOME | End: 2024-06-12
Admitting: PHYSICIAN ASSISTANT
Payer: COMMERCIAL

## 2024-06-12 DIAGNOSIS — M16.0 OSTEOARTHRITIS OF BOTH HIPS, UNSPECIFIED OSTEOARTHRITIS TYPE: ICD-10-CM

## 2024-06-12 DIAGNOSIS — M70.62 TROCHANTERIC BURSITIS OF LEFT HIP: ICD-10-CM

## 2024-06-12 DIAGNOSIS — M25.552 LEFT HIP PAIN: ICD-10-CM

## 2024-06-12 PROCEDURE — 73721 MRI JNT OF LWR EXTRE W/O DYE: CPT

## 2024-06-17 ENCOUNTER — PROCEDURE VISIT (OUTPATIENT)
Dept: NEUROLOGY | Facility: CLINIC | Age: 54
End: 2024-06-17
Payer: COMMERCIAL

## 2024-06-17 VITALS
BODY MASS INDEX: 38.98 KG/M2 | SYSTOLIC BLOOD PRESSURE: 116 MMHG | DIASTOLIC BLOOD PRESSURE: 83 MMHG | HEART RATE: 90 BPM | WEIGHT: 220 LBS | HEIGHT: 63 IN

## 2024-06-17 DIAGNOSIS — M51.16 LUMBAR DISC DISEASE WITH RADICULOPATHY: Primary | ICD-10-CM

## 2024-06-17 PROCEDURE — 95886 MUSC TEST DONE W/N TEST COMP: CPT | Performed by: PSYCHIATRY & NEUROLOGY

## 2024-06-17 PROCEDURE — 95910 NRV CNDJ TEST 7-8 STUDIES: CPT | Performed by: PSYCHIATRY & NEUROLOGY

## 2024-06-17 PROCEDURE — 99203 OFFICE O/P NEW LOW 30 MIN: CPT | Performed by: PSYCHIATRY & NEUROLOGY

## 2024-06-17 NOTE — ASSESSMENT & PLAN NOTE
Nerve conduction studies abnormal shows electrophysiologic evidence for severe left L5 radiculopathy.  There is evidence of denervation in the muscles tested.  The patient is to follow-up with neurosurgery for possible compression of the lumbar spine at L4-L5.  And for let me participate in her care.

## 2024-06-17 NOTE — PROGRESS NOTES
"Chief Complaint  Nerve Study (BLE L>R) and Numbness    Subjective          Bianca Lara is a 53 y.o. female who presents to [facility]  History of Present Illness  53-year-old woman evaluated for left leg pain.  She has radicular symptoms going to her foot.  This started November of last year.  MRI of the lumbar spine shows severe left foraminal narrowing between L4 and L5.  Objective   Vital Signs:   /83 (BP Location: Left arm, Patient Position: Sitting, Cuff Size: Adult)   Pulse 90   Ht 160 cm (62.99\")   Wt 99.8 kg (220 lb)   BMI 38.98 kg/m²     Physical Exam   There is 4/5 strength on left foot dorsiflexion and eversion and 2/5 on inversion.  Reflexes are absent.  There is no weakness on hip flexion.        Assessment and Plan  Diagnoses and all orders for this visit:    1. Lumbar disc disease with radiculopathy (Primary)  Assessment & Plan:  Nerve conduction studies abnormal shows electrophysiologic evidence for severe left L5 radiculopathy.  There is evidence of denervation in the muscles tested.  The patient is to follow-up with neurosurgery for possible compression of the lumbar spine at L4-L5.  And for let me participate in her care.           Nerve Conduction Study:  7 nerves     EMG:  Complete two lower extremity    Total time spent with the patient and coordinating patient care was 30 minutes.    Follow Up  No follow-ups on file.  Patient was given instructions and counseling regarding her condition or for health maintenance advice. Please see specific information pulled into the AVS if appropriate.       "

## 2024-06-20 ENCOUNTER — OFFICE VISIT (OUTPATIENT)
Dept: FAMILY MEDICINE CLINIC | Facility: CLINIC | Age: 54
End: 2024-06-20
Payer: COMMERCIAL

## 2024-06-20 VITALS
DIASTOLIC BLOOD PRESSURE: 85 MMHG | SYSTOLIC BLOOD PRESSURE: 128 MMHG | TEMPERATURE: 97.6 F | BODY MASS INDEX: 39.02 KG/M2 | OXYGEN SATURATION: 97 % | WEIGHT: 220.2 LBS | HEIGHT: 63 IN | HEART RATE: 78 BPM

## 2024-06-20 DIAGNOSIS — R93.7 ABNORMAL MRI, LUMBAR SPINE: Primary | ICD-10-CM

## 2024-06-20 DIAGNOSIS — F41.9 ANXIETY AND DEPRESSION: ICD-10-CM

## 2024-06-20 DIAGNOSIS — M48.061 FORAMINAL STENOSIS OF LUMBAR REGION: ICD-10-CM

## 2024-06-20 DIAGNOSIS — F32.A ANXIETY AND DEPRESSION: ICD-10-CM

## 2024-06-20 DIAGNOSIS — Z87.891 PERSONAL HISTORY OF NICOTINE DEPENDENCE: ICD-10-CM

## 2024-06-20 PROCEDURE — 1159F MED LIST DOCD IN RCRD: CPT | Performed by: NURSE PRACTITIONER

## 2024-06-20 PROCEDURE — 3074F SYST BP LT 130 MM HG: CPT | Performed by: NURSE PRACTITIONER

## 2024-06-20 PROCEDURE — 1160F RVW MEDS BY RX/DR IN RCRD: CPT | Performed by: NURSE PRACTITIONER

## 2024-06-20 PROCEDURE — 3079F DIAST BP 80-89 MM HG: CPT | Performed by: NURSE PRACTITIONER

## 2024-06-20 PROCEDURE — 99213 OFFICE O/P EST LOW 20 MIN: CPT | Performed by: NURSE PRACTITIONER

## 2024-06-20 PROCEDURE — 1125F AMNT PAIN NOTED PAIN PRSNT: CPT | Performed by: NURSE PRACTITIONER

## 2024-06-20 RX ORDER — LAMOTRIGINE 25 MG/1
TABLET ORAL
Qty: 120 TABLET | Refills: 1 | Status: SHIPPED | OUTPATIENT
Start: 2024-06-20

## 2024-06-20 RX ORDER — VARENICLINE TARTRATE 0.5 (11)-1
KIT ORAL
Qty: 53 EACH | OUTPATIENT
Start: 2024-06-20

## 2024-06-20 NOTE — PROGRESS NOTES
Chief Complaint  Anxiety (Follow up)    Subjective          Bianca Lara is a 53 y.o. female who presents to [facility]    History of Present Illness  History of Present Illness  The patient presents for follow-up of anxiety.    The patient has been adhering to her prescribed regimen of Lamictal, administered at a dosage of 25 mg twice daily, during her last visit. She reports a stable mood and expresses a desire for additional assistance with her current dosage. She acknowledges that her current situation is overwhelming, a situation she has never encountered before, and she is currently in the process of filing for disability benefits. She expresses interest in resuming therapy. Her current medication regimen includes daily Viibryd and Lyrica, administered thrice daily. Pt reports daily feelings of sadness.     Supplemental Information  She went to a nerve damage test and was told that her leg is messed up real bad. It is on the L5 and it looks like a cyst because it is swollen up that bad. She was told that she needs surgery. An MRI done by another doctor showed a tear.      PHQ-2 Total Score: 20   PHQ-9 Total Score: 20        Review of Systems   Constitutional:  Negative for chills, fatigue and fever.   Respiratory:  Negative for cough and shortness of breath.    Cardiovascular:  Negative for chest pain and palpitations.   Gastrointestinal:  Negative for constipation, diarrhea, nausea and vomiting.   Musculoskeletal:  Positive for back pain. Negative for neck pain.   Skin:  Negative for rash.   Neurological:  Positive for numbness. Negative for dizziness and headaches.   Psychiatric/Behavioral:  Positive for dysphoric mood. Negative for self-injury, sleep disturbance and suicidal ideas. The patient is nervous/anxious.           Medical History: has a past medical history of Allergic, Allergic rhinitis, Anxiety (09/11/2018), Arthritis, Blood in stool, Condition not  found, CTS (carpal tunnel syndrome) (2015), Depression (09/11/2018), Difficulty walking (2023), Dysmenorrhea, Gross hematuria, Headache, Headache, tension-type (2022), Helicobacter positive gastritis (05/26/2016), Hyperlipidemia (2024), Hypertension, Irregular menses, Low back pain (07/09/2014), Microscopic hematuria (06/10/2014), Night sweats, Onychomycosis of toenail (07/09/2014), Pap smear for cervical cancer screening (05/16/2016), Right sided abdominal pain (06/12/2014), Screening mammogram, encounter for (11/09/2018), Sleep apnea (2023), STD exposure (1993), Tobacco abuse, and Vitamin D deficiency.     Surgical History: has a past surgical history that includes Breast biopsy (Left, 01/09/2012); Cholecystectomy; Colonoscopy (08/15/2016); Cyst Removal; and Cystoscopy (06/27/2014).     Family History: family history includes Alzheimer's disease in her mother; Cancer in her maternal grandmother; Dementia in her mother; Diabetes in her mother and another family member; Heart disease in her brother, sister, and another family member; Hyperlipidemia in her sister; Kidney disease in her sister; Leukemia in her father and paternal grandfather; Stomach cancer in her mother; Stroke in an other family member.     Social History: reports that she quit smoking about 3 weeks ago. Her smoking use included cigarettes. She started smoking about 34 years ago. She has a 13.5 pack-year smoking history. She has been exposed to tobacco smoke. She has never used smokeless tobacco. She reports current alcohol use of about 10.0 standard drinks of alcohol per week. She reports that she does not use drugs.    Allergies: Azithromycin, Duloxetine hcl, Lisinopril, and Penicillins      Health Maintenance Due   Topic Date Due    TDAP/TD VACCINES (1 - Tdap) Never done    ZOSTER VACCINE (1 of 2) Never done    COVID-19 Vaccine (5 - 2023-24 season) 09/01/2023            Current Outpatient Medications:     ammonium lactate (LAC-HYDRIN) 12 %  lotion, Apply 1 application  topically to the appropriate area as directed As Needed for Irritation., Disp: , Rfl:     ciclopirox (PENLAC) 8 % solution, APPLY TO THE AFFECTED AREA(S) TOPICALLY ONCE DAILY PREFERABLY AT BEDTIME OR 8 HOURS BEFORE WASHING, Disp: , Rfl:     fluticasone (FLONASE) 50 MCG/ACT nasal spray, 2 sprays into the nostril(s) as directed by provider Daily., Disp: 16 g, Rfl: 5    lamoTRIgine (LaMICtal) 25 MG tablet, Take 2 tabs po bid., Disp: 120 tablet, Rfl: 1    loratadine (Claritin) 10 MG tablet, Take 1 tablet by mouth Daily., Disp: 90 tablet, Rfl: 3    losartan-hydrochlorothiazide (Hyzaar) 50-12.5 MG per tablet, Take 1 tablet by mouth Daily., Disp: 90 tablet, Rfl: 1    potassium chloride 10 MEQ CR tablet, Take 1 tablet by mouth Daily., Disp: 90 tablet, Rfl: 1    pregabalin (LYRICA) 100 MG capsule, Take 1 capsule by mouth 3 times a day., Disp: , Rfl:     valACYclovir (VALTREX) 500 MG tablet, TAKE 1 TABLET BY MOUTH EVERY DAY, Disp: 30 tablet, Rfl: 1    Varenicline Tartrate, Starter, 0.5 MG X 11 & 1 MG X 42 tablet therapy pack, Take 0.5 mg by mouth Daily for 3 days, THEN 0.5 mg 2 (Two) Times a Day for 4 days, THEN 1 mg 2 (Two) Times a Day for 21 days. Take 0.5 mg po daily x 3 days, then 0.5 mg po bid x 4 days, then 1 mg po bid, Disp: 1 each, Rfl: 0    vilazodone (Viibryd) 40 MG tablet tablet, Take 1 tablet by mouth Daily., Disp: 90 tablet, Rfl: 1    vitamin D (ERGOCALCIFEROL) 1.25 MG (37575 UT) capsule capsule, Take 1 capsule by mouth 1 (One) Time Per Week., Disp: 13 capsule, Rfl: 0    [START ON 6/28/2024] varenicline (Chantix Continuing Month Ryan) 1 MG tablet, Take 1 tablet by mouth 2 (Two) Times a Day for 56 days. (Patient not taking: Reported on 6/20/2024), Disp: 56 tablet, Rfl: 1      Immunization History   Administered Date(s) Administered    COVID-19 (MODERNA) 1st,2nd,3rd Dose Monovalent 01/07/2021, 02/04/2021, 10/28/2021    COVID-19 (MODERNA) BIVALENT 12+YRS 10/17/2022    Fluzone (or Fluarix  "& Flulaval for VFC) >6mos 10/17/2022, 11/09/2023    Fluzone Quad >6mos (Multi-dose) 10/29/2020    Influenza, Unspecified 10/17/2022    Pneumococcal Conjugate 20-Valent (PCV20) 05/31/2024         Objective       Vitals:    06/20/24 1458   BP: 128/85   Pulse: 78   Temp: 97.6 °F (36.4 °C)   TempSrc: Temporal   SpO2: 97%   Weight: 99.9 kg (220 lb 3.2 oz)   Height: 160 cm (62.99\")      Body mass index is 39.02 kg/m².   Wt Readings from Last 3 Encounters:   06/20/24 99.9 kg (220 lb 3.2 oz)   06/17/24 99.8 kg (220 lb)   05/31/24 97.3 kg (214 lb 6.4 oz)      BP Readings from Last 3 Encounters:   06/20/24 128/85   06/17/24 116/83   05/31/24 128/83             Physical Exam  Vitals reviewed.   Constitutional:       Appearance: Normal appearance. She is well-developed.   HENT:      Head: Normocephalic and atraumatic.   Eyes:      Conjunctiva/sclera: Conjunctivae normal.      Pupils: Pupils are equal, round, and reactive to light.   Cardiovascular:      Rate and Rhythm: Normal rate and regular rhythm.      Heart sounds: Normal heart sounds. No murmur heard.  Pulmonary:      Effort: Pulmonary effort is normal.      Breath sounds: Normal breath sounds. No wheezing or rhonchi.   Abdominal:      General: Bowel sounds are normal. There is no distension.      Palpations: Abdomen is soft.      Tenderness: There is no abdominal tenderness.   Skin:     General: Skin is warm and dry.   Neurological:      Mental Status: She is alert and oriented to person, place, and time.      Gait: Gait abnormal.      Comments: Cane assisted   Psychiatric:         Mood and Affect: Mood and affect normal.         Behavior: Behavior normal.         Thought Content: Thought content normal.         Judgment: Judgment normal.         Physical Exam        Result Review :   Results  Pt reports nerve damage test showed issues in the leg with a cyst on L5. MRI revealed a tear.       Common labs          3/20/2024    15:22 5/31/2024    14:09   Common Labs "   Glucose 109  137    BUN 24  16    Creatinine 0.91  0.96    Sodium 140  143    Potassium 3.9  3.3    Chloride 99  101    Calcium 10.6  9.5    Albumin 4.3  4.2    Total Bilirubin 0.3  0.2    Alkaline Phosphatase 87  68    AST (SGOT) 23  12    ALT (SGPT) 19  17    WBC 14.94  10.11    Hemoglobin 13.2  12.6    Hematocrit 41.0  39.3    Platelets 583  455    Total Cholesterol 200     Triglycerides 60     HDL Cholesterol 60     LDL Cholesterol  129     Hemoglobin A1C 6.40                      Assessment and Plan        Diagnoses and all orders for this visit:    1. Anxiety and depression  -     lamoTRIgine (LaMICtal) 25 MG tablet; Take 2 tabs po bid.  Dispense: 120 tablet; Refill: 1        Assessment & Plan  1. Anxiety and depression.  The patient's anxiety appears to be situational in nature. The dosage of Lamictal will be escalated to 50 mg, to be taken twice daily, to aid in managing her mood and depression. She has been advised to seek immediate medical attention at the Emergency Room if she experiences any suicidal ideation. Additionally, she has been advised to schedule an appointment with a different therapist.    Follow-up  The patient is scheduled for a follow-up visit in 4 weeks.    Follow Up     Return in about 4 weeks (around 7/18/2024) for Next scheduled follow up.    Patient was given instructions and counseling regarding her condition or for health maintenance advice. Please see specific information pulled into the AVS if appropriate.     TESSA Cerrato    Patient or patient representative verbalized consent for the use of Ambient Listening during the visit with  TESSA Cerrato for chart documentation. 6/21/2024  15:21 EDT

## 2024-06-24 DIAGNOSIS — F32.A ANXIETY AND DEPRESSION: ICD-10-CM

## 2024-06-24 DIAGNOSIS — F41.9 ANXIETY AND DEPRESSION: ICD-10-CM

## 2024-06-25 RX ORDER — LAMOTRIGINE 25 MG/1
TABLET ORAL
Qty: 120 TABLET | Refills: 1 | OUTPATIENT
Start: 2024-06-25

## 2024-06-27 ENCOUNTER — OFFICE VISIT (OUTPATIENT)
Dept: ORTHOPEDIC SURGERY | Facility: CLINIC | Age: 54
End: 2024-06-27
Payer: COMMERCIAL

## 2024-06-27 VITALS
DIASTOLIC BLOOD PRESSURE: 66 MMHG | HEIGHT: 63 IN | HEART RATE: 94 BPM | OXYGEN SATURATION: 97 % | WEIGHT: 224.6 LBS | BODY MASS INDEX: 39.8 KG/M2 | SYSTOLIC BLOOD PRESSURE: 127 MMHG

## 2024-06-27 DIAGNOSIS — F41.9 ANXIETY AND DEPRESSION: ICD-10-CM

## 2024-06-27 DIAGNOSIS — M25.552 LEFT HIP PAIN: Primary | ICD-10-CM

## 2024-06-27 DIAGNOSIS — B00.9 HERPES: ICD-10-CM

## 2024-06-27 DIAGNOSIS — M16.0 OSTEOARTHRITIS OF BOTH HIPS, UNSPECIFIED OSTEOARTHRITIS TYPE: ICD-10-CM

## 2024-06-27 DIAGNOSIS — F32.A ANXIETY AND DEPRESSION: ICD-10-CM

## 2024-06-27 DIAGNOSIS — M70.62 TROCHANTERIC BURSITIS OF LEFT HIP: ICD-10-CM

## 2024-06-27 DIAGNOSIS — M70.61 TROCHANTERIC BURSITIS OF RIGHT HIP: ICD-10-CM

## 2024-06-27 DIAGNOSIS — M51.16 LUMBAR DISC DISEASE WITH RADICULOPATHY: ICD-10-CM

## 2024-06-27 RX ORDER — TRIAMCINOLONE ACETONIDE 40 MG/ML
40 INJECTION, SUSPENSION INTRA-ARTICULAR; INTRAMUSCULAR
Status: COMPLETED | OUTPATIENT
Start: 2024-06-27 | End: 2024-06-27

## 2024-06-27 RX ORDER — LIDOCAINE HYDROCHLORIDE 10 MG/ML
5 INJECTION, SOLUTION INFILTRATION; PERINEURAL
Status: COMPLETED | OUTPATIENT
Start: 2024-06-27 | End: 2024-06-27

## 2024-06-27 RX ORDER — LAMOTRIGINE 25 MG/1
TABLET ORAL
Qty: 120 TABLET | Refills: 1 | OUTPATIENT
Start: 2024-06-27

## 2024-06-27 RX ORDER — VALACYCLOVIR HYDROCHLORIDE 500 MG/1
500 TABLET, FILM COATED ORAL DAILY
Qty: 30 TABLET | Refills: 1 | Status: SHIPPED | OUTPATIENT
Start: 2024-06-27

## 2024-06-27 RX ADMIN — LIDOCAINE HYDROCHLORIDE 5 ML: 10 INJECTION, SOLUTION INFILTRATION; PERINEURAL at 09:33

## 2024-06-27 RX ADMIN — LIDOCAINE HYDROCHLORIDE 5 ML: 10 INJECTION, SOLUTION INFILTRATION; PERINEURAL at 09:35

## 2024-06-27 RX ADMIN — TRIAMCINOLONE ACETONIDE 40 MG: 40 INJECTION, SUSPENSION INTRA-ARTICULAR; INTRAMUSCULAR at 09:35

## 2024-06-27 RX ADMIN — TRIAMCINOLONE ACETONIDE 40 MG: 40 INJECTION, SUSPENSION INTRA-ARTICULAR; INTRAMUSCULAR at 09:33

## 2024-06-27 NOTE — TELEPHONE ENCOUNTER
Caller: Bianca Lraan    Relationship: Self    Best call back number: [phone]    Requested Prescriptions:   Requested Prescriptions     Pending Prescriptions Disp Refills    lamoTRIgine (LaMICtal) 25 MG tablet 120 tablet 1     Sig: Take 2 tabs po bid.    valACYclovir (VALTREX) 500 MG tablet 30 tablet 1     Sig: Take 1 tablet by mouth Daily.        Pharmacy where request should be sent: [facility]/PHARMACY #[id number] - [place], [place] - [address] [facility] [phone] [facility] [phone] FX     Last office visit with prescribing clinician: 6/20/2024   Last telemedicine visit with prescribing clinician: Visit date not found   Next office visit with prescribing clinician: 7/24/2024     Does the patient have less than a 3 day supply:  [x] Yes  [] No    Would you like a call back once the refill request has been completed: [] Yes [] No    If the office needs to give you a call back, can they leave a voicemail: [] Yes [] No    Jonatan Chinchilla Rep   06/27/24 12:55 EDT

## 2024-06-27 NOTE — PROGRESS NOTES
Chief Complaint  Follow-up of the Left Hip and Follow-up of the Right Hip    Subjective          History of Present Illness      Bianca Lara is a 53 y.o. female  presents to [facility] for     Presents for follow-up evaluation of bilateral hip pain and bilateral hip bursitis she had MRI of her left hip she is here to review this.  She states that she is seeing Dr. Ayala's office for her back and also sees pain management.  She points to the lateral hips as her areas of pain she states it is similar to past episodes she is requesting an injection today.  She states her back treatments are helping also    Allergies   Allergen Reactions    Azithromycin Nausea Only    Duloxetine Hcl Unknown - Low Severity    Lisinopril Cough    Penicillins Unknown - Low Severity        Social History     Socioeconomic History    Marital status:    Tobacco Use    Smoking status: Former     Current packs/day: 0.00     Average packs/day: 0.3 packs/day for 54.1 years (13.5 ttl pk-yrs)     Types: Cigarettes     Start date: 1989     Quit date: 2024     Years since quittin.0     Passive exposure: Past    Smokeless tobacco: Never   Vaping Use    Vaping status: Never Used   Substance and Sexual Activity    Alcohol use: Yes     Alcohol/week: 10.0 standard drinks of alcohol     Types: 4 Glasses of wine, 4 Shots of liquor, 2 Drinks containing 0.5 oz of alcohol per week     Comment: glass of wine every other day    Drug use: Never    Sexual activity: Yes     Partners: Male     Birth control/protection: Condom        REVIEW OF SYSTEMS    Constitutional: Awake alert and oriented x3, no acute distress, denies fevers, chills, weight loss  Respiratory: No respiratory distress  Vascular: Brisk cap refill, Intact distal pulses, No cyanosis, compartments soft with no signs or symptoms of compartment syndrome or DVT.   Cardiovascular: Denies chest pain, shortness of breath  Skin: Denies rashes,  "acute skin changes  Neurologic: Denies headache, loss of consciousness  MSK: Bilateral hip pain      Objective   Vital Signs:   /66   Pulse 94   Ht 160 cm (62.99\")   Wt 102 kg (224 lb 9.6 oz)   SpO2 97%   BMI 39.80 kg/m²     Body mass index is 39.8 kg/m².    Physical Exam       Right hip- Positive EHL, FHL, GS and TA. Sensation intact to all 5 nerves of the foot. Positive pulses. Flexion 90. External Rotation 35. Internal rotation 30. Negative straight leg raise.  Tender to the lateral hip over the greater trochanteric. Tender to the lower lumbar     Left hip-  Flexion 90, External Rotation 40, internal rotation 25. Negative straight leg raise. Tender to the lateral hip over the bursa. Tender to the lower lumbar, Positive EHL, FHL, GS and TA. Sensation intact to all 5 nerves of the foot. Positive pulses.       Large Joint: R hip joint  Date/Time: 6/27/2024 9:33 AM  Consent given by: patient  Site marked: site marked  Timeout: Immediately prior to procedure a time out was called to verify the correct patient, procedure, equipment, support staff and site/side marked as required   Supporting Documentation  Indications: pain   Procedure Details  Location: hip - R hip joint  Preparation: Patient was prepped and draped in the usual sterile fashion  Needle size: 23 G (Spinal)  Medications administered: 5 mL lidocaine 1 %; 40 mg triamcinolone acetonide 40 MG/ML  Patient tolerance: patient tolerated the procedure well with no immediate complications      Large Joint: L hip joint  Date/Time: 6/27/2024 9:35 AM  Consent given by: patient  Site marked: site marked  Timeout: Immediately prior to procedure a time out was called to verify the correct patient, procedure, equipment, support staff and site/side marked as required   Supporting Documentation  Indications: pain   Procedure Details  Location: hip - L hip joint  Preparation: Patient was prepped and draped in the usual sterile fashion  Needle size: 23 G " (Spinal)  Medications administered: 5 mL lidocaine 1 %; 40 mg triamcinolone acetonide 40 MG/ML  Patient tolerance: patient tolerated the procedure well with no immediate complications    This injection documentation was Scribed for LETY Maxwell by Courtney Lara MA.  06/27/24   09:35 EDT    Imaging Results (Most Recent)       None           MRI Hip Left Without Contrast    Result Date: 6/14/2024  Narrative: MRI HIP LEFT WO CONTRAST Date of Exam: 6/12/2024 5:58 PM EDT Indication: left hip pain, pain radiating to left lower extremity.  Comparison: Two-view bilateral hips dated 3/14/2024 Technique:  Routine multiplanar/multisequence images of the left hip were obtained without contrast administration.  Findings: No fracture or malalignment is identified. Marrow signal appears normal. Dedicated images of the left hip were obtained. No labral tear is identified. Mild osteoarthritic change with subchondral edema/cyst formation is noted. Cartilage in the joint is intact. No significant joint effusion or loose body is evident. Dedicated images of the right hip were not obtained. No significant joint effusion or loose body is seen. Mild osteoarthritic spurring is noted. Mild to moderate left trochanteric and gluteal bursitis is noted. Partial-thickness tears are noted involving the hamstring tendon origins bilaterally. Musculature and tendons around the pelvis otherwise appear unremarkable. Visualized pelvic viscera appear normal.     Impression: Impression: 1.Mild bilateral hip osteoarthritis. 2.Left trochanteric/gluteal bursitis. 3.Partial-thickness tears involving the bilateral hamstring tendon origins. Electronically Signed: Roger Aguayo MD  6/14/2024 12:59 PM EDT  Workstation ID: [id number]     Result Review :   The following data was reviewed by: LETY Maxwell on 06/27/2024:  Data reviewed : Radiologic studies by me with the patient              Assessment and Plan    Diagnoses and all  orders for this visit:    1. Left hip pain (Primary)    2. Trochanteric bursitis of left hip    3. Osteoarthritis of both hips, unspecified osteoarthritis type    4. Lumbar disc disease with radiculopathy    5. Trochanteric bursitis of right hip        Discussed diagnosis and treatment options with the patient she was advised to continue conservative treatment we discussed MRI findings and advised her that therapy and injections can help she states her back causes her pain with therapy so she is working on that first, she elected to have bilateral hip bursitis injections which she tolerated well follow-up as needed    Call or return if worsening symptoms.    Follow Up   Return if symptoms worsen or fail to improve.  Patient was given instructions and counseling regarding her condition or for health maintenance advice. Please see specific information pulled into the AVS if appropriate.       EMR Dragon/Transcription disclaimer:  Part of this note may be an electronic transcription/translation of spoken language to printed text using the Dragon Dictation System

## 2024-06-28 ENCOUNTER — TELEPHONE (OUTPATIENT)
Dept: FAMILY MEDICINE CLINIC | Facility: CLINIC | Age: 54
End: 2024-06-28
Payer: COMMERCIAL

## 2024-06-28 DIAGNOSIS — H91.90 DECREASED HEARING, UNSPECIFIED LATERALITY: Primary | ICD-10-CM

## 2024-06-28 NOTE — TELEPHONE ENCOUNTER
Caller: Bianca Lara    Relationship: Self    Best call back number: [phone]     What is the medical concern/diagnosis: DIFFICULTY HEARING    What specialty or service is being requested: REFERRAL TO AUDIOLOGY    What is the office location: [place]

## 2024-07-01 ENCOUNTER — HOSPITAL ENCOUNTER (OUTPATIENT)
Dept: MAMMOGRAPHY | Facility: HOSPITAL | Age: 54
Discharge: HOME | End: 2024-07-01
Admitting: NURSE PRACTITIONER
Payer: COMMERCIAL

## 2024-07-01 DIAGNOSIS — Z12.31 VISIT FOR SCREENING MAMMOGRAM: ICD-10-CM

## 2024-07-01 PROCEDURE — 77067 SCR MAMMO BI INCL CAD: CPT

## 2024-07-01 PROCEDURE — 77063 BREAST TOMOSYNTHESIS BI: CPT

## 2024-07-03 DIAGNOSIS — J30.1 SEASONAL ALLERGIC RHINITIS DUE TO POLLEN: ICD-10-CM

## 2024-07-03 RX ORDER — FLUTICASONE PROPIONATE 50 MCG
2 SPRAY, SUSPENSION (ML) NASAL DAILY
Qty: 16 G | Refills: 5 | Status: SHIPPED | OUTPATIENT
Start: 2024-07-03

## 2024-07-03 NOTE — TELEPHONE ENCOUNTER
Caller: Marco Bianca Guerran    Relationship: Self    Best call back number: [phone]  Requested Prescriptions:   Requested Prescriptions     Pending Prescriptions Disp Refills    fluticasone (FLONASE) 50 MCG/ACT nasal spray 16 g 5     Si sprays into the nostril(s) as directed by provider Daily.        Pharmacy where request should be sent: [facility]/PHARMACY #[id number] - [place], [place] - [address] [facility] [phone] [facility] [phone] FX     Last office visit with prescribing clinician: 2024   Last telemedicine visit with prescribing clinician: Visit date not found   Next office visit with prescribing clinician: 2024     Additional details provided by patient: PATIENT IS CURRENTLY OUT OF THIS MEDICATION.     Does the patient have less than a 3 day supply:  [x] Yes  [] No    Would you like a call back once the refill request has been completed: [] Yes [x] No    If the office needs to give you a call back, can they leave a voicemail: [] Yes [x] No    Jonatan Keys   24 12:07 EDT

## 2024-07-10 NOTE — PROGRESS NOTES
Chief Complaint  Anxiety (Follow up)    Subjective          Bianca Lara is a 53 y.o. female who presents to [facility]    History of Present Illness  History of Present Illness  The patient presents for a follow-up visit.    She reports a stable mood, attributing it to the effectiveness of Viibryd daily and Lamictal, which she takes twice daily. Her sleep patterns are normal and she denies any thoughts of self-harm or harm to others. Over the past two weeks, she has not experienced a lack of interest in activities or experienced feelings of depression or hopelessness.    She requires a refill of her Flonase prescription.      PHQ-2 Total Score: 0   PHQ-9 Total Score: 0        Review of Systems   Constitutional:  Negative for chills, fatigue and fever.   Respiratory:  Negative for cough and shortness of breath.    Cardiovascular:  Negative for chest pain and palpitations.   Gastrointestinal:  Negative for constipation, diarrhea, nausea and vomiting.   Musculoskeletal:  Negative for back pain and neck pain.   Skin:  Negative for rash.   Neurological:  Negative for dizziness and headaches.   Psychiatric/Behavioral:  Positive for dysphoric mood. Negative for self-injury, sleep disturbance and suicidal ideas. The patient is nervous/anxious.           Medical History: has a past medical history of Allergic, Allergic rhinitis, Anxiety (09/11/2018), Arthritis, Blood in stool, Condition not found, CTS (carpal tunnel syndrome) (2015), Depression (09/11/2018), Difficulty walking (2023), Dysmenorrhea, Gross hematuria, Headache, Headache, tension-type (2022), Helicobacter positive gastritis (05/26/2016), Hyperlipidemia (2024), Hypertension, Irregular menses, Low back pain (07/09/2014), Microscopic hematuria (06/10/2014), Night sweats, Onychomycosis of toenail (07/09/2014), Pap smear for cervical cancer screening (05/16/2016), Right sided abdominal pain (06/12/2014), Screening  mammogram, encounter for (11/09/2018), Sleep apnea (2023), STD exposure (1993), Tobacco abuse, and Vitamin D deficiency.     Surgical History: has a past surgical history that includes Breast biopsy (Left, 01/09/2012); Cholecystectomy; Colonoscopy (08/15/2016); Cyst Removal; and Cystoscopy (06/27/2014).     Family History: family history includes Alzheimer's disease in her mother; Cancer in her maternal grandmother; Dementia in her mother; Diabetes in her mother and another family member; Heart disease in her brother, sister, and another family member; Hyperlipidemia in her sister; Kidney disease in her sister; Leukemia in her father and paternal grandfather; Stomach cancer in her mother; Stroke in an other family member.     Social History: reports that she quit smoking about 8 weeks ago. Her smoking use included cigarettes. She started smoking about 34 years ago. She has a 13.5 pack-year smoking history. She has been exposed to tobacco smoke. She has never used smokeless tobacco. She reports current alcohol use of about 10.0 standard drinks of alcohol per week. She reports that she does not use drugs.    Allergies: Azithromycin, Duloxetine hcl, Lisinopril, and Penicillins      Health Maintenance Due   Topic Date Due    TDAP/TD VACCINES (1 - Tdap) Never done    ZOSTER VACCINE (1 of 2) Never done    COVID-19 Vaccine (5 - 2023-24 season) 09/01/2023            Current Outpatient Medications:     ammonium lactate (LAC-HYDRIN) 12 % lotion, Apply 1 application  topically to the appropriate area as directed As Needed for Irritation., Disp: , Rfl:     ciclopirox (PENLAC) 8 % solution, APPLY TO THE AFFECTED AREA(S) TOPICALLY ONCE DAILY PREFERABLY AT BEDTIME OR 8 HOURS BEFORE WASHING, Disp: , Rfl:     fluticasone (FLONASE) 50 MCG/ACT nasal spray, 2 sprays into the nostril(s) as directed by provider Daily., Disp: 16 g, Rfl: 5    lamoTRIgine (LaMICtal) 25 MG tablet, Take 1 tablet by mouth 2 (Two) Times a Day. TAKE 1 TAB AT  "BEDTIME FOR 7 DAYS THEN TWICE DAILY THEREAFTER., Disp: 180 tablet, Rfl: 1    loratadine (Claritin) 10 MG tablet, Take 1 tablet by mouth Daily., Disp: 90 tablet, Rfl: 3    losartan-hydrochlorothiazide (Hyzaar) 50-12.5 MG per tablet, Take 1 tablet by mouth Daily., Disp: 90 tablet, Rfl: 1    potassium chloride 10 MEQ CR tablet, Take 1 tablet by mouth Daily., Disp: 90 tablet, Rfl: 1    pregabalin (LYRICA) 100 MG capsule, Take 1 capsule by mouth 3 times a day., Disp: , Rfl:     valACYclovir (VALTREX) 500 MG tablet, Take 1 tablet by mouth Daily., Disp: 30 tablet, Rfl: 1    varenicline (Chantix Continuing Month Ryan) 1 MG tablet, Take 1 tablet by mouth 2 (Two) Times a Day for 56 days., Disp: 56 tablet, Rfl: 1    vilazodone (Viibryd) 40 MG tablet tablet, Take 1 tablet by mouth Daily., Disp: 90 tablet, Rfl: 1    vitamin D (ERGOCALCIFEROL) 1.25 MG (20590 UT) capsule capsule, Take 1 capsule by mouth 1 (One) Time Per Week., Disp: 13 capsule, Rfl: 0    methocarbamol (ROBAXIN) 750 MG tablet, Oral for 10 Days, Disp: , Rfl:       Immunization History   Administered Date(s) Administered    COVID-19 (MODERNA) 1st,2nd,3rd Dose Monovalent 01/07/2021, 02/04/2021, 10/28/2021    COVID-19 (MODERNA) BIVALENT 12+YRS 10/17/2022    Fluzone (or Fluarix & Flulaval for VFC) >6mos 10/17/2022, 11/09/2023    Fluzone Quad >6mos (Multi-dose) 10/29/2020    Influenza, Unspecified 10/17/2022    Pneumococcal Conjugate 20-Valent (PCV20) 05/31/2024         Objective       Vitals:    07/24/24 0821   BP: 123/89   Pulse: 81   Temp: 98 °F (36.7 °C)   TempSrc: Temporal   SpO2: 96%   Weight: 97.5 kg (215 lb)   Height: 160 cm (62.99\")      Body mass index is 38.1 kg/m².   Wt Readings from Last 3 Encounters:   07/24/24 97.5 kg (215 lb)   06/27/24 102 kg (224 lb 9.6 oz)   06/20/24 99.9 kg (220 lb 3.2 oz)      BP Readings from Last 3 Encounters:   07/24/24 123/89   06/27/24 127/66   06/20/24 128/85             Physical Exam  Vitals reviewed.   Constitutional:       " Appearance: Normal appearance. She is well-developed.   HENT:      Head: Normocephalic and atraumatic.   Eyes:      Conjunctiva/sclera: Conjunctivae normal.      Pupils: Pupils are equal, round, and reactive to light.   Cardiovascular:      Rate and Rhythm: Normal rate and regular rhythm.      Heart sounds: Normal heart sounds. No murmur heard.  Pulmonary:      Effort: Pulmonary effort is normal.      Breath sounds: Normal breath sounds. No wheezing or rhonchi.   Abdominal:      General: Bowel sounds are normal. There is no distension.      Palpations: Abdomen is soft.      Tenderness: There is no abdominal tenderness.   Skin:     General: Skin is warm and dry.   Neurological:      Mental Status: She is alert and oriented to person, place, and time.   Psychiatric:         Mood and Affect: Mood and affect normal.         Behavior: Behavior normal.         Thought Content: Thought content normal.         Judgment: Judgment normal.         Physical Exam        Result Review :   Results         Common labs          3/20/2024    15:22 5/31/2024    14:09   Common Labs   Glucose 109  137    BUN 24  16    Creatinine 0.91  0.96    Sodium 140  143    Potassium 3.9  3.3    Chloride 99  101    Calcium 10.6  9.5    Albumin 4.3  4.2    Total Bilirubin 0.3  0.2    Alkaline Phosphatase 87  68    AST (SGOT) 23  12    ALT (SGPT) 19  17    WBC 14.94  10.11    Hemoglobin 13.2  12.6    Hematocrit 41.0  39.3    Platelets 583  455    Total Cholesterol 200     Triglycerides 60     HDL Cholesterol 60     LDL Cholesterol  129     Hemoglobin A1C 6.40                      Assessment and Plan        Diagnoses and all orders for this visit:    1. Anxiety and depression (Primary)  -     lamoTRIgine (LaMICtal) 25 MG tablet; Take 1 tablet by mouth 2 (Two) Times a Day. TAKE 1 TAB AT BEDTIME FOR 7 DAYS THEN TWICE DAILY THEREAFTER.  Dispense: 180 tablet; Refill: 1  -     vilazodone (Viibryd) 40 MG tablet tablet; Take 1 tablet by mouth Daily.   Dispense: 90 tablet; Refill: 1    2. Primary hypertension  -     losartan-hydrochlorothiazide (Hyzaar) 50-12.5 MG per tablet; Take 1 tablet by mouth Daily.  Dispense: 90 tablet; Refill: 1    3. Seasonal allergic rhinitis due to pollen  -     fluticasone (FLONASE) 50 MCG/ACT nasal spray; 2 sprays into the nostril(s) as directed by provider Daily.  Dispense: 16 g; Refill: 5      Patient advised to monitor blood pressure at home and journal readings.  Patient informed that a blood pressure reading at home of more than 130/80 is considered hypertension.  For readings greater than 140/90 or higher patient is advised to follow-up in the office with readings for management.  Patient advised to limit sodium intake.  Assessment & Plan  1. Anxiety and depression.  The current regimen of lamotrigine 25 mg twice daily will be maintained, with 180 tablets and a 3-month refill provided. Viibryd will be continued.    2. Hypertension.  Blood pressure readings are within normal range, albeit with a slight borderline diastolic reading. Monitoring of salt intake is advised.    3. Medication refill.  A refill for Flonase will be provided.    Follow-up  A follow-up visit is scheduled for 6 months from now.    Follow Up     Return in about 6 months (around 1/24/2025) for Next scheduled follow up.    Patient was given instructions and counseling regarding her condition or for health maintenance advice. Please see specific information pulled into the AVS if appropriate.     TESSA Cerrato    Patient or patient representative verbalized consent for the use of Ambient Listening during the visit with  TESSA Cerrato for chart documentation. 7/24/2024  08:43 EDT

## 2024-07-11 DIAGNOSIS — H91.90 HEARING LOSS, UNSPECIFIED HEARING LOSS TYPE, UNSPECIFIED LATERALITY: Primary | ICD-10-CM

## 2024-07-19 ENCOUNTER — PROCEDURE VISIT (OUTPATIENT)
Dept: OTOLARYNGOLOGY | Facility: CLINIC | Age: 54
End: 2024-07-19
Payer: COMMERCIAL

## 2024-07-19 DIAGNOSIS — H90.A12 CONDUCTIVE HEARING LOSS OF LEFT EAR WITH RESTRICTED HEARING OF RIGHT EAR: ICD-10-CM

## 2024-07-19 DIAGNOSIS — H90.A11 CONDUCTIVE HEARING LOSS OF RIGHT EAR WITH RESTRICTED HEARING OF LEFT EAR: Primary | ICD-10-CM

## 2024-07-19 NOTE — PROGRESS NOTES
AUDIOMETRIC EVALUATION      Name:  Bianca Lara  :  1970  Age:  53 y.o.  Date of Evaluation:  2024       History:  Mrs. Lara is seen today for a hearing evaluation due to hearing loss.    Audiologic Information:  Concerns for Hearing: Yes  PETs: No  Other otologic surgical history: None  Aural Pressure/Fullness: No  Otalgia: No  Otorrhea: None  Tinnitus: No  Dizziness: None  Noise Exposure: None  Family history of hearing loss: No  Head trauma requiring hospital stay: No  Chemotherapy: None  Other significant history: No    EVALUATION:    See audiogram    RESULTS:    Otoscopic Evaluation:  Right: Unremarkable  Left: Unremarkable     Tympanometry (226 Hz):  Right: Type A  Left: Type A    IMPRESSIONS:  Pure tone thresholds for the right ear shows a mild conductive hearing loss at 0.25 K.  A mild sensorineural hearing loss at 4K and 8K hertz.  Pure tone thresholds for the left ear shows a mild conductive hearing loss at 0.25 K and 0.5 K.  A mild sensorineural hearing loss at 4K and 8K hertz.  Patient was counseled with regard to the findings.    Amplification needs:  Patient could benefit from amplification if they feel increased communication difficulties.    RECOMMENDATIONS/PLAN:  Follow-up with primary care physician for eustachian tube dysfunction  Repeat hearing evaluation in 1 year.  Discussed results and recommendations with patient. Questions were addressed and the patient was encouraged to contact our department should concerns arise.          Cristi Joshi M.S, [facility]  Licensed Audiologist

## 2024-07-24 ENCOUNTER — OFFICE VISIT (OUTPATIENT)
Dept: FAMILY MEDICINE CLINIC | Facility: CLINIC | Age: 54
End: 2024-07-24
Payer: COMMERCIAL

## 2024-07-24 VITALS
HEART RATE: 81 BPM | BODY MASS INDEX: 38.09 KG/M2 | HEIGHT: 63 IN | TEMPERATURE: 98 F | WEIGHT: 215 LBS | OXYGEN SATURATION: 96 % | SYSTOLIC BLOOD PRESSURE: 123 MMHG | DIASTOLIC BLOOD PRESSURE: 89 MMHG

## 2024-07-24 DIAGNOSIS — F32.A ANXIETY AND DEPRESSION: ICD-10-CM

## 2024-07-24 DIAGNOSIS — F41.9 ANXIETY AND DEPRESSION: Primary | ICD-10-CM

## 2024-07-24 DIAGNOSIS — F41.9 ANXIETY AND DEPRESSION: ICD-10-CM

## 2024-07-24 DIAGNOSIS — I10 PRIMARY HYPERTENSION: ICD-10-CM

## 2024-07-24 DIAGNOSIS — J30.1 SEASONAL ALLERGIC RHINITIS DUE TO POLLEN: ICD-10-CM

## 2024-07-24 DIAGNOSIS — F32.A ANXIETY AND DEPRESSION: Primary | ICD-10-CM

## 2024-07-24 PROCEDURE — 1160F RVW MEDS BY RX/DR IN RCRD: CPT | Performed by: NURSE PRACTITIONER

## 2024-07-24 PROCEDURE — 1125F AMNT PAIN NOTED PAIN PRSNT: CPT | Performed by: NURSE PRACTITIONER

## 2024-07-24 PROCEDURE — 3074F SYST BP LT 130 MM HG: CPT | Performed by: NURSE PRACTITIONER

## 2024-07-24 PROCEDURE — 99213 OFFICE O/P EST LOW 20 MIN: CPT | Performed by: NURSE PRACTITIONER

## 2024-07-24 PROCEDURE — 3079F DIAST BP 80-89 MM HG: CPT | Performed by: NURSE PRACTITIONER

## 2024-07-24 PROCEDURE — 1159F MED LIST DOCD IN RCRD: CPT | Performed by: NURSE PRACTITIONER

## 2024-07-24 RX ORDER — LOSARTAN POTASSIUM AND HYDROCHLOROTHIAZIDE 12.5; 5 MG/1; MG/1
1 TABLET ORAL DAILY
Qty: 90 TABLET | Refills: 1 | Status: SHIPPED | OUTPATIENT
Start: 2024-07-24

## 2024-07-24 RX ORDER — METHOCARBAMOL 750 MG/1
TABLET, FILM COATED ORAL
COMMUNITY

## 2024-07-24 RX ORDER — FLUTICASONE PROPIONATE 50 MCG
2 SPRAY, SUSPENSION (ML) NASAL DAILY
Qty: 16 G | Refills: 5 | Status: SHIPPED | OUTPATIENT
Start: 2024-07-24

## 2024-07-24 RX ORDER — LAMOTRIGINE 25 MG/1
TABLET ORAL
Qty: 60 TABLET | Refills: 1 | Status: SHIPPED | OUTPATIENT
Start: 2024-07-24 | End: 2024-07-24 | Stop reason: SDUPTHER

## 2024-07-24 RX ORDER — LAMOTRIGINE 25 MG/1
25 TABLET ORAL 2 TIMES DAILY
Qty: 180 TABLET | Refills: 1 | Status: SHIPPED | OUTPATIENT
Start: 2024-07-24

## 2024-07-24 RX ORDER — VILAZODONE HYDROCHLORIDE 40 MG/1
40 TABLET ORAL DAILY
Qty: 90 TABLET | Refills: 1 | Status: SHIPPED | OUTPATIENT
Start: 2024-07-24

## 2024-08-06 ENCOUNTER — CONSULT (OUTPATIENT)
Dept: ONCOLOGY | Facility: HOSPITAL | Age: 54
End: 2024-08-06
Payer: COMMERCIAL

## 2024-08-06 ENCOUNTER — LAB (OUTPATIENT)
Dept: ONCOLOGY | Facility: HOSPITAL | Age: 54
End: 2024-08-06
Payer: COMMERCIAL

## 2024-08-06 VITALS
WEIGHT: 213 LBS | BODY MASS INDEX: 37.74 KG/M2 | OXYGEN SATURATION: 99 % | HEIGHT: 63 IN | RESPIRATION RATE: 18 BRPM | TEMPERATURE: 98.8 F | HEART RATE: 71 BPM | DIASTOLIC BLOOD PRESSURE: 77 MMHG | SYSTOLIC BLOOD PRESSURE: 100 MMHG

## 2024-08-06 DIAGNOSIS — E61.1 IRON DEFICIENCY: Primary | ICD-10-CM

## 2024-08-06 DIAGNOSIS — D75.839 THROMBOCYTOSIS, UNSPECIFIED: ICD-10-CM

## 2024-08-06 DIAGNOSIS — D64.9 ANEMIA, UNSPECIFIED TYPE: Primary | ICD-10-CM

## 2024-08-06 LAB
ANISOCYTOSIS BLD QL: NORMAL
BASOPHILS # BLD AUTO: 0.04 10*3/MM3 (ref 0–0.2)
BASOPHILS NFR BLD AUTO: 0.6 % (ref 0–1.5)
CLUMPED PLATELETS: NORMAL
DEPRECATED RDW RBC AUTO: 48.9 FL (ref 37–54)
EOSINOPHIL # BLD AUTO: 0.07 10*3/MM3 (ref 0–0.4)
EOSINOPHIL NFR BLD AUTO: 1 % (ref 0.3–6.2)
ERYTHROCYTE [DISTWIDTH] IN BLOOD BY AUTOMATED COUNT: 14.6 % (ref 12.3–15.4)
FERRITIN SERPL-MCNC: 141.5 NG/ML (ref 13–150)
HCT VFR BLD AUTO: 43.2 % (ref 34–46.6)
HGB BLD-MCNC: 13.5 G/DL (ref 12–15.9)
IMM GRANULOCYTES # BLD AUTO: 0.01 10*3/MM3 (ref 0–0.05)
IMM GRANULOCYTES NFR BLD AUTO: 0.1 % (ref 0–0.5)
IRON 24H UR-MRATE: 44 MCG/DL (ref 37–145)
IRON SATN MFR SERPL: 11 % (ref 20–50)
LARGE PLATELETS: NORMAL
LYMPHOCYTES # BLD AUTO: 2.39 10*3/MM3 (ref 0.7–3.1)
LYMPHOCYTES # BLD MANUAL: 2.25 10*3/MM3 (ref 0.7–3.1)
LYMPHOCYTES NFR BLD AUTO: 34 % (ref 19.6–45.3)
LYMPHOCYTES NFR BLD MANUAL: 5 % (ref 5–12)
MCH RBC QN AUTO: 28.1 PG (ref 26.6–33)
MCHC RBC AUTO-ENTMCNC: 31.3 G/DL (ref 31.5–35.7)
MCV RBC AUTO: 89.8 FL (ref 79–97)
MONOCYTES # BLD AUTO: 0.44 10*3/MM3 (ref 0.1–0.9)
MONOCYTES # BLD: 0.35 10*3/MM3 (ref 0.1–0.9)
MONOCYTES NFR BLD AUTO: 6.3 % (ref 5–12)
NEUTROPHILS # BLD AUTO: 4.42 10*3/MM3 (ref 1.7–7)
NEUTROPHILS NFR BLD AUTO: 4.07 10*3/MM3 (ref 1.7–7)
NEUTROPHILS NFR BLD AUTO: 58 % (ref 42.7–76)
NEUTROPHILS NFR BLD MANUAL: 63 % (ref 42.7–76)
PATHOLOGY REVIEW: YES
PLATELET # BLD AUTO: 492 10*3/MM3 (ref 140–450)
PMV BLD AUTO: 8.4 FL (ref 6–12)
RBC # BLD AUTO: 4.81 10*6/MM3 (ref 3.77–5.28)
SMALL PLATELETS BLD QL SMEAR: NORMAL
STOMATOCYTES BLD QL SMEAR: NORMAL
TIBC SERPL-MCNC: 408 MCG/DL (ref 298–536)
TRANSFERRIN SERPL-MCNC: 274 MG/DL (ref 200–360)
VARIANT LYMPHS NFR BLD MANUAL: 32 % (ref 19.6–45.3)
WBC MORPH BLD: NORMAL
WBC NRBC COR # BLD AUTO: 7.02 10*3/MM3 (ref 3.4–10.8)

## 2024-08-06 PROCEDURE — 36415 COLL VENOUS BLD VENIPUNCTURE: CPT | Performed by: NURSE PRACTITIONER

## 2024-08-06 PROCEDURE — 3078F DIAST BP <80 MM HG: CPT | Performed by: NURSE PRACTITIONER

## 2024-08-06 PROCEDURE — 85025 COMPLETE CBC W/AUTO DIFF WBC: CPT | Performed by: NURSE PRACTITIONER

## 2024-08-06 PROCEDURE — 99214 OFFICE O/P EST MOD 30 MIN: CPT | Performed by: NURSE PRACTITIONER

## 2024-08-06 PROCEDURE — 82728 ASSAY OF FERRITIN: CPT | Performed by: NURSE PRACTITIONER

## 2024-08-06 PROCEDURE — 1126F AMNT PAIN NOTED NONE PRSNT: CPT | Performed by: NURSE PRACTITIONER

## 2024-08-06 PROCEDURE — 83540 ASSAY OF IRON: CPT | Performed by: NURSE PRACTITIONER

## 2024-08-06 PROCEDURE — 1159F MED LIST DOCD IN RCRD: CPT | Performed by: NURSE PRACTITIONER

## 2024-08-06 PROCEDURE — G0463 HOSPITAL OUTPT CLINIC VISIT: HCPCS | Performed by: NURSE PRACTITIONER

## 2024-08-06 PROCEDURE — 1160F RVW MEDS BY RX/DR IN RCRD: CPT | Performed by: NURSE PRACTITIONER

## 2024-08-06 PROCEDURE — 3074F SYST BP LT 130 MM HG: CPT | Performed by: NURSE PRACTITIONER

## 2024-08-06 PROCEDURE — 84466 ASSAY OF TRANSFERRIN: CPT | Performed by: NURSE PRACTITIONER

## 2024-08-06 RX ORDER — FERROUS GLUCONATE 324(38)MG
324 TABLET ORAL 3 TIMES WEEKLY
Qty: 60 TABLET | Refills: 1 | Status: SHIPPED | OUTPATIENT
Start: 2024-08-07

## 2024-08-06 NOTE — PROGRESS NOTES
Chief Complaint/Reason for Referral:  No chief complaint on file.    Reina Putnam APRN Samuels, Amanda, TESSA    Records Obtained:  Records of the patients history including those obtained from  [facility] and patient information  were reviewed and summarized in detail.    Subjective    History of Present Illness  Ms. Bianca Lara is a very pleasant 53 year old  female who presents for new  hematology patient evaluation for thrombocytosis.     PMH includes: hypertension, DDD, Vitamin D deficiency, anxiety and depression, tobacco abuse, trying to quit. Smokes when she drinks. Family history of mother with stomach cancer, father and paternal grandfather with some type of leukemia. Multiple other family members with malignancy.     Denies any current bleeding. History of hematuria in the past. Does not have menses any longer in the last 20 years. Last scopes were in 1/4 and 4/21/2021 with EGD and colonoscopy. EGD showed esophagitis at the GE junction. Colonoscopy showed: polyps, hemorrhoids.     Lab work: 5/31/2024: WBC 10.11, hemoglobin 12.6, platelet count of 455. Differential with elevated lymphocyte count of 4090.     Has had intermittent thrombocytosis in the range of 451 to 583 at its highest in March of 2024. Normal platelet count of 411 back in 12/14/2024. Has had mild thrombocytosis since November of 2019. CBC has been normal. CMP is normal except for glucose of 137.       Oncology/Hematology History    No history exists.       Review of Systems   Constitutional:  Positive for fatigue.   HENT: Negative.     Eyes: Negative.    Respiratory: Negative.     Cardiovascular: Negative.    Gastrointestinal: Negative.    Endocrine: Negative.    Genitourinary: Negative.    Musculoskeletal:  Positive for back pain.   Skin: Negative.    Allergic/Immunologic: Negative.    Neurological: Negative.    Hematological: Negative.    Psychiatric/Behavioral: Negative.         Current Outpatient Medications on File  Prior to Visit   Medication Sig Dispense Refill    ammonium lactate (LAC-HYDRIN) 12 % lotion Apply 1 application  topically to the appropriate area as directed As Needed for Irritation.      ciclopirox (PENLAC) 8 % solution APPLY TO THE AFFECTED AREA(S) TOPICALLY ONCE DAILY PREFERABLY AT BEDTIME OR 8 HOURS BEFORE WASHING      fluticasone (FLONASE) 50 MCG/ACT nasal spray 2 sprays into the nostril(s) as directed by provider Daily. 16 g 5    lamoTRIgine (LaMICtal) 25 MG tablet Take 1 tablet by mouth 2 (Two) Times a Day. TAKE 1 TAB AT BEDTIME FOR 7 DAYS THEN TWICE DAILY THEREAFTER. 180 tablet 1    loratadine (Claritin) 10 MG tablet Take 1 tablet by mouth Daily. 90 tablet 3    losartan-hydrochlorothiazide (Hyzaar) 50-12.5 MG per tablet Take 1 tablet by mouth Daily. 90 tablet 1    methocarbamol (ROBAXIN) 750 MG tablet Oral for 10 Days      potassium chloride 10 MEQ CR tablet Take 1 tablet by mouth Daily. 90 tablet 1    pregabalin (LYRICA) 100 MG capsule Take 1 capsule by mouth 3 times a day.      valACYclovir (VALTREX) 500 MG tablet Take 1 tablet by mouth Daily. 30 tablet 1    varenicline (Chantix Continuing Month Ryan) 1 MG tablet Take 1 tablet by mouth 2 (Two) Times a Day for 56 days. 56 tablet 1    vilazodone (Viibryd) 40 MG tablet tablet Take 1 tablet by mouth Daily. 90 tablet 1    vitamin D (ERGOCALCIFEROL) 1.25 MG (54128 UT) capsule capsule Take 1 capsule by mouth 1 (One) Time Per Week. 13 capsule 0     No current facility-administered medications on file prior to visit.       Allergies   Allergen Reactions    Azithromycin Nausea Only    Duloxetine Hcl Unknown - Low Severity    Lisinopril Cough    Penicillins Unknown - Low Severity     Past Medical History:   Diagnosis Date    Allergic     Allergic rhinitis     Anxiety 09/11/2018    Arthritis     Blood in stool     Condition not found     MEATAL STENOSIS, UNSPECIFIED    CTS (carpal tunnel syndrome) 2015    Depression 09/11/2018    Difficulty walking 2023     Dysmenorrhea     Gross hematuria     Headache     Headache, tension-type     Helicobacter positive gastritis 2016    Hyperlipidemia     Hypertension     Irregular menses     Low back pain 2014    Microscopic hematuria 06/10/2014    Night sweats     Onychomycosis of toenail 2014    Pap smear for cervical cancer screening 2016    Right sided abdominal pain 2014    Screening mammogram, encounter for 2018    Sleep apnea     STD exposure     chlamydia    Tobacco abuse     chronic    Vitamin D deficiency      Past Surgical History:   Procedure Laterality Date    BREAST BIOPSY Left 2012    CHOLECYSTECTOMY      COLONOSCOPY  08/15/2016    CYST REMOVAL      left wrist    CYSTOSCOPY  2014    Office cystoscopy w/ Dr. Goncalves     Social History     Socioeconomic History    Marital status:    Tobacco Use    Smoking status: Former     Current packs/day: 0.00     Average packs/day: 0.3 packs/day for 54.1 years (13.5 ttl pk-yrs)     Types: Cigarettes     Start date: 1989     Quit date: 2024     Years since quittin.1     Passive exposure: Past    Smokeless tobacco: Never   Vaping Use    Vaping status: Never Used   Substance and Sexual Activity    Alcohol use: Yes     Alcohol/week: 10.0 standard drinks of alcohol     Types: 4 Glasses of wine, 4 Shots of liquor, 2 Drinks containing 0.5 oz of alcohol per week     Comment: glass of wine every other day    Drug use: Never    Sexual activity: Yes     Partners: Male     Birth control/protection: Condom     Family History   Problem Relation Age of Onset    Stomach cancer Mother     Alzheimer's disease Mother     Diabetes Mother     Dementia Mother     Leukemia Father     Heart disease Sister     Heart disease Brother     Cancer Maternal Grandmother     Leukemia Paternal Grandfather     Stroke Other         AUNT;UNCLE    Heart disease Other         AUNT,UNCLE    Diabetes Other         AUNT,UNCLE     Hyperlipidemia Sister     Kidney disease Sister      Immunization History   Administered Date(s) Administered    COVID-19 (MODERNA) 1st,2nd,3rd Dose Monovalent 01/07/2021, 02/04/2021, 10/28/2021    COVID-19 (MODERNA) BIVALENT 12+YRS 10/17/2022    Fluzone (or Fluarix & Flulaval for VFC) >6mos 10/17/2022, 11/09/2023    Fluzone Quad >6mos (Multi-dose) 10/29/2020    Influenza, Unspecified 10/17/2022    Pneumococcal Conjugate 20-Valent (PCV20) 05/31/2024       Tobacco Use: Medium Risk (8/6/2024)    Patient History     Smoking Tobacco Use: Former     Smokeless Tobacco Use: Never     Passive Exposure: Past       Objective     Physical Exam  Vitals and nursing note reviewed.   Constitutional:       Appearance: Normal appearance. She is obese.   HENT:      Head: Normocephalic.      Nose: Nose normal.      Mouth/Throat:      Mouth: Mucous membranes are moist.   Eyes:      Pupils: Pupils are equal, round, and reactive to light.   Cardiovascular:      Rate and Rhythm: Normal rate and regular rhythm.      Pulses: Normal pulses.      Heart sounds: Normal heart sounds. No murmur heard.  Pulmonary:      Effort: Pulmonary effort is normal. No respiratory distress.      Breath sounds: Normal breath sounds.   Abdominal:      General: Bowel sounds are normal. There is no distension.      Palpations: Abdomen is soft.   Musculoskeletal:         General: Normal range of motion.      Cervical back: Normal range of motion and neck supple.   Skin:     General: Skin is warm and dry.      Capillary Refill: Capillary refill takes less than 2 seconds.   Neurological:      General: No focal deficit present.      Mental Status: She is alert and oriented to person, place, and time.   Psychiatric:         Mood and Affect: Mood normal.         Behavior: Behavior normal.         Thought Content: Thought content normal.         Judgment: Judgment normal.         Vitals:    08/06/24 0812   BP: 100/77   Pulse: 71   Resp: 18   Temp: 98.8 °F (37.1 °C)  "  TempSrc: Temporal   SpO2: 99%   Weight: 96.6 kg (213 lb)   Height: 160 cm (63\")   PainSc: 0-No pain       Wt Readings from Last 3 Encounters:   08/06/24 96.6 kg (213 lb)   07/24/24 97.5 kg (215 lb)   06/27/24 102 kg (224 lb 9.6 oz)        ECOG score: 1         ECOG: (0) Fully Active - Able to Carry On All Pre-disease Performance Without Restriction  Fall Risk Assessment was completed, and patient is at low risk for falls.  PHQ-9 Total Score: 0       The patient is  experiencing fatigue. Fatigue score: 7    PT/OT Functional Screening: PT fx screen : No needs identified  Speech Functional Screening: Speech fx screen : No needs identified  Rehab to be ordered: Rehab to be ordered : No needs identified        Result Review :  The following data was reviewed by: TESSA Anand on 08/06/2024:  Lab Results   Component Value Date    HGB 13.5 08/06/2024    HCT 43.2 08/06/2024    MCV 89.8 08/06/2024     (H) 08/06/2024    WBC 7.02 08/06/2024    NEUTROABS 4.07 08/06/2024    LYMPHSABS 2.39 08/06/2024    MONOSABS 0.44 08/06/2024    EOSABS 0.07 08/06/2024    BASOSABS 0.04 08/06/2024     Lab Results   Component Value Date    GLUCOSE 137 (H) 05/31/2024    BUN 16 05/31/2024    CREATININE 0.96 05/31/2024     05/31/2024    K 3.3 (L) 05/31/2024     05/31/2024    CO2 31.0 (H) 05/31/2024    CALCIUM 9.5 05/31/2024    PROTEINTOT 6.8 05/31/2024    ALBUMIN 4.2 05/31/2024    BILITOT 0.2 05/31/2024    ALKPHOS 68 05/31/2024    AST 12 05/31/2024    ALT 17 05/31/2024     Lab Results   Component Value Date    FERRITIN 141.50 08/06/2024     Lab Results   Component Value Date    IRON 44 08/06/2024    LABIRON 11 (L) 08/06/2024    TRANSFERRIN 274 08/06/2024    TIBC 408 08/06/2024     Lab Results   Component Value Date    FERRITIN 141.50 08/06/2024     No results found for: \"PSA\", \"CEA\", \"AFP\", \"\", \"\"    Mammo Screening Digital Tomosynthesis Bilateral With CAD    Result Date: 7/1/2024  Benign mammogram. " Recommend routine breast screening.  TISSUE DENSITY:  There are scattered areas of fibroglandular density.  BI-RADS ASSESSMENT: BI-RADS 1. Negative.   The patient's information is entered into a computerized reminder system with a targeted due date for the next mammogram.  Note:  It has been reported that there is approximately a 15% false negative in mammography.  Therefore, management of a palpable abnormality should not be deferred because of a negative mammogram.           Electronically Signed By-DONAL LEMON MD On:7/1/2024 4:40 PM      MRI Hip Left Without Contrast    Result Date: 6/14/2024  Impression: 1.Mild bilateral hip osteoarthritis. 2.Left trochanteric/gluteal bursitis. 3.Partial-thickness tears involving the bilateral hamstring tendon origins. Electronically Signed: Roger Aguayo MD  6/14/2024 12:59 PM EDT  Workstation ID: [id number]           Assessment and Plan:  Diagnoses and all orders for this visit:    1. Thrombocytosis, unspecified (Primary)  -     Iron Profile  -     Ferritin  -     CBC & Differential  -     Peripheral Blood Smear  -     Cancel: JAK2 V617F Qual w/Reflex to JAK2 Exon12 ([facility] only)  -     Cancel: JAK2 V617F Qual w/Reflex to CALR Mutation ([facility] only)  -     Cancel: JAK2 V617F Qual w/Reflex to  Mutation ([facility] only)  -     Sedimentation Rate; Future  -     Comprehensive Metabolic Panel; Future  -     Manual Differential  -     LSAC Slide Creation  -     JAK2 V617F, reflex to CALR + MPL + E12-15; Future  -     Cancel: JAK2 V617F, reflex to CALR + MPL + E12-15  -     Cancel: JAK2 V617F, reflex to CALR + MPL + E12-15; Future  -     Cancel: JAK2 V617F, reflex to CALR + MPL + E12-15  -     Cancel: JAK2 V617F, reflex to CALR + MPL + E12-15; Future  -     Cancel: JAK2 V617F, reflex to CALR + MPL + E12-15  -     Cancel: JAK2 V617F, reflex to CALR + MPL + E12-15; Future  -     Cancel: JAK2 V617F, reflex to CALR + MPL + E12-15  -      JAK2 V617F, reflex to CALR + MPL + E12-15; Future  -     JAK2 V617F, reflex to CALR + MPL + E12-15    Thrombocytosis intermittent since 11/2019 in the range of 411 (normal) on 12/14/2022 and mildly elevated in the range of 451 to 583 its highest. Likely benign since has been normal back in December of 2022 and waxes and wanes and not worsening. May be secondary to underlying iron deficiency. Has had hematuria she reports intermittently. Would certainly investigate this further is still present. No anemia is present. Could be secondary to underlying inflammation as well or smoking. Denies any prior surgeries with splenectomy. Today, CBC with platelet count up to 492 from 455 on 5/31/2024. Iron studies with iron 44, ferritin 141, iron sat down to 11%. Would favor trialing ferrous sulfate 3 days a week to see if platelet count improves.       Will check for MPN evaluation, but doubt any cause for concern. Will check for iron deficiency. Will check sed rad as well to evaluate for underlying inflammation.     If iron studies, ferritin are low, then will consider oral iron for replacement.     Follow up with MD / NP in 6 weeks.       I spent 30 minutes caring for Virginia on this date of service. This time includes time spent by me in the following activities:preparing for the visit, reviewing tests, obtaining and/or reviewing a separately obtained history, performing a medically appropriate examination and/or evaluation , counseling and educating the patient/family/caregiver, ordering medications, tests, or procedures, referring and communicating with other health care professionals , documenting information in the medical record, and independently interpreting results and communicating that information with the patient/family/caregiver    Patient Follow Up: 6 weeks  with NP.     Patient was given instructions and counseling regarding her condition or for health maintenance advice. Please see specific information pulled  into the AVS if appropriate.     Taylor Espitia, APRN    8/6/2024    .tob

## 2024-08-09 ENCOUNTER — PATIENT ROUNDING (BHMG ONLY) (OUTPATIENT)
Dept: ONCOLOGY | Facility: HOSPITAL | Age: 54
End: 2024-08-09
Payer: COMMERCIAL

## 2024-08-09 NOTE — PROGRESS NOTES
August 9, 2024    Hello, may I speak with Bianca Lara?    My name is Vanesa.      I am  with [facility]  [address] [place] [address] [place] [address] [phone].    Before we get started may I verify your date of birth? 1970    I am calling to officially welcome you to our practice and ask about your recent visit. Is this a good time to talk? NO- Sent j-Grab Message    Tell me about your visit with us. What things went well?         We're always looking for ways to make our patients' experiences even better. Do you have recommendations on ways we may improve?      Overall were you satisfied with your first visit to our practice?        I appreciate you taking the time to speak with me today. Is there anything else I can do for you?       Thank you, and have a great day.

## 2024-08-13 ENCOUNTER — OFFICE VISIT (OUTPATIENT)
Dept: NEUROSURGERY | Facility: CLINIC | Age: 54
End: 2024-08-13
Payer: COMMERCIAL

## 2024-08-13 VITALS
WEIGHT: 209.1 LBS | DIASTOLIC BLOOD PRESSURE: 84 MMHG | HEIGHT: 63 IN | BODY MASS INDEX: 37.05 KG/M2 | SYSTOLIC BLOOD PRESSURE: 117 MMHG

## 2024-08-13 DIAGNOSIS — M71.30 SYNOVIAL CYST: ICD-10-CM

## 2024-08-13 DIAGNOSIS — M54.41 CHRONIC MIDLINE LOW BACK PAIN WITH BILATERAL SCIATICA: Primary | ICD-10-CM

## 2024-08-13 DIAGNOSIS — M54.42 CHRONIC MIDLINE LOW BACK PAIN WITH BILATERAL SCIATICA: Primary | ICD-10-CM

## 2024-08-13 DIAGNOSIS — G89.29 CHRONIC MIDLINE LOW BACK PAIN WITH BILATERAL SCIATICA: Primary | ICD-10-CM

## 2024-08-13 LAB
CALR EXON 9 MUT ANL BLD/T: NORMAL
CITATION REF LAB TEST: NORMAL
CYTO UR: NORMAL
JAK2 GENE MUT ANL BLD/T: NORMAL
JAK2 P.V617F BLD/T QL: NORMAL
LAB AP CASE REPORT: NORMAL
LAB AP CLINICAL INFORMATION: NORMAL
LAB DIRECTOR NAME PROVIDER: NORMAL
MPL GENE MUT TESTED MAR: NORMAL
PATH REPORT.FINAL DX SPEC: NORMAL
PATH REPORT.GROSS SPEC: NORMAL
REF LAB TEST METHOD: NORMAL
REFLEX: NORMAL
TEST PERFORMANCE INFO SPEC: NORMAL

## 2024-08-13 PROCEDURE — 3079F DIAST BP 80-89 MM HG: CPT | Performed by: NEUROLOGICAL SURGERY

## 2024-08-13 PROCEDURE — 99214 OFFICE O/P EST MOD 30 MIN: CPT | Performed by: NEUROLOGICAL SURGERY

## 2024-08-13 PROCEDURE — 3074F SYST BP LT 130 MM HG: CPT | Performed by: NEUROLOGICAL SURGERY

## 2024-08-13 PROCEDURE — 1160F RVW MEDS BY RX/DR IN RCRD: CPT | Performed by: NEUROLOGICAL SURGERY

## 2024-08-13 PROCEDURE — 1159F MED LIST DOCD IN RCRD: CPT | Performed by: NEUROLOGICAL SURGERY

## 2024-08-13 NOTE — PROGRESS NOTES
Bianca Lara is a 53 y.o. female that presents with Back Pain       She has noticed increasing lower back pain. The pain radiates down the bilateral legs to the feet with associated numbness. The back pain is greater than the leg pain. The pain is worse with walking and prolonged sitting. It is better with laying flat and ice packs. She has used injections, lyrica, muscle relaxants and NSAIDs in the past. She has done PT in the past which was canceled as it made the symptoms worse.       Back Pain      Review of Systems   Musculoskeletal:  Positive for back pain and myalgias.        Vitals:    08/13/24 1125   BP: 117/84        Physical Exam  Constitutional:       Comments: BMI 37   Pulmonary:      Effort: Pulmonary effort is normal.   Neurological:      Mental Status: She is alert.      Sensory: No sensory deficit.      Motor: No weakness.      Deep Tendon Reflexes: Reflexes normal (1+/4).   Psychiatric:         Mood and Affect: Mood normal.        SLR-    I personally interpreted the patient's MRI scan which shows facet arthritis. She had no movement on flex-ext films in February. She appears to have a new cyst on the left at L5-S1 which was not recorded.      Assessment and Plan {CC Problem List  Visit Diagnosis  ROS  Review (Popup)  Health Maintenance  Quality  BestPractice  Medications  SmartSets  SnapShot Encounters  Media :23}   Problem List Items Addressed This Visit       Low back pain - Primary    Relevant Orders    MRI Lumbar Spine Without Contrast     Other Visit Diagnoses       Synovial cyst        Relevant Orders    MRI Lumbar Spine Without Contrast        As her MRI is nearly a year old and she has bilateral leg symptoms, I have recommended a new MRI to f/u on the left sided L5-S1 synovial cyst.    Follow Up {Instructions Charge Capture  Follow-up Communications :23}   After MRI.

## 2024-08-14 ENCOUNTER — TELEPHONE (OUTPATIENT)
Dept: ONCOLOGY | Facility: HOSPITAL | Age: 54
End: 2024-08-14
Payer: COMMERCIAL

## 2024-08-14 NOTE — TELEPHONE ENCOUNTER
The pt called and states that Naomi ZARATE had started her on a iron pill. The pt states that she has broken out in a bumps over her body and is itching x3 days. This nurse instructed the pt to stop the Iron pill and to take Benadryl PO. This nurse informed the pt that Naomi will have to evaluate her POC and make changes. The pt voiced understanding.

## 2024-09-05 ENCOUNTER — TELEPHONE (OUTPATIENT)
Dept: ORTHOPEDIC SURGERY | Facility: CLINIC | Age: 54
End: 2024-09-05

## 2024-09-05 RX ORDER — POTASSIUM CHLORIDE 750 MG/1
10 TABLET, EXTENDED RELEASE ORAL DAILY
Qty: 90 TABLET | Refills: 1 | Status: SHIPPED | OUTPATIENT
Start: 2024-09-05

## 2024-09-05 RX ORDER — METHOCARBAMOL 500 MG/1
TABLET, FILM COATED ORAL
Qty: 30 TABLET | Refills: 0 | OUTPATIENT
Start: 2024-09-05

## 2024-09-05 NOTE — TELEPHONE ENCOUNTER
Last sent medication on 05/09/2024, Last appt for DENISE hip 06/27/2024, Future appt for DENISE hip 10/03/2024.

## 2024-09-05 NOTE — TELEPHONE ENCOUNTER
Provider:  KENDY CEDILLO PA-C    Caller:  CLEO CHOWDHURY    Relationship to Patient:  SELF    Pharmacy:  [facility] [phone]      Phone Number:  [phone]    Reason for Call:  PATIENT ASKING IF DOCTOR COULD INCREASE THE STRENGTH OF THE METHOCARBAMOL 500MG. PATIENT SAYS SHE HAS PAIN IN HER THIGHS.    When was the patient last seen:  6/27/24

## 2024-09-13 ENCOUNTER — TELEPHONE (OUTPATIENT)
Dept: FAMILY MEDICINE CLINIC | Facility: CLINIC | Age: 54
End: 2024-09-13
Payer: COMMERCIAL

## 2024-09-17 ENCOUNTER — OFFICE VISIT (OUTPATIENT)
Dept: ONCOLOGY | Facility: HOSPITAL | Age: 54
End: 2024-09-17
Payer: COMMERCIAL

## 2024-09-17 ENCOUNTER — LAB (OUTPATIENT)
Dept: ONCOLOGY | Facility: HOSPITAL | Age: 54
End: 2024-09-17
Payer: COMMERCIAL

## 2024-09-17 VITALS
RESPIRATION RATE: 18 BRPM | BODY MASS INDEX: 36.93 KG/M2 | HEIGHT: 63 IN | HEART RATE: 81 BPM | SYSTOLIC BLOOD PRESSURE: 111 MMHG | WEIGHT: 208.4 LBS | DIASTOLIC BLOOD PRESSURE: 76 MMHG | TEMPERATURE: 97 F | OXYGEN SATURATION: 100 %

## 2024-09-17 DIAGNOSIS — K21.9 GASTROESOPHAGEAL REFLUX DISEASE WITHOUT ESOPHAGITIS: ICD-10-CM

## 2024-09-17 DIAGNOSIS — D75.839 THROMBOCYTOSIS, UNSPECIFIED: Primary | ICD-10-CM

## 2024-09-17 DIAGNOSIS — E61.1 IRON DEFICIENCY: ICD-10-CM

## 2024-09-17 DIAGNOSIS — D64.9 ANEMIA, UNSPECIFIED TYPE: Primary | ICD-10-CM

## 2024-09-17 LAB
BASOPHILS # BLD AUTO: 0.05 10*3/MM3 (ref 0–0.2)
BASOPHILS NFR BLD AUTO: 0.5 % (ref 0–1.5)
DEPRECATED RDW RBC AUTO: 49.2 FL (ref 37–54)
EOSINOPHIL # BLD AUTO: 0.27 10*3/MM3 (ref 0–0.4)
EOSINOPHIL NFR BLD AUTO: 2.9 % (ref 0.3–6.2)
ERYTHROCYTE [DISTWIDTH] IN BLOOD BY AUTOMATED COUNT: 14.7 % (ref 12.3–15.4)
FERRITIN SERPL-MCNC: 304.5 NG/ML (ref 13–150)
HCT VFR BLD AUTO: 40.3 % (ref 34–46.6)
HGB BLD-MCNC: 12.8 G/DL (ref 12–15.9)
IMM GRANULOCYTES # BLD AUTO: 0.04 10*3/MM3 (ref 0–0.05)
IMM GRANULOCYTES NFR BLD AUTO: 0.4 % (ref 0–0.5)
IRON 24H UR-MRATE: 33 MCG/DL (ref 37–145)
IRON SATN MFR SERPL: 7 % (ref 20–50)
LYMPHOCYTES # BLD AUTO: 1.97 10*3/MM3 (ref 0.7–3.1)
LYMPHOCYTES NFR BLD AUTO: 21.2 % (ref 19.6–45.3)
MCH RBC QN AUTO: 28.4 PG (ref 26.6–33)
MCHC RBC AUTO-ENTMCNC: 31.8 G/DL (ref 31.5–35.7)
MCV RBC AUTO: 89.4 FL (ref 79–97)
MONOCYTES # BLD AUTO: 0.68 10*3/MM3 (ref 0.1–0.9)
MONOCYTES NFR BLD AUTO: 7.3 % (ref 5–12)
NEUTROPHILS NFR BLD AUTO: 6.28 10*3/MM3 (ref 1.7–7)
NEUTROPHILS NFR BLD AUTO: 67.7 % (ref 42.7–76)
PLATELET # BLD AUTO: 494 10*3/MM3 (ref 140–450)
PMV BLD AUTO: 8.8 FL (ref 6–12)
RBC # BLD AUTO: 4.51 10*6/MM3 (ref 3.77–5.28)
TIBC SERPL-MCNC: 444 MCG/DL (ref 298–536)
TRANSFERRIN SERPL-MCNC: 298 MG/DL (ref 200–360)
WBC NRBC COR # BLD AUTO: 9.29 10*3/MM3 (ref 3.4–10.8)

## 2024-09-17 PROCEDURE — 1159F MED LIST DOCD IN RCRD: CPT | Performed by: NURSE PRACTITIONER

## 2024-09-17 PROCEDURE — 1126F AMNT PAIN NOTED NONE PRSNT: CPT | Performed by: NURSE PRACTITIONER

## 2024-09-17 PROCEDURE — 36415 COLL VENOUS BLD VENIPUNCTURE: CPT | Performed by: NURSE PRACTITIONER

## 2024-09-17 PROCEDURE — 1160F RVW MEDS BY RX/DR IN RCRD: CPT | Performed by: NURSE PRACTITIONER

## 2024-09-17 PROCEDURE — 82728 ASSAY OF FERRITIN: CPT | Performed by: NURSE PRACTITIONER

## 2024-09-17 PROCEDURE — 99214 OFFICE O/P EST MOD 30 MIN: CPT | Performed by: NURSE PRACTITIONER

## 2024-09-17 PROCEDURE — 85025 COMPLETE CBC W/AUTO DIFF WBC: CPT | Performed by: NURSE PRACTITIONER

## 2024-09-17 PROCEDURE — 83540 ASSAY OF IRON: CPT | Performed by: NURSE PRACTITIONER

## 2024-09-17 PROCEDURE — 3078F DIAST BP <80 MM HG: CPT | Performed by: NURSE PRACTITIONER

## 2024-09-17 PROCEDURE — 3074F SYST BP LT 130 MM HG: CPT | Performed by: NURSE PRACTITIONER

## 2024-09-17 PROCEDURE — 84466 ASSAY OF TRANSFERRIN: CPT | Performed by: NURSE PRACTITIONER

## 2024-09-17 RX ORDER — OMEPRAZOLE 10 MG/1
20 CAPSULE, DELAYED RELEASE ORAL DAILY
Qty: 90 CAPSULE | Refills: 1 | Status: SHIPPED | OUTPATIENT
Start: 2024-09-17 | End: 2024-09-19 | Stop reason: SDUPTHER

## 2024-09-19 RX ORDER — OMEPRAZOLE 10 MG/1
20 CAPSULE, DELAYED RELEASE ORAL DAILY
Qty: 90 CAPSULE | Refills: 1 | Status: SHIPPED | OUTPATIENT
Start: 2024-09-19

## 2024-09-20 ENCOUNTER — HOSPITAL ENCOUNTER (OUTPATIENT)
Dept: MRI IMAGING | Facility: HOSPITAL | Age: 54
Discharge: HOME | End: 2024-09-20
Payer: COMMERCIAL

## 2024-09-20 DIAGNOSIS — M54.42 CHRONIC MIDLINE LOW BACK PAIN WITH BILATERAL SCIATICA: ICD-10-CM

## 2024-09-20 DIAGNOSIS — M71.30 SYNOVIAL CYST: ICD-10-CM

## 2024-09-20 DIAGNOSIS — M54.41 CHRONIC MIDLINE LOW BACK PAIN WITH BILATERAL SCIATICA: ICD-10-CM

## 2024-09-20 DIAGNOSIS — G89.29 CHRONIC MIDLINE LOW BACK PAIN WITH BILATERAL SCIATICA: ICD-10-CM

## 2024-09-20 PROCEDURE — 72148 MRI LUMBAR SPINE W/O DYE: CPT

## 2024-09-23 ENCOUNTER — TELEPHONE (OUTPATIENT)
Dept: ONCOLOGY | Facility: HOSPITAL | Age: 54
End: 2024-09-23

## 2024-09-23 NOTE — TELEPHONE ENCOUNTER
Caller: Bianca Lara    Relationship: Self    Best call back number: [phone]    Caller requesting test results: PATIENT     What test was performed: LABS     When was the test performed: 9-17-24    Where was the test performed:     Additional notes: PATIENT DID RECEIVE MESSAGE FROM KAY BUT WANTED TO SPEAK TO HER REGARDING LAB RESULTS

## 2024-09-24 DIAGNOSIS — D75.839 THROMBOCYTOSIS, UNSPECIFIED: ICD-10-CM

## 2024-09-24 DIAGNOSIS — E61.1 IRON DEFICIENCY: ICD-10-CM

## 2024-09-24 DIAGNOSIS — K59.03 DRUG-INDUCED CONSTIPATION: Primary | ICD-10-CM

## 2024-09-24 RX ORDER — FERROUS GLUCONATE 324(38)MG
324 TABLET ORAL 3 TIMES WEEKLY
Qty: 60 TABLET | Refills: 1 | Status: SHIPPED | OUTPATIENT
Start: 2024-09-25

## 2024-09-24 RX ORDER — AMOXICILLIN 250 MG
1 CAPSULE ORAL DAILY PRN
Qty: 90 TABLET | Refills: 1 | Status: SHIPPED | OUTPATIENT
Start: 2024-09-24

## 2024-09-30 DIAGNOSIS — B00.9 HERPES: ICD-10-CM

## 2024-09-30 RX ORDER — VALACYCLOVIR HYDROCHLORIDE 500 MG/1
500 TABLET, FILM COATED ORAL DAILY
Qty: 30 TABLET | Refills: 1 | Status: SHIPPED | OUTPATIENT
Start: 2024-09-30

## 2024-10-03 ENCOUNTER — TELEPHONE (OUTPATIENT)
Dept: NEUROSURGERY | Facility: CLINIC | Age: 54
End: 2024-10-03
Payer: COMMERCIAL

## 2024-10-03 ENCOUNTER — OFFICE VISIT (OUTPATIENT)
Dept: ORTHOPEDIC SURGERY | Facility: CLINIC | Age: 54
End: 2024-10-03
Payer: COMMERCIAL

## 2024-10-03 VITALS
HEART RATE: 83 BPM | BODY MASS INDEX: 36.86 KG/M2 | HEIGHT: 63 IN | SYSTOLIC BLOOD PRESSURE: 128 MMHG | DIASTOLIC BLOOD PRESSURE: 78 MMHG | OXYGEN SATURATION: 95 % | WEIGHT: 208 LBS

## 2024-10-03 DIAGNOSIS — M25.552 LEFT HIP PAIN: ICD-10-CM

## 2024-10-03 DIAGNOSIS — M70.61 TROCHANTERIC BURSITIS OF RIGHT HIP: ICD-10-CM

## 2024-10-03 DIAGNOSIS — M16.0 OSTEOARTHRITIS OF BOTH HIPS, UNSPECIFIED OSTEOARTHRITIS TYPE: ICD-10-CM

## 2024-10-03 DIAGNOSIS — M25.551 RIGHT HIP PAIN: ICD-10-CM

## 2024-10-03 DIAGNOSIS — M70.62 TROCHANTERIC BURSITIS OF LEFT HIP: Primary | ICD-10-CM

## 2024-10-03 RX ORDER — LIDOCAINE HYDROCHLORIDE 10 MG/ML
5 INJECTION, SOLUTION INFILTRATION; PERINEURAL
Status: COMPLETED | OUTPATIENT
Start: 2024-10-03 | End: 2024-10-03

## 2024-10-03 RX ORDER — TRIAMCINOLONE ACETONIDE 40 MG/ML
40 INJECTION, SUSPENSION INTRA-ARTICULAR; INTRAMUSCULAR
Status: COMPLETED | OUTPATIENT
Start: 2024-10-03 | End: 2024-10-03

## 2024-10-03 RX ORDER — METHOCARBAMOL 500 MG/1
500 TABLET, FILM COATED ORAL 4 TIMES DAILY
Qty: 40 TABLET | Refills: 0 | Status: SHIPPED | OUTPATIENT
Start: 2024-10-03 | End: 2024-10-13

## 2024-10-03 RX ADMIN — LIDOCAINE HYDROCHLORIDE 5 ML: 10 INJECTION, SOLUTION INFILTRATION; PERINEURAL at 09:22

## 2024-10-03 RX ADMIN — LIDOCAINE HYDROCHLORIDE 5 ML: 10 INJECTION, SOLUTION INFILTRATION; PERINEURAL at 09:21

## 2024-10-03 RX ADMIN — TRIAMCINOLONE ACETONIDE 40 MG: 40 INJECTION, SUSPENSION INTRA-ARTICULAR; INTRAMUSCULAR at 09:21

## 2024-10-03 RX ADMIN — TRIAMCINOLONE ACETONIDE 40 MG: 40 INJECTION, SUSPENSION INTRA-ARTICULAR; INTRAMUSCULAR at 09:22

## 2024-10-03 NOTE — TELEPHONE ENCOUNTER
----- Message from James Ayala sent at 10/3/2024  7:59 AM EDT -----  There are some areas of moderate foraminal narrowing.  No significant synovial cyst but there is facet arthritis.  Can arrange for follow-up to discuss and make recommendations if desires.

## 2024-10-03 NOTE — PROGRESS NOTES
Chief Complaint  Follow-up of the Left Hip and Follow-up of the Right Hip    Subjective          History of Present Illness      Bianca Lara is a 53 y.o. female  presents to [facility] for     Patient presents for follow-up evaluation of bilateral hip pain and bilateral hip bursitis.  She has been seen by Dr. Ayala for her low back she is also in pain management.  She is requesting a Robaxin refill.  She states that her hips hurt in the lateral hips same as in the past.  She is requesting bilateral hip bursitis injections      Allergies   Allergen Reactions    Azithromycin Nausea Only    Duloxetine Hcl Unknown - Low Severity    Lisinopril Cough    Penicillins Unknown - Low Severity        Social History     Socioeconomic History    Marital status:    Tobacco Use    Smoking status: Former     Current packs/day: 0.00     Average packs/day: 0.3 packs/day for 54.1 years (13.5 ttl pk-yrs)     Types: Cigarettes     Start date: 1989     Quit date: 2024     Years since quittin.3     Passive exposure: Past    Smokeless tobacco: Never   Vaping Use    Vaping status: Never Used   Substance and Sexual Activity    Alcohol use: Yes     Alcohol/week: 10.0 standard drinks of alcohol     Types: 4 Glasses of wine, 4 Shots of liquor, 2 Drinks containing 0.5 oz of alcohol per week     Comment: glass of wine every other day    Drug use: Never    Sexual activity: Yes     Partners: Male     Birth control/protection: Condom        REVIEW OF SYSTEMS    Constitutional: Awake alert and oriented x3, no acute distress, denies fevers, chills, weight loss  Respiratory: No respiratory distress  Vascular: Brisk cap refill, Intact distal pulses, No cyanosis, compartments soft with no signs or symptoms of compartment syndrome or DVT.   Cardiovascular: Denies chest pain, shortness of breath  Skin: Denies rashes, acute skin changes  Neurologic: Denies headache, loss of consciousness  MSK:  "Bilateral hip pain      Objective   Vital Signs:   /78   Pulse 83   Ht 160 cm (62.99\")   Wt 94.3 kg (208 lb)   SpO2 95%   BMI 36.85 kg/m²     Body mass index is 36.85 kg/m².    Physical Exam       Right hip- Positive EHL, FHL, GS and TA. Sensation intact to all 5 nerves of the foot. Positive pulses. Flexion 90. External Rotation 35. Internal rotation 30. Negative straight leg raise.  Tender to the lateral hip over the greater trochanteric. Tender to the lower lumbar     Left hip-  Flexion 90, External Rotation 40, internal rotation 25. Negative straight leg raise. Tender to the lateral hip over the bursa. Tender to the lower lumbar, Positive EHL, FHL, GS and TA. Sensation intact to all 5 nerves of the foot. Positive pulses.       Large Joint: R greater trochanteric bursa  Date/Time: 10/3/2024 9:21 AM  Consent given by: patient  Site marked: site marked  Timeout: Immediately prior to procedure a time out was called to verify the correct patient, procedure, equipment, support staff and site/side marked as required   Supporting Documentation  Indications: pain   Procedure Details  Location: hip - R greater trochanteric bursa  Preparation: Patient was prepped and draped in the usual sterile fashion  Needle size: 22 G (spinal)  Medications administered: 5 mL lidocaine 1 %; 40 mg triamcinolone acetonide 40 MG/ML  Patient tolerance: patient tolerated the procedure well with no immediate complications      Large Joint: L greater trochanteric bursa  Date/Time: 10/3/2024 9:22 AM  Consent given by: patient  Site marked: site marked  Timeout: Immediately prior to procedure a time out was called to verify the correct patient, procedure, equipment, support staff and site/side marked as required   Supporting Documentation  Indications: pain   Procedure Details  Location: hip - L greater trochanteric bursa  Preparation: Patient was prepped and draped in the usual sterile fashion  Needle size: 22 G (spinal)  Medications " administered: 5 mL lidocaine 1 %; 40 mg triamcinolone acetonide 40 MG/ML  Patient tolerance: patient tolerated the procedure well with no immediate complications    This injection documentation was Scribed for LETY Maxwell by Courtney Lara MA.  10/03/24   09:23 EDT    Imaging Results (Most Recent)       None             Result Review :   The following data was reviewed by: LETY Maxwell on 10/03/2024:               Assessment and Plan    Diagnoses and all orders for this visit:    1. Trochanteric bursitis of left hip (Primary)    2. Osteoarthritis of both hips, unspecified osteoarthritis type    3. Trochanteric bursitis of right hip    4. Right hip pain    5. Left hip pain    Other orders  -     methocarbamol (ROBAXIN) 500 MG tablet; Take 1 tablet by mouth 4 (Four) Times a Day for 10 days.  Dispense: 40 tablet; Refill: 0        Discussed diagnosis and treatment options with the patient she was advised to continue conservative treatment she elected to have bilateral hip bursitis injections which she tolerated well, she was given a short course refill for Robaxin, avoid working driving or operating heavy machinery while on this, she agreed follow-up in 3 months.  We discussed that she will request this medicine from her primary care physician or pain management physician in the future    Call or return if worsening symptoms.    Follow Up   Return in about 3 months (around 1/3/2025) for Recheck.  Patient was given instructions and counseling regarding her condition or for health maintenance advice. Please see specific information pulled into the AVS if appropriate.       EMR Dragon/Transcription disclaimer:  Part of this note may be an electronic transcription/translation of spoken language to printed text using the Dragon Dictation System

## 2024-10-03 NOTE — TELEPHONE ENCOUNTER
Attempted to contact patient, but voicemail box has not been set up. Ok for hub to relay/schedule follow up.

## 2024-10-10 ENCOUNTER — TELEPHONE (OUTPATIENT)
Dept: FAMILY MEDICINE CLINIC | Facility: CLINIC | Age: 54
End: 2024-10-10
Payer: COMMERCIAL

## 2024-10-10 NOTE — TELEPHONE ENCOUNTER
Patients daughter Cassie Rodriguez call stated the patient needs a referral to a Therapist, stating she is needing to sit down and talk to someone. Once referral is placed she would like a call.

## 2024-10-13 DIAGNOSIS — F41.9 ANXIETY AND DEPRESSION: ICD-10-CM

## 2024-10-13 DIAGNOSIS — F32.A ANXIETY AND DEPRESSION: ICD-10-CM

## 2024-10-14 DIAGNOSIS — F41.9 ANXIETY AND DEPRESSION: ICD-10-CM

## 2024-10-14 DIAGNOSIS — I10 PRIMARY HYPERTENSION: ICD-10-CM

## 2024-10-14 DIAGNOSIS — B00.9 HERPES: ICD-10-CM

## 2024-10-14 DIAGNOSIS — F32.A ANXIETY AND DEPRESSION: ICD-10-CM

## 2024-10-14 DIAGNOSIS — J30.1 SEASONAL ALLERGIC RHINITIS DUE TO POLLEN: ICD-10-CM

## 2024-10-14 DIAGNOSIS — Z87.891 PERSONAL HISTORY OF NICOTINE DEPENDENCE: ICD-10-CM

## 2024-10-14 RX ORDER — FLUTICASONE PROPIONATE 50 UG/1
2 SPRAY, METERED NASAL DAILY
Qty: 16 G | Refills: 5 | Status: SHIPPED | OUTPATIENT
Start: 2024-10-14

## 2024-10-14 RX ORDER — VALACYCLOVIR HYDROCHLORIDE 500 MG/1
500 TABLET, FILM COATED ORAL DAILY
Qty: 30 TABLET | Refills: 1 | Status: SHIPPED | OUTPATIENT
Start: 2024-10-14

## 2024-10-14 RX ORDER — POTASSIUM CHLORIDE 750 MG/1
10 TABLET, EXTENDED RELEASE ORAL DAILY
Qty: 90 TABLET | Refills: 1 | Status: SHIPPED | OUTPATIENT
Start: 2024-10-14

## 2024-10-14 RX ORDER — VILAZODONE HYDROCHLORIDE 40 MG/1
40 TABLET ORAL DAILY
Qty: 90 TABLET | Refills: 1 | Status: SHIPPED | OUTPATIENT
Start: 2024-10-14

## 2024-10-14 RX ORDER — LAMOTRIGINE 25 MG/1
50 TABLET ORAL 2 TIMES DAILY
Qty: 120 TABLET | Refills: 1 | Status: SHIPPED | OUTPATIENT
Start: 2024-10-14

## 2024-10-14 RX ORDER — LORATADINE 10 MG/1
10 TABLET ORAL DAILY
Qty: 90 TABLET | Refills: 3 | Status: SHIPPED | OUTPATIENT
Start: 2024-10-14

## 2024-10-14 RX ORDER — LOSARTAN POTASSIUM AND HYDROCHLOROTHIAZIDE 12.5; 5 MG/1; MG/1
1 TABLET ORAL DAILY
Qty: 90 TABLET | Refills: 1 | Status: SHIPPED | OUTPATIENT
Start: 2024-10-14

## 2024-10-14 RX ORDER — ERGOCALCIFEROL 1.25 MG/1
50000 CAPSULE, LIQUID FILLED ORAL WEEKLY
Qty: 13 CAPSULE | Refills: 0 | Status: SHIPPED | OUTPATIENT
Start: 2024-10-14

## 2024-10-15 RX ORDER — VARENICLINE TARTRATE 1 MG/1
1 TABLET, FILM COATED ORAL 2 TIMES DAILY
Qty: 56 TABLET | Refills: 1 | Status: SHIPPED | OUTPATIENT
Start: 2024-10-15 | End: 2024-12-10

## 2024-10-15 RX ORDER — TERBINAFINE HYDROCHLORIDE 250 MG/1
250 TABLET ORAL DAILY
Qty: 30 TABLET | Refills: 2 | OUTPATIENT
Start: 2024-10-15

## 2024-10-16 DIAGNOSIS — I10 PRIMARY HYPERTENSION: ICD-10-CM

## 2024-10-16 DIAGNOSIS — B00.9 HERPES: ICD-10-CM

## 2024-10-16 RX ORDER — LOSARTAN POTASSIUM AND HYDROCHLOROTHIAZIDE 12.5; 5 MG/1; MG/1
1 TABLET ORAL DAILY
Qty: 90 TABLET | Refills: 1 | OUTPATIENT
Start: 2024-10-16

## 2024-10-16 RX ORDER — VALACYCLOVIR HYDROCHLORIDE 500 MG/1
500 TABLET, FILM COATED ORAL DAILY
Qty: 30 TABLET | Refills: 1 | OUTPATIENT
Start: 2024-10-16

## 2024-10-16 NOTE — TELEPHONE ENCOUNTER
Caller: edna de guzman    Relationship: Emergency Contact    Best call back number: [phone]     Requested Prescriptions:   Requested Prescriptions     Pending Prescriptions Disp Refills    valACYclovir (VALTREX) 500 MG tablet 30 tablet 1     Sig: Take 1 tablet by mouth Daily.    losartan-hydrochlorothiazide (Hyzaar) 50-12.5 MG per tablet 90 tablet 1     Sig: Take 1 tablet by mouth Daily.        Pharmacy where request should be sent: [facility] #[id number] - [place], [place] - [address] [facility] [phone] [facility] [phone] FX     Last office visit with prescribing clinician: 7/24/2024   Last telemedicine visit with prescribing clinician: Visit date not found   Next office visit with prescribing clinician: 1/23/2025     Does the patient have less than a 3 day supply:  [x] Yes  [] No    Would you like a call back once the refill request has been completed: [x] Yes [] No    If the office needs to give you a call back, can they leave a voicemail: [x] Yes [] No    Jonatan Maede Rep   10/16/24 12:23 EDT

## 2024-10-31 ENCOUNTER — TELEPHONE (OUTPATIENT)
Dept: FAMILY MEDICINE CLINIC | Facility: CLINIC | Age: 54
End: 2024-10-31

## 2024-10-31 ENCOUNTER — OFFICE VISIT (OUTPATIENT)
Dept: FAMILY MEDICINE CLINIC | Facility: CLINIC | Age: 54
End: 2024-10-31
Payer: COMMERCIAL

## 2024-10-31 VITALS — HEART RATE: 74 BPM | SYSTOLIC BLOOD PRESSURE: 140 MMHG | OXYGEN SATURATION: 99 % | DIASTOLIC BLOOD PRESSURE: 98 MMHG

## 2024-10-31 DIAGNOSIS — R45.851 SUICIDAL IDEATION: ICD-10-CM

## 2024-10-31 DIAGNOSIS — M51.362 DEGENERATION OF INTERVERTEBRAL DISC OF LUMBAR REGION WITH DISCOGENIC BACK PAIN AND LOWER EXTREMITY PAIN: ICD-10-CM

## 2024-10-31 DIAGNOSIS — F32.A ANXIETY AND DEPRESSION: Primary | ICD-10-CM

## 2024-10-31 DIAGNOSIS — F41.9 ANXIETY AND DEPRESSION: Primary | ICD-10-CM

## 2024-10-31 PROCEDURE — 3080F DIAST BP >= 90 MM HG: CPT | Performed by: NURSE PRACTITIONER

## 2024-10-31 PROCEDURE — 99213 OFFICE O/P EST LOW 20 MIN: CPT | Performed by: NURSE PRACTITIONER

## 2024-10-31 PROCEDURE — 1160F RVW MEDS BY RX/DR IN RCRD: CPT | Performed by: NURSE PRACTITIONER

## 2024-10-31 PROCEDURE — 1159F MED LIST DOCD IN RCRD: CPT | Performed by: NURSE PRACTITIONER

## 2024-10-31 PROCEDURE — 1125F AMNT PAIN NOTED PAIN PRSNT: CPT | Performed by: NURSE PRACTITIONER

## 2024-10-31 PROCEDURE — 3077F SYST BP >= 140 MM HG: CPT | Performed by: NURSE PRACTITIONER

## 2024-10-31 RX ORDER — OLANZAPINE 5 MG/1
5 TABLET ORAL NIGHTLY
Qty: 30 TABLET | Refills: 1 | Status: SHIPPED | OUTPATIENT
Start: 2024-10-31

## 2024-10-31 NOTE — TELEPHONE ENCOUNTER
Attempted to call both patient and her daughter (emergency contact) but both went straight to voicemail and voicemail boxes not set up.     I called [facility] and verified that they received the prescription for Olanzapine and losartan-HCTZ. They states the Olanzapine needs a prior authorization and the losartan-HCTZ can not be filled until 11/19/2024 because she recently filled the medication at a different pharmacy.     I did a PA on the Olanzapine and it has been approved. Patient will have to pay out of pocket for losartan-HCTZ.

## 2024-10-31 NOTE — TELEPHONE ENCOUNTER
After the patients conversation with Love, I spoke with the patient. She stated that she has been having a very hard time with her anxiety and depression. She stated that due to her severe back and hip pain she is unable to work which has also contributed in her worsening mental status. She has had to move in with her daughter and she feels like a burden. I advised her that, based off of her symptoms and worsening mental state, we recommend her to go to an inpatient facility such as [facility] or the ED to have a psychiatric evaluation. Patient verbalized understanding and agreed to go to an psychiatric facility but she had to leave and go pick her daughter up from an appointment. Patient was in her car about to leave when I was advised to go get her from outside, bring her back in, have someone stay in the room with her and call 911 to report suicidal ideations PER [facility] protocol. After which Reina husain came in and evaluated the patient.

## 2024-10-31 NOTE — TELEPHONE ENCOUNTER
I left my desk to go speak with Ms. Putnam. Per Ms. Putnam, Clarice JIANG will speak to Ms. Lara at this time.

## 2024-10-31 NOTE — TELEPHONE ENCOUNTER
Patient came into office wanting to know if you would send her in a steroid pack or some steroids for her back pain. She is going to have surgery but not scheduled yet, she sees Dr. Ayala 11-. States her back is hurting and not doing good, she has tried to call his office without getting moved up in her appointment.     Patient did get all medication that was sent on 10/14/2024 except her losartan-hydrochlorothiazide (Hyzaar) 50-12.5 MG per tablet she states that [facility] will not [facility] at this time.     She states that she did increase her Vibrid to 60 mg. She is taking the 40 and 1/2 of another pill due to not wanting to kill herself or harm someone. She states she does not feel like killing herself today.

## 2024-10-31 NOTE — PROGRESS NOTES
Chief Complaint  Depression and Anxiety    Subjective          Bianca Lara is a 54 y.o. female who presents to [facility]    History of Present Illness  History of Present Illness    Patient presented to the office at the  and was inquiring about getting refills of her blood pressure medicine that the pharmacy would not currently refill.  Patient advised  that her Viibryd was not working and she had increased it to 1-1/2 tabs of the 40 mg tab patient was tearful and reported that she was suicidal to the .  Patient was brought back to a room for further evaluation.  Upon further questioning patient did not have a plan for suicide patient denied any access to guns in her home.  Patient reports that her back pain has gotten worse and she has not been able to work this is caused her to have to live with her daughter which has caused her significant depression.  Patient did contract for safety that she did not have a plan and that she would not harm herself.  We had the medical assistant call the pharmacy to see why her blood pressure medicine could not be refilled.    Patient was giving a steroid pack for her persistent back pain while she is awaiting her appointment to see neurosurgery on November 12.  Patient continues to ambulate with a cane.    Patient's blood pressure was high in the office today due to being upset and not having her hypertensive meds.           Health Maintenance Due   Topic Date Due    TDAP/TD VACCINES (1 - Tdap) Never done    ZOSTER VACCINE (1 of 2) Never done    COVID-19 Vaccine (5 - 2024-25 season) 09/01/2024        Review of Systems   Constitutional:  Negative for fever.   Musculoskeletal:  Positive for back pain.   Psychiatric/Behavioral:  Positive for decreased concentration, dysphoric mood and suicidal ideas. Negative for agitation, behavioral problems, confusion and self-injury. The patient is nervous/anxious.            Medical History: has a past medical history of Allergic, Allergic rhinitis, Anxiety (09/11/2018), Arthritis, Blood in stool, Condition not found, CTS (carpal tunnel syndrome) (2015), Depression (09/11/2018), Difficulty walking (2023), Dysmenorrhea, Gross hematuria, Headache, Headache, tension-type (2022), Helicobacter positive gastritis (05/26/2016), Hyperlipidemia (2024), Hypertension, Irregular menses, Low back pain (07/09/2014), Microscopic hematuria (06/10/2014), Night sweats, Onychomycosis of toenail (07/09/2014), Pap smear for cervical cancer screening (05/16/2016), Right sided abdominal pain (06/12/2014), Screening mammogram, encounter for (11/09/2018), Sleep apnea (2023), STD exposure (1993), Tobacco abuse, and Vitamin D deficiency.     Surgical History: has a past surgical history that includes Breast biopsy (Left, 01/09/2012); Cholecystectomy; Colonoscopy (08/15/2016); Cyst Removal; and Cystoscopy (06/27/2014).     Family History: family history includes Alzheimer's disease in her mother; Cancer in her maternal grandmother; Dementia in her mother; Diabetes in her mother and another family member; Heart disease in her brother, sister, and another family member; Hyperlipidemia in her sister; Kidney disease in her sister; Leukemia in her father and paternal grandfather; Stomach cancer in her mother; Stroke in an other family member.     Social History: reports that she quit smoking about 5 months ago. Her smoking use included cigarettes. She started smoking about 34 years ago. She has a 13.5 pack-year smoking history. She has been exposed to tobacco smoke. She has never used smokeless tobacco. She reports current alcohol use of about 10.0 standard drinks of alcohol per week. She reports that she does not use drugs.    Allergies: Azithromycin, Duloxetine hcl, Lisinopril, and Penicillins      Current Outpatient Medications:     ammonium lactate (LAC-HYDRIN) 12 % lotion, Apply 1 application  topically to  the appropriate area as directed As Needed for Irritation., Disp: , Rfl:     ciclopirox (PENLAC) 8 % solution, APPLY TO THE AFFECTED AREA(S) TOPICALLY ONCE DAILY PREFERABLY AT BEDTIME OR 8 HOURS BEFORE WASHING, Disp: , Rfl:     ferrous gluconate (FERGON) 324 MG tablet, Take 1 tablet by mouth 3 (Three) Times a Week., Disp: 60 tablet, Rfl: 1    fluticasone (FLONASE) 50 MCG/ACT nasal spray, Administer 2 sprays into the nostril(s) as directed by provider Daily., Disp: 16 g, Rfl: 5    loratadine (Claritin) 10 MG tablet, Take 1 tablet by mouth Daily., Disp: 90 tablet, Rfl: 3    losartan-hydrochlorothiazide (Hyzaar) 50-12.5 MG per tablet, Take 1 tablet by mouth Daily., Disp: 90 tablet, Rfl: 1    omeprazole (prilOSEC) 10 MG capsule, Take 2 capsules by mouth Daily., Disp: 90 capsule, Rfl: 1    potassium chloride 10 MEQ CR tablet, Take 1 tablet by mouth Daily., Disp: 90 tablet, Rfl: 1    pregabalin (LYRICA) 100 MG capsule, Take 1 capsule by mouth 3 times a day., Disp: , Rfl:     sennosides-docusate (senna-docusate sodium) 8.6-50 MG per tablet, Take 1 tablet by mouth Daily As Needed for Constipation., Disp: 90 tablet, Rfl: 1    valACYclovir (VALTREX) 500 MG tablet, Take 1 tablet by mouth Daily., Disp: 30 tablet, Rfl: 1    vilazodone (Viibryd) 40 MG tablet tablet, Take 1 tablet by mouth Daily., Disp: 90 tablet, Rfl: 1    vitamin D (ERGOCALCIFEROL) 1.25 MG (58601 UT) capsule capsule, Take 1 capsule by mouth 1 (One) Time Per Week., Disp: 13 capsule, Rfl: 0    methylPREDNISolone (MEDROL) 4 MG dose pack, Take as directed on package instructions., Disp: 21 tablet, Rfl: 0    OLANZapine (ZyPREXA) 5 MG tablet, Take 1 tablet by mouth Every Night., Disp: 30 tablet, Rfl: 1      Immunization History   Administered Date(s) Administered    COVID-19 (MODERNA) 1st,2nd,3rd Dose Monovalent 01/07/2021, 02/04/2021, 10/28/2021    COVID-19 (MODERNA) BIVALENT 12+YRS 10/17/2022    Fluzone (or Fluarix & Flulaval for VFC) >6mos 10/17/2022, 11/09/2023     Fluzone Quad >6mos (Multi-dose) 10/29/2020    Influenza, Unspecified 10/17/2022    Pneumococcal Conjugate 20-Valent (PCV20) 05/31/2024         Objective       Vitals:    10/31/24 1409   BP: 140/98   BP Location: Right arm   Pulse: 74   SpO2: 99%   PainSc:   8   PainLoc: Back      There is no height or weight on file to calculate BMI.   Wt Readings from Last 3 Encounters:   10/03/24 94.3 kg (208 lb)   09/17/24 94.5 kg (208 lb 6.4 oz)   08/13/24 94.8 kg (209 lb 1.6 oz)      BP Readings from Last 3 Encounters:   10/31/24 140/98   10/03/24 128/78   09/17/24 111/76             Physical Exam  Constitutional:       Appearance: Normal appearance.   Pulmonary:      Effort: Pulmonary effort is normal.   Neurological:      General: No focal deficit present.      Mental Status: She is alert and oriented to person, place, and time. Mental status is at baseline.   Psychiatric:         Mood and Affect: Mood normal. Affect is tearful.         Speech: Speech normal. Speech is not rapid and pressured, delayed or slurred.         Thought Content: Thought content normal.         Cognition and Memory: Cognition is not impaired. Memory is not impaired.         Judgment: Judgment normal.         Physical Exam        Result Review :   Results         Common labs          5/31/2024    14:09 8/6/2024    08:51 9/17/2024    08:59   Common Labs   Glucose 137      BUN 16      Creatinine 0.96      Sodium 143      Potassium 3.3      Chloride 101      Calcium 9.5      Albumin 4.2      Total Bilirubin 0.2      Alkaline Phosphatase 68      AST (SGOT) 12      ALT (SGPT) 17      WBC 10.11  7.02  9.29    Hemoglobin 12.6  13.5  12.8    Hematocrit 39.3  43.2  40.3    Platelets 455  492  494                     Assessment and Plan        Diagnoses and all orders for this visit:    1. Anxiety and depression (Primary)  -     OLANZapine (ZyPREXA) 5 MG tablet; Take 1 tablet by mouth Every Night.  Dispense: 30 tablet; Refill: 1  -     Ambulatory Referral to  Psychiatry    2. Suicidal ideation  -     OLANZapine (ZyPREXA) 5 MG tablet; Take 1 tablet by mouth Every Night.  Dispense: 30 tablet; Refill: 1  -     Ambulatory Referral to Psychiatry    3. Degeneration of intervertebral disc of lumbar region with discogenic back pain and lower extremity pain  Assessment & Plan:  Patient should keep her follow-up with neurosurgery.      Patient was advised to return to her original dose of Viibryd at 40 mg as this is the max recommended daily dose.  Patient was advised that we were going to decrease her Lamictal and start her on Zyprexa as it did not seem to be stabilizing her mood well.  Patient was advised that it would be helpful for to her to have inpatient evaluation so that she could have the necessary med adjustments patient agreed that after she picked up her daughter from psychiatry that she would check into inpatient therapy either at [facility] or [facility].  Patient contracted for safety and denied any plan or guns in the home.    The medical assistant called the pharmacy and it was too early for pickup on insurance however patient was advised that she could  her prescription for $22 and patient stated that she would do so.  Patient advised to take her medicine as soon as possible.  And to continue monitoring her blood pressure.    Patient advised to monitor blood pressure at home and journal readings.  Patient informed that a blood pressure reading at home of more than 130/80 is considered hypertension.  For readings greater than 140/90 or higher patient is advised to follow-up in the office with readings for management.  Patient advised to limit sodium intake.        Assessment & Plan      Return in about 1 week (around 11/7/2024).    Patient was given instructions and counseling regarding her condition or for health maintenance advice. Please see specific information pulled into the AVS if appropriate.     TESSA Cerrato    Patient or  patient representative verbalized consent for the use of Ambient Listening during the visit with  TESSA Cerrato for chart documentation. 11/4/2024  10:16 EST

## 2024-11-01 ENCOUNTER — TELEPHONE (OUTPATIENT)
Dept: FAMILY MEDICINE CLINIC | Facility: CLINIC | Age: 54
End: 2024-11-01
Payer: COMMERCIAL

## 2024-11-01 RX ORDER — METHYLPREDNISOLONE 4 MG/1
TABLET ORAL
Qty: 21 TABLET | Refills: 0 | Status: SHIPPED | OUTPATIENT
Start: 2024-11-01

## 2024-11-01 NOTE — TELEPHONE ENCOUNTER
Called patient today to check up on her. I asked her how she was feeling and if she picked up her medications from the pharmacy. She said she was not able to  her medications because they phamarcy told her she could not give them to her. I advised her that the Olanzapine/Zyprexa needed a PA and I completed that and it has been approved, and told her that the Losartan/HCTZ could not be filled through insurance because it is too soon. I advised her that she would have to notify the pharmacy that she would pay for it out of pocket until the 11/19/24 when her insurance will pay for it. Patient verbalized understanding.    I asked her how she is feeling overall. She said she is feeling better today and that she has had family members that have helped her. I also verified that her follow up appointment with Reina is on 11/7/24.    Gent and Clinda given preop  Now postpartum  Peds aware

## 2024-11-13 ENCOUNTER — TELEPHONE (OUTPATIENT)
Dept: ONCOLOGY | Facility: HOSPITAL | Age: 54
End: 2024-11-13

## 2024-11-13 NOTE — TELEPHONE ENCOUNTER
Caller: JAIME CAMPBELL    Relationship:[employer]     Best call back number: [phone] EXT 3      Who are you requesting to speak with (clinical staff, provider,  specific staff member): CLINICAL        What was the call regarding: CALLING TO VERIFY IF OFFICE RECEIVED MEDICAL QUESTIONNAIRE

## 2024-11-14 ENCOUNTER — APPOINTMENT (OUTPATIENT)
Dept: GENERAL RADIOLOGY | Facility: HOSPITAL | Age: 54
End: 2024-11-14
Payer: COMMERCIAL

## 2024-11-14 ENCOUNTER — TELEPHONE (OUTPATIENT)
Dept: FAMILY MEDICINE CLINIC | Facility: CLINIC | Age: 54
End: 2024-11-14
Payer: COMMERCIAL

## 2024-11-14 ENCOUNTER — HOSPITAL ENCOUNTER (EMERGENCY)
Facility: HOSPITAL | Age: 54
Discharge: HOME | End: 2024-11-14
Attending: EMERGENCY MEDICINE
Payer: COMMERCIAL

## 2024-11-14 VITALS
WEIGHT: 213.41 LBS | RESPIRATION RATE: 13 BRPM | TEMPERATURE: 98.8 F | BODY MASS INDEX: 37.81 KG/M2 | SYSTOLIC BLOOD PRESSURE: 118 MMHG | OXYGEN SATURATION: 98 % | DIASTOLIC BLOOD PRESSURE: 84 MMHG | HEIGHT: 63 IN | HEART RATE: 73 BPM
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
## 2024-11-14 LAB
ALBUMIN SERPL-MCNC: 4 G/DL (ref 3.5–5.2)
ALBUMIN/GLOB SERPL: 1.2 G/DL
ALP SERPL-CCNC: 106 U/L (ref 39–117)
ALT SERPL W P-5'-P-CCNC: 33 U/L (ref 1–33)
ANION GAP SERPL CALCULATED.3IONS-SCNC: 9.6 MMOL/L (ref 5–15)
AST SERPL-CCNC: 18 U/L (ref 1–32)
BACTERIA UR QL AUTO: ABNORMAL /HPF
BASOPHILS # BLD AUTO: 0.08 10*3/MM3 (ref 0–0.2)
BASOPHILS NFR BLD AUTO: 0.8 % (ref 0–1.5)
BILIRUB SERPL-MCNC: 0.2 MG/DL (ref 0–1.2)
BILIRUB UR QL STRIP: NEGATIVE
BUN SERPL-MCNC: 15 MG/DL (ref 6–20)
BUN/CREAT SERPL: 17.4 (ref 7–25)
CALCIUM SPEC-SCNC: 9.4 MG/DL (ref 8.6–10.5)
CHLORIDE SERPL-SCNC: 103 MMOL/L (ref 98–107)
CLARITY UR: CLEAR
CO2 SERPL-SCNC: 27.4 MMOL/L (ref 22–29)
COLOR UR: YELLOW
CREAT SERPL-MCNC: 0.86 MG/DL (ref 0.57–1)
DEPRECATED RDW RBC AUTO: 52.1 FL (ref 37–54)
EGFRCR SERPLBLD CKD-EPI 2021: 80.4 ML/MIN/1.73
EOSINOPHIL # BLD AUTO: 0.13 10*3/MM3 (ref 0–0.4)
EOSINOPHIL NFR BLD AUTO: 1.3 % (ref 0.3–6.2)
ERYTHROCYTE [DISTWIDTH] IN BLOOD BY AUTOMATED COUNT: 15.9 % (ref 12.3–15.4)
GLOBULIN UR ELPH-MCNC: 3.4 GM/DL
GLUCOSE SERPL-MCNC: 103 MG/DL (ref 65–99)
GLUCOSE UR STRIP-MCNC: NEGATIVE MG/DL
HCT VFR BLD AUTO: 39.5 % (ref 34–46.6)
HGB BLD-MCNC: 12.4 G/DL (ref 12–15.9)
HGB UR QL STRIP.AUTO: NEGATIVE
HOLD SPECIMEN: NORMAL
HOLD SPECIMEN: NORMAL
HYALINE CASTS UR QL AUTO: ABNORMAL /LPF
IMM GRANULOCYTES # BLD AUTO: 0.06 10*3/MM3 (ref 0–0.05)
IMM GRANULOCYTES NFR BLD AUTO: 0.6 % (ref 0–0.5)
KETONES UR QL STRIP: NEGATIVE
LEUKOCYTE ESTERASE UR QL STRIP.AUTO: ABNORMAL
LYMPHOCYTES # BLD AUTO: 2.62 10*3/MM3 (ref 0.7–3.1)
LYMPHOCYTES NFR BLD AUTO: 25.9 % (ref 19.6–45.3)
MAGNESIUM SERPL-MCNC: 2.1 MG/DL (ref 1.6–2.6)
MCH RBC QN AUTO: 28.3 PG (ref 26.6–33)
MCHC RBC AUTO-ENTMCNC: 31.4 G/DL (ref 31.5–35.7)
MCV RBC AUTO: 90.2 FL (ref 79–97)
MONOCYTES # BLD AUTO: 0.85 10*3/MM3 (ref 0.1–0.9)
MONOCYTES NFR BLD AUTO: 8.4 % (ref 5–12)
NEUTROPHILS NFR BLD AUTO: 6.39 10*3/MM3 (ref 1.7–7)
NEUTROPHILS NFR BLD AUTO: 63 % (ref 42.7–76)
NITRITE UR QL STRIP: NEGATIVE
NRBC BLD AUTO-RTO: 0 /100 WBC (ref 0–0.2)
PH UR STRIP.AUTO: <=5 [PH] (ref 5–8)
PLATELET # BLD AUTO: 459 10*3/MM3 (ref 140–450)
PMV BLD AUTO: 8.5 FL (ref 6–12)
POTASSIUM SERPL-SCNC: 4.2 MMOL/L (ref 3.5–5.2)
PROT SERPL-MCNC: 7.4 G/DL (ref 6–8.5)
PROT UR QL STRIP: NEGATIVE
QT INTERVAL: 315 MS
QTC INTERVAL: 415 MS
RBC # BLD AUTO: 4.38 10*6/MM3 (ref 3.77–5.28)
RBC # UR STRIP: ABNORMAL /HPF
REF LAB TEST METHOD: ABNORMAL
SODIUM SERPL-SCNC: 140 MMOL/L (ref 136–145)
SP GR UR STRIP: 1.02 (ref 1–1.03)
SQUAMOUS #/AREA URNS HPF: ABNORMAL /HPF
TROPONIN T SERPL HS-MCNC: <6 NG/L
UROBILINOGEN UR QL STRIP: ABNORMAL
WBC # UR STRIP: ABNORMAL /HPF
WBC NRBC COR # BLD AUTO: 10.13 10*3/MM3 (ref 3.4–10.8)
WHOLE BLOOD HOLD COAG: NORMAL
WHOLE BLOOD HOLD SPECIMEN: NORMAL

## 2024-11-14 PROCEDURE — 85025 COMPLETE CBC W/AUTO DIFF WBC: CPT | Performed by: EMERGENCY MEDICINE

## 2024-11-14 PROCEDURE — 99284 EMERGENCY DEPT VISIT MOD MDM: CPT

## 2024-11-14 PROCEDURE — 93005 ELECTROCARDIOGRAM TRACING: CPT

## 2024-11-14 PROCEDURE — 84484 ASSAY OF TROPONIN QUANT: CPT | Performed by: EMERGENCY MEDICINE

## 2024-11-14 PROCEDURE — 83735 ASSAY OF MAGNESIUM: CPT | Performed by: EMERGENCY MEDICINE

## 2024-11-14 PROCEDURE — 80053 COMPREHEN METABOLIC PANEL: CPT | Performed by: EMERGENCY MEDICINE

## 2024-11-14 PROCEDURE — 71045 X-RAY EXAM CHEST 1 VIEW: CPT

## 2024-11-14 PROCEDURE — 93005 ELECTROCARDIOGRAM TRACING: CPT | Performed by: EMERGENCY MEDICINE

## 2024-11-14 PROCEDURE — 81001 URINALYSIS AUTO W/SCOPE: CPT | Performed by: EMERGENCY MEDICINE

## 2024-11-14 RX ORDER — SODIUM CHLORIDE 0.9 % (FLUSH) 0.9 %
10 SYRINGE (ML) INJECTION AS NEEDED
Status: DISCONTINUED | OUTPATIENT
Start: 2024-11-14 | End: 2024-11-14 | Stop reason: HOSPADM

## 2024-11-14 NOTE — TELEPHONE ENCOUNTER
Relay     Request has been recd.Records request goes to [employer] can call them to check on status [phone]

## 2024-11-14 NOTE — TELEPHONE ENCOUNTER
Caller: JAIME    Relationship: [employer]     Best call back number: [phone]  EXTENSION 3    What is the best time to reach you: ANY    Who are you requesting to speak with (clinical staff, provider,  specific staff member): CLINICAL STAFF    What was the call regarding: JAIME WITH [employer] CALLED WANTING TO VERIFY IF A RECORD REQUEST (SENT ON 11/8/24) HAS BEEN RECEIVED BY THE OFFICE.  RECORD NUMBER: [id number]

## 2024-11-14 NOTE — ED PROVIDER NOTES
Time: 12:48 PM EST  Date of encounter:  11/14/2024  Independent Historian/Clinical History and Information was obtained by:   Patient    History is limited by: N/A    Chief Complaint: Fall      History of Present Illness:  Patient is a 54 y.o. year old female who presents to the emergency department for evaluation of fall that occurred prior to ED arrival.  Patient denies fever and chills.  Patient does report striking her anterior chest wall.  Patient denies head injury.  Patient has no loss of consciousness.  Patient denies subjective neurological deficit including numbness and tingling.  Patient does report that she got dizzy prior to this incident.      Patient Care Team  Primary Care Provider: Reina Putnam APRN    Past Medical History:     Allergies   Allergen Reactions    Azithromycin Nausea Only    Duloxetine Hcl Unknown - Low Severity    Lisinopril Cough    Penicillins Unknown - Low Severity     Past Medical History:   Diagnosis Date    Allergic     Allergic rhinitis     Anxiety 09/11/2018    Arthritis     Blood in stool     Condition not found     MEATAL STENOSIS, UNSPECIFIED    CTS (carpal tunnel syndrome) 2015    Depression 09/11/2018    Difficulty walking 2023    Dysmenorrhea     Gross hematuria     Headache     Headache, tension-type 2022    Helicobacter positive gastritis 05/26/2016    Hyperlipidemia 2024    Hypertension     Irregular menses     Low back pain 07/09/2014    Microscopic hematuria 06/10/2014    Night sweats     Onychomycosis of toenail 07/09/2014    Pap smear for cervical cancer screening 05/16/2016    Right sided abdominal pain 06/12/2014    Screening mammogram, encounter for 11/09/2018    Sleep apnea 2023    STD exposure 1993    chlamydia    Tobacco abuse     chronic    Vitamin D deficiency      Past Surgical History:   Procedure Laterality Date    BREAST BIOPSY Left 01/09/2012    CHOLECYSTECTOMY      COLONOSCOPY  08/15/2016    CYST REMOVAL      left wrist    CYSTOSCOPY  06/27/2014     Office cystoscopy w/ Dr. Goncalves     Family History   Problem Relation Age of Onset    Stomach cancer Mother     Alzheimer's disease Mother     Diabetes Mother     Dementia Mother     Leukemia Father     Heart disease Sister     Heart disease Brother     Cancer Maternal Grandmother     Leukemia Paternal Grandfather     Stroke Other         AUNT;UNCLE    Heart disease Other         AUNT,UNCLE    Diabetes Other         AUNT,UNCLE    Hyperlipidemia Sister     Kidney disease Sister        Home Medications:  Prior to Admission medications    Medication Sig Start Date End Date Taking? Authorizing Provider   ammonium lactate (LAC-HYDRIN) 12 % lotion Apply 1 application  topically to the appropriate area as directed As Needed for Irritation. 12/18/23   Danish Brown MD   ciclopirox (PENLAC) 8 % solution APPLY TO THE AFFECTED AREA(S) TOPICALLY ONCE DAILY PREFERABLY AT BEDTIME OR 8 HOURS BEFORE WASHING 3/11/24   Danish Brown MD   ferrous gluconate (FERGON) 324 MG tablet Take 1 tablet by mouth 3 (Three) Times a Week. 9/25/24   Taylor Espitia APRN   fluticasone (FLONASE) 50 MCG/ACT nasal spray Administer 2 sprays into the nostril(s) as directed by provider Daily. 10/14/24   Reina Putnam APRN   loratadine (Claritin) 10 MG tablet Take 1 tablet by mouth Daily. 10/14/24   Reina Putnam APRN   losartan-hydrochlorothiazide (Hyzaar) 50-12.5 MG per tablet Take 1 tablet by mouth Daily. 10/14/24   Reina Putnam APRN   methylPREDNISolone (MEDROL) 4 MG dose pack Take as directed on package instructions. 11/1/24   Reina Putnam APRN   OLANZapine (ZyPREXA) 5 MG tablet Take 1 tablet by mouth Every Night. 10/31/24   Reina Putnam APRN   omeprazole (prilOSEC) 10 MG capsule Take 2 capsules by mouth Daily. 9/19/24   Taylor Espitia APRN   potassium chloride 10 MEQ CR tablet Take 1 tablet by mouth Daily. 10/14/24   Reina Putnam APRN   pregabalin (LYRICA) 100 MG capsule Take 1 capsule by mouth 3  times a day. 24   Provider, MD terence Pengsides-docusate (senna-docusate sodium) 8.6-50 MG per tablet Take 1 tablet by mouth Daily As Needed for Constipation. 24   Taylor Espitia APRN   valACYclovir (VALTREX) 500 MG tablet Take 1 tablet by mouth Daily. 10/14/24   Reina Putnam APRN   vilazodone (Viibryd) 40 MG tablet tablet Take 1 tablet by mouth Daily. 10/14/24   Reina Putnam APRN   vitamin D (ERGOCALCIFEROL) 1.25 MG (26085 UT) capsule capsule Take 1 capsule by mouth 1 (One) Time Per Week. 10/14/24   Reina Putnam APRN        Social History:   Social History     Tobacco Use    Smoking status: Former     Current packs/day: 0.00     Average packs/day: 0.3 packs/day for 54.1 years (13.5 ttl pk-yrs)     Types: Cigarettes     Start date: 1989     Quit date: 2024     Years since quittin.4     Passive exposure: Past    Smokeless tobacco: Never   Vaping Use    Vaping status: Never Used   Substance Use Topics    Alcohol use: Yes     Alcohol/week: 10.0 standard drinks of alcohol     Types: 4 Glasses of wine, 4 Shots of liquor, 2 Drinks containing 0.5 oz of alcohol per week     Comment: glass of wine every other day    Drug use: Never         Review of Systems:  Review of Systems   Constitutional:  Negative for chills and fever.   HENT:  Negative for congestion, rhinorrhea and sore throat.    Eyes:  Negative for pain and visual disturbance.   Respiratory:  Negative for apnea, cough, chest tightness and shortness of breath.    Cardiovascular:  Negative for chest pain and palpitations.   Gastrointestinal:  Negative for abdominal pain, diarrhea, nausea and vomiting.   Genitourinary:  Negative for difficulty urinating and dysuria.   Musculoskeletal:  Negative for joint swelling and myalgias.   Skin:  Negative for color change.   Neurological:  Negative for seizures and headaches.   Psychiatric/Behavioral: Negative.     All other systems reviewed and are negative.       Physical  "Exam:  /84 (BP Location: Left arm, Patient Position: Lying)   Pulse 76   Temp 98.8 °F (37.1 °C) (Oral)   Resp 13   Ht 160 cm (62.99\")   Wt 96.8 kg (213 lb 6.5 oz)   LMP  (LMP Unknown)   SpO2 94%   BMI 37.81 kg/m²     Physical Exam  Vitals and nursing note reviewed.   Constitutional:       General: She is not in acute distress.     Appearance: Normal appearance. She is not toxic-appearing.   HENT:      Head: Normocephalic and atraumatic.      Jaw: There is normal jaw occlusion.   Eyes:      General: Lids are normal.      Extraocular Movements: Extraocular movements intact.      Conjunctiva/sclera: Conjunctivae normal.      Pupils: Pupils are equal, round, and reactive to light.   Cardiovascular:      Rate and Rhythm: Normal rate and regular rhythm.      Pulses: Normal pulses.      Heart sounds: Normal heart sounds.   Pulmonary:      Effort: Pulmonary effort is normal. No respiratory distress.      Breath sounds: Normal breath sounds. No wheezing or rhonchi.   Chest:      Chest wall: Tenderness present.   Abdominal:      General: Abdomen is flat.      Palpations: Abdomen is soft.      Tenderness: There is no abdominal tenderness. There is no guarding or rebound.   Musculoskeletal:         General: Normal range of motion.      Cervical back: Normal range of motion and neck supple.      Right lower leg: No edema.      Left lower leg: No edema.   Skin:     General: Skin is warm and dry.   Neurological:      Mental Status: She is alert and oriented to person, place, and time. Mental status is at baseline.   Psychiatric:         Mood and Affect: Mood normal.                  Procedures:  Procedures      Medical Decision Making:      Comorbidities that affect care:    Cancer    External Notes reviewed:    Previous Clinic Note: Patient was last seen in clinic for depression and anxiety.      The following orders were placed and all results were independently analyzed by me:  Orders Placed This Encounter "   Procedures    XR Chest 1 View    [place] Draw    Comprehensive Metabolic Panel    Single High Sensitivity Troponin T    Magnesium    Urinalysis With Microscopic If Indicated (No Culture) - Urine, Clean Catch    CBC Auto Differential    Urinalysis, Microscopic Only - Urine, Clean Catch    NPO Diet NPO Type: Strict NPO    Undress & Gown    Continuous Pulse Oximetry    Vital Signs    Orthostatic Blood Pressure    Oxygen Therapy- Nasal Cannula; Titrate 1-6 LPM Per SpO2; 90 - 95%    POC Glucose Once    ECG 12 Lead ED Triage Standing Order; Weak / Dizzy / AMS    Insert Peripheral IV    Fall Precautions    CBC & Differential    Green Top (Gel)    Lavender Top    Gold Top - SST    Light Blue Top       Medications Given in the Emergency Department:  Medications   sodium chloride 0.9 % flush 10 mL (has no administration in time range)        ED Course:         Labs:    Lab Results (last 24 hours)       Procedure Component Value Units Date/Time    CBC & Differential [[id number]]  (Abnormal) Collected: 11/14/24 1042    Specimen: Blood Updated: 11/14/24 1054    Narrative:      The following orders were created for panel order CBC & Differential.  Procedure                               Abnormality         Status                     ---------                               -----------         ------                     CBC Auto Differential[[id number]]        Abnormal            Final result                 Please view results for these tests on the individual orders.    Comprehensive Metabolic Panel [[id number]]  (Abnormal) Collected: 11/14/24 1042    Specimen: Blood Updated: 11/14/24 1115     Glucose 103 mg/dL      BUN 15 mg/dL      Creatinine 0.86 mg/dL      Sodium 140 mmol/L      Potassium 4.2 mmol/L      Chloride 103 mmol/L      CO2 27.4 mmol/L      Calcium 9.4 mg/dL      Total Protein 7.4 g/dL      Albumin 4.0 g/dL      ALT (SGPT) 33 U/L      AST (SGOT) 18 U/L      Alkaline Phosphatase 106 U/L      Total Bilirubin 0.2 mg/dL       Globulin 3.4 gm/dL      A/G Ratio 1.2 g/dL      BUN/Creatinine Ratio 17.4     Anion Gap 9.6 mmol/L      eGFR 80.4 mL/min/1.73     Narrative:      GFR Normal >60  Chronic Kidney Disease <60  Kidney Failure <15      Single High Sensitivity Troponin T [[id number]]  (Normal) Collected: 11/14/24 1042    Specimen: Blood Updated: 11/14/24 1115     HS Troponin T <6 ng/L     Narrative:      High Sensitive Troponin T Reference Range:  <14.0 ng/L- Negative Female for AMI  <22.0 ng/L- Negative Male for AMI  >=14 - Abnormal Female indicating possible myocardial injury.  >=22 - Abnormal Male indicating possible myocardial injury.   Clinicians would have to utilize clinical acumen, EKG, Troponin, and serial changes to determine if it is an Acute Myocardial Infarction or myocardial injury due to an underlying chronic condition.         Magnesium [[id number]]  (Normal) Collected: 11/14/24 1042    Specimen: Blood Updated: 11/14/24 1115     Magnesium 2.1 mg/dL     CBC Auto Differential [[id number]]  (Abnormal) Collected: 11/14/24 1042    Specimen: Blood Updated: 11/14/24 1054     WBC 10.13 10*3/mm3      RBC 4.38 10*6/mm3      Hemoglobin 12.4 g/dL      Hematocrit 39.5 %      MCV 90.2 fL      MCH 28.3 pg      MCHC 31.4 g/dL      RDW 15.9 %      RDW-SD 52.1 fl      MPV 8.5 fL      Platelets 459 10*3/mm3      Neutrophil % 63.0 %      Lymphocyte % 25.9 %      Monocyte % 8.4 %      Eosinophil % 1.3 %      Basophil % 0.8 %      Immature Grans % 0.6 %      Neutrophils, Absolute 6.39 10*3/mm3      Lymphocytes, Absolute 2.62 10*3/mm3      Monocytes, Absolute 0.85 10*3/mm3      Eosinophils, Absolute 0.13 10*3/mm3      Basophils, Absolute 0.08 10*3/mm3      Immature Grans, Absolute 0.06 10*3/mm3      nRBC 0.0 /100 WBC     Urinalysis With Microscopic If Indicated (No Culture) - Urine, Clean Catch [[id number]]  (Abnormal) Collected: 11/14/24 1220    Specimen: Urine, Clean Catch Updated: 11/14/24 1233     Color, UA Yellow     Appearance, UA Clear      pH, UA <=5.0     Specific Gravity, UA 1.022     Glucose, UA Negative     Ketones, UA Negative     Bilirubin, UA Negative     Blood, UA Negative     Protein, UA Negative     Leuk Esterase, UA Small (1+)     Nitrite, UA Negative     Urobilinogen, UA 0.2 E.U./dL    Urinalysis, Microscopic Only - Urine, Clean Catch [[id number]]  (Abnormal) Collected: 11/14/24 1220    Specimen: Urine, Clean Catch Updated: 11/14/24 1244     RBC, UA None Seen /HPF      WBC, UA 6-10 /HPF      Bacteria, UA None Seen /HPF      Squamous Epithelial Cells, UA 13-20 /HPF      Hyaline Casts, UA None Seen /LPF      Methodology Automated Microscopy             Imaging:    XR Chest 1 View    Result Date: 11/14/2024  XR CHEST 1 VW Date of Exam: 11/14/2024 11:00 AM EST Indication: Weak/Dizzy/AMS triage protocol Comparison: December 10, 2012 Findings: The lungs are clear. The heart and mediastinal contours appear normal. There is no pleural effusion. The pulmonary vasculature appears normal. The osseous structures appear intact.     Impression: No acute cardiopulmonary process. Electronically Signed: Dhruv Chacko MD  11/14/2024 11:22 AM EST  Workstation ID: [id number]       Differential Diagnosis and Discussion:    Trauma:  Differential diagnosis considered but not limited to were subarachnoid hemorrhage, intracranial bleeding, pneumothorax, cardiac contusion, lung contusion, intra-abdominal bleeding, and compartment syndrome of any extremity or other significant traumatic pathology    All labs were reviewed and interpreted by me.  All X-rays impressions were independently interpreted by me.    MDM     The patient´s CBC that was reviewed and interpreted by me shows no abnormalities of critical concern. Of note, there is no anemia requiring a blood transfusion and the platelet count is acceptable.  The patient´s CMP that was reviewed and interpretted by me shows no abnormalities of critical concern. Of note, the patient´s sodium and potassium are  acceptable. The patient´s liver enzymes are unremarkable. The patient´s renal function (creatinine) is preserved. The patient has a normal anion gap.  Urinalysis is negative for bacteriuria.  Magnesium is 2.1.  Troponin is 6.  Chest x-ray shows no rib fractures.                Patient Care Considerations:    ANTIBIOTICS: I considered prescribing antibiotics as an outpatient however no bacterial focus of infection was found.      Consultants/Shared Management Plan:    None    Social Determinants of Health:    Patient is independent, reliable, and has access to care.       Disposition and Care Coordination:    Discharged: I considered escalation of care by admitting this patient to the hospital, however patient has no respiratory distress or hypoxia and is stable and suitable for discharge.    I have explained the patient´s condition, diagnoses and treatment plan based on the information available to me at this time. I have answered questions and addressed any concerns. The patient has a good  understanding of the patient´s diagnosis, condition, and treatment plan as can be expected at this point. The vital signs have been stable. The patient´s condition is stable and appropriate for discharge from the emergency department.      The patient will pursue further outpatient evaluation with the primary care physician or other designated or consulting physician as outlined in the discharge instructions. They are agreeable to this plan of care and follow-up instructions have been explained in detail. The patient has received these instructions in written format and has expressed an understanding of the discharge instructions. The patient is aware that any significant change in condition or worsening of symptoms should prompt an immediate return to this or the closest emergency department or call to 911.  I have explained discharge medications and the need for follow up with the patient/caretakers. This was also printed in  the discharge instructions. Patient was discharged with the following medications and follow up:      Medication List      No changes were made to your prescriptions during this visit.      Reina Putnam, APRN  [address] [place] [address]  [phone]    In 2 days         Final diagnoses:   Fall, initial encounter        ED Disposition       ED Disposition   Discharge    Condition   Stable    Comment   --               This medical record created using voice recognition software.             Freddy Bill MD  11/14/24 3000

## 2024-11-18 ENCOUNTER — OFFICE VISIT (OUTPATIENT)
Dept: FAMILY MEDICINE CLINIC | Facility: CLINIC | Age: 54
End: 2024-11-18
Payer: COMMERCIAL

## 2024-11-18 VITALS
WEIGHT: 221 LBS | HEIGHT: 63 IN | SYSTOLIC BLOOD PRESSURE: 115 MMHG | HEART RATE: 101 BPM | BODY MASS INDEX: 39.16 KG/M2 | TEMPERATURE: 97.4 F | DIASTOLIC BLOOD PRESSURE: 81 MMHG | OXYGEN SATURATION: 96 %

## 2024-11-18 DIAGNOSIS — F41.9 ANXIETY AND DEPRESSION: ICD-10-CM

## 2024-11-18 DIAGNOSIS — F32.A ANXIETY AND DEPRESSION: ICD-10-CM

## 2024-11-18 DIAGNOSIS — M51.362 DEGENERATION OF INTERVERTEBRAL DISC OF LUMBAR REGION WITH DISCOGENIC BACK PAIN AND LOWER EXTREMITY PAIN: ICD-10-CM

## 2024-11-18 DIAGNOSIS — K21.9 GASTROESOPHAGEAL REFLUX DISEASE WITHOUT ESOPHAGITIS: ICD-10-CM

## 2024-11-18 DIAGNOSIS — R45.851 SUICIDAL IDEATION: ICD-10-CM

## 2024-11-18 DIAGNOSIS — B35.3 TINEA PEDIS OF BOTH FEET: Primary | ICD-10-CM

## 2024-11-18 PROCEDURE — 1125F AMNT PAIN NOTED PAIN PRSNT: CPT | Performed by: NURSE PRACTITIONER

## 2024-11-18 PROCEDURE — 1159F MED LIST DOCD IN RCRD: CPT | Performed by: NURSE PRACTITIONER

## 2024-11-18 PROCEDURE — 1160F RVW MEDS BY RX/DR IN RCRD: CPT | Performed by: NURSE PRACTITIONER

## 2024-11-18 PROCEDURE — 3074F SYST BP LT 130 MM HG: CPT | Performed by: NURSE PRACTITIONER

## 2024-11-18 PROCEDURE — 3079F DIAST BP 80-89 MM HG: CPT | Performed by: NURSE PRACTITIONER

## 2024-11-18 PROCEDURE — 99214 OFFICE O/P EST MOD 30 MIN: CPT | Performed by: NURSE PRACTITIONER

## 2024-11-18 RX ORDER — DULOXETIN HYDROCHLORIDE 30 MG/1
30 CAPSULE, DELAYED RELEASE ORAL 2 TIMES DAILY
Qty: 60 CAPSULE | Refills: 1 | Status: SHIPPED | OUTPATIENT
Start: 2024-11-18

## 2024-11-18 RX ORDER — CICLOPIROX 80 MG/ML
SOLUTION TOPICAL NIGHTLY
Qty: 6 ML | Refills: 1 | Status: SHIPPED | OUTPATIENT
Start: 2024-11-18

## 2024-11-18 RX ORDER — OLANZAPINE 10 MG/1
10 TABLET ORAL NIGHTLY
Qty: 30 TABLET | Refills: 1 | Status: SHIPPED | OUTPATIENT
Start: 2024-11-18

## 2024-11-18 RX ORDER — PREGABALIN 150 MG/1
1 CAPSULE ORAL 3 TIMES DAILY
COMMUNITY
Start: 2024-11-06

## 2024-11-18 RX ORDER — HYDROXYZINE HYDROCHLORIDE 25 MG/1
25 TABLET, FILM COATED ORAL EVERY 6 HOURS PRN
Qty: 30 TABLET | Refills: 1 | Status: SHIPPED | OUTPATIENT
Start: 2024-11-18

## 2024-11-18 RX ORDER — OMEPRAZOLE 40 MG/1
40 CAPSULE, DELAYED RELEASE ORAL DAILY
Qty: 90 CAPSULE | Refills: 1 | Status: SHIPPED | OUTPATIENT
Start: 2024-11-18

## 2024-11-18 NOTE — PROGRESS NOTES
Chief Complaint  Fall (Fallen a lot lately; recent ER visit.  11/14), ADD (No attention span), Memory Loss, Anxiety (Has trouble keeping her emotions under control.), Uncontrollable Need To Sleep (Wants to sleep all the time.  Within 10 minutes of taking her medication), Headache (On right side of forehead for x2 days), and Shortness of Breath (Trouble breathing)    Subjective          Bianca Lara is a 54 y.o. female who presents to [facility]    History of Present Illness  History of Present Illness  The patient presents for evaluation of multiple medical concerns. She is accompanied by her daughter.    She reports an improvement in her condition, although her daughter notes fluctuations in her health. She is adhering to her medication regimen and requests a refill of Penlac, which she applies to the soles of both feet. She prefers this topical treatment over oral medication.    She is currently on Viibryd 40 mg and Zyprexa, which her daughter believes is causing excessive sleepiness. She finds Viibryd helpful but is unsure if she needs a higher dose. She has been on Viibryd since 2022 and has not tried any other medications. She also takes hydroxyzine daily. She uses a pill planner to remember her medication schedule. She is also taking Zyprexa at night, which aids her sleep, although she frequently wakes up due to dry mouth. She smokes weed, which she says helps her get along with others.    She experiences reflux every 2 to 3 days and has not been taking omeprazole.     She continues to have suicidal thoughts but has no plans to harm herself. She visited the ER yesterday following a fall and inquired about inpatient treatment. Her daughter reports that she is prone to panic attacks and emotional outbursts over minor issues. She has been staying with her daughter since the beginning of 10/2024.    She is also taking Lyrica 150 mg three times a day for back pain and  neuropathy. She missed her neurosurgeon appointment due to oversleeping. She experiences back pain, numbness in her leg, sleep issues, anxiety, depression, decreased concentration, agitation, and fatigue. She takes ibuprofen for severe back pain, which she took last night.    She has questions about the presence of white blood cells in her urine, even though UTI tests are negative. She does not wear underwear.    She reports right-sided headaches without nausea, vomiting, or light sensitivity. She occasionally experiences a throbbing sensation in the back of her head, which she describes as an echo. She does not take any medication for her headaches. She takes Flonase and Claritin for allergies, which she finds helpful.    She has an eye exam scheduled for 12/2024 and wears multifocal contacts with a prescription of -4.75 and -5.       The PHQ has not been completed during this encounter.               Health Maintenance Due   Topic Date Due    TDAP/TD VACCINES (1 - Tdap) Never done    ZOSTER VACCINE (1 of 2) Never done    COVID-19 Vaccine (5 - 2024-25 season) 09/01/2024        Review of Systems   Constitutional:  Positive for fatigue. Negative for chills and fever.   Respiratory:  Negative for cough and shortness of breath.    Cardiovascular:  Negative for chest pain and palpitations.   Gastrointestinal:  Negative for constipation, diarrhea, nausea and vomiting.   Musculoskeletal:  Positive for back pain. Negative for neck pain.   Skin:  Negative for rash.   Neurological:  Positive for numbness and headaches. Negative for dizziness.   Psychiatric/Behavioral:  Positive for agitation, decreased concentration, dysphoric mood and sleep disturbance. The patient is nervous/anxious.           Medical History: has a past medical history of Allergic, Allergic rhinitis, Anxiety (09/11/2018), Arthritis, Blood in stool, Condition not found, CTS (carpal tunnel syndrome) (2015), Depression (09/11/2018), Difficulty walking  (2023), Dysmenorrhea, Gross hematuria, Headache, Headache, tension-type (2022), Helicobacter positive gastritis (05/26/2016), Hyperlipidemia (2024), Hypertension, Irregular menses, Low back pain (07/09/2014), Microscopic hematuria (06/10/2014), Night sweats, Onychomycosis of toenail (07/09/2014), Pap smear for cervical cancer screening (05/16/2016), Right sided abdominal pain (06/12/2014), Screening mammogram, encounter for (11/09/2018), Sleep apnea (2023), STD exposure (1993), Tobacco abuse, and Vitamin D deficiency.     Surgical History: has a past surgical history that includes Breast biopsy (Left, 01/09/2012); Cholecystectomy; Colonoscopy (08/15/2016); Cyst Removal; and Cystoscopy (06/27/2014).     Family History: family history includes Alzheimer's disease in her mother; Cancer in her maternal grandmother; Dementia in her mother; Diabetes in her mother and another family member; Heart disease in her brother, sister, and another family member; Hyperlipidemia in her sister; Kidney disease in her sister; Leukemia in her father and paternal grandfather; Stomach cancer in her mother; Stroke in an other family member.     Social History: reports that she quit smoking about 5 months ago. Her smoking use included cigarettes. She started smoking about 34 years ago. She has a 13.5 pack-year smoking history. She has been exposed to tobacco smoke. She has never used smokeless tobacco. She reports current alcohol use of about 10.0 standard drinks of alcohol per week. She reports that she does not use drugs.    Allergies: Azithromycin, Duloxetine hcl, Lisinopril, and Penicillins      Current Outpatient Medications:     ammonium lactate (LAC-HYDRIN) 12 % lotion, Apply 1 application  topically to the appropriate area as directed As Needed for Irritation., Disp: , Rfl:     ciclopirox (PENLAC) 8 % solution, Apply  topically to the appropriate area as directed Every Night., Disp: 6 mL, Rfl: 1    ferrous gluconate (FERGON) 324 MG  tablet, Take 1 tablet by mouth 3 (Three) Times a Week., Disp: 60 tablet, Rfl: 1    fluticasone (FLONASE) 50 MCG/ACT nasal spray, Administer 2 sprays into the nostril(s) as directed by provider Daily., Disp: 16 g, Rfl: 5    loratadine (Claritin) 10 MG tablet, Take 1 tablet by mouth Daily., Disp: 90 tablet, Rfl: 3    losartan-hydrochlorothiazide (Hyzaar) 50-12.5 MG per tablet, Take 1 tablet by mouth Daily., Disp: 90 tablet, Rfl: 1    OLANZapine (ZyPREXA) 10 MG tablet, Take 1 tablet by mouth Every Night., Disp: 30 tablet, Rfl: 1    omeprazole (priLOSEC) 40 MG capsule, Take 1 capsule by mouth Daily., Disp: 90 capsule, Rfl: 1    potassium chloride 10 MEQ CR tablet, Take 1 tablet by mouth Daily., Disp: 90 tablet, Rfl: 1    pregabalin (LYRICA) 150 MG capsule, Take 1 capsule by mouth 3 times a day., Disp: , Rfl:     valACYclovir (VALTREX) 500 MG tablet, Take 1 tablet by mouth Daily., Disp: 30 tablet, Rfl: 1    vitamin D (ERGOCALCIFEROL) 1.25 MG (81299 UT) capsule capsule, Take 1 capsule by mouth 1 (One) Time Per Week., Disp: 13 capsule, Rfl: 0    DULoxetine (CYMBALTA) 30 MG capsule, Take 1 capsule by mouth 2 (Two) Times a Day., Disp: 60 capsule, Rfl: 1    hydrOXYzine (ATARAX) 25 MG tablet, Take 1 tablet by mouth Every 6 (Six) Hours As Needed for Anxiety., Disp: 30 tablet, Rfl: 1    sennosides-docusate (senna-docusate sodium) 8.6-50 MG per tablet, Take 1 tablet by mouth Daily As Needed for Constipation. (Patient not taking: Reported on 11/18/2024), Disp: 90 tablet, Rfl: 1      Immunization History   Administered Date(s) Administered    COVID-19 (MODERNA) 1st,2nd,3rd Dose Monovalent 01/07/2021, 02/04/2021, 10/28/2021    COVID-19 (MODERNA) BIVALENT 12+YRS 10/17/2022    Fluzone (or Fluarix & Flulaval for VFC) >6mos 10/17/2022, 11/09/2023    Fluzone Quad >6mos (Multi-dose) 10/29/2020    Influenza, Unspecified 10/17/2022    Pneumococcal Conjugate 20-Valent (PCV20) 05/31/2024         Objective       Vitals:    11/18/24 1204   BP:  "115/81   Pulse: 101   Temp: 97.4 °F (36.3 °C)   TempSrc: Temporal   SpO2: 96%   Weight: 100 kg (221 lb)   Height: 160 cm (62.99\")      Body mass index is 39.16 kg/m².   Wt Readings from Last 3 Encounters:   11/18/24 100 kg (221 lb)   11/14/24 96.8 kg (213 lb 6.5 oz)   10/03/24 94.3 kg (208 lb)      BP Readings from Last 3 Encounters:   11/18/24 115/81   11/14/24 118/84   10/31/24 140/98             Physical Exam  Vitals reviewed.   Constitutional:       Appearance: Normal appearance. She is well-developed.   HENT:      Head: Normocephalic and atraumatic.   Eyes:      Conjunctiva/sclera: Conjunctivae normal.      Pupils: Pupils are equal, round, and reactive to light.   Cardiovascular:      Rate and Rhythm: Normal rate and regular rhythm.      Heart sounds: Normal heart sounds. No murmur heard.  Pulmonary:      Effort: Pulmonary effort is normal.      Breath sounds: Normal breath sounds. No wheezing or rhonchi.   Abdominal:      General: Bowel sounds are normal. There is no distension.      Palpations: Abdomen is soft.      Tenderness: There is no abdominal tenderness.   Skin:     General: Skin is warm and dry.   Neurological:      Mental Status: She is alert and oriented to person, place, and time.   Psychiatric:         Mood and Affect: Mood and affect normal.         Behavior: Behavior normal.         Thought Content: Thought content normal.         Judgment: Judgment normal.         Physical Exam        Result Review :   Results             Common labs          8/6/2024    08:51 9/17/2024    08:59 11/14/2024    10:42   Common Labs   Glucose   103    BUN   15    Creatinine   0.86    Sodium   140    Potassium   4.2    Chloride   103    Calcium   9.4    Albumin   4.0    Total Bilirubin   0.2    Alkaline Phosphatase   106    AST (SGOT)   18    ALT (SGPT)   33    WBC 7.02  9.29  10.13    Hemoglobin 13.5  12.8  12.4    Hematocrit 43.2  40.3  39.5    Platelets 492  494  769                     Assessment and Plan  "       Diagnoses and all orders for this visit:    1. Tinea pedis of both feet (Primary)  -     ciclopirox (PENLAC) 8 % solution; Apply  topically to the appropriate area as directed Every Night.  Dispense: 6 mL; Refill: 1    2. Gastroesophageal reflux disease without esophagitis  -     omeprazole (priLOSEC) 40 MG capsule; Take 1 capsule by mouth Daily.  Dispense: 90 capsule; Refill: 1    3. Anxiety and depression  -     OLANZapine (ZyPREXA) 10 MG tablet; Take 1 tablet by mouth Every Night.  Dispense: 30 tablet; Refill: 1  -     hydrOXYzine (ATARAX) 25 MG tablet; Take 1 tablet by mouth Every 6 (Six) Hours As Needed for Anxiety.  Dispense: 30 tablet; Refill: 1  -     DULoxetine (CYMBALTA) 30 MG capsule; Take 1 capsule by mouth 2 (Two) Times a Day.  Dispense: 60 capsule; Refill: 1    4. Suicidal ideation  -     OLANZapine (ZyPREXA) 10 MG tablet; Take 1 tablet by mouth Every Night.  Dispense: 30 tablet; Refill: 1    5. Degeneration of intervertebral disc of lumbar region with discogenic back pain and lower extremity pain  -     DULoxetine (CYMBALTA) 30 MG capsule; Take 1 capsule by mouth 2 (Two) Times a Day.  Dispense: 60 capsule; Refill: 1        Assessment & Plan  1. Gastroesophageal Reflux Disease (GERD).  Her dry mouth could be indicative of reflux, potentially disrupting her sleep. The dosage of omeprazole will be increased from 20 mg to 40 mg. If her reflux symptoms persist more than twice a week after 4 to 5 days of increased omeprazole dosage, she should inform the provider. She is advised to avoid overeating and not to eat 3 hours before bed.    2. Neuropathic Back Pain.  The Lyrica she is taking for neuropathic back pain may be contributing to her sleepiness. She is currently on 150 mg of Lyrica three times a day. She missed her neurosurgeon appointment and should reschedule it.    3. Depression.  The Zyprexa dosage will be increased from 5 mg to 10 mg, to be taken at bedtime. Cymbalta will be introduced,  starting with 30 mg twice daily for two weeks, then increasing to 60 mg twice daily. If she tolerates the Cymbalta well and requires a higher dose, it can be increased to 120 mg between visits. Hydroxyzine can be taken three times daily as needed for anxiety.    4. Onychomycosis.  A refill for her foot solution (Penlac) has been provided.    5. Headaches.  Her headaches might be associated with her contact lenses. She should monitor if her headaches correlate with contact lens use.    6. Urinary White Blood Cells.  Her urine sample was not a sterile catch, which could explain the presence of white blood cells. She is advised against using scented vaginal products and should wear cotton underwear. Over-the-counter Rephresh can be used to help maintain vaginal pH balance.    Follow-up  Return in 1 month for follow up.    Return in about 4 weeks (around 12/16/2024) for Next scheduled follow up.    Patient was given instructions and counseling regarding her condition or for health maintenance advice. Please see specific information pulled into the AVS if appropriate.     TESSA Cerrato    Patient or patient representative verbalized consent for the use of Ambient Listening during the visit with  TESSA Cerrato for chart documentation. 11/19/2024  12:34 EST

## 2024-11-19 ENCOUNTER — TELEPHONE (OUTPATIENT)
Dept: FAMILY MEDICINE CLINIC | Facility: CLINIC | Age: 54
End: 2024-11-19
Payer: COMMERCIAL

## 2024-11-19 NOTE — TELEPHONE ENCOUNTER
Whisper we have received an additional provider statement request. I just attached it to this encounter.

## 2024-11-19 NOTE — TELEPHONE ENCOUNTER
Patient's daughter Cassie dropped off her [employer] paperwork. I have attached blank forms to encounter and placed them in providers bin.

## 2024-11-30 LAB
QT INTERVAL: 315 MS
QTC INTERVAL: 415 MS

## 2024-12-03 ENCOUNTER — OFFICE VISIT (OUTPATIENT)
Dept: NEUROSURGERY | Facility: CLINIC | Age: 54
End: 2024-12-03
Payer: COMMERCIAL

## 2024-12-03 ENCOUNTER — HOSPITAL ENCOUNTER (OUTPATIENT)
Dept: GENERAL RADIOLOGY | Facility: HOSPITAL | Age: 54
Discharge: HOME | End: 2024-12-03
Admitting: NEUROLOGICAL SURGERY
Payer: COMMERCIAL

## 2024-12-03 VITALS
WEIGHT: 227.3 LBS | HEIGHT: 63 IN | DIASTOLIC BLOOD PRESSURE: 85 MMHG | BODY MASS INDEX: 40.27 KG/M2 | SYSTOLIC BLOOD PRESSURE: 134 MMHG

## 2024-12-03 DIAGNOSIS — M47.816 FACET ARTHRITIS OF LUMBAR REGION: ICD-10-CM

## 2024-12-03 DIAGNOSIS — M71.30 SYNOVIAL CYST: Primary | ICD-10-CM

## 2024-12-03 PROCEDURE — 3075F SYST BP GE 130 - 139MM HG: CPT | Performed by: NEUROLOGICAL SURGERY

## 2024-12-03 PROCEDURE — 3079F DIAST BP 80-89 MM HG: CPT | Performed by: NEUROLOGICAL SURGERY

## 2024-12-03 PROCEDURE — 72114 X-RAY EXAM L-S SPINE BENDING: CPT

## 2024-12-03 PROCEDURE — 99213 OFFICE O/P EST LOW 20 MIN: CPT | Performed by: NEUROLOGICAL SURGERY

## 2024-12-03 NOTE — PROGRESS NOTES
Bianca Lara is a 54 y.o. female that presents with Back Pain     History of Present Illness  The patient is a 54-year-old female who presents for review of her MRI.    She reports experiencing pain that radiates down both legs, extending to her toes. She notes that the leg pain is more severe than her back pain. She also mentions discomfort in her neck, which she attributes to her sleeping position. Despite using a heating pad for relief, she finds it difficult to sleep and often wakes up early. She describes the pain in her thigh as particularly intense. She expresses a desire to return to work or engage in some form of activity.     Review of Systems   Musculoskeletal:  Positive for back pain.        Vitals:    12/03/24 1011   BP: 134/85        Physical Exam  Constitutional:       Comments: BMI 40   Pulmonary:      Effort: Pulmonary effort is normal.   Neurological:      Mental Status: She is alert.      Sensory: No sensory deficit.      Motor: No weakness.   Psychiatric:         Mood and Affect: Mood normal.        I personally interpreted the patient's MRI scan with the patient.   Results  Imaging  MRI shows no significant spinal narrowing at L5-S1. Synovial cyst previously present is now gone. Facet arthritis is present, with left facet joint effusion worse than the right. Spinal canal is minimally narrowed at L4-5.          Assessment and Plan {CC Problem List  Visit Diagnosis  ROS  Review (Popup)  Health Maintenance  Quality  BestPractice  Medications  SmartSets  SnapShot Encounters  Media :23}   Problem List Items Addressed This Visit    None  Visit Diagnoses       Synovial cyst-L5-S1, now resolved    -  Primary    Facet arthritis of lumbar region-L4-5 most notably                Assessment & Plan  1. Facet arthritis.  Her recent MRI indicates the resolution of the synovial cyst at L5-S1, with no significant spinal narrowing observed. However, facet arthritis is present, with a more  pronounced effusion on the left facet joint compared to the right. X-rays of the back are recommended to assess for instability. Weight loss is also advised to alleviate some of her pain and reduce potential complications should surgery be necessary.         Follow Up {Instructions Charge Capture  Follow-up Communications :23}   No follow-ups on file.      Patient or patient representative verbalized consent for the use of Ambient Listening during the visit with  James Ayala MD for chart documentation. 12/3/2024  10:25 EST

## 2024-12-03 NOTE — PROGRESS NOTES
Chief Complaint  Anxiety (Follow up), Urinary Incontinence (At night ), and Nutrition Counseling (Would like to discuss her diet)    Subjective          Bianca Lara is a 54 y.o. female who presents to [facility]    History of Present Illness  History of Present Illness  The patient presents for evaluation of urinary frequency, weight management, chronic pain and neuropathy, and mental health.    She reports experiencing urinary frequency, necessitating her to wake up twice during the night to urinate. She also mentions an incident of urinary incontinence while laughing, which was managed with the use of a diaper.    She expresses concern about her dietary habits, particularly her high sugar intake due to her preference for sweetened coffee. She is seeking advice on potential dietary modifications.    She has been diagnosed with arthritis and has been advised to lose weight prior to undergoing back surgery. She describes a sensation of a hole in her back, which is a new symptom for her. She also reports pain upon palpation of her back and can feel the puncture sites from previous injections.    She is currently on Zyprexa, which was increased during her last visit. She reports a significant improvement in her mental state since the dosage increase. She does not endorse any suicidal or homicidal ideations. However, she does report experiencing memory lapses, such as forgetting to bring her daughters dog indoors after 30 minutes. She has a scheduled appointment with her psychiatrist next month.    She is on Cymbalta 30 mg twice daily for chronic pain and neuropathy, which she finds beneficial.    SOCIAL HISTORY  The patient admits to smoking cigarettes.    MEDICATIONS  Current: Zyprexa, Cymbalta      The PHQ has not been completed during this encounter.               Health Maintenance Due   Topic Date Due    TDAP/TD VACCINES (1 - Tdap) Never done    ZOSTER VACCINE (1 of 2)  Never done    COVID-19 Vaccine (5 - 2024-25 season) 09/01/2024    ANNUAL PHYSICAL  12/11/2024        Review of Systems   Constitutional:  Negative for chills, fatigue and fever.   Respiratory:  Negative for cough and shortness of breath.    Cardiovascular:  Negative for chest pain and palpitations.   Gastrointestinal:  Negative for constipation, diarrhea, nausea and vomiting.   Genitourinary:  Positive for frequency. Negative for difficulty urinating.   Musculoskeletal:  Negative for back pain and neck pain.   Skin:  Negative for rash.   Neurological:  Negative for dizziness and headaches.          Medical History: has a past medical history of Allergic, Allergic rhinitis, Anxiety (09/11/2018), Arthritis, Blood in stool, Condition not found, CTS (carpal tunnel syndrome) (2015), Depression (09/11/2018), Difficulty walking (2023), Dysmenorrhea, Gross hematuria, Headache, Headache, tension-type (2022), Helicobacter positive gastritis (05/26/2016), Hyperlipidemia (2024), Hypertension, Irregular menses, Low back pain (07/09/2014), Microscopic hematuria (06/10/2014), Night sweats, Onychomycosis of toenail (07/09/2014), Pap smear for cervical cancer screening (05/16/2016), Right sided abdominal pain (06/12/2014), Screening mammogram, encounter for (11/09/2018), Sleep apnea (2023), STD exposure (1993), Tobacco abuse, and Vitamin D deficiency.     Surgical History: has a past surgical history that includes Breast biopsy (Left, 01/09/2012); Cholecystectomy; Colonoscopy (08/15/2016); Cyst Removal; and Cystoscopy (06/27/2014).     Family History: family history includes Alzheimer's disease in her mother; Cancer in her maternal grandmother; Dementia in her mother; Diabetes in her mother and another family member; Heart disease in her brother, sister, and another family member; Hyperlipidemia in her sister; Kidney disease in her sister; Leukemia in her father and paternal grandfather; Stomach cancer in her mother; Stroke in an  other family member.     Social History: reports that she quit smoking about 6 months ago. Her smoking use included cigarettes. She started smoking about 35 years ago. She has a 13.5 pack-year smoking history. She has been exposed to tobacco smoke. She has never used smokeless tobacco. She reports current alcohol use of about 10.0 standard drinks of alcohol per week. She reports that she does not use drugs.    Allergies: Azithromycin, Duloxetine hcl, Lisinopril, and Penicillins      Current Outpatient Medications:     ammonium lactate (LAC-HYDRIN) 12 % lotion, Apply  topically to the appropriate area as directed As Needed for Irritation., Disp: 225 g, Rfl: 0    ciclopirox (PENLAC) 8 % solution, Apply  topically to the appropriate area as directed Every Night., Disp: 6 mL, Rfl: 1    DULoxetine (CYMBALTA) 30 MG capsule, Take 1 capsule by mouth 2 (Two) Times a Day., Disp: 180 capsule, Rfl: 1    ferrous gluconate (FERGON) 324 MG tablet, Take 1 tablet by mouth 3 (Three) Times a Week., Disp: 60 tablet, Rfl: 1    fluticasone (FLONASE) 50 MCG/ACT nasal spray, Administer 2 sprays into the nostril(s) as directed by provider Daily., Disp: 16 g, Rfl: 5    hydrOXYzine (ATARAX) 25 MG tablet, Take 1 tablet by mouth Every 6 (Six) Hours As Needed for Anxiety., Disp: 30 tablet, Rfl: 1    ibuprofen (ADVIL,MOTRIN) 600 MG tablet, TAKE 1 TABLET BY MOUTH THREE TIMES A DAY WITH FOOD OR MILK AS NEEDED FOR 30 DAYS, Disp: , Rfl:     loratadine (Claritin) 10 MG tablet, Take 1 tablet by mouth Daily., Disp: 90 tablet, Rfl: 3    losartan-hydrochlorothiazide (Hyzaar) 50-12.5 MG per tablet, Take 1 tablet by mouth Daily., Disp: 90 tablet, Rfl: 1    OLANZapine (ZyPREXA) 10 MG tablet, Take 1 tablet by mouth Every Night., Disp: 90 tablet, Rfl: 1    omeprazole (priLOSEC) 40 MG capsule, Take 1 capsule by mouth Daily., Disp: 90 capsule, Rfl: 1    potassium chloride 10 MEQ CR tablet, Take 1 tablet by mouth Daily., Disp: 90 tablet, Rfl: 1    pregabalin  (LYRICA) 150 MG capsule, Take 1 capsule by mouth 3 times a day., Disp: , Rfl:     valACYclovir (VALTREX) 500 MG tablet, Take 1 tablet by mouth Daily., Disp: 30 tablet, Rfl: 1    vitamin D (ERGOCALCIFEROL) 1.25 MG (28985 UT) capsule capsule, Take 1 capsule by mouth 1 (One) Time Per Week., Disp: 13 capsule, Rfl: 0    oxybutynin XL (DITROPAN-XL) 10 MG 24 hr tablet, Take 1 tablet by mouth Daily., Disp: 90 tablet, Rfl: 1    sennosides-docusate (senna-docusate sodium) 8.6-50 MG per tablet, Take 1 tablet by mouth Daily As Needed for Constipation. (Patient not taking: Reported on 11/18/2024), Disp: 90 tablet, Rfl: 1      Immunization History   Administered Date(s) Administered    COVID-19 (MODERNA) 1st,2nd,3rd Dose Monovalent 01/07/2021, 02/04/2021, 10/28/2021    COVID-19 (MODERNA) BIVALENT 12+YRS 10/17/2022    Fluzone (or Fluarix & Flulaval for VFC) >6mos 10/17/2022, 11/09/2023    Fluzone Quad >6mos (Multi-dose) 10/29/2020    Influenza, Unspecified 10/17/2022    Pneumococcal Conjugate 20-Valent (PCV20) 05/31/2024         Objective       Vitals:    12/18/24 1602   BP: 117/85   Pulse: 71   Temp: 97.2 °F (36.2 °C)   TempSrc: Temporal   SpO2: 100%   Weight: 104 kg (229 lb 9.6 oz)      Body mass index is 40.68 kg/m².   Wt Readings from Last 3 Encounters:   12/18/24 104 kg (229 lb 9.6 oz)   12/03/24 103 kg (227 lb 4.8 oz)   11/18/24 100 kg (221 lb)      BP Readings from Last 3 Encounters:   12/18/24 117/85   12/03/24 134/85   11/18/24 115/81             Physical Exam  Vitals reviewed.   Constitutional:       Appearance: Normal appearance. She is well-developed.   HENT:      Head: Normocephalic and atraumatic.   Eyes:      Conjunctiva/sclera: Conjunctivae normal.      Pupils: Pupils are equal, round, and reactive to light.   Cardiovascular:      Rate and Rhythm: Normal rate and regular rhythm.      Heart sounds: Normal heart sounds. No murmur heard.  Pulmonary:      Effort: Pulmonary effort is normal.      Breath sounds: Normal  breath sounds. No wheezing or rhonchi.   Abdominal:      General: Bowel sounds are normal. There is no distension.      Palpations: Abdomen is soft.      Tenderness: There is no abdominal tenderness.   Musculoskeletal:      Lumbar back: Tenderness present.        Back:    Skin:     General: Skin is warm and dry.   Neurological:      Mental Status: She is alert and oriented to person, place, and time.   Psychiatric:         Mood and Affect: Mood and affect normal.         Behavior: Behavior normal.         Thought Content: Thought content normal.         Judgment: Judgment normal.         Physical Exam        Result Review :   Results           Common labs          9/17/2024    08:59 11/14/2024    10:42 12/18/2024    17:04   Common Labs   Glucose  103  97    BUN  15  15    Creatinine  0.86  0.79    Sodium  140  136    Potassium  4.2  4.0    Chloride  103  98    Calcium  9.4  9.6    Albumin  4.0  3.9    Total Bilirubin  0.2  <0.2    Alkaline Phosphatase  106  120    AST (SGOT)  18  23    ALT (SGPT)  33  20    WBC 9.29  10.13  8.72    Hemoglobin 12.8  12.4  12.2    Hematocrit 40.3  39.5  39.6    Platelets 494  459  526    Total Cholesterol   176    Triglycerides   98    HDL Cholesterol   57    LDL Cholesterol    101    Hemoglobin A1C   6.10                     Assessment and Plan        Diagnoses and all orders for this visit:    1. Anxiety and depression (Primary)  -     hydrOXYzine (ATARAX) 25 MG tablet; Take 1 tablet by mouth Every 6 (Six) Hours As Needed for Anxiety.  Dispense: 30 tablet; Refill: 1  -     OLANZapine (ZyPREXA) 10 MG tablet; Take 1 tablet by mouth Every Night.  Dispense: 90 tablet; Refill: 1  -     DULoxetine (CYMBALTA) 30 MG capsule; Take 1 capsule by mouth 2 (Two) Times a Day.  Dispense: 180 capsule; Refill: 1    2. Seasonal allergic rhinitis due to pollen  -     loratadine (Claritin) 10 MG tablet; Take 1 tablet by mouth Daily.  Dispense: 90 tablet; Refill: 3    3. Herpes  -     valACYclovir  (VALTREX) 500 MG tablet; Take 1 tablet by mouth Daily.  Dispense: 30 tablet; Refill: 1    4. OAB (overactive bladder)  -     oxybutynin XL (DITROPAN-XL) 10 MG 24 hr tablet; Take 1 tablet by mouth Daily.  Dispense: 90 tablet; Refill: 1    5. Primary hypertension  -     potassium chloride 10 MEQ CR tablet; Take 1 tablet by mouth Daily.  Dispense: 90 tablet; Refill: 1  -     Comprehensive Metabolic Panel; Future  -     CBC (No Diff); Future    6. Onychomycosis of toenail  -     ammonium lactate (LAC-HYDRIN) 12 % lotion; Apply  topically to the appropriate area as directed As Needed for Irritation.  Dispense: 225 g; Refill: 0    7. Impaired fasting glucose  -     Hemoglobin A1c; Future    8. Screening for thyroid disorder  -     TSH; Future    9. Lipid screening  -     Lipid Panel; Future    10. Suicidal ideation  -     OLANZapine (ZyPREXA) 10 MG tablet; Take 1 tablet by mouth Every Night.  Dispense: 90 tablet; Refill: 1    11. Degeneration of intervertebral disc of lumbar region with discogenic back pain and lower extremity pain  -     DULoxetine (CYMBALTA) 30 MG capsule; Take 1 capsule by mouth 2 (Two) Times a Day.  Dispense: 180 capsule; Refill: 1    12. Class 3 severe obesity due to excess calories with serious comorbidity and body mass index (BMI) of 40.0 to 44.9 in adult    The patient is asked to make an attempt to improve diet and exercise patterns to aid in medical management of these diagnoses.    Assessment & Plan  1. Urinary frequency.  A prescription for oxybutynin 10 mg will be provided to manage bladder spasms, which should help reduce urgency and frequency. The medication should be taken at bedtime to minimize dizziness. Potential side effects, including dry mouth and dizziness, were discussed.    2. Weight management.  Her blood pressure readings are within normal range. She is advised to adopt a diet low in carbohydrates, sugars, and processed foods. A 6-week dietary plan will be provided to help reset  insulin levels and promote weight loss. She is encouraged to consume protein within the first hour of the day. A comprehensive physical panel will be ordered. She is advised to continue her current medication regimen. If her hemoglobin A1c level reaches 6.5, she may be eligible for a diabetic medicine that helps with weight loss.    3. Chronic pain and neuropathy.  She reports that Cymbalta 30 mg twice a day is helping with her chronic pain and neuropathy. She should continue this medication as it has been effective.    4. Mental health.  She reports feeling better on the increased dose of Zyprexa and has no thoughts of self-harm or harm to others. She should continue her current dosage of Zyprexa.    5. Arthritis.  She reports pain in her back, which may be related to arthritis in the SI joints. She is advised to quit smoking to improve healing post-surgery.    Follow-up  The patient will follow up in 3 months.    Return in about 3 months (around 3/18/2025) for Next scheduled follow up.    Patient was given instructions and counseling regarding her condition or for health maintenance advice. Please see specific information pulled into the AVS if appropriate.     TESSA Cerrato    Patient or patient representative verbalized consent for the use of Ambient Listening during the visit with  TESSA Cerrato for chart documentation. 12/19/2024  16:34 EST

## 2024-12-04 ENCOUNTER — TELEPHONE (OUTPATIENT)
Dept: NEUROSURGERY | Facility: CLINIC | Age: 54
End: 2024-12-04
Payer: COMMERCIAL

## 2024-12-04 NOTE — TELEPHONE ENCOUNTER
PATIENT CALLED AND STATES SHE HAD HER XR COMPLETED 12/3/2024 @  AND IS CALLING FOR THE RESULTS AND TO SEE THE NEXT STEPS MOVING FORWARD.     PLEASE CALL PATIENT  THANK YOU

## 2024-12-05 NOTE — TELEPHONE ENCOUNTER
She has some movement at L4-5. She could consider fusion, but would want to work on getting BMI to 35 or less to reduce the complications.

## 2024-12-18 ENCOUNTER — OFFICE VISIT (OUTPATIENT)
Dept: FAMILY MEDICINE CLINIC | Facility: CLINIC | Age: 54
End: 2024-12-18
Payer: COMMERCIAL

## 2024-12-18 ENCOUNTER — LAB (OUTPATIENT)
Facility: HOSPITAL | Age: 54
End: 2024-12-18
Payer: COMMERCIAL

## 2024-12-18 VITALS
OXYGEN SATURATION: 100 % | WEIGHT: 229.6 LBS | DIASTOLIC BLOOD PRESSURE: 85 MMHG | HEART RATE: 71 BPM | BODY MASS INDEX: 40.68 KG/M2 | TEMPERATURE: 97.2 F | SYSTOLIC BLOOD PRESSURE: 117 MMHG

## 2024-12-18 DIAGNOSIS — Z13.29 SCREENING FOR THYROID DISORDER: ICD-10-CM

## 2024-12-18 DIAGNOSIS — N32.81 OAB (OVERACTIVE BLADDER): ICD-10-CM

## 2024-12-18 DIAGNOSIS — I10 PRIMARY HYPERTENSION: ICD-10-CM

## 2024-12-18 DIAGNOSIS — R45.851 SUICIDAL IDEATION: ICD-10-CM

## 2024-12-18 DIAGNOSIS — J30.1 SEASONAL ALLERGIC RHINITIS DUE TO POLLEN: ICD-10-CM

## 2024-12-18 DIAGNOSIS — Z13.220 LIPID SCREENING: ICD-10-CM

## 2024-12-18 DIAGNOSIS — E61.1 IRON DEFICIENCY: ICD-10-CM

## 2024-12-18 DIAGNOSIS — R73.01 IMPAIRED FASTING GLUCOSE: ICD-10-CM

## 2024-12-18 DIAGNOSIS — F41.9 ANXIETY AND DEPRESSION: Primary | ICD-10-CM

## 2024-12-18 DIAGNOSIS — M51.362 DEGENERATION OF INTERVERTEBRAL DISC OF LUMBAR REGION WITH DISCOGENIC BACK PAIN AND LOWER EXTREMITY PAIN: ICD-10-CM

## 2024-12-18 DIAGNOSIS — E66.813 CLASS 3 SEVERE OBESITY DUE TO EXCESS CALORIES WITH SERIOUS COMORBIDITY AND BODY MASS INDEX (BMI) OF 40.0 TO 44.9 IN ADULT: ICD-10-CM

## 2024-12-18 DIAGNOSIS — B35.1 ONYCHOMYCOSIS OF TOENAIL: ICD-10-CM

## 2024-12-18 DIAGNOSIS — E66.01 CLASS 3 SEVERE OBESITY DUE TO EXCESS CALORIES WITH SERIOUS COMORBIDITY AND BODY MASS INDEX (BMI) OF 40.0 TO 44.9 IN ADULT: ICD-10-CM

## 2024-12-18 DIAGNOSIS — F32.A ANXIETY AND DEPRESSION: Primary | ICD-10-CM

## 2024-12-18 DIAGNOSIS — B00.9 HERPES: ICD-10-CM

## 2024-12-18 LAB
ALBUMIN SERPL-MCNC: 3.9 G/DL (ref 3.5–5.2)
ALBUMIN/GLOB SERPL: 1.1 G/DL
ALP SERPL-CCNC: 120 U/L (ref 39–117)
ALT SERPL W P-5'-P-CCNC: 20 U/L (ref 1–33)
ANION GAP SERPL CALCULATED.3IONS-SCNC: 7 MMOL/L (ref 5–15)
AST SERPL-CCNC: 23 U/L (ref 1–32)
BASOPHILS # BLD AUTO: 0.06 10*3/MM3 (ref 0–0.2)
BASOPHILS NFR BLD AUTO: 0.7 % (ref 0–1.5)
BILIRUB SERPL-MCNC: <0.2 MG/DL (ref 0–1.2)
BUN SERPL-MCNC: 15 MG/DL (ref 6–20)
BUN/CREAT SERPL: 19 (ref 7–25)
CALCIUM SPEC-SCNC: 9.6 MG/DL (ref 8.6–10.5)
CHLORIDE SERPL-SCNC: 98 MMOL/L (ref 98–107)
CHOLEST SERPL-MCNC: 176 MG/DL (ref 0–200)
CO2 SERPL-SCNC: 31 MMOL/L (ref 22–29)
CREAT SERPL-MCNC: 0.79 MG/DL (ref 0.57–1)
DEPRECATED RDW RBC AUTO: 51.5 FL (ref 37–54)
EGFRCR SERPLBLD CKD-EPI 2021: 89 ML/MIN/1.73
EOSINOPHIL # BLD AUTO: 0.2 10*3/MM3 (ref 0–0.4)
EOSINOPHIL NFR BLD AUTO: 2.3 % (ref 0.3–6.2)
ERYTHROCYTE [DISTWIDTH] IN BLOOD BY AUTOMATED COUNT: 15.6 % (ref 12.3–15.4)
FERRITIN SERPL-MCNC: 106.1 NG/ML (ref 13–150)
GLOBULIN UR ELPH-MCNC: 3.5 GM/DL
GLUCOSE SERPL-MCNC: 97 MG/DL (ref 65–99)
HBA1C MFR BLD: 6.1 % (ref 4.8–5.6)
HCT VFR BLD AUTO: 39.6 % (ref 34–46.6)
HDLC SERPL-MCNC: 57 MG/DL (ref 40–60)
HGB BLD-MCNC: 12.2 G/DL (ref 12–15.9)
IMM GRANULOCYTES # BLD AUTO: 0.1 10*3/MM3 (ref 0–0.05)
IMM GRANULOCYTES NFR BLD AUTO: 1.1 % (ref 0–0.5)
IRON 24H UR-MRATE: 36 MCG/DL (ref 37–145)
IRON SATN MFR SERPL: 9 % (ref 20–50)
LDLC SERPL CALC-MCNC: 101 MG/DL (ref 0–100)
LDLC/HDLC SERPL: 1.74 {RATIO}
LYMPHOCYTES # BLD AUTO: 2.28 10*3/MM3 (ref 0.7–3.1)
LYMPHOCYTES NFR BLD AUTO: 26.1 % (ref 19.6–45.3)
MCH RBC QN AUTO: 27.6 PG (ref 26.6–33)
MCHC RBC AUTO-ENTMCNC: 30.8 G/DL (ref 31.5–35.7)
MCV RBC AUTO: 89.6 FL (ref 79–97)
MONOCYTES # BLD AUTO: 0.59 10*3/MM3 (ref 0.1–0.9)
MONOCYTES NFR BLD AUTO: 6.8 % (ref 5–12)
NEUTROPHILS NFR BLD AUTO: 5.49 10*3/MM3 (ref 1.7–7)
NEUTROPHILS NFR BLD AUTO: 63 % (ref 42.7–76)
NRBC BLD AUTO-RTO: 0 /100 WBC (ref 0–0.2)
PLATELET # BLD AUTO: 526 10*3/MM3 (ref 140–450)
PMV BLD AUTO: 8.8 FL (ref 6–12)
POTASSIUM SERPL-SCNC: 4 MMOL/L (ref 3.5–5.2)
PROT SERPL-MCNC: 7.4 G/DL (ref 6–8.5)
RBC # BLD AUTO: 4.42 10*6/MM3 (ref 3.77–5.28)
SODIUM SERPL-SCNC: 136 MMOL/L (ref 136–145)
TIBC SERPL-MCNC: 405 MCG/DL (ref 298–536)
TRANSFERRIN SERPL-MCNC: 272 MG/DL (ref 200–360)
TRIGL SERPL-MCNC: 98 MG/DL (ref 0–150)
TSH SERPL DL<=0.05 MIU/L-ACNC: 0.74 UIU/ML (ref 0.27–4.2)
VLDLC SERPL-MCNC: 18 MG/DL (ref 5–40)
WBC NRBC COR # BLD AUTO: 8.72 10*3/MM3 (ref 3.4–10.8)

## 2024-12-18 PROCEDURE — 83540 ASSAY OF IRON: CPT

## 2024-12-18 PROCEDURE — 80053 COMPREHEN METABOLIC PANEL: CPT

## 2024-12-18 PROCEDURE — 80061 LIPID PANEL: CPT

## 2024-12-18 PROCEDURE — 1125F AMNT PAIN NOTED PAIN PRSNT: CPT | Performed by: NURSE PRACTITIONER

## 2024-12-18 PROCEDURE — 99214 OFFICE O/P EST MOD 30 MIN: CPT | Performed by: NURSE PRACTITIONER

## 2024-12-18 PROCEDURE — 82728 ASSAY OF FERRITIN: CPT

## 2024-12-18 PROCEDURE — 85025 COMPLETE CBC W/AUTO DIFF WBC: CPT

## 2024-12-18 PROCEDURE — 84443 ASSAY THYROID STIM HORMONE: CPT

## 2024-12-18 PROCEDURE — 36415 COLL VENOUS BLD VENIPUNCTURE: CPT

## 2024-12-18 PROCEDURE — 1160F RVW MEDS BY RX/DR IN RCRD: CPT | Performed by: NURSE PRACTITIONER

## 2024-12-18 PROCEDURE — 3079F DIAST BP 80-89 MM HG: CPT | Performed by: NURSE PRACTITIONER

## 2024-12-18 PROCEDURE — 83036 HEMOGLOBIN GLYCOSYLATED A1C: CPT

## 2024-12-18 PROCEDURE — 1159F MED LIST DOCD IN RCRD: CPT | Performed by: NURSE PRACTITIONER

## 2024-12-18 PROCEDURE — 84466 ASSAY OF TRANSFERRIN: CPT

## 2024-12-18 PROCEDURE — 3074F SYST BP LT 130 MM HG: CPT | Performed by: NURSE PRACTITIONER

## 2024-12-18 RX ORDER — IBUPROFEN 600 MG/1
TABLET, FILM COATED ORAL
COMMUNITY
Start: 2024-12-03

## 2024-12-18 RX ORDER — HYDROXYZINE HYDROCHLORIDE 25 MG/1
25 TABLET, FILM COATED ORAL EVERY 6 HOURS PRN
Qty: 30 TABLET | Refills: 1 | Status: SHIPPED | OUTPATIENT
Start: 2024-12-18

## 2024-12-18 RX ORDER — POTASSIUM CHLORIDE 750 MG/1
10 TABLET, EXTENDED RELEASE ORAL DAILY
Qty: 90 TABLET | Refills: 1 | Status: SHIPPED | OUTPATIENT
Start: 2024-12-18

## 2024-12-18 RX ORDER — AMMONIUM LACTATE 12 G/100G
LOTION TOPICAL AS NEEDED
Qty: 225 G | Refills: 0 | Status: SHIPPED | OUTPATIENT
Start: 2024-12-18

## 2024-12-18 RX ORDER — OLANZAPINE 10 MG/1
10 TABLET ORAL NIGHTLY
Qty: 90 TABLET | Refills: 1 | Status: SHIPPED | OUTPATIENT
Start: 2024-12-18

## 2024-12-18 RX ORDER — LORATADINE 10 MG/1
10 TABLET ORAL DAILY
Qty: 90 TABLET | Refills: 3 | Status: SHIPPED | OUTPATIENT
Start: 2024-12-18

## 2024-12-18 RX ORDER — DULOXETIN HYDROCHLORIDE 30 MG/1
30 CAPSULE, DELAYED RELEASE ORAL 2 TIMES DAILY
Qty: 180 CAPSULE | Refills: 1 | Status: SHIPPED | OUTPATIENT
Start: 2024-12-18

## 2024-12-18 RX ORDER — VALACYCLOVIR HYDROCHLORIDE 500 MG/1
500 TABLET, FILM COATED ORAL DAILY
Qty: 30 TABLET | Refills: 1 | Status: SHIPPED | OUTPATIENT
Start: 2024-12-18

## 2024-12-18 RX ORDER — OXYBUTYNIN CHLORIDE 10 MG/1
10 TABLET, EXTENDED RELEASE ORAL DAILY
Qty: 90 TABLET | Refills: 1 | Status: SHIPPED | OUTPATIENT
Start: 2024-12-18

## 2024-12-23 ENCOUNTER — TELEPHONE (OUTPATIENT)
Dept: FAMILY MEDICINE CLINIC | Facility: CLINIC | Age: 54
End: 2024-12-23

## 2024-12-23 NOTE — TELEPHONE ENCOUNTER
Caller: [facility]      Patient is needing: [facility] CALLED STATING THAT THE PAPERWORK FAXED TO THEM ON 12.18.24 HAS SOME INFORMATION MISSING. PLEASE FILL OUT PAGE 2 SECTION B (YES OR NO ANSWERS) AND RESEND TO [phone].

## 2024-12-27 ENCOUNTER — TELEPHONE (OUTPATIENT)
Dept: ONCOLOGY | Facility: HOSPITAL | Age: 54
End: 2024-12-27

## 2024-12-27 ENCOUNTER — TELEPHONE (OUTPATIENT)
Dept: FAMILY MEDICINE CLINIC | Facility: CLINIC | Age: 54
End: 2024-12-27

## 2024-12-27 NOTE — TELEPHONE ENCOUNTER
Tried to call back; no answer.  According to previous encounters those documents were given to the patient.

## 2024-12-27 NOTE — TELEPHONE ENCOUNTER
Caller: [facility]    Relationship to patient: Other    Best call back number: [phone]    Patient is needing:     [facility]-[id number] (PLEASE SEE MEDIA) FAX COMPLETED FORM  [phone] ASAP THEY HAVE NOT RECEIVED THESE COMPLETED FORMS AS OF TODAY.    [id number]- THEY HAVE NOT RECEIVED THIS ONE EITHER BUT CALLER IS RE-FAXING TO OFFICE TODAY.

## 2024-12-31 ENCOUNTER — OFFICE VISIT (OUTPATIENT)
Dept: ONCOLOGY | Facility: HOSPITAL | Age: 54
End: 2024-12-31
Payer: COMMERCIAL

## 2024-12-31 VITALS
SYSTOLIC BLOOD PRESSURE: 130 MMHG | DIASTOLIC BLOOD PRESSURE: 88 MMHG | RESPIRATION RATE: 20 BRPM | HEIGHT: 63 IN | BODY MASS INDEX: 41.25 KG/M2 | WEIGHT: 232.8 LBS | OXYGEN SATURATION: 97 % | HEART RATE: 103 BPM | TEMPERATURE: 97.7 F

## 2024-12-31 DIAGNOSIS — E61.1 IRON DEFICIENCY: Primary | ICD-10-CM

## 2024-12-31 DIAGNOSIS — J30.1 SEASONAL ALLERGIC RHINITIS DUE TO POLLEN: ICD-10-CM

## 2024-12-31 DIAGNOSIS — D75.839 THROMBOCYTOSIS, UNSPECIFIED: ICD-10-CM

## 2024-12-31 PROCEDURE — G0463 HOSPITAL OUTPT CLINIC VISIT: HCPCS | Performed by: NURSE PRACTITIONER

## 2024-12-31 RX ORDER — FLUTICASONE PROPIONATE 50 MCG
2 SPRAY, SUSPENSION (ML) NASAL DAILY
Qty: 16 G | Refills: 5 | Status: SHIPPED | OUTPATIENT
Start: 2024-12-31

## 2024-12-31 NOTE — TELEPHONE ENCOUNTER
Caller: Bianca Lara    Relationship: Self    Best call back number:[phone]     What orders are you requesting (i.e. lab or imaging): COLOR GUARD TEST/ UPPER AND LOWER GI TEST     In what timeframe would the patient need to come in: AS SOON AS POSSIBLE     Where will you receive your lab/imaging services: HOSPITAL     Additional notes: PLEASE CALL AND ADVISE

## 2024-12-31 NOTE — TELEPHONE ENCOUNTER
Patient stated her Onocologist recommended she follow up with GI concerning her ongoing problem with blood in her stool, dry mouth, possibly retaining water.    Patient stated she also has been experiencing numbness and tingling that comes and goes while she is sitting, starting in one then it goes to the others.       Please advise

## 2024-12-31 NOTE — PROGRESS NOTES
Chief Complaint/Reason for Referral:  Thrombocytosis      Reina Putnam, TESSA Putnam, Reina, TESSA    Records Obtained:  Records of the patients history including those obtained from  [facility] and patient information were reviewed and summarized in detail.    Subjective    History of Present Illness    Ms. Bianca Lara presents for follow up after initial visit on 8/6/2024 for referral for the mild thrombocytosis.     Elevated platelet count waxes and wanes. Iron studies were normal except for the iron sat was 11%, iron  low normal. Ferritin normal range at 141, TIBC of 408. She was started on oral iron 1 tab 3 days a week. Took for several days and reports she noticed itching and some constipation with taking it. Reports took some Benadryl. Has taken oral iron in the past and did well with taking it when she took it years ago. She stopped the oral iron and then tried again and had itching again and stopped taking the oral iron again. Currently, she is not taking the iron.     Prior EGD / colonoscopy: in 4/21/2021: colonoscopy gastric 3 mm polyps with polypectomy, internal hemorrhoids. EGD: small HH, esophagitis. Was recommended Protonix but never started.     Denies any GI blood loss. Denies having menses. Denies any hemoptysis, hematemesis or hematuria.   0  Testing for the MPN work up was negative for Carlos-R, MpL, VIOLA V617F, exon 12-15 was negative.   Peripheral smear .Peripheral blood (CBC and smear):               - WBC:  Adequate, normal morphology               - RBC:  Normocytic normochromic RBCs               - Platelets:  Thrombocytosis, slight large platelets      JAK2 V617F, reflex to CALR + MPL + E12-15: Negative    12/31/2024: Interval follow up:   Ms. Jessica Lara presents for 4 month follow up. CBC shows elevated platelet count up to 526 from 459. Normal hemoglobin with no anemia noted. Iron level down to 36, ferritin of 106, iron sat of 9%. Unable to take oral iron and does not tolerate well.  We discussed IV iron infusions. Reports she has noticed blood in the stool at least 3 days a week. History of hemorrhoids in the past. Last scopes were in April of 2021.       Oncology/Hematology History    No history exists.    8/6/2024 initial consult: History of Present Illness  Ms. Bianca Lara is a very pleasant 53 year old  female who presents for new  hematology patient evaluation for thrombocytosis.      PMH includes: hypertension, DDD, Vitamin D deficiency, anxiety and depression, tobacco abuse, trying to quit. Smokes when she drinks. Family history of mother with stomach cancer, father and paternal grandfather with some type of leukemia. Multiple other family members with malignancy. Cancer-related family history includes Cancer in her maternal grandmother; Stomach cancer in her mother.       Denies any current bleeding. History of hematuria in the past. Does not have menses any longer in the last 20 years. Last scopes were in 1/4 and 4/21/2021 with EGD and colonoscopy. EGD showed esophagitis at the GE junction. Colonoscopy showed: polyps, hemorrhoids.      Lab work: 5/31/2024: WBC 10.11, hemoglobin 12.6, platelet count of 455. Differential with elevated lymphocyte count of 4090.      Has had intermittent thrombocytosis in the range of 451 to 583 at its highest in March of 2024. Normal platelet count of 411 back in 12/14/2024. Has had mild thrombocytosis since November of 2019. CBC has been normal. CMP is normal except for glucose of 137.        Review of Systems   Constitutional:  Positive for fatigue.   HENT: Negative.     Eyes: Negative.    Respiratory: Negative.     Cardiovascular: Negative.    Gastrointestinal: Negative.    Endocrine: Negative.    Genitourinary: Negative.    Musculoskeletal:  Positive for back pain.   Skin: Negative.    Allergic/Immunologic: Negative.    Neurological: Negative.    Hematological: Negative.    Psychiatric/Behavioral: Negative.     All other systems  reviewed and are negative.      Current Outpatient Medications on File Prior to Visit   Medication Sig Dispense Refill    ammonium lactate (LAC-HYDRIN) 12 % lotion Apply  topically to the appropriate area as directed As Needed for Irritation. 225 g 0    ciclopirox (PENLAC) 8 % solution Apply  topically to the appropriate area as directed Every Night. 6 mL 1    DULoxetine (CYMBALTA) 30 MG capsule Take 1 capsule by mouth 2 (Two) Times a Day. 180 capsule 1    hydrOXYzine (ATARAX) 25 MG tablet Take 1 tablet by mouth Every 6 (Six) Hours As Needed for Anxiety. 30 tablet 1    ibuprofen (ADVIL,MOTRIN) 600 MG tablet TAKE 1 TABLET BY MOUTH THREE TIMES A DAY WITH FOOD OR MILK AS NEEDED FOR 30 DAYS      loratadine (Claritin) 10 MG tablet Take 1 tablet by mouth Daily. 90 tablet 3    losartan-hydrochlorothiazide (Hyzaar) 50-12.5 MG per tablet Take 1 tablet by mouth Daily. 90 tablet 1    OLANZapine (ZyPREXA) 10 MG tablet Take 1 tablet by mouth Every Night. 90 tablet 1    omeprazole (priLOSEC) 40 MG capsule Take 1 capsule by mouth Daily. 90 capsule 1    oxybutynin XL (DITROPAN-XL) 10 MG 24 hr tablet Take 1 tablet by mouth Daily. 90 tablet 1    potassium chloride 10 MEQ CR tablet Take 1 tablet by mouth Daily. 90 tablet 1    pregabalin (LYRICA) 150 MG capsule Take 1 capsule by mouth 3 times a day.      sennosides-docusate (senna-docusate sodium) 8.6-50 MG per tablet Take 1 tablet by mouth Daily As Needed for Constipation. 90 tablet 1    valACYclovir (VALTREX) 500 MG tablet Take 1 tablet by mouth Daily. 30 tablet 1    vitamin D (ERGOCALCIFEROL) 1.25 MG (62429 UT) capsule capsule Take 1 capsule by mouth 1 (One) Time Per Week. 13 capsule 0    [DISCONTINUED] ferrous gluconate (FERGON) 324 MG tablet Take 1 tablet by mouth 3 (Three) Times a Week. 60 tablet 1    [DISCONTINUED] fluticasone (FLONASE) 50 MCG/ACT nasal spray Administer 2 sprays into the nostril(s) as directed by provider Daily. 16 g 5     No current facility-administered  medications on file prior to visit.       Allergies   Allergen Reactions    Azithromycin Nausea Only    Duloxetine Hcl Unknown - Low Severity    Lisinopril Cough    Penicillins Unknown - Low Severity     Past Medical History:   Diagnosis Date    Allergic     Allergic rhinitis     Anxiety 2018    Arthritis     Blood in stool     Condition not found     MEATAL STENOSIS, UNSPECIFIED    CTS (carpal tunnel syndrome) 2015    Depression 2018    Difficulty walking     Dysmenorrhea     Gross hematuria     Headache     Headache, tension-type     Helicobacter positive gastritis 2016    Hyperlipidemia     Hypertension     Irregular menses     Low back pain 2014    Microscopic hematuria 06/10/2014    Night sweats     Onychomycosis of toenail 2014    Pap smear for cervical cancer screening 2016    Right sided abdominal pain 2014    Screening mammogram, encounter for 2018    Sleep apnea     STD exposure     chlamydia    Tobacco abuse     chronic    Vitamin D deficiency      Past Surgical History:   Procedure Laterality Date    BREAST BIOPSY Left 2012    CHOLECYSTECTOMY      COLONOSCOPY  08/15/2016    CYST REMOVAL      left wrist    CYSTOSCOPY  2014    Office cystoscopy w/ Dr. Goncalves     Social History     Socioeconomic History    Marital status:    Tobacco Use    Smoking status: Former     Current packs/day: 0.00     Average packs/day: 0.3 packs/day for 54.1 years (13.5 ttl pk-yrs)     Types: Cigarettes     Start date: 1989     Quit date: 2024     Years since quittin.5     Passive exposure: Past    Smokeless tobacco: Never   Vaping Use    Vaping status: Never Used   Substance and Sexual Activity    Alcohol use: Yes     Alcohol/week: 10.0 standard drinks of alcohol     Types: 4 Glasses of wine, 4 Shots of liquor, 2 Drinks containing 0.5 oz of alcohol per week     Comment: glass of wine every other day    Drug use: Never    Sexual  activity: Yes     Partners: Male     Birth control/protection: Condom     Family History   Problem Relation Age of Onset    Stomach cancer Mother     Alzheimer's disease Mother     Diabetes Mother     Dementia Mother     Leukemia Father     Heart disease Sister     Heart disease Brother     Cancer Maternal Grandmother     Leukemia Paternal Grandfather     Stroke Other         AUNT;UNCLE    Heart disease Other         AUNT,UNCLE    Diabetes Other         AUNT,UNCLE    Hyperlipidemia Sister     Kidney disease Sister      Immunization History   Administered Date(s) Administered    COVID-19 (MODERNA) 1st,2nd,3rd Dose Monovalent 01/07/2021, 02/04/2021, 10/28/2021    COVID-19 (MODERNA) BIVALENT 12+YRS 10/17/2022    Fluzone (or Fluarix & Flulaval for VFC) >6mos 10/17/2022, 11/09/2023    Fluzone Quad >6mos (Multi-dose) 10/29/2020    Influenza, Unspecified 10/17/2022    Pneumococcal Conjugate 20-Valent (PCV20) 05/31/2024       Tobacco Use: Medium Risk (12/31/2024)    Patient History     Smoking Tobacco Use: Former     Smokeless Tobacco Use: Never     Passive Exposure: Past       Objective     Physical Exam  Vitals and nursing note reviewed.   Constitutional:       General: She is not in acute distress.     Appearance: Normal appearance. She is not ill-appearing.   HENT:      Head: Normocephalic.      Nose: Nose normal.      Mouth/Throat:      Mouth: Mucous membranes are moist.   Eyes:      Pupils: Pupils are equal, round, and reactive to light.   Cardiovascular:      Rate and Rhythm: Normal rate and regular rhythm.      Pulses: Normal pulses.      Heart sounds: Normal heart sounds.   Pulmonary:      Effort: Pulmonary effort is normal. No respiratory distress.      Breath sounds: Normal breath sounds.   Abdominal:      General: Bowel sounds are normal.      Palpations: Abdomen is soft.   Musculoskeletal:         General: Normal range of motion.      Cervical back: Normal range of motion and neck supple.   Skin:     General:  "Skin is warm and dry.      Capillary Refill: Capillary refill takes less than 2 seconds.   Neurological:      General: No focal deficit present.      Mental Status: She is alert and oriented to person, place, and time.   Psychiatric:         Mood and Affect: Mood normal.         Behavior: Behavior normal.         Thought Content: Thought content normal.         Judgment: Judgment normal.         Vitals:    12/31/24 0852   BP: 130/88   Pulse: 103   Resp: 20   Temp: 97.7 °F (36.5 °C)   TempSrc: Temporal   SpO2: 97%   Weight: 106 kg (232 lb 12.8 oz)   Height: 160 cm (62.99\")   PainSc: 0-No pain       Wt Readings from Last 3 Encounters:   12/31/24 106 kg (232 lb 12.8 oz)   12/18/24 104 kg (229 lb 9.6 oz)   12/03/24 103 kg (227 lb 4.8 oz)        ECOG score: 1         ECOG: (0) Fully Active - Able to Carry On All Pre-disease Performance Without Restriction  Fall Risk Assessment was completed, and patient is at low risk for falls.  PHQ-9 Total Score:         The patient is  experiencing fatigue. Fatigue score: 1    PT/OT Functional Screening: PT fx screen : No needs identified  Speech Functional Screening: Speech fx screen : No needs identified  Rehab to be ordered: Rehab to be ordered : No needs identified        Result Review :  The following data was reviewed by: TESSA Anand on 09/17/2024:  Lab Results   Component Value Date    HGB 12.2 12/18/2024    HCT 39.6 12/18/2024    MCV 89.6 12/18/2024     (H) 12/18/2024    WBC 8.72 12/18/2024    NEUTROABS 5.49 12/18/2024    LYMPHSABS 2.28 12/18/2024    MONOSABS 0.59 12/18/2024    EOSABS 0.20 12/18/2024    BASOSABS 0.06 12/18/2024     Lab Results   Component Value Date    GLUCOSE 97 12/18/2024    BUN 15 12/18/2024    CREATININE 0.79 12/18/2024     12/18/2024    K 4.0 12/18/2024    CL 98 12/18/2024    CO2 31.0 (H) 12/18/2024    CALCIUM 9.6 12/18/2024    PROTEINTOT 7.4 12/18/2024    ALBUMIN 3.9 12/18/2024    BILITOT <0.2 12/18/2024    ALKPHOS 120 (H) " "12/18/2024    AST 23 12/18/2024    ALT 20 12/18/2024     Lab Results   Component Value Date    Ferritin 106.10 12/18/2024     Lab Results   Component Value Date    IRON 36 (L) 12/18/2024    LABIRON 9 (L) 12/18/2024    TRANSFERRIN 272 12/18/2024    TIBC 405 12/18/2024     Lab Results   Component Value Date    FERRITIN 106.10 12/18/2024     No results found for: \"PSA\", \"CEA\", \"AFP\", \"\", \"\"    Mammo Screening Digital Tomosynthesis Bilateral With CAD    Result Date: 7/1/2024  Benign mammogram. Recommend routine breast screening.  TISSUE DENSITY:  There are scattered areas of fibroglandular density.  BI-RADS ASSESSMENT: BI-RADS 1. Negative.   The patient's information is entered into a computerized reminder system with a targeted due date for the next mammogram.  Note:  It has been reported that there is approximately a 15% false negative in mammography.  Therefore, management of a palpable abnormality should not be deferred because of a negative mammogram.           Electronically Signed By-DONAL LEMON MD On:7/1/2024 4:40 PM      MRI Hip Left Without Contrast    Result Date: 6/14/2024  Impression: 1.Mild bilateral hip osteoarthritis. 2.Left trochanteric/gluteal bursitis. 3.Partial-thickness tears involving the bilateral hamstring tendon origins. Electronically Signed: Roger Aguayo MD  6/14/2024 12:59 PM EDT  Workstation ID: [id number]           Assessment and Plan:  Diagnoses and all orders for this visit:    1. Iron deficiency (Primary)  -     Iron Profile; Future  -     Ferritin; Future  -     Vitamin B12; Future  -     Folate; Future  -     CBC & Differential; Future  -     Occult Blood, Fecal By Immunoassay - Stool, Per Rectum    2. Thrombocytosis, unspecified  -     Iron Profile; Future  -     Ferritin; Future  -     Vitamin B12; Future  -     Folate; Future  -     CBC & Differential; Future       Thrombocytosis: Mild thrombocytosis in the range of normal to 530. No anemia noted. Iron studies with a " low iron sat of 11% with the rest of the iron panel normal. P. Smear was normal. Charly 2 neg, exon 12-15 neg, Carlos-R neg, MpL neg. Likely secondary to the mild iron loss. Placed on oral iron but did not tolerate due to itching and constipation and stopped the oral iron. Denies any acute blood of any sorts. Last scopes with EGD and colonoscopy done in 4/2021 with small HH with recommendation for protonix but was never ordered. C scope with hemorrhoids, few polyps. Denies any acute bleeding of any sorts. Reports has noticed blood in the stool at least 3 times a week. Will plan to check stool for OB and refer to GI if OB +.     Will plan for IV iron replacement since iron low and low iron sat as the elevated platelet count may be secondary to iron deficiency without anemia.       Allergic reaction to oral iron: Likely the oral iron with itching is secondary to preservatives in the product and a different iron prep-(preservative free ) may not cause itching.     GERD: Still has GERD symptoms. Start Omeprazole 10 mg BID. Sent to pharmacy.     History of leukemia: Reports her father and brother with acute leukemia ??.She is not sure of details. Genetic testing may be recommended to see if there are inheritable genetic mutations.         I spent 30 minutes caring for [place] on this date of service. This time includes time spent by me in the following activities:preparing for the visit, reviewing tests, obtaining and/or reviewing a separately obtained history, performing a medically appropriate examination and/or evaluation , counseling and educating the patient/family/caregiver, ordering medications, tests, or procedures, referring and communicating with other health care professionals , documenting information in the medical record, and independently interpreting results and communicating that information with the patient/family/caregiver    Patient Follow Up: 2 months with labs and follow up with NP>     Patient was given  instructions and counseling regarding her condition or for health maintenance advice. Please see specific information pulled into the AVS if appropriate.     Taylor Espitia, APRN    12/31/2024    .tob

## 2024-12-31 NOTE — TELEPHONE ENCOUNTER
Caller: Marco Bianca Guerran    Relationship: Self    Best call back number: [phone]     Requested Prescriptions:   Requested Prescriptions     Pending Prescriptions Disp Refills    fluticasone (FLONASE) 50 MCG/ACT nasal spray 16 g 5     Sig: Administer 2 sprays into the nostril(s) as directed by provider Daily.        Pharmacy where request should be sent: [facility]ARMACY #[id number] - [place], [place] - [address] [facility] [phone] [facility] [phone] FX     Last office visit with prescribing clinician: 12/18/2024   Last telemedicine visit with prescribing clinician: Visit date not found   Next office visit with prescribing clinician: 1/13/2025       Does the patient have less than a 3 day supply:  [x] Yes  [] No    Would you like a call back once the refill request has been completed: [x] Yes [] No    If the office needs to give you a call back, can they leave a voicemail: [x] Yes [] No    Jonatan Huynh Rep   12/31/24 10:01 EST

## 2025-01-02 PROBLEM — E61.1 IRON DEFICIENCY: Status: ACTIVE | Noted: 2025-01-02

## 2025-01-02 PROBLEM — K90.49 MALABSORPTION DUE TO INTOLERANCE, NOT ELSEWHERE CLASSIFIED: Status: ACTIVE | Noted: 2025-01-02

## 2025-01-08 ENCOUNTER — TELEPHONE (OUTPATIENT)
Dept: ONCOLOGY | Facility: HOSPITAL | Age: 55
End: 2025-01-08
Payer: COMMERCIAL

## 2025-01-08 NOTE — TELEPHONE ENCOUNTER
Caller: Bianca Lara    Relationship: Self    Best call back number: [phone]     What is the best time to reach you: ASAP    Who are you requesting to speak with (clinical staff, provider,  specific staff member): CLINICAL     What was the call regarding: PT HAS RECEIVED HER KIT FOR THE STOOL SAMPLE. THE PT IS NEEDING SOMEONE TO CALL HER AND TELL HER HOW SHE IS SUPPOSED TO DO THE SAMPLE. PT HAS QUESTIONS. THERE WERE NO INSTRUCTIONS PROVIDED.     Is it okay if the provider responds through MyChart: PT WOULD PREFER A CALL BACK.

## 2025-01-09 LAB — HEMOCCULT STL QL IA: NEGATIVE

## 2025-01-09 PROCEDURE — 82274 ASSAY TEST FOR BLOOD FECAL: CPT | Performed by: NURSE PRACTITIONER

## 2025-01-14 DIAGNOSIS — E61.1 IRON DEFICIENCY: Primary | ICD-10-CM

## 2025-01-23 ENCOUNTER — OFFICE VISIT (OUTPATIENT)
Dept: FAMILY MEDICINE CLINIC | Facility: CLINIC | Age: 55
End: 2025-01-23
Payer: COMMERCIAL

## 2025-01-23 VITALS
TEMPERATURE: 96.9 F | BODY MASS INDEX: 41.25 KG/M2 | HEIGHT: 63 IN | HEART RATE: 69 BPM | WEIGHT: 232.8 LBS | SYSTOLIC BLOOD PRESSURE: 134 MMHG | DIASTOLIC BLOOD PRESSURE: 92 MMHG

## 2025-01-23 DIAGNOSIS — H90.A12 CONDUCTIVE HEARING LOSS OF LEFT EAR WITH RESTRICTED HEARING OF RIGHT EAR: ICD-10-CM

## 2025-01-23 DIAGNOSIS — M21.41 PES PLANUS OF BOTH FEET: ICD-10-CM

## 2025-01-23 DIAGNOSIS — F41.9 ANXIETY AND DEPRESSION: ICD-10-CM

## 2025-01-23 DIAGNOSIS — Z12.31 BREAST CANCER SCREENING BY MAMMOGRAM: Primary | ICD-10-CM

## 2025-01-23 DIAGNOSIS — E66.01 CLASS 3 SEVERE OBESITY DUE TO EXCESS CALORIES WITH SERIOUS COMORBIDITY AND BODY MASS INDEX (BMI) OF 40.0 TO 44.9 IN ADULT: ICD-10-CM

## 2025-01-23 DIAGNOSIS — M21.42 PES PLANUS OF BOTH FEET: ICD-10-CM

## 2025-01-23 DIAGNOSIS — H90.A11 CONDUCTIVE HEARING LOSS OF RIGHT EAR WITH RESTRICTED HEARING OF LEFT EAR: Primary | ICD-10-CM

## 2025-01-23 DIAGNOSIS — M51.362 DEGENERATION OF INTERVERTEBRAL DISC OF LUMBAR REGION WITH DISCOGENIC BACK PAIN AND LOWER EXTREMITY PAIN: ICD-10-CM

## 2025-01-23 DIAGNOSIS — I10 PRIMARY HYPERTENSION: ICD-10-CM

## 2025-01-23 DIAGNOSIS — H91.93 BILATERAL HEARING LOSS, UNSPECIFIED HEARING LOSS TYPE: ICD-10-CM

## 2025-01-23 DIAGNOSIS — E66.813 CLASS 3 SEVERE OBESITY DUE TO EXCESS CALORIES WITH SERIOUS COMORBIDITY AND BODY MASS INDEX (BMI) OF 40.0 TO 44.9 IN ADULT: ICD-10-CM

## 2025-01-23 DIAGNOSIS — F32.A ANXIETY AND DEPRESSION: ICD-10-CM

## 2025-01-23 PROCEDURE — 1126F AMNT PAIN NOTED NONE PRSNT: CPT | Performed by: NURSE PRACTITIONER

## 2025-01-23 PROCEDURE — 3075F SYST BP GE 130 - 139MM HG: CPT | Performed by: NURSE PRACTITIONER

## 2025-01-23 PROCEDURE — 99214 OFFICE O/P EST MOD 30 MIN: CPT | Performed by: NURSE PRACTITIONER

## 2025-01-23 PROCEDURE — 3080F DIAST BP >= 90 MM HG: CPT | Performed by: NURSE PRACTITIONER

## 2025-01-23 RX ORDER — HYDROXYZINE HYDROCHLORIDE 25 MG/1
25 TABLET, FILM COATED ORAL EVERY 6 HOURS PRN
Qty: 90 TABLET | Refills: 1 | Status: SHIPPED | OUTPATIENT
Start: 2025-01-23

## 2025-01-23 RX ORDER — DULOXETIN HYDROCHLORIDE 60 MG/1
60 CAPSULE, DELAYED RELEASE ORAL 2 TIMES DAILY
Qty: 60 CAPSULE | Refills: 2 | Status: SHIPPED | OUTPATIENT
Start: 2025-01-23

## 2025-01-23 RX ORDER — LOSARTAN POTASSIUM AND HYDROCHLOROTHIAZIDE 12.5; 5 MG/1; MG/1
1 TABLET ORAL DAILY
Qty: 90 TABLET | Refills: 1 | Status: SHIPPED | OUTPATIENT
Start: 2025-01-23

## 2025-01-23 NOTE — PROGRESS NOTES
Chief Complaint  Depression (FOLLOW UP, needs medication adjustment, daughter states patient zones out), Weight Loss (Would like to discuss medication for weight loss/ unsuccessful with diet change, would like to discuss self pay options), and Poor Circulation (Bottom of legs stay ice cold when lying down; hands as well)    Subjective          Bianca Lara is a 54 y.o. female who presents to [facility]    History of Present Illness  History of Present Illness  The patient presents for a follow-up on depression, anxiety, back pain, weight management, cold extremities, and elevated blood pressure. She is accompanied by her daughter.    She reports persistent anxiety, which she manages with hydroxyzine 25 mg twice daily. She has exhausted her supply of this medication. Her depression appears to be improving, but her daughter disagrees, citing increased episodes of zoning out. The patient also exhibits signs of hearing impairment, necessitating loud noises to gain her attention. This issue has been present throughout her life but has recently worsened. She also reports excessive sleepiness and increased appetite, which she attributes to her medication regimen. Despite these side effects, she continues to take her medications as prescribed. She occasionally experiences insomnia, staying awake all night, but remains alert during the day. She is currently on Cymbalta 30 mg twice daily, which does not alleviate her back pain, necessitating additional ibuprofen use. Her pain management team has increased her Lyrica dosage, informing her that complete pain relief is unlikely without surgery. She experiences radiating pain from her lower back to her toes, diagnosed as sciatica. She has been advised to lose weight and quit smoking before considering surgery. She is able to ambulate without exacerbating her pain but requires a cane for stability. She has attempted dietary modifications  for weight loss but has not been successful. She reports coldness in her legs and hands, particularly at night, which she suspects may be due to poor circulation. She has tried walking around the bed to improve circulation, but this has not been effective. She is currently receiving weekly iron infusions and reports shortness of breath. She has requested insoles for her shoes due to flat feet.    Supplemental Information  She has been referred to a gastroenterologist by Dr. Mcknight, hematology due to persistent diarrhea and reflux symptoms.    SOCIAL HISTORY  The patient is a smoker.    MEDICATIONS  Current: Cymbalta, Zyprexa, Lyrica, ibuprofen, hydroxyzine      The PHQ has not been completed during this encounter.               Health Maintenance Due   Topic Date Due    TDAP/TD VACCINES (1 - Tdap) Never done    ZOSTER VACCINE (1 of 2) Never done    ANNUAL PHYSICAL  12/11/2024        Review of Systems   Constitutional:  Positive for fatigue and unexpected weight change. Negative for chills and fever.   Respiratory:  Negative for cough and shortness of breath.    Cardiovascular:  Negative for chest pain and palpitations.   Gastrointestinal:  Positive for diarrhea. Negative for constipation, nausea and vomiting.        Reflux   Musculoskeletal:  Positive for back pain. Negative for neck pain.   Skin:  Negative for rash.   Neurological:  Negative for dizziness and headaches.   Psychiatric/Behavioral:  Positive for behavioral problems, dysphoric mood and sleep disturbance. Negative for self-injury and suicidal ideas. The patient is nervous/anxious.           Medical History: has a past medical history of Allergic, Allergic rhinitis, Anxiety (09/11/2018), Arthritis, Blood in stool, Condition not found, CTS (carpal tunnel syndrome) (2015), Depression (09/11/2018), Difficulty walking (2023), Dysmenorrhea, Gross hematuria, Headache, Headache, tension-type (2022), Helicobacter positive gastritis (05/26/2016),  Hyperlipidemia (2024), Hypertension, Irregular menses, Low back pain (07/09/2014), Microscopic hematuria (06/10/2014), Night sweats, Onychomycosis of toenail (07/09/2014), Pap smear for cervical cancer screening (05/16/2016), Right sided abdominal pain (06/12/2014), Screening mammogram, encounter for (11/09/2018), Sleep apnea (2023), STD exposure (1993), Tobacco abuse, and Vitamin D deficiency.     Surgical History: has a past surgical history that includes Breast biopsy (Left, 01/09/2012); Cholecystectomy; Colonoscopy (08/15/2016); Cyst Removal; and Cystoscopy (06/27/2014).     Family History: family history includes Alzheimer's disease in her mother; Cancer in her maternal grandmother; Dementia in her mother; Diabetes in her mother and another family member; Heart disease in her brother, sister, and another family member; Hyperlipidemia in her sister; Kidney disease in her sister; Leukemia in her father and paternal grandfather; Stomach cancer in her mother; Stroke in an other family member.     Social History: reports that she quit smoking about 7 months ago. Her smoking use included cigarettes. She started smoking about 35 years ago. She has a 13.5 pack-year smoking history. She has been exposed to tobacco smoke. She has never used smokeless tobacco. She reports current alcohol use of about 10.0 standard drinks of alcohol per week. She reports that she does not use drugs.    Allergies: Azithromycin, Duloxetine hcl, Lisinopril, and Penicillins      Current Outpatient Medications:     ammonium lactate (LAC-HYDRIN) 12 % lotion, Apply  topically to the appropriate area as directed As Needed for Irritation., Disp: 225 g, Rfl: 0    ciclopirox (PENLAC) 8 % solution, Apply  topically to the appropriate area as directed Every Night., Disp: 6 mL, Rfl: 1    DULoxetine (CYMBALTA) 60 MG capsule, Take 1 capsule by mouth 2 (Two) Times a Day., Disp: 60 capsule, Rfl: 2    fluticasone (FLONASE) 50 MCG/ACT nasal spray, Administer  "2 sprays into the nostril(s) as directed by provider Daily., Disp: 16 g, Rfl: 5    hydrOXYzine (ATARAX) 25 MG tablet, Take 1 tablet by mouth Every 6 (Six) Hours As Needed for Anxiety., Disp: 90 tablet, Rfl: 1    ibuprofen (ADVIL,MOTRIN) 600 MG tablet, TAKE 1 TABLET BY MOUTH THREE TIMES A DAY WITH FOOD OR MILK AS NEEDED FOR 30 DAYS, Disp: , Rfl:     loratadine (Claritin) 10 MG tablet, Take 1 tablet by mouth Daily., Disp: 90 tablet, Rfl: 3    losartan-hydrochlorothiazide (Hyzaar) 50-12.5 MG per tablet, Take 1 tablet by mouth Daily., Disp: 90 tablet, Rfl: 1    OLANZapine (ZyPREXA) 10 MG tablet, Take 1 tablet by mouth Every Night., Disp: 90 tablet, Rfl: 1    omeprazole (priLOSEC) 40 MG capsule, Take 1 capsule by mouth Daily., Disp: 90 capsule, Rfl: 1    oxybutynin XL (DITROPAN-XL) 10 MG 24 hr tablet, Take 1 tablet by mouth Daily., Disp: 90 tablet, Rfl: 1    potassium chloride 10 MEQ CR tablet, Take 1 tablet by mouth Daily., Disp: 90 tablet, Rfl: 1    pregabalin (LYRICA) 150 MG capsule, Take 1 capsule by mouth 3 times a day., Disp: , Rfl:     valACYclovir (VALTREX) 500 MG tablet, Take 1 tablet by mouth Daily., Disp: 30 tablet, Rfl: 1      Immunization History   Administered Date(s) Administered    COVID-19 (MODERNA) 1st,2nd,3rd Dose Monovalent 01/07/2021, 02/04/2021, 10/28/2021    COVID-19 (MODERNA) BIVALENT 12+YRS 10/17/2022    COVID-19 (PFIZER) 12YRS+ (COMIRNATY) 12/19/2024    Fluzone (or Fluarix & Flulaval for VFC) >6mos 10/17/2022, 11/09/2023    Fluzone Quad >6mos (Multi-dose) 10/29/2020    Influenza, Unspecified 10/17/2022    Pneumococcal Conjugate 20-Valent (PCV20) 05/31/2024         Objective       Vitals:    01/23/25 0911   BP: 134/92   Pulse: 69   Temp: 96.9 °F (36.1 °C)   TempSrc: Temporal   Weight: 106 kg (232 lb 12.8 oz)   Height: 160 cm (62.99\")      Body mass index is 41.25 kg/m².   Wt Readings from Last 3 Encounters:   01/23/25 106 kg (232 lb 12.8 oz)   12/31/24 106 kg (232 lb 12.8 oz)   12/18/24 104 kg " (229 lb 9.6 oz)      BP Readings from Last 3 Encounters:   01/23/25 134/92   12/31/24 130/88   12/18/24 117/85             Physical Exam  Vitals reviewed.   Constitutional:       Appearance: Normal appearance. She is well-developed.   HENT:      Head: Normocephalic and atraumatic.   Eyes:      Conjunctiva/sclera: Conjunctivae normal.      Pupils: Pupils are equal, round, and reactive to light.   Cardiovascular:      Rate and Rhythm: Normal rate and regular rhythm.      Heart sounds: Normal heart sounds. No murmur heard.  Pulmonary:      Effort: Pulmonary effort is normal.      Breath sounds: Normal breath sounds. No wheezing or rhonchi.   Abdominal:      General: Bowel sounds are normal. There is no distension.      Palpations: Abdomen is soft.      Tenderness: There is no abdominal tenderness.   Skin:     General: Skin is warm and dry.   Neurological:      Mental Status: She is alert and oriented to person, place, and time.   Psychiatric:         Mood and Affect: Mood and affect normal. Affect is flat.         Behavior: Behavior normal.         Thought Content: Thought content normal.         Judgment: Judgment normal.         Physical Exam        Result Review :   Results  Laboratory Studies  Hemoglobin A1c was 6.1 a month ago.         Common labs          9/17/2024    08:59 11/14/2024    10:42 12/18/2024    17:04   Common Labs   Glucose  103  97    BUN  15  15    Creatinine  0.86  0.79    Sodium  140  136    Potassium  4.2  4.0    Chloride  103  98    Calcium  9.4  9.6    Albumin  4.0  3.9    Total Bilirubin  0.2  <0.2    Alkaline Phosphatase  106  120    AST (SGOT)  18  23    ALT (SGPT)  33  20    WBC 9.29  10.13  8.72    Hemoglobin 12.8  12.4  12.2    Hematocrit 40.3  39.5  39.6    Platelets 494  459  526    Total Cholesterol   176    Triglycerides   98    HDL Cholesterol   57    LDL Cholesterol    101    Hemoglobin A1C   6.10                     Assessment and Plan        Diagnoses and all orders for this  visit:    1. Breast cancer screening by mammogram (Primary)  -     Mammo Screening Digital Tomosynthesis Bilateral With CAD; Future    2. Bilateral hearing loss, unspecified hearing loss type  -     Ambulatory Referral to Audiology    3. Class 3 severe obesity due to excess calories with serious comorbidity and body mass index (BMI) of 40.0 to 44.9 in adult  Comments:  High-protein lower carb and sugar intake recommended.  Discussed doing recumbent bike for exercise.    4. Degeneration of intervertebral disc of lumbar region with discogenic back pain and lower extremity pain  -     DULoxetine (CYMBALTA) 60 MG capsule; Take 1 capsule by mouth 2 (Two) Times a Day.  Dispense: 60 capsule; Refill: 2    5. Anxiety and depression  -     Ambulatory Referral to Psychiatry  -     hydrOXYzine (ATARAX) 25 MG tablet; Take 1 tablet by mouth Every 6 (Six) Hours As Needed for Anxiety.  Dispense: 90 tablet; Refill: 1  -     DULoxetine (CYMBALTA) 60 MG capsule; Take 1 capsule by mouth 2 (Two) Times a Day.  Dispense: 60 capsule; Refill: 2    6. Pes planus of both feet  -     Ambulatory Referral to Podiatry    7. Primary hypertension  -     losartan-hydrochlorothiazide (Hyzaar) 50-12.5 MG per tablet; Take 1 tablet by mouth Daily.  Dispense: 90 tablet; Refill: 1      The patient is asked to make an attempt to improve diet and exercise patterns to aid in medical management of these diagnoses.    Assessment & Plan  1. Depression.  Her current medication regimen includes Cymbalta 30 mg twice daily and Zyprexa 10 mg at bedtime. Despite these treatments, she reports no significant improvement in her depressive symptoms. The dosage of Cymbalta will be increased to 60 mg twice daily to better manage her depression and chronic pain. A referral to psychiatry has been made for further evaluation and potential adjustment of her medication regimen.    2. Anxiety.  She has been taking hydroxyzine 25 mg for anxiety, which has been effective. A refill  for hydroxyzine 25 mg has been provided. She is advised to continue taking it as needed for anxiety management.    3. Chronic Pain.  She reports chronic pain radiating from her lower back down to her toes, likely due to sciatica. She is advised to continue using ibuprofen as needed for pain relief. Increasing the Cymbalta to 60 mg twice daily may also help manage her chronic pain. She is encouraged to engage in low-impact exercises such as walking or using a recumbent bike to help alleviate pain and improve overall health.    4. Hearing Loss.  She reports difficulty hearing, which has worsened over the past few weeks. A referral to audiology has been made for a comprehensive hearing test.    5. Cold Extremities.  She reports that her hands and legs feel extremely cold, especially at night. Her circulation appears normal upon examination. She is advised to take photographs of her hands if they turn red, white, and blue for further evaluation. She is also advised to keep her hands warm and consider using heated gloves if necessary.    6. Weight Management.  She has been advised to lose weight to help manage her chronic pain and improve overall health. She is encouraged to follow a low-carb, low-sugar diet and increase protein intake. She is also advised to engage in regular physical activity, such as walking or using a recumbent bike. A referral to a nutritionist has been suggested, but she declined.    7. Elevated Blood Pressure.  Her diastolic blood pressure is slightly elevated today. She is advised to monitor her salt intake and continue with her current diet and exercise recommendations.    8. Iron Deficiency Anemia.  She reports feeling cold and experiencing shortness of breath, which may be related to low iron levels. She is currently receiving weekly iron infusions, which should help improve her symptoms.    9. Flatfoot.  A referral to podiatry has been made for the fitting of new orthotic inserts.    10.  Health Maintenance.  A mammogram has been ordered and scheduled with her gynecologist, Dr. Sutherland, for July 2025.    Follow-up  The patient will follow up in 1 month.    Return in about 4 weeks (around 2/20/2025) for Next scheduled follow up.    Patient was given instructions and counseling regarding her condition or for health maintenance advice. Please see specific information pulled into the AVS if appropriate.     TESSA Cerrato    Patient or patient representative verbalized consent for the use of Ambient Listening during the visit with  TESSA Cerrato for chart documentation. 1/23/2025  09:54 EST

## 2025-02-03 ENCOUNTER — HOSPITAL ENCOUNTER (OUTPATIENT)
Dept: INFUSION THERAPY | Facility: HOSPITAL | Age: 55
Discharge: HOME | End: 2025-02-03
Admitting: NURSE PRACTITIONER
Payer: COMMERCIAL

## 2025-02-03 VITALS
TEMPERATURE: 98.6 F | HEIGHT: 63 IN | SYSTOLIC BLOOD PRESSURE: 143 MMHG | DIASTOLIC BLOOD PRESSURE: 88 MMHG | BODY MASS INDEX: 41.45 KG/M2 | WEIGHT: 233.91 LBS | RESPIRATION RATE: 20 BRPM | OXYGEN SATURATION: 98 % | HEART RATE: 70 BPM

## 2025-02-03 DIAGNOSIS — E61.1 IRON DEFICIENCY: ICD-10-CM

## 2025-02-03 DIAGNOSIS — K90.49 MALABSORPTION DUE TO INTOLERANCE, NOT ELSEWHERE CLASSIFIED: Primary | ICD-10-CM

## 2025-02-03 PROCEDURE — 96366 THER/PROPH/DIAG IV INF ADDON: CPT

## 2025-02-03 PROCEDURE — 25010000002 DEXAMETHASONE PER 1 MG: Performed by: NURSE PRACTITIONER

## 2025-02-03 PROCEDURE — 96374 THER/PROPH/DIAG INJ IV PUSH: CPT

## 2025-02-03 PROCEDURE — 96365 THER/PROPH/DIAG IV INF INIT: CPT

## 2025-02-03 PROCEDURE — 25810000003 SODIUM CHLORIDE 0.9 % SOLUTION: Performed by: NURSE PRACTITIONER

## 2025-02-03 PROCEDURE — 96375 TX/PRO/DX INJ NEW DRUG ADDON: CPT

## 2025-02-03 PROCEDURE — 25010000002 NA FERRIC GLUC CPLX PER 12.5 MG: Performed by: NURSE PRACTITIONER

## 2025-02-03 RX ORDER — ACETAMINOPHEN 325 MG/1
650 TABLET ORAL ONCE
Status: COMPLETED | OUTPATIENT
Start: 2025-02-03 | End: 2025-02-03

## 2025-02-03 RX ORDER — DEXAMETHASONE SODIUM PHOSPHATE 4 MG/ML
8 INJECTION, SOLUTION INTRA-ARTICULAR; INTRALESIONAL; INTRAMUSCULAR; INTRAVENOUS; SOFT TISSUE ONCE
Status: COMPLETED | OUTPATIENT
Start: 2025-02-03 | End: 2025-02-03

## 2025-02-03 RX ORDER — CETIRIZINE HYDROCHLORIDE 10 MG/1
10 TABLET ORAL ONCE
Status: COMPLETED | OUTPATIENT
Start: 2025-02-03 | End: 2025-02-03

## 2025-02-03 RX ADMIN — ACETAMINOPHEN 650 MG: 325 TABLET ORAL at 15:33

## 2025-02-03 RX ADMIN — SODIUM CHLORIDE 250 MG: 9 INJECTION, SOLUTION INTRAVENOUS at 15:37

## 2025-02-03 RX ADMIN — DEXAMETHASONE SODIUM PHOSPHATE 8 MG: 4 INJECTION, SOLUTION INTRAMUSCULAR; INTRAVENOUS at 15:33

## 2025-02-03 RX ADMIN — CETIRIZINE HYDROCHLORIDE 10 MG: 10 TABLET, FILM COATED ORAL at 15:33

## 2025-02-04 ENCOUNTER — PROCEDURE VISIT (OUTPATIENT)
Dept: OTOLARYNGOLOGY | Facility: CLINIC | Age: 55
End: 2025-02-04
Payer: COMMERCIAL

## 2025-02-04 DIAGNOSIS — H90.A22 SENSORINEURAL HEARING LOSS (SNHL) OF LEFT EAR WITH RESTRICTED HEARING OF RIGHT EAR: ICD-10-CM

## 2025-02-04 DIAGNOSIS — H90.A21 SENSORINEURAL HEARING LOSS (SNHL) OF RIGHT EAR WITH RESTRICTED HEARING OF LEFT EAR: Primary | ICD-10-CM

## 2025-02-04 PROCEDURE — 92567 TYMPANOMETRY: CPT | Performed by: AUDIOLOGIST

## 2025-02-04 PROCEDURE — 92557 COMPREHENSIVE HEARING TEST: CPT | Performed by: AUDIOLOGIST

## 2025-02-04 NOTE — PROGRESS NOTES
AUDIOMETRIC EVALUATION      Name:  Bianca Lara  :  1970  Age:  54 y.o.  Date of Evaluation:  2025       History:  is seen today for a hearing evaluation due to hearing loss.    Audiologic Information:  Concerns for Hearing: Yes  PETs:  No  Other otologic surgical history: No  Aural Pressure/Fullness:  No  Otalgia: No  Otorrhea: No  Tinnitus:  No  Dizziness:  No  Noise Exposure: Denies  Family history of hearing loss: No  Head trauma requiring hospital stay: No  Chemotherapy: No  Other significant history: No    EVALUATION:    See Audiogram    RESULTS:    Otoscopic Evaluation:  Right: Unremarkable  Left: Unremarkable    Tympanometry (226 Hz):  Right: Type A  Left: Type A    IMPRESSIONS:  Tympanometry showed normal middle ear pressure and static compliance, bilaterally.  Pure tone thresholds for the right ear shows a mild sensorineural hearing loss.   Pure tone thresholds for the left ear shows a mild sensorineural hearing loss.   Patient was counseled with regard to the findings.    Amplification needs:  Patient could benefit from amplification if they feel increased communication difficulties.    RECOMMENDATIONS/PLAN:  Hearing Aid Evaluation  Practice good communication strategies to assist with everyday listening (eye contact with speakers, reduce background noise, encourage others to communicate clearly and slowly).  Discussed results and recommendations with patient. Questions were addressed and the patient was encouraged to contact our department should concerns arise.          Cristi Joshi M.S, [facility]  Licensed Audiologist

## 2025-02-10 ENCOUNTER — HOSPITAL ENCOUNTER (OUTPATIENT)
Dept: INFUSION THERAPY | Facility: HOSPITAL | Age: 55
Discharge: HOME | End: 2025-02-10
Admitting: NURSE PRACTITIONER
Payer: COMMERCIAL

## 2025-02-10 VITALS
OXYGEN SATURATION: 100 % | SYSTOLIC BLOOD PRESSURE: 128 MMHG | TEMPERATURE: 98.5 F | DIASTOLIC BLOOD PRESSURE: 82 MMHG | HEART RATE: 97 BPM | RESPIRATION RATE: 20 BRPM

## 2025-02-10 DIAGNOSIS — E61.1 IRON DEFICIENCY: ICD-10-CM

## 2025-02-10 DIAGNOSIS — K90.49 MALABSORPTION DUE TO INTOLERANCE, NOT ELSEWHERE CLASSIFIED: Primary | ICD-10-CM

## 2025-02-10 PROCEDURE — 96366 THER/PROPH/DIAG IV INF ADDON: CPT

## 2025-02-10 PROCEDURE — 25010000002 DEXAMETHASONE PER 1 MG: Performed by: NURSE PRACTITIONER

## 2025-02-10 PROCEDURE — 25810000003 SODIUM CHLORIDE 0.9 % SOLUTION: Performed by: NURSE PRACTITIONER

## 2025-02-10 PROCEDURE — 96375 TX/PRO/DX INJ NEW DRUG ADDON: CPT

## 2025-02-10 PROCEDURE — 96365 THER/PROPH/DIAG IV INF INIT: CPT

## 2025-02-10 PROCEDURE — 25010000002 NA FERRIC GLUC CPLX PER 12.5 MG: Performed by: NURSE PRACTITIONER

## 2025-02-10 RX ORDER — DEXAMETHASONE SODIUM PHOSPHATE 4 MG/ML
8 INJECTION, SOLUTION INTRA-ARTICULAR; INTRALESIONAL; INTRAMUSCULAR; INTRAVENOUS; SOFT TISSUE ONCE
Status: COMPLETED | OUTPATIENT
Start: 2025-02-10 | End: 2025-02-10

## 2025-02-10 RX ORDER — ACETAMINOPHEN 325 MG/1
650 TABLET ORAL ONCE
Status: COMPLETED | OUTPATIENT
Start: 2025-02-10 | End: 2025-02-10

## 2025-02-10 RX ADMIN — SODIUM CHLORIDE 250 MG: 9 INJECTION, SOLUTION INTRAVENOUS at 15:16

## 2025-02-10 RX ADMIN — DEXAMETHASONE SODIUM PHOSPHATE 8 MG: 4 INJECTION, SOLUTION INTRAMUSCULAR; INTRAVENOUS at 15:16

## 2025-02-10 RX ADMIN — ACETAMINOPHEN 650 MG: 325 TABLET ORAL at 15:16

## 2025-02-12 ENCOUNTER — TELEPHONE (OUTPATIENT)
Dept: FAMILY MEDICINE CLINIC | Facility: CLINIC | Age: 55
End: 2025-02-12
Payer: COMMERCIAL

## 2025-02-12 NOTE — TELEPHONE ENCOUNTER
Caller: Bianca Lara    Relationship: Self    Best call back number:     [phone]     What is the medical concern/diagnosis: OVERWEIGHT    What specialty or service is being requested: WEIGHT WATCHERS    Any additional details: PATIENT STATES THAT THIS NEEDS TO BE SENT TO PASSPORT AND THIS NEEDS TO INCLUDE HER BMI.

## 2025-02-13 NOTE — PROGRESS NOTES
"Subjective   Bianca Lara is a 54 y.o. female who presents today for initial evaluation     Referring Provider:  Reina Putnam, TESSA  [address] [address]    Chief Complaint:  anxiety and depression    History of Present Illness:     2025: INITIAL VISIT Chart review:     Keith: On Lyrica 100 mg 3 times a day  Care Everywhere: a few non behavioral health notes     Psychotropic medication chart review:  Present:  Olanzapine 5 mg at night  Viibryd 40 mg a day     Previously:  Lamictal 25 mg, 50 mg a day  Viibryd 20 mg a day     EKG: none  Procedures: none  Head imaging: MRI of the brain in  shows no acute.  Ordered for hearing loss  Labs: May through 2024: Abnormal thyroid studies, some abnormalities on CMP including glucose 137 CO2 31 potassium 3.3; low vitamin D; abnormal CBC with platelets 494;  Initial Chart Review Notes: Patient seen by primary care .  Client was suicidal but then was taken into a back room and stated no plan.  She had increased Viibryd to 60 mg on her own.  No guns at home.  Because she was having so much back pain it is causing her depression.  Patient sees neurosurgery on the  for her back pain.  Patient was started on Zyprexa.         Chart Review By Dates:      Planning:      VISITS/APPOINTMENTS (BELOW):    \"Yina\"    Iron infusions  R/O ADHD: [employer]: good student, reader of the month, got in trouble for not completing one assignment, getting out of her seat \"trying to be nice and help others\"    2025:   In person.  Interview:  His/Her Story: \"The olanzapine helps.\"  Need to lose weight to do surgery  \"I'm a whole lot better, really. Anything would just break me before.\"  Daughter is waiting for me in the car  My daughter is always saying why do you have so many projects around the house that you haven't finished  I need a job, a place to live on my own  My back pain led to having to stop " working this past year  I lost a kid in 1990, we don't know how, fetal demise at 36w  Fhx: denies ADHD, bipolar  Sleeping? Improving, I used to stay up all night long  Eating? Wg 6 lbs in 6w  Energy? Getting better since started iron infusions  Depression/Mood:  Depressed mood, poor energy, poor concentration, weight gain, psychomotor retardation or agitation, insomnia.  Seasonal pattern: james are worse  Severity: Moderate  Duration: decades  Anxiety:  Uncontrolled worrying, muscle tension, fatigue, poor concentration, feeling on edge, irritability, insomnia.  Severity: Moderate  Duration: decades  Panic attacks: yes  AIMS if on antipsychotic: denies abnl movements  Psych ROS: Positive for depression, anxiety.  Negative for psychosis and phi.  ADHD: possible  PTSD: def  No SI HI AVH.  Medication compliant: y    Access to Firearms: denies    PHQ-9 Depression Screening  PHQ-9 Total Score: 16   Little interest or pleasure in doing things? Over half   Feeling down, depressed, or hopeless? Over half   PHQ-2 Total Score 4   Trouble falling or staying asleep, or sleeping too much? Several days   Feeling tired or having little energy? Over half   Poor appetite or overeating? Almost all   Feeling bad about yourself - or that you are a failure or have let yourself or your family down? Over half   Trouble concentrating on things, such as reading the newspaper or watching television? Almost all   Moving or speaking so slowly that other people could have noticed? Or the opposite - being so fidgety or restless that you have been moving around a lot more than usual? Several days   Thoughts that you would be better off dead, or of hurting yourself in some way? Not at all   PHQ-9 Total Score 16   If you checked off any problems, how difficult have these problems made it for you to do your work, take care of things at home, or get along with other people? Somewhat difficult            TINO-7  Feeling nervous, anxious or on edge:  Nearly every day  Not being able to stop or control worrying: Nearly every day  Worrying too much about different things: More than half the days  Trouble Relaxing: Several days  Being so restless that it is hard to sit still: Several days  Feeling afraid as if something awful might happen: Several days  Becoming easily annoyed or irritable: Several days  TINO 7 Total Score: 12  If you checked any problems, how difficult have these problems made it for you to do your work, take care of things at home, or get along with other people: Somewhat difficult    Past Surgical History:  Past Surgical History:   Procedure Laterality Date    BREAST BIOPSY Left 01/09/2012    CHOLECYSTECTOMY      COLONOSCOPY  08/15/2016    CYST REMOVAL      left wrist    CYSTOSCOPY  06/27/2014    Office cystoscopy w/ Dr. Goncalves       Problem List:  Patient Active Problem List   Diagnosis    Allergic rhinitis    Anxiety and depression    Dysmenorrhea    Gross hematuria    Microscopic hematuria    Helicobacter pylori gastritis    Hematochezia    Irregular menses    Low back pain    Night sweats    Onychomycosis of toenail    Right sided abdominal pain    STD exposure    Stenosis of urinary meatus    Vitamin D deficiency    DDD (degenerative disc disease), lumbar    Primary hypertension    Lumbar disc disease with radiculopathy    Trochanteric bursitis of left hip    Left hip pain    Right hip pain    Osteoarthritis of both hips    Trochanteric bursitis of right hip    Iron deficiency    Malabsorption due to intolerance, not elsewhere classified       Allergy:   Allergies   Allergen Reactions    Azithromycin Nausea Only    Duloxetine Hcl Unknown - Low Severity    Lisinopril Cough    Penicillins Unknown - Low Severity        Discontinued Medications:  There are no discontinued medications.    Current Medications:   Current Outpatient Medications   Medication Sig Dispense Refill    ammonium lactate (LAC-HYDRIN) 12 % lotion Apply  topically to the  appropriate area as directed As Needed for Irritation. 225 g 0    ciclopirox (PENLAC) 8 % solution Apply  topically to the appropriate area as directed Every Night. 6 mL 1    DULoxetine (CYMBALTA) 60 MG capsule Take 1 capsule by mouth 2 (Two) Times a Day. 60 capsule 2    fluticasone (FLONASE) 50 MCG/ACT nasal spray Administer 2 sprays into the nostril(s) as directed by provider Daily. 16 g 5    hydrOXYzine (ATARAX) 25 MG tablet Take 1 tablet by mouth Every 6 (Six) Hours As Needed for Anxiety. 90 tablet 1    ibuprofen (ADVIL,MOTRIN) 600 MG tablet TAKE 1 TABLET BY MOUTH THREE TIMES A DAY WITH FOOD OR MILK AS NEEDED FOR 30 DAYS      loratadine (Claritin) 10 MG tablet Take 1 tablet by mouth Daily. 90 tablet 3    losartan-hydrochlorothiazide (Hyzaar) 50-12.5 MG per tablet Take 1 tablet by mouth Daily. 90 tablet 1    OLANZapine (ZyPREXA) 10 MG tablet Take 1 tablet by mouth Every Night. 90 tablet 1    omeprazole (priLOSEC) 40 MG capsule Take 1 capsule by mouth Daily. 90 capsule 1    oxybutynin XL (DITROPAN-XL) 10 MG 24 hr tablet Take 1 tablet by mouth Daily. 90 tablet 1    potassium chloride 10 MEQ CR tablet Take 1 tablet by mouth Daily. 90 tablet 1    pregabalin (LYRICA) 150 MG capsule Take 1 capsule by mouth 3 times a day.      valACYclovir (VALTREX) 500 MG tablet Take 1 tablet by mouth Daily. 30 tablet 1    busPIRone (BUSPAR) 10 MG tablet Take 1 tablet by mouth 3 (Three) Times a Day. 90 tablet 2    OLANZapine-Samidorphan (Lybalvi) 10-10 MG tablet Take 1 tablet by mouth every night at bedtime. 28 tablet 0     No current facility-administered medications for this visit.       Past Medical History:  Past Medical History:   Diagnosis Date    Allergic     Allergic rhinitis     Anxiety 09/11/2018    Arthritis     Blood in stool     Chronic pain disorder 2008    Back pain    Condition not found     MEATAL STENOSIS, UNSPECIFIED    CTS (carpal tunnel syndrome) 2015    Depression 09/11/2018    Difficulty walking 2023     Dysmenorrhea     Gross hematuria     Headache     Headache, tension-type     Helicobacter positive gastritis 2016    Hyperlipidemia     Hypertension     Irregular menses     Low back pain 2014    Microscopic hematuria 06/10/2014    Night sweats     Onychomycosis of toenail 2014    Pap smear for cervical cancer screening 2016    Peripheral neuropathy     Right sided abdominal pain 2014    Screening mammogram, encounter for 2018    Sleep apnea     STD exposure     chlamydia    Tobacco abuse     chronic    Vitamin D deficiency        Past Psychiatric History:  Began Treatment:   Diagnoses: unsure  Psychiatrist: [facility]  Therapist: Rober in   Admission History:Denies  Medication Trials:        Self Harm: Denies  Suicide Attempts:Denies   Psychosis, Anxiety, Depression: Denies    Substance Abuse History:   Types: cigs and mj  Withdrawal Symptoms:Denies  Longest Period Sober:Not Applicable   AA: Not applicable     Social History:  Martial Status:   Employed:No  Kids:Yes  House:Lives in a house   History: Denies    Social History     Socioeconomic History    Marital status:    Tobacco Use    Smoking status: Some Days     Current packs/day: 0.00     Average packs/day: 0.3 packs/day for 54.1 years (13.5 ttl pk-yrs)     Types: Cigarettes     Start date: 1989     Last attempt to quit: 2024     Years since quittin.7     Passive exposure: Past    Smokeless tobacco: Never    Tobacco comments:     Medication   Vaping Use    Vaping status: Never Used   Substance and Sexual Activity    Alcohol use: Yes     Alcohol/week: 7.0 standard drinks of alcohol     Types: 1 Cans of beer, 4 Shots of liquor, 2 Drinks containing 0.5 oz of alcohol per week     Comment: glass of wine every other day    Drug use: Yes     Types: Marijuana     Comment: daily    Sexual activity: Yes     Partners: Male     Birth control/protection: Condom       Family  "History:   Suicide Attempts: Denies  Suicide Completions:Denies      Family History   Problem Relation Age of Onset    Stomach cancer Mother     Alzheimer's disease Mother     Diabetes Mother     Dementia Mother     Anxiety disorder Mother     Leukemia Father     ADD / ADHD Father     Heart disease Sister     Hyperlipidemia Sister     Kidney disease Sister     Heart disease Brother     No Known Problems Maternal Aunt     No Known Problems Paternal Aunt     No Known Problems Maternal Uncle     No Known Problems Paternal Uncle     No Known Problems Maternal Grandfather     Cancer Maternal Grandmother     Leukemia Paternal Grandfather     No Known Problems Paternal Grandmother     No Known Problems Cousin     Stroke Other         AUNT;UNCLE    Heart disease Other         AUNT,UNCLE    Diabetes Other         AUNT,UNCLE    Alcohol abuse Neg Hx     Bipolar disorder Neg Hx     Depression Neg Hx     Drug abuse Neg Hx     OCD Neg Hx     Paranoid behavior Neg Hx     Schizophrenia Neg Hx     Seizures Neg Hx     Self-Injurious Behavior  Neg Hx     Suicide Attempts Neg Hx        Developmental History:     Childhood: Denies Abuse  High School:Completed  College: some, doing some now (medical coding)    Mental Status Exam  Appearance  : groomed, good eye contact, normal street clothes  Behavior  : pleasant and cooperative  Motor  : No abnormal, uses a cane  Speech  :normal rhythm, rate, volume, tone, not hyperverbal, not pressured, normal prosidy  Mood  : \"Doing much better. I think it's the loneliness.\"  Affect  : euthymic, mood congruent, good variability  Thought Content  : negative suicidal ideations, negative homicidal ideations, negative obsessions  Perceptions  : negative auditory hallucinations, negative visual hallucinations  Thought Process  : linear  Insight/Judgement  : Fair/fair  Cognition  : grossly intact  Attention   : intact      Review of Systems:  Review of Systems   Constitutional:  Negative for diaphoresis and " "fatigue.   HENT:  Negative for drooling.    Eyes:  Negative for visual disturbance.   Respiratory:  Negative for cough and shortness of breath.    Cardiovascular:  Negative for chest pain, palpitations and leg swelling.   Gastrointestinal:  Negative for nausea and vomiting.   Endocrine: Negative for cold intolerance and heat intolerance.   Genitourinary:  Negative for difficulty urinating.   Musculoskeletal:  Positive for joint swelling.   Allergic/Immunologic: Negative for immunocompromised state.   Neurological:  Positive for dizziness and numbness. Negative for seizures and speech difficulty.       Physical Exam:  Physical Exam    Vital Signs:   /84   Pulse 86   Ht 160 cm (63\")   Wt 107 kg (236 lb 12.8 oz)   BMI 41.95 kg/m²      Lab Results:   Office Visit on 12/31/2024   Component Date Value Ref Range Status    Occult Blood, Fecal by Immunoassay 01/09/2025 Negative  Negative Final   Lab on 12/18/2024   Component Date Value Ref Range Status    Total Cholesterol 12/18/2024 176  0 - 200 mg/dL Final    Triglycerides 12/18/2024 98  0 - 150 mg/dL Final    HDL Cholesterol 12/18/2024 57  40 - 60 mg/dL Final    LDL Cholesterol  12/18/2024 101 (H)  0 - 100 mg/dL Final    VLDL Cholesterol 12/18/2024 18  5 - 40 mg/dL Final    LDL/HDL Ratio 12/18/2024 1.74   Final    Iron 12/18/2024 36 (L)  37 - 145 mcg/dL Final    Iron Saturation (TSAT) 12/18/2024 9 (L)  20 - 50 % Final    Transferrin 12/18/2024 272  200 - 360 mg/dL Final    TIBC 12/18/2024 405  298 - 536 mcg/dL Final    Ferritin 12/18/2024 106.10  13.00 - 150.00 ng/mL Final    Glucose 12/18/2024 97  65 - 99 mg/dL Final    BUN 12/18/2024 15  6 - 20 mg/dL Final    Creatinine 12/18/2024 0.79  0.57 - 1.00 mg/dL Final    Sodium 12/18/2024 136  136 - 145 mmol/L Final    Potassium 12/18/2024 4.0  3.5 - 5.2 mmol/L Final    Chloride 12/18/2024 98  98 - 107 mmol/L Final    CO2 12/18/2024 31.0 (H)  22.0 - 29.0 mmol/L Final    Calcium 12/18/2024 9.6  8.6 - 10.5 mg/dL Final "    Total Protein 12/18/2024 7.4  6.0 - 8.5 g/dL Final    Albumin 12/18/2024 3.9  3.5 - 5.2 g/dL Final    ALT (SGPT) 12/18/2024 20  1 - 33 U/L Final    AST (SGOT) 12/18/2024 23  1 - 32 U/L Final    Alkaline Phosphatase 12/18/2024 120 (H)  39 - 117 U/L Final    Total Bilirubin 12/18/2024 <0.2  0.0 - 1.2 mg/dL Final    Globulin 12/18/2024 3.5  gm/dL Final    A/G Ratio 12/18/2024 1.1  g/dL Final    BUN/Creatinine Ratio 12/18/2024 19.0  7.0 - 25.0 Final    Anion Gap 12/18/2024 7.0  5.0 - 15.0 mmol/L Final    eGFR 12/18/2024 89.0  >60.0 mL/min/1.73 Final    TSH 12/18/2024 0.745  0.270 - 4.200 uIU/mL Final    Hemoglobin A1C 12/18/2024 6.10 (H)  4.80 - 5.60 % Final    WBC 12/18/2024 8.72  3.40 - 10.80 10*3/mm3 Final    RBC 12/18/2024 4.42  3.77 - 5.28 10*6/mm3 Final    Hemoglobin 12/18/2024 12.2  12.0 - 15.9 g/dL Final    Hematocrit 12/18/2024 39.6  34.0 - 46.6 % Final    MCV 12/18/2024 89.6  79.0 - 97.0 fL Final    MCH 12/18/2024 27.6  26.6 - 33.0 pg Final    MCHC 12/18/2024 30.8 (L)  31.5 - 35.7 g/dL Final    RDW 12/18/2024 15.6 (H)  12.3 - 15.4 % Final    RDW-SD 12/18/2024 51.5  37.0 - 54.0 fl Final    MPV 12/18/2024 8.8  6.0 - 12.0 fL Final    Platelets 12/18/2024 526 (H)  140 - 450 10*3/mm3 Final    Neutrophil % 12/18/2024 63.0  42.7 - 76.0 % Final    Lymphocyte % 12/18/2024 26.1  19.6 - 45.3 % Final    Monocyte % 12/18/2024 6.8  5.0 - 12.0 % Final    Eosinophil % 12/18/2024 2.3  0.3 - 6.2 % Final    Basophil % 12/18/2024 0.7  0.0 - 1.5 % Final    Immature Grans % 12/18/2024 1.1 (H)  0.0 - 0.5 % Final    Neutrophils, Absolute 12/18/2024 5.49  1.70 - 7.00 10*3/mm3 Final    Lymphocytes, Absolute 12/18/2024 2.28  0.70 - 3.10 10*3/mm3 Final    Monocytes, Absolute 12/18/2024 0.59  0.10 - 0.90 10*3/mm3 Final    Eosinophils, Absolute 12/18/2024 0.20  0.00 - 0.40 10*3/mm3 Final    Basophils, Absolute 12/18/2024 0.06  0.00 - 0.20 10*3/mm3 Final    Immature Grans, Absolute 12/18/2024 0.10 (H)  0.00 - 0.05 10*3/mm3 Final    nRBC  12/18/2024 0.0  0.0 - 0.2 /100 WBC Final   Admission on 11/14/2024, Discharged on 11/14/2024   Component Date Value Ref Range Status    QT Interval 11/14/2024 315  ms Final    QTC Interval 11/14/2024 415  ms Final    Glucose 11/14/2024 103 (H)  65 - 99 mg/dL Final    BUN 11/14/2024 15  6 - 20 mg/dL Final    Creatinine 11/14/2024 0.86  0.57 - 1.00 mg/dL Final    Sodium 11/14/2024 140  136 - 145 mmol/L Final    Potassium 11/14/2024 4.2  3.5 - 5.2 mmol/L Final    Chloride 11/14/2024 103  98 - 107 mmol/L Final    CO2 11/14/2024 27.4  22.0 - 29.0 mmol/L Final    Calcium 11/14/2024 9.4  8.6 - 10.5 mg/dL Final    Total Protein 11/14/2024 7.4  6.0 - 8.5 g/dL Final    Albumin 11/14/2024 4.0  3.5 - 5.2 g/dL Final    ALT (SGPT) 11/14/2024 33  1 - 33 U/L Final    AST (SGOT) 11/14/2024 18  1 - 32 U/L Final    Alkaline Phosphatase 11/14/2024 106  39 - 117 U/L Final    Total Bilirubin 11/14/2024 0.2  0.0 - 1.2 mg/dL Final    Globulin 11/14/2024 3.4  gm/dL Final    A/G Ratio 11/14/2024 1.2  g/dL Final    BUN/Creatinine Ratio 11/14/2024 17.4  7.0 - 25.0 Final    Anion Gap 11/14/2024 9.6  5.0 - 15.0 mmol/L Final    eGFR 11/14/2024 80.4  >60.0 mL/min/1.73 Final    HS Troponin T 11/14/2024 <6  <14 ng/L Final    Magnesium 11/14/2024 2.1  1.6 - 2.6 mg/dL Final    Color, UA 11/14/2024 Yellow  Yellow, Straw Final    Appearance, UA 11/14/2024 Clear  Clear Final    pH, UA 11/14/2024 <=5.0  5.0 - 8.0 Final    Specific Gravity, UA 11/14/2024 1.022  1.005 - 1.030 Final    Glucose, UA 11/14/2024 Negative  Negative Final    Ketones, UA 11/14/2024 Negative  Negative Final    Bilirubin, UA 11/14/2024 Negative  Negative Final    Blood, UA 11/14/2024 Negative  Negative Final    Protein, UA 11/14/2024 Negative  Negative Final    Leuk Esterase, UA 11/14/2024 Small (1+) (A)  Negative Final    Nitrite, UA 11/14/2024 Negative  Negative Final    Urobilinogen, UA 11/14/2024 0.2 E.U./dL  0.2 - 1.0 E.U./dL Final    Extra Tube 11/14/2024 Hold for add-ons.    Final    Auto resulted.    Extra Tube 11/14/2024 hold for add-on   Final    Auto resulted    Extra Tube 11/14/2024 Hold for add-ons.   Final    Auto resulted.    Extra Tube 11/14/2024 Hold for add-ons.   Final    Auto resulted    WBC 11/14/2024 10.13  3.40 - 10.80 10*3/mm3 Final    RBC 11/14/2024 4.38  3.77 - 5.28 10*6/mm3 Final    Hemoglobin 11/14/2024 12.4  12.0 - 15.9 g/dL Final    Hematocrit 11/14/2024 39.5  34.0 - 46.6 % Final    MCV 11/14/2024 90.2  79.0 - 97.0 fL Final    MCH 11/14/2024 28.3  26.6 - 33.0 pg Final    MCHC 11/14/2024 31.4 (L)  31.5 - 35.7 g/dL Final    RDW 11/14/2024 15.9 (H)  12.3 - 15.4 % Final    RDW-SD 11/14/2024 52.1  37.0 - 54.0 fl Final    MPV 11/14/2024 8.5  6.0 - 12.0 fL Final    Platelets 11/14/2024 459 (H)  140 - 450 10*3/mm3 Final    Neutrophil % 11/14/2024 63.0  42.7 - 76.0 % Final    Lymphocyte % 11/14/2024 25.9  19.6 - 45.3 % Final    Monocyte % 11/14/2024 8.4  5.0 - 12.0 % Final    Eosinophil % 11/14/2024 1.3  0.3 - 6.2 % Final    Basophil % 11/14/2024 0.8  0.0 - 1.5 % Final    Immature Grans % 11/14/2024 0.6 (H)  0.0 - 0.5 % Final    Neutrophils, Absolute 11/14/2024 6.39  1.70 - 7.00 10*3/mm3 Final    Lymphocytes, Absolute 11/14/2024 2.62  0.70 - 3.10 10*3/mm3 Final    Monocytes, Absolute 11/14/2024 0.85  0.10 - 0.90 10*3/mm3 Final    Eosinophils, Absolute 11/14/2024 0.13  0.00 - 0.40 10*3/mm3 Final    Basophils, Absolute 11/14/2024 0.08  0.00 - 0.20 10*3/mm3 Final    Immature Grans, Absolute 11/14/2024 0.06 (H)  0.00 - 0.05 10*3/mm3 Final    nRBC 11/14/2024 0.0  0.0 - 0.2 /100 WBC Final    RBC, UA 11/14/2024 None Seen  None Seen, 0-2 /HPF Final    WBC, UA 11/14/2024 6-10 (A)  None Seen, 0-2 /HPF Final    Bacteria, UA 11/14/2024 None Seen  None Seen /HPF Final    Squamous Epithelial Cells, UA 11/14/2024 13-20 (A)  None Seen, 0-2 /HPF Final    Hyaline Casts, UA 11/14/2024 None Seen  None Seen /LPF Final    Methodology 11/14/2024 Automated Microscopy   Final   Office Visit on  09/17/2024   Component Date Value Ref Range Status    Iron 09/17/2024 33 (L)  37 - 145 mcg/dL Final    Iron Saturation (TSAT) 09/17/2024 7 (L)  20 - 50 % Final    Transferrin 09/17/2024 298  200 - 360 mg/dL Final    TIBC 09/17/2024 444  298 - 536 mcg/dL Final    Ferritin 09/17/2024 304.50 (H)  13.00 - 150.00 ng/mL Final    WBC 09/17/2024 9.29  3.40 - 10.80 10*3/mm3 Final    RBC 09/17/2024 4.51  3.77 - 5.28 10*6/mm3 Final    Hemoglobin 09/17/2024 12.8  12.0 - 15.9 g/dL Final    Hematocrit 09/17/2024 40.3  34.0 - 46.6 % Final    MCV 09/17/2024 89.4  79.0 - 97.0 fL Final    MCH 09/17/2024 28.4  26.6 - 33.0 pg Final    MCHC 09/17/2024 31.8  31.5 - 35.7 g/dL Final    RDW 09/17/2024 14.7  12.3 - 15.4 % Final    RDW-SD 09/17/2024 49.2  37.0 - 54.0 fl Final    MPV 09/17/2024 8.8  6.0 - 12.0 fL Final    Platelets 09/17/2024 494 (H)  140 - 450 10*3/mm3 Final    Neutrophil % 09/17/2024 67.7  42.7 - 76.0 % Final    Lymphocyte % 09/17/2024 21.2  19.6 - 45.3 % Final    Monocyte % 09/17/2024 7.3  5.0 - 12.0 % Final    Eosinophil % 09/17/2024 2.9  0.3 - 6.2 % Final    Basophil % 09/17/2024 0.5  0.0 - 1.5 % Final    Immature Grans % 09/17/2024 0.4  0.0 - 0.5 % Final    Neutrophils, Absolute 09/17/2024 6.28  1.70 - 7.00 10*3/mm3 Final    Lymphocytes, Absolute 09/17/2024 1.97  0.70 - 3.10 10*3/mm3 Final    Monocytes, Absolute 09/17/2024 0.68  0.10 - 0.90 10*3/mm3 Final    Eosinophils, Absolute 09/17/2024 0.27  0.00 - 0.40 10*3/mm3 Final    Basophils, Absolute 09/17/2024 0.05  0.00 - 0.20 10*3/mm3 Final    Immature Grans, Absolute 09/17/2024 0.04  0.00 - 0.05 10*3/mm3 Final       EKG Results:  No orders to display       Imaging Results:  XR Spine Lumbar Complete With Flex & Ext    Result Date: 12/4/2024  Impression: There is no evidence of fracture. There is grade 1 anterolisthesis of L4 on L5 which does appear dynamic, likely mildly exaggerated in flexion. No additional listhesis or subluxation is evident. Spondylosis changes appear  similar to prior MRI, likely most advanced at L4-5. The SI joints appear symmetric. Electronically Signed: Rio Garcia MD  12/4/2024 2:06 PM EST  Workstation ID: [id number]    XR Chest 1 View    Result Date: 11/14/2024  Impression: No acute cardiopulmonary process. Electronically Signed: Dhruv Chacko MD  11/14/2024 11:22 AM EST  Workstation ID: [id number]        Assessment & Plan   Diagnoses and all orders for this visit:    1. Major depressive disorder, recurrent episode, moderate (Primary)  -     OLANZapine-Samidorphan (Lybalvi) 10-10 MG tablet; Take 1 tablet by mouth every night at bedtime.  Dispense: 28 tablet; Refill: 0  -     busPIRone (BUSPAR) 10 MG tablet; Take 1 tablet by mouth 3 (Three) Times a Day.  Dispense: 90 tablet; Refill: 2    2. Generalized anxiety disorder  -     OLANZapine-Samidorphan (Lybalvi) 10-10 MG tablet; Take 1 tablet by mouth every night at bedtime.  Dispense: 28 tablet; Refill: 0  -     busPIRone (BUSPAR) 10 MG tablet; Take 1 tablet by mouth 3 (Three) Times a Day.  Dispense: 90 tablet; Refill: 2    3. Insomnia due to mental condition  -     OLANZapine-Samidorphan (Lybalvi) 10-10 MG tablet; Take 1 tablet by mouth every night at bedtime.  Dispense: 28 tablet; Refill: 0  -     busPIRone (BUSPAR) 10 MG tablet; Take 1 tablet by mouth 3 (Three) Times a Day.  Dispense: 90 tablet; Refill: 2    4. Panic attacks  -     OLANZapine-Samidorphan (Lybalvi) 10-10 MG tablet; Take 1 tablet by mouth every night at bedtime.  Dispense: 28 tablet; Refill: 0  -     busPIRone (BUSPAR) 10 MG tablet; Take 1 tablet by mouth 3 (Three) Times a Day.  Dispense: 90 tablet; Refill: 2    5. Snoring  -     Ambulatory Referral to Sleep Medicine        Visit Diagnoses:    ICD-10-CM ICD-9-CM   1. Major depressive disorder, recurrent episode, moderate  F33.1 296.32   2. Generalized anxiety disorder  F41.1 300.02   3. Insomnia due to mental condition  F51.05 300.9     327.02   4. Panic attacks  F41.0 300.01   5. Snoring   R06.83 786.09     02/14/2025: ciro, sleep medicine consult. Switch olanzapine to lybalvi (not on opioids). Consider abilify, vraylar. Start buspar for MDD, TINO. Could be ADHD. Could be bipolar, need to get to know her better. Olanzapine really helped. 4w    PLAN:  Safety: No acute safety concerns  Therapy: None, possibly later  Risk Assessment: Risk of self-harm acutely is moderate.  Risk factors include anxiety disorder, mood disorder, and recent psychosocial stressors (pandemic). Protective factors include no family history, denies access to guns/weapons, no present SI, no history of suicide attempts or self-harm in the past, minimal AODA, healthcare seeking, future orientation, willingness to engage in care.  Risk of self-harm chronically is also moderate, but could be further elevated in the event of treatment noncompliance and/or AODA.  Meds:  START buspar 10 mg tid. Risks, benefits, alternatives discussed with patient including nausea, GI upset, mild sedation, falls risk.  Use care when operating vehicle, vessel, or machine. After discussion of these risks and benefits, the patient voiced understanding and agreed to proceed.  CONTINUE cymbalta 60 mg bid. Risks, benefits, alternatives discussed with patient including GI upset, nausea vomiting diarrhea, theoretical decrease of seizure threshold predisposing the patient to a slightly higher seizure risk, headaches, sexual dysfunction, serotonin syndrome, bleeding risk, increased suicidality in patients 24 years and younger, switching to phi/hypomania.  Also constipation and urinary retention.  After discussion of these risks and benefits, the patient voiced understanding and agreed to proceed.  SWITCH olanzapine 10 mg qhs to lybalvi 10 mg qhs. Risks, benefits, alternatives discussed with patient including nausea and vomiting, GI upset, sedation, dizziness/falls risk, akathisia, hypotension, increased appetite, lowering of seizure threshold, theoretical risk  of tardive dyskinesia, movement issues. Use care when operating vehicle, vessel, or machine. After discussion of these risks and benefits, the patient voiced understanding and agreed to proceed.  Labs: none    Patient screened positive for depression based on a PHQ-9 score of 16 on 2/14/2025. Follow-up recommendations include: Prescribed antidepressant medication treatment and Suicide Risk Assessment performed.           TREATMENT PLAN/GOALS: Continue supportive psychotherapy efforts and medications as indicated. Treatment and medication options discussed during today's visit. Patient acknowledged and verbally consented to continue with current treatment plan and was educated on the importance of compliance with treatment and follow-up appointments.    MEDICATION ISSUES:  FEMI reviewed as expected.  Discussed medication options and treatment plan of prescribed medication as well as the risks, benefits, and side effects including potential falls, possible impaired driving and metabolic adversities among others. Patient is agreeable to call the office with any worsening of symptoms or onset of side effects. Patient is agreeable to call 911 or go to the nearest ER should he/she begin having SI/HI. No medication side effects or related complaints today.     MEDS ORDERED DURING VISIT:  New Medications Ordered This Visit   Medications    OLANZapine-Samidorphan (Lybalvi) 10-10 MG tablet     Sig: Take 1 tablet by mouth every night at bedtime.     Dispense:  28 tablet     Refill:  0    busPIRone (BUSPAR) 10 MG tablet     Sig: Take 1 tablet by mouth 3 (Three) Times a Day.     Dispense:  90 tablet     Refill:  2       Return in about 4 weeks (around 3/14/2025).         This document has been electronically signed by Flor Cantor MD  February 14, 2025 09:30 EST    Dictated Utilizing Dragon Dictation: Part of this note may be an electronic transcription/translation of spoken language to printed text using the Dragon  Dictation System.

## 2025-02-14 ENCOUNTER — OFFICE VISIT (OUTPATIENT)
Dept: PSYCHIATRY | Facility: CLINIC | Age: 55
End: 2025-02-14
Payer: COMMERCIAL

## 2025-02-14 VITALS
SYSTOLIC BLOOD PRESSURE: 136 MMHG | DIASTOLIC BLOOD PRESSURE: 84 MMHG | WEIGHT: 236.8 LBS | BODY MASS INDEX: 41.96 KG/M2 | HEIGHT: 63 IN | HEART RATE: 86 BPM

## 2025-02-14 DIAGNOSIS — F41.1 GENERALIZED ANXIETY DISORDER: ICD-10-CM

## 2025-02-14 DIAGNOSIS — F41.0 PANIC ATTACKS: ICD-10-CM

## 2025-02-14 DIAGNOSIS — F51.05 INSOMNIA DUE TO MENTAL CONDITION: ICD-10-CM

## 2025-02-14 DIAGNOSIS — R06.83 SNORING: ICD-10-CM

## 2025-02-14 DIAGNOSIS — F33.1 MAJOR DEPRESSIVE DISORDER, RECURRENT EPISODE, MODERATE: Primary | ICD-10-CM

## 2025-02-14 RX ORDER — BUSPIRONE HYDROCHLORIDE 10 MG/1
10 TABLET ORAL 3 TIMES DAILY
Qty: 90 TABLET | Refills: 2 | Status: SHIPPED | OUTPATIENT
Start: 2025-02-14

## 2025-02-14 RX ORDER — OLANZAPINE AND SAMIDORPHAN L-MALATE 10; 10 MG/1; MG/1
1 TABLET, FILM COATED ORAL
Qty: 28 TABLET | Refills: 0 | COMMUNITY
Start: 2025-02-14

## 2025-02-14 NOTE — PLAN OF CARE
[facility] to help manage depression and anxiety related to family conflict, medical conditions

## 2025-02-14 NOTE — TREATMENT PLAN
Multi-Disciplinary Problems (from Behavioral Health Treatment Plan)      Active Problems       Problem: Anxiety  Start Date: 02/14/25      Problem Details: The patient self-scales this problem as a 6 with 10 being the worst.  family conflict, medical conditions        Goal Priority Start Date Expected End Date End Date    Patient will develop and implement behavioral and cognitive strategies to reduce anxiety and irrational fears. -- 02/14/25 08/15/25 --    Goal Details: Progress toward goal:  Not appropriate to rate progress toward goal since this is the initial treatment plan.          Goal Intervention Frequency Start Date End Date    Help patient explore past emotional issues in relation to present anxiety. Q Month 02/14/25 --    Intervention Details: Duration of treatment until remission of symptoms.          Goal Intervention Frequency Start Date End Date    Help patient develop an awareness of their cognitive and physical responses to anxiety. Q Month 02/14/25 --    Intervention Details: Duration of treatment until remission of symptoms.                  Problem: Depression  Start Date: 02/14/25      Problem Details: The patient self-scales this problem as a 6 with 10 being the worst.  family conflict, medical conditions        Goal Priority Start Date Expected End Date End Date    Patient will demonstrate the ability to initiate new constructive life skills outside of sessions on a consistent basis. -- 02/14/25 08/15/25 --    Goal Details: Progress toward goal:  Not appropriate to rate progress toward goal since this is the initial treatment plan.          Goal Intervention Frequency Start Date End Date    Assist patient in setting attainable activities of daily living goals. PRN 02/14/25 --      Goal Intervention Frequency Start Date End Date    Provide education about depression Q Month 02/14/25 --    Intervention Details: Duration of treatment until remission of symptoms.          Goal Intervention  Frequency Start Date End Date    Assist patient in developing healthy coping strategies. Q Month 02/14/25 --    Intervention Details: Duration of treatment until remission of symptoms.                          Reviewed By       Flor Cantor MD 02/14/25 0900                     I have discussed and reviewed this treatment plan with the patient.

## 2025-02-20 NOTE — PROGRESS NOTES
Chief Complaint    Annual Exam  Annual Exam (Needs /ibuprofen (ADVIL,MOTRIN) 600 MG tablet /refilled) and Anxiety (Follow up)    Subjective          Bianca Lara is a 54 y.o. female who presents to [facility]    Annual Exam    History of Present Illness  The patient presents for evaluation of anxiety, depression, back pain, and memory issues.    She reports an improvement in her condition following a medication adjustment from olanzapine to olanzapine-samidorphan, which was made due to the potential weight gain associated with olanzapine. She has been prescribed BuSpar, which initially caused some disorientation but she has since acclimated to it. Despite these interventions, she continues to experience anxiety and depression, although at a more manageable level. Her sleep pattern remains normal.    She is under the care of Dr. Cantor for her anxiety, which is showing signs of improvement.    She continues to experience back pain that radiates down her leg, necessitating the use of a cane for mobility support.    She has expressed concerns about potential memory issues, given her family history of dementia in her mother.    She has undergone colon cancer screening and is scheduled for both upper and lower GI procedures. Her dental and eye examinations are current, with a follow-up dental appointment scheduled for the first of the month. This appointment will include deep cleaning and the placement of dentures following the extraction of a few teeth.    FAMILY HISTORY  Her mother had dementia.    MEDICATIONS  Current: Hydralazine, BuSpar, duloxetine.  Discontinued: Olanzapine.        Health Maintenance Due   Topic Date Due    TDAP/TD VACCINES (1 - Tdap) Never done    ZOSTER VACCINE (1 of 2) Never done    ANNUAL PHYSICAL  12/11/2024          The PHQ has not been completed during this encounter.          Review of Systems   Constitutional:  Negative for chills, fatigue and  fever.   Respiratory:  Negative for cough and shortness of breath.    Cardiovascular:  Negative for chest pain and palpitations.   Gastrointestinal:  Negative for constipation, diarrhea, nausea and vomiting.   Musculoskeletal:  Positive for back pain. Negative for neck pain.   Skin:  Negative for rash.   Neurological:  Positive for numbness. Negative for dizziness and headaches.   Psychiatric/Behavioral:  Positive for dysphoric mood. Negative for self-injury, sleep disturbance and suicidal ideas. The patient is nervous/anxious.           Medical History: has a past medical history of ADHD (attention deficit hyperactivity disorder) (2024), Allergic, Allergic rhinitis, Anxiety (09/11/2018), Arthritis, Blood in stool, Chronic pain disorder (2008), Condition not found, CTS (carpal tunnel syndrome) (2015), Depression (09/11/2018), Difficulty walking (2023), Dysmenorrhea, Gross hematuria, Headache, Headache, tension-type (2022), Helicobacter positive gastritis (05/26/2016), HL (hearing loss) (2020), Hyperlipidemia (2024), Hypertension, Irregular menses, Low back pain (07/09/2014), Microscopic hematuria (06/10/2014), Night sweats, Onychomycosis of toenail (07/09/2014), Pap smear for cervical cancer screening (05/16/2016), Peripheral neuropathy, Right sided abdominal pain (06/12/2014), Screening mammogram, encounter for (11/09/2018), Sleep apnea (2023), STD exposure (1993), Tobacco abuse, and Vitamin D deficiency.     Surgical History: has a past surgical history that includes Breast biopsy (Left, 01/09/2012); Cholecystectomy; Colonoscopy (08/15/2016); Cyst Removal; and Cystoscopy (06/27/2014).     Family History: family history includes ADD / ADHD in her father; Alzheimer's disease in her mother; Anxiety disorder in her mother; Arthritis in her sister; Cancer in her maternal grandmother; Dementia in her mother; Diabetes in her mother and another family member; Heart disease in her brother, sister, and another family  member; Hyperlipidemia in her sister; Kidney disease in her sister; Leukemia in her father and paternal grandfather; No Known Problems in her cousin, maternal aunt, maternal grandfather, maternal uncle, paternal aunt, paternal grandmother, and paternal uncle; Stomach cancer in her mother; Stroke in an other family member.     Social History: reports that she has been smoking cigarettes. She started smoking about 35 years ago. She has a 13.5 pack-year smoking history. She has been exposed to tobacco smoke. She has never used smokeless tobacco. She reports that she does not currently use alcohol after a past usage of about 7.0 standard drinks of alcohol per week. She reports current drug use. Drug: Marijuana.    Allergies: Azithromycin, Lisinopril, and Penicillins        Current Outpatient Medications:     ammonium lactate (LAC-HYDRIN) 12 % lotion, Apply  topically to the appropriate area as directed As Needed for Irritation., Disp: 225 g, Rfl: 0    busPIRone (BUSPAR) 10 MG tablet, Take 1 tablet by mouth 3 (Three) Times a Day., Disp: 90 tablet, Rfl: 2    ciclopirox (PENLAC) 8 % solution, Apply  topically to the appropriate area as directed Every Night., Disp: 6 mL, Rfl: 1    DULoxetine (CYMBALTA) 60 MG capsule, Take 1 capsule by mouth 2 (Two) Times a Day., Disp: 60 capsule, Rfl: 2    fluticasone (FLONASE) 50 MCG/ACT nasal spray, Administer 2 sprays into the nostril(s) as directed by provider Daily., Disp: 16 g, Rfl: 5    hydrOXYzine (ATARAX) 25 MG tablet, Take 1 tablet by mouth Every 6 (Six) Hours As Needed for Anxiety., Disp: 90 tablet, Rfl: 1    ibuprofen (ADVIL,MOTRIN) 600 MG tablet, Take 1 tablet by mouth Every 8 (Eight) Hours As Needed for Mild Pain., Disp: 30 tablet, Rfl: 0    loratadine (Claritin) 10 MG tablet, Take 1 tablet by mouth Daily., Disp: 90 tablet, Rfl: 3    losartan-hydrochlorothiazide (Hyzaar) 50-12.5 MG per tablet, Take 1 tablet by mouth Daily., Disp: 90 tablet, Rfl: 1    OLANZapine-Samidorphan  "(Lybalvi) 10-10 MG tablet, Take 1 tablet by mouth every night at bedtime., Disp: 28 tablet, Rfl: 0    omeprazole (priLOSEC) 40 MG capsule, Take 1 capsule by mouth Daily., Disp: 90 capsule, Rfl: 1    oxybutynin XL (DITROPAN-XL) 10 MG 24 hr tablet, Take 1 tablet by mouth Daily., Disp: 90 tablet, Rfl: 1    potassium chloride 10 MEQ CR tablet, Take 1 tablet by mouth Daily., Disp: 90 tablet, Rfl: 1    pregabalin (LYRICA) 150 MG capsule, Take 1 capsule by mouth 3 times a day., Disp: , Rfl:     valACYclovir (VALTREX) 500 MG tablet, Take 1 tablet by mouth Daily., Disp: 30 tablet, Rfl: 0      Immunization History   Administered Date(s) Administered    COVID-19 (MODERNA) 1st,2nd,3rd Dose Monovalent 01/07/2021, 02/04/2021, 10/28/2021    COVID-19 (MODERNA) BIVALENT 12+YRS 10/17/2022    COVID-19 (PFIZER) 12YRS+ (COMIRNATY) 12/19/2024    Fluzone (or Fluarix & Flulaval for VFC) >6mos 10/17/2022, 11/09/2023    Fluzone Quad >6mos (Multi-dose) 10/29/2020    Influenza Inj MDCK Preserative Free 09/17/2024    Influenza, Unspecified 10/17/2022    Pneumococcal Conjugate 20-Valent (PCV20) 05/31/2024         Objective       Vitals:    02/27/25 1101   BP: 134/84   Pulse: 70   Temp: 98 °F (36.7 °C)   TempSrc: Temporal   SpO2: 99%   Weight: 108 kg (239 lb)   Height: 160 cm (62.99\")      Body mass index is 42.35 kg/m².   Wt Readings from Last 3 Encounters:   02/27/25 108 kg (239 lb)   02/14/25 107 kg (236 lb 12.8 oz)   02/03/25 106 kg (233 lb 14.5 oz)      BP Readings from Last 3 Encounters:   02/27/25 134/84   02/24/25 127/81   02/14/25 136/84                 Physical Exam  Vitals reviewed.   Constitutional:       Appearance: Normal appearance. She is well-developed.   HENT:      Head: Normocephalic and atraumatic.   Eyes:      Conjunctiva/sclera: Conjunctivae normal.      Pupils: Pupils are equal, round, and reactive to light.   Cardiovascular:      Rate and Rhythm: Normal rate and regular rhythm.      Heart sounds: Normal heart sounds. No " murmur heard.  Pulmonary:      Effort: Pulmonary effort is normal.      Breath sounds: Normal breath sounds. No wheezing or rhonchi.   Abdominal:      General: Bowel sounds are normal. There is no distension.      Palpations: Abdomen is soft.      Tenderness: There is no abdominal tenderness.   Musculoskeletal:      Lumbar back: Tenderness present.        Back:    Skin:     General: Skin is warm and dry.   Neurological:      Mental Status: She is alert and oriented to person, place, and time.   Psychiatric:         Mood and Affect: Mood and affect normal.         Behavior: Behavior normal.         Thought Content: Thought content normal.         Judgment: Judgment normal.       Physical Exam  Lungs were auscultated.      Result Review :       Common labs          9/17/2024    08:59 11/14/2024    10:42 12/18/2024    17:04   Common Labs   Glucose  103  97    BUN  15  15    Creatinine  0.86  0.79    Sodium  140  136    Potassium  4.2  4.0    Chloride  103  98    Calcium  9.4  9.6    Albumin  4.0  3.9    Total Bilirubin  0.2  <0.2    Alkaline Phosphatase  106  120    AST (SGOT)  18  23    ALT (SGPT)  33  20    WBC 9.29  10.13  8.72    Hemoglobin 12.8  12.4  12.2    Hematocrit 40.3  39.5  39.6    Platelets 494  459  526    Total Cholesterol   176    Triglycerides   98    HDL Cholesterol   57    LDL Cholesterol    101    Hemoglobin A1C   6.10      Results  Laboratory Studies  Blood sugar was 6.1 twelve months ago.                 Assessment and Plan          Diagnoses and all orders for this visit:    1. Primary hypertension (Primary)  -     CBC (No Diff); Future  -     CBC (No Diff)    2. Screening for thyroid disorder  -     TSH; Future  -     TSH    3. Lipid screening  -     Lipid Panel; Future  -     Comprehensive Metabolic Panel; Future  -     Lipid Panel  -     Comprehensive Metabolic Panel    4. Herpes  -     valACYclovir (VALTREX) 500 MG tablet; Take 1 tablet by mouth Daily.  Dispense: 30 tablet; Refill: 0    5.  Degeneration of intervertebral disc of lumbar region with discogenic back pain and lower extremity pain  -     DULoxetine (CYMBALTA) 60 MG capsule; Take 1 capsule by mouth 2 (Two) Times a Day.  Dispense: 60 capsule; Refill: 2    6. Anxiety and depression  -     DULoxetine (CYMBALTA) 60 MG capsule; Take 1 capsule by mouth 2 (Two) Times a Day.  Dispense: 60 capsule; Refill: 2    Other orders  -     ibuprofen (ADVIL,MOTRIN) 600 MG tablet; Take 1 tablet by mouth Every 8 (Eight) Hours As Needed for Mild Pain.  Dispense: 30 tablet; Refill: 0        Assessment & Plan  1. Anxiety.  She reports that her anxiety is more manageable with the current treatment regimen. She is currently on BuSpar, although it initially caused her to feel loopy. She is also seeing Dr. Cantor for her anxiety, which is improving.    2. Depression.  Her depression is more manageable now. She is sleeping well and does not feel like she is going to have an attack. A prescription refill for duloxetine has been provided.    3. Back pain.  She continues to experience back pain radiating down her leg and uses a cane for support.    4. Memory issues.  A referral to a neurologist has been initiated for further evaluation of her memory concerns.    5. Health maintenance.  She is advised to undergo mammogram for breast cancer screening and colon cancer screening. She is also due for blood work, which can be done today.    Follow-up  The patient will follow up in 3 months.    Follow Up     Return in about 3 months (around 5/27/2025) for Next scheduled follow up.    Updated annual wellness visit checklist.  Immunizations discussed.  Screening discussed and/or ordered.  Recommend yearly dental and eye exams. Also discussed monitoring of blood pressure and lipids.      Patient was given instructions and counseling regarding her condition or for health maintenance advice. Please see specific information pulled into the AVS if appropriate.     Patient or patient  representative verbalized consent for the use of Ambient Listening during the visit with  TESSA Cerrato for chart documentation. 2/27/2025  11:24 TESSA Kwon

## 2025-02-24 ENCOUNTER — HOSPITAL ENCOUNTER (OUTPATIENT)
Dept: INFUSION THERAPY | Facility: HOSPITAL | Age: 55
Discharge: HOME | End: 2025-02-24
Admitting: NURSE PRACTITIONER
Payer: COMMERCIAL

## 2025-02-24 VITALS
RESPIRATION RATE: 20 BRPM | HEART RATE: 78 BPM | OXYGEN SATURATION: 99 % | TEMPERATURE: 98 F | DIASTOLIC BLOOD PRESSURE: 81 MMHG | SYSTOLIC BLOOD PRESSURE: 127 MMHG

## 2025-02-24 DIAGNOSIS — E61.1 IRON DEFICIENCY: ICD-10-CM

## 2025-02-24 DIAGNOSIS — K90.49 MALABSORPTION DUE TO INTOLERANCE, NOT ELSEWHERE CLASSIFIED: Primary | ICD-10-CM

## 2025-02-24 PROCEDURE — 96375 TX/PRO/DX INJ NEW DRUG ADDON: CPT

## 2025-02-24 PROCEDURE — 25010000002 NA FERRIC GLUC CPLX PER 12.5 MG: Performed by: NURSE PRACTITIONER

## 2025-02-24 PROCEDURE — 96365 THER/PROPH/DIAG IV INF INIT: CPT

## 2025-02-24 PROCEDURE — 96366 THER/PROPH/DIAG IV INF ADDON: CPT

## 2025-02-24 PROCEDURE — 25010000002 DEXAMETHASONE PER 1 MG: Performed by: NURSE PRACTITIONER

## 2025-02-24 PROCEDURE — 25810000003 SODIUM CHLORIDE 0.9 % SOLUTION: Performed by: NURSE PRACTITIONER

## 2025-02-24 RX ORDER — ACETAMINOPHEN 325 MG/1
650 TABLET ORAL ONCE
Status: COMPLETED | OUTPATIENT
Start: 2025-02-24 | End: 2025-02-24

## 2025-02-24 RX ORDER — DEXAMETHASONE SODIUM PHOSPHATE 4 MG/ML
8 INJECTION, SOLUTION INTRA-ARTICULAR; INTRALESIONAL; INTRAMUSCULAR; INTRAVENOUS; SOFT TISSUE ONCE
Status: COMPLETED | OUTPATIENT
Start: 2025-02-24 | End: 2025-02-24

## 2025-02-24 RX ADMIN — ACETAMINOPHEN 650 MG: 325 TABLET ORAL at 14:54

## 2025-02-24 RX ADMIN — DEXAMETHASONE SODIUM PHOSPHATE 8 MG: 4 INJECTION, SOLUTION INTRAMUSCULAR; INTRAVENOUS at 14:54

## 2025-02-24 RX ADMIN — SODIUM CHLORIDE 250 MG: 9 INJECTION, SOLUTION INTRAVENOUS at 14:55

## 2025-02-25 ENCOUNTER — TELEPHONE (OUTPATIENT)
Dept: FAMILY MEDICINE CLINIC | Facility: CLINIC | Age: 55
End: 2025-02-25
Payer: COMMERCIAL

## 2025-02-25 DIAGNOSIS — R41.3 MEMORY CHANGES: Primary | ICD-10-CM

## 2025-02-25 NOTE — TELEPHONE ENCOUNTER
Daughter called back. She states she did not call about mask or any prescription for Aeroflow.     She wants to know if patient can be referred to Neurology for memory loss. States her mothers memory has gotten worse recently and she has family hx of dementia. Please advise.

## 2025-02-25 NOTE — TELEPHONE ENCOUNTER
Caller: edna de guzman    Relationship: Emergency Contact    Best call back number: [phone]    What is the medical concern/diagnosis: MEMORY LOSS     What specialty or service is being requested: NEUROLOGISTS     What is the provider, practice or medical service name: N/A    What is the office location:     What is the office phone number:     Any additional details: REQUESTING TO STAY IN NETWORK WITH INSURANCE

## 2025-02-25 NOTE — TELEPHONE ENCOUNTER
Caller: edna de guzman    Relationship to patient: Emergency Contact    Best call back number: [phone]    Patient is needing: PATIENT IS NEEDING NICOLE REGAN TO SEND A NEW PRESCRIPTION TO [facility] FOR HER MASK. SHE IS NEEDING A LARGE. SHE WOULD LIKE TWO BOXES.PLEASE CALL AND ADVISE.

## 2025-02-25 NOTE — TELEPHONE ENCOUNTER
Left vm for daughter to call back and clarify what is needed. [facility] If additional orders are needing, [facility] our [facility].

## 2025-02-27 ENCOUNTER — OFFICE VISIT (OUTPATIENT)
Dept: FAMILY MEDICINE CLINIC | Facility: CLINIC | Age: 55
End: 2025-02-27
Payer: COMMERCIAL

## 2025-02-27 VITALS
OXYGEN SATURATION: 99 % | HEART RATE: 70 BPM | BODY MASS INDEX: 42.35 KG/M2 | SYSTOLIC BLOOD PRESSURE: 134 MMHG | DIASTOLIC BLOOD PRESSURE: 84 MMHG | WEIGHT: 239 LBS | TEMPERATURE: 98 F | HEIGHT: 63 IN

## 2025-02-27 DIAGNOSIS — B00.9 HERPES: ICD-10-CM

## 2025-02-27 DIAGNOSIS — F41.9 ANXIETY AND DEPRESSION: ICD-10-CM

## 2025-02-27 DIAGNOSIS — I10 PRIMARY HYPERTENSION: Primary | ICD-10-CM

## 2025-02-27 DIAGNOSIS — Z13.29 SCREENING FOR THYROID DISORDER: ICD-10-CM

## 2025-02-27 DIAGNOSIS — M51.362 DEGENERATION OF INTERVERTEBRAL DISC OF LUMBAR REGION WITH DISCOGENIC BACK PAIN AND LOWER EXTREMITY PAIN: ICD-10-CM

## 2025-02-27 DIAGNOSIS — F32.A ANXIETY AND DEPRESSION: ICD-10-CM

## 2025-02-27 DIAGNOSIS — Z13.220 LIPID SCREENING: ICD-10-CM

## 2025-02-27 LAB
ALBUMIN SERPL-MCNC: 3.9 G/DL (ref 3.5–5.2)
ALBUMIN/GLOB SERPL: 1.1 G/DL
ALP SERPL-CCNC: 88 U/L (ref 39–117)
ALT SERPL W P-5'-P-CCNC: 17 U/L (ref 1–33)
ANION GAP SERPL CALCULATED.3IONS-SCNC: 8.7 MMOL/L (ref 5–15)
AST SERPL-CCNC: 18 U/L (ref 1–32)
BILIRUB SERPL-MCNC: <0.2 MG/DL (ref 0–1.2)
BUN SERPL-MCNC: 17 MG/DL (ref 6–20)
BUN/CREAT SERPL: 18.5 (ref 7–25)
CALCIUM SPEC-SCNC: 9.8 MG/DL (ref 8.6–10.5)
CHLORIDE SERPL-SCNC: 101 MMOL/L (ref 98–107)
CHOLEST SERPL-MCNC: 179 MG/DL (ref 0–200)
CO2 SERPL-SCNC: 28.3 MMOL/L (ref 22–29)
CREAT SERPL-MCNC: 0.92 MG/DL (ref 0.57–1)
DEPRECATED RDW RBC AUTO: 46 FL (ref 37–54)
EGFRCR SERPLBLD CKD-EPI 2021: 74.1 ML/MIN/1.73
ERYTHROCYTE [DISTWIDTH] IN BLOOD BY AUTOMATED COUNT: 13.9 % (ref 12.3–15.4)
GLOBULIN UR ELPH-MCNC: 3.4 GM/DL
GLUCOSE SERPL-MCNC: 82 MG/DL (ref 65–99)
HCT VFR BLD AUTO: 39.2 % (ref 34–46.6)
HDLC SERPL-MCNC: 53 MG/DL (ref 40–60)
HGB BLD-MCNC: 12.5 G/DL (ref 12–15.9)
LDLC SERPL CALC-MCNC: 108 MG/DL (ref 0–100)
LDLC/HDLC SERPL: 2 {RATIO}
MCH RBC QN AUTO: 28.6 PG (ref 26.6–33)
MCHC RBC AUTO-ENTMCNC: 31.9 G/DL (ref 31.5–35.7)
MCV RBC AUTO: 89.7 FL (ref 79–97)
PLATELET # BLD AUTO: 448 10*3/MM3 (ref 140–450)
PMV BLD AUTO: 9.6 FL (ref 6–12)
POTASSIUM SERPL-SCNC: 3.8 MMOL/L (ref 3.5–5.2)
PROT SERPL-MCNC: 7.3 G/DL (ref 6–8.5)
RBC # BLD AUTO: 4.37 10*6/MM3 (ref 3.77–5.28)
SODIUM SERPL-SCNC: 138 MMOL/L (ref 136–145)
TRIGL SERPL-MCNC: 99 MG/DL (ref 0–150)
TSH SERPL DL<=0.05 MIU/L-ACNC: 1.36 UIU/ML (ref 0.27–4.2)
VLDLC SERPL-MCNC: 18 MG/DL (ref 5–40)
WBC NRBC COR # BLD AUTO: 9.05 10*3/MM3 (ref 3.4–10.8)

## 2025-02-27 PROCEDURE — 84443 ASSAY THYROID STIM HORMONE: CPT | Performed by: NURSE PRACTITIONER

## 2025-02-27 PROCEDURE — 85027 COMPLETE CBC AUTOMATED: CPT | Performed by: NURSE PRACTITIONER

## 2025-02-27 PROCEDURE — 80061 LIPID PANEL: CPT | Performed by: NURSE PRACTITIONER

## 2025-02-27 PROCEDURE — 80053 COMPREHEN METABOLIC PANEL: CPT | Performed by: NURSE PRACTITIONER

## 2025-02-27 RX ORDER — VALACYCLOVIR HYDROCHLORIDE 500 MG/1
500 TABLET, FILM COATED ORAL DAILY
Qty: 30 TABLET | Refills: 0 | Status: SHIPPED | OUTPATIENT
Start: 2025-02-27

## 2025-02-27 RX ORDER — DULOXETIN HYDROCHLORIDE 60 MG/1
60 CAPSULE, DELAYED RELEASE ORAL 2 TIMES DAILY
Qty: 60 CAPSULE | Refills: 2 | Status: SHIPPED | OUTPATIENT
Start: 2025-02-27

## 2025-02-27 RX ORDER — IBUPROFEN 600 MG/1
600 TABLET, FILM COATED ORAL EVERY 8 HOURS PRN
Qty: 30 TABLET | Refills: 0 | Status: SHIPPED | OUTPATIENT
Start: 2025-02-27

## 2025-02-27 NOTE — PATIENT INSTRUCTIONS
Preventive Care 40-64 Years Old, Female  Preventive care refers to lifestyle choices and visits with your health care provider that can promote health and wellness. Preventive care visits are also called wellness exams.  What can I expect for my preventive care visit?  Counseling  Your health care provider may ask you questions about your:  Medical history, including:  Past medical problems.  Family medical history.  Pregnancy history.  Current health, including:  Menstrual cycle.  Method of birth control.  Emotional well-being.  Home life and relationship well-being.  Sexual activity and sexual health.  Lifestyle, including:  Alcohol, nicotine or tobacco, and drug use.  Access to firearms.  Diet, exercise, and sleep habits.  Work and work environment.  Sunscreen use.  Safety issues such as seatbelt and bike helmet use.  Physical exam  Your health care provider will check your:  Height and weight. These may be used to calculate your BMI (body mass index). BMI is a measurement that tells if you are at a healthy weight.  Waist circumference. This measures the distance around your waistline. This measurement also tells if you are at a healthy weight and may help predict your risk of certain diseases, such as type 2 diabetes and high blood pressure.  Heart rate and blood pressure.  Body temperature.  Skin for abnormal spots.  What immunizations do I need?    Vaccines are usually given at various ages, according to a schedule. Your health care provider will recommend vaccines for you based on your age, medical history, and lifestyle or other factors, such as travel or where you work.  What tests do I need?  Screening  Your health care provider may recommend screening tests for certain conditions. This may include:  Lipid and cholesterol levels.  Diabetes screening. This is done by checking your blood sugar (glucose) after you have not eaten for a while (fasting).  Pelvic exam and Pap test.  Hepatitis B test.  Hepatitis C  test.  HIV (human immunodeficiency virus) test.  STI (sexually transmitted infection) testing, if you are at risk.  Lung cancer screening.  Colorectal cancer screening.  Mammogram. Talk with your health care provider about when you should start having regular mammograms. This may depend on whether you have a family history of breast cancer.  BRCA-related cancer screening. This may be done if you have a family history of breast, ovarian, tubal, or peritoneal cancers.  Bone density scan. This is done to screen for osteoporosis.  Talk with your health care provider about your test results, treatment options, and if necessary, the need for more tests.  Follow these instructions at home:  Eating and drinking    Eat a diet that includes fresh fruits and vegetables, whole grains, lean protein, and low-fat dairy products.  Take vitamin and mineral supplements as recommended by your health care provider.  Do not drink alcohol if:  Your health care provider tells you not to drink.  You are pregnant, may be pregnant, or are planning to become pregnant.  If you drink alcohol:  Limit how much you have to 0-1 drink a day.  Know how much alcohol is in your drink. In the [place] one drink equals one 12 oz bottle of beer (355 mL), one 5 oz glass of wine (148 mL), or one 1½ oz glass of hard liquor (44 mL).  Lifestyle  Brush your teeth every morning and night with fluoride toothpaste. Floss one time each day.  Exercise for at least 30 minutes 5 or more days each week.  Do not use any products that contain nicotine or tobacco. These products include cigarettes, chewing tobacco, and vaping devices, such as e-cigarettes. If you need help quitting, ask your health care provider.  Do not use drugs.  If you are sexually active, practice safe sex. Use a condom or other form of protection to prevent STIs.  If you do not wish to become pregnant, use a form of birth control. If you plan to become pregnant, see your health care provider for a  prepregnancy visit.  Take aspirin only as told by your health care provider. Make sure that you understand how much to take and what form to take. Work with your health care provider to find out whether it is safe and beneficial for you to take aspirin daily.  Find healthy ways to manage stress, such as:  Meditation, yoga, or listening to music.  Journaling.  Talking to a trusted person.  Spending time with friends and family.  Minimize exposure to UV radiation to reduce your risk of skin cancer.  Safety  Always wear your seat belt while driving or riding in a vehicle.  Do not drive:  If you have been drinking alcohol. Do not ride with someone who has been drinking.  When you are tired or distracted.  While texting.  If you have been using any mind-altering substances or drugs.  Wear a helmet and other protective equipment during sports activities.  If you have firearms in your house, make sure you follow all gun safety procedures.  Seek help if you have been physically or sexually abused.  What's next?  Visit your health care provider once a year for an annual wellness visit.  Ask your health care provider how often you should have your eyes and teeth checked.  Stay up to date on all vaccines.  This information is not intended to replace advice given to you by your health care provider. Make sure you discuss any questions you have with your health care provider.  Document Revised: 06/15/2022 Document Reviewed: 06/15/2022  [employer] Patient Education © 2024 [employer]Generalized Anxiety Disorder, Adult  Generalized anxiety disorder (TINO) is a mental health condition. Unlike normal worries, anxiety related to TINO is not triggered by a specific event. These worries do not fade or get better with time. TINO interferes with relationships, work, and school.  TINO symptoms can vary from mild to severe. People with severe TINO can have intense waves of anxiety with physical symptoms that are similar to panic attacks.  What  are the causes?  The exact cause of TINO is not known, but the following are believed to have an impact:  Differences in natural brain chemicals.  Genes passed down from parents to children.  Differences in the way threats are perceived.  Development and stress during childhood.  Personality.  What increases the risk?  The following factors may make you more likely to develop this condition:  Being female.  Having a family history of anxiety disorders.  Being very shy.  Experiencing very stressful life events, such as the death of a loved one.  Having a very stressful family environment.  What are the signs or symptoms?  People with TINO often worry excessively about many things in their lives, such as their health and family. Symptoms may also include:  Mental and emotional symptoms:  Worrying excessively about natural disasters.  Fear of being late.  Difficulty concentrating.  Fears that others are judging your performance.  Physical symptoms:  Fatigue.  Headaches, muscle tension, muscle twitches, trembling, or feeling shaky.  Feeling like your heart is pounding or beating very fast.  Feeling out of breath or like you cannot take a deep breath.  Having trouble falling asleep or staying asleep, or experiencing restlessness.  Sweating.  Nausea, diarrhea, or irritable bowel syndrome (IBS).  Behavioral symptoms:  Experiencing erratic moods or irritability.  Avoidance of new situations.  Avoidance of people.  Extreme difficulty making decisions.  How is this diagnosed?  This condition is diagnosed based on your symptoms and medical history. You will also have a physical exam. Your health care provider may perform tests to rule out other possible causes of your symptoms.  To be diagnosed with TINO, a person must have anxiety that:  Is out of his or her control.  Affects several different aspects of his or her life, such as work and relationships.  Causes distress that makes him or her unable to take part in normal  activities.  Includes at least three symptoms of TINO, such as restlessness, fatigue, trouble concentrating, irritability, muscle tension, or sleep problems.  Before your health care provider can confirm a diagnosis of TINO, these symptoms must be present more days than they are not, and they must last for 6 months or longer.  How is this treated?  This condition may be treated with:  Medicine. Antidepressant medicine is usually prescribed for long-term daily control. Anti-anxiety medicines may be added in severe cases, especially when panic attacks occur.  Talk therapy (psychotherapy). Certain types of talk therapy can be helpful in treating TINO by providing support, education, and guidance. Options include:  Cognitive behavioral therapy (CBT). People learn coping skills and self-calming techniques to ease their physical symptoms. They learn to identify unrealistic thoughts and behaviors and to replace them with more appropriate thoughts and behaviors.  Acceptance and commitment therapy (ACT). This treatment teaches people how to be mindful as a way to cope with unwanted thoughts and feelings.  Biofeedback. This process trains you to manage your body's response (physiological response) through breathing techniques and relaxation methods. You will work with a therapist while machines are used to monitor your physical symptoms.  Stress management techniques. These include yoga, meditation, and exercise.  A mental health specialist can help determine which treatment is best for you. Some people see improvement with one type of therapy. However, other people require a combination of therapies.  Follow these instructions at home:  Lifestyle  Maintain a consistent routine and schedule.  Anticipate stressful situations. Create a plan and allow extra time to work with your plan.  Practice stress management or self-calming techniques that you have learned from your therapist or your health care provider.  Exercise regularly  and spend time outdoors.  Eat a healthy diet that includes plenty of vegetables, fruits, whole grains, low-fat dairy products, and lean protein.  Do not eat a lot of foods that are high in fat, added sugar, or salt (sodium).  Drink plenty of water.  Avoid alcohol. Alcohol can increase anxiety.  Avoid caffeine and certain over-the-counter cold medicines. These may make you feel worse. Ask your pharmacist which medicines to avoid.  General instructions  Take over-the-counter and prescription medicines only as told by your health care provider.  Understand that you are likely to have setbacks. Accept this and be kind to yourself as you persist to take better care of yourself.  Anticipate stressful situations. Create a plan and allow extra time to work with your plan.  Recognize and accept your accomplishments, even if you  them as small.  Spend time with people who care about you.  Keep all follow-up visits. This is important.  Where to find more information  National [place] of Mental Health: www.nimh.nih.gov  Substance Abuse and Mental Health Services: www.samhsa.gov  Contact a health care provider if:  Your symptoms do not get better.  Your symptoms get worse.  You have signs of depression, such as:  A persistently sad or irritable mood.  Loss of enjoyment in activities that used to bring you michelle.  Change in weight or eating.  Changes in sleeping habits.  Get help right away if:  You have thoughts about hurting yourself or others.  If you ever feel like you may hurt yourself or others, or have thoughts about taking your own life, get help right away. Go to your nearest emergency department or:  Call your local emergency services (911 in the [place]  Call a suicide crisis helpline, such as the National Suicide Prevention Lifeline at 1-712.425.8787 or 144 in the [place] This is open 24 hours a day in the [place]  If you’re a :  Call 988 and press 1. This is open 24 hours a day.  Text the Veterans Crisis Line at  069599.  Summary  Generalized anxiety disorder (TINO) is a mental health condition that involves worry that is not triggered by a specific event.  People with TINO often worry excessively about many things in their lives, such as their health and family.  TINO may cause symptoms such as restlessness, trouble concentrating, sleep problems, frequent sweating, nausea, diarrhea, headaches, and trembling or muscle twitching.  A mental health specialist can help determine which treatment is best for you. Some people see improvement with one type of therapy. However, other people require a combination of therapies.  This information is not intended to replace advice given to you by your health care provider. Make sure you discuss any questions you have with your health care provider.  Document Revised: 08/01/2024 Document Reviewed: 04/10/2022  [employer] Patient Education © 2024 [employer]

## 2025-02-28 ENCOUNTER — HOSPITAL ENCOUNTER (OUTPATIENT)
Dept: SLEEP MEDICINE | Facility: HOSPITAL | Age: 55
End: 2025-02-28
Payer: COMMERCIAL

## 2025-02-28 ENCOUNTER — OFFICE VISIT (OUTPATIENT)
Dept: SLEEP MEDICINE | Facility: HOSPITAL | Age: 55
End: 2025-02-28
Payer: COMMERCIAL

## 2025-02-28 VITALS
DIASTOLIC BLOOD PRESSURE: 74 MMHG | HEART RATE: 74 BPM | SYSTOLIC BLOOD PRESSURE: 125 MMHG | HEIGHT: 63 IN | WEIGHT: 236.3 LBS | OXYGEN SATURATION: 94 % | BODY MASS INDEX: 41.87 KG/M2

## 2025-02-28 DIAGNOSIS — G47.19 EXCESSIVE DAYTIME SLEEPINESS: ICD-10-CM

## 2025-02-28 DIAGNOSIS — E66.01 OBESITY, CLASS III, BMI 40-49.9 (MORBID OBESITY): ICD-10-CM

## 2025-02-28 DIAGNOSIS — I10 HYPERTENSION, UNSPECIFIED TYPE: ICD-10-CM

## 2025-02-28 DIAGNOSIS — G47.19 EXCESSIVE DAYTIME SLEEPINESS: Primary | ICD-10-CM

## 2025-02-28 PROCEDURE — 95806 SLEEP STUDY UNATT&RESP EFFT: CPT

## 2025-02-28 PROCEDURE — G0463 HOSPITAL OUTPT CLINIC VISIT: HCPCS

## 2025-02-28 NOTE — PROGRESS NOTES
[facility]   [address]  [place] [id number] [phone]     Referring physician/provider: Flor Cantor MD   PCP: Reina Putnam APRN    Type of service: Initial Sleep Medicine Consult.  Date of service: 2/28/2025        Sleep Clinic Initial Consult Visit      Patient or patient representative verbalized consent for the use of Ambient Listening during the visit with  Yair Lara DO for chart documentation. 2/28/2025  11:33 EST    HISTORY OF PRESENT ILLNESS  Chief Complaint: trouble sleeping  Bianca Lara is a 54 y.o. female that was seen today, on 2/28/2025 at [facility].  History of Present Illness  She has been experiencing sleep disturbances for the past few years and now has trouble sleeping every night. Despite using over-the-counter melatonin, she continues to struggle with maintaining sleep, often waking up within 30 minutes to an hour after falling asleep.   She has been informed by others of her loud snoring, which has been ongoing for approximately 5 to 6 years. She resides with her daughter and a pet dog. She has been observed to have apneic episodes during sleep. She does not report any sensations of choking or gasping for air upon awakening.  Her sleep pattern is fragmented, characterized by periods of wakefulness and sleep throughout the night. She typically retires to bed around 10 PM but does not achieve sleep until midnight. She remains in bed during this time, occasionally resorting to television as a distraction, but often finds herself too engaged to fall asleep. She experiences awakenings at least 3 to 4 times during the night, either to use the restroom or due to unexplained arousals. Upon awakening, she often feels groggy. Her morning routine begins at 4 AM, during which she remains awake for a few hours, consumes coffee, and then returns to sleep around 8 AM, sleeping until 10 AM. She is  "unemployed and her daily schedule remains consistent. She has not undergone testing for sleep apnea. She reports daytime sleepiness. She experiences leg discomfort at night and has lumbar disc disease with radiculopathy and is on lyrica 3 times daily. She does not experience sleep paralysis or muscle weakness associated with laughter or excitement. She continues to drive and has not been involved in any sleep-related accidents. She reports no family history of sleep apnea. She consumes coffee in the morning and occasionally at night before bed, believing the warmth aids her sleep.       Medical history  She has anxiety and depression.   She has high blood pressure.   She has chronic pain.   She has headaches.     SOCIAL HISTORY  The patient smokes about 3 cigarettes a day, smokes marijuana, and drinks wine or beer or alcohol on occasion, about twice a week.    FAMILY HISTORY  The patient does not have a family history of sleep apnea that she is aware of.      History of Sleep Study before: no  ESS today:16  Occupation: unemployed     Symptoms:   Have you ever awakened gasping for breath, coughing, choking:  []   Yes     [x]   No   Witnessed apneas: [x]   Yes     []   No   Loud Snoring: [x]   Yes     []   No   Do you drive a commercial vehicle:  []   Yes     [x]   No   History of any near accidents while driving due to sleepiness in the past 5 years: []   Yes     [x]   No     Negative for:  Symptoms consistent with the clinical diagnosis of Cataplexy   Sleep walking   See scanned media document for other sleep related questions      Allergies: Azithromycin, Lisinopril, and Penicillins       Objective   Vital Signs:   Vitals:    02/28/25 1100   BP: 125/74   Pulse: 74   SpO2: 94%   Weight: 107 kg (236 lb 4.8 oz)   Height: 160 cm (62.99\")     Body mass index is 41.87 kg/m².      PHYSICAL EXAM  CONSTITUTIONAL:  Non-toxic, In no overt distress   ENT: Mallampati class 4  NECK:Neck Circumference: 15 inches  RESPIRATORY " SYSTEM: Breathing appears nonlabored, no wheezes or rales  CARDIOVASULAR SYSTEM: Regular rate and rhythm  NEUROLOGICAL SYSTEM: answers questions appropriately      Result Review   The following data was reviewed by: Yair Lara DO on 02/28/2025:  [x]  Medications reviewed        ASSESSMENT/PLAN  Diagnoses and all orders for this visit:    1. Excessive daytime sleepiness (Primary)  -     Home Sleep Study; Future    2. Hypertension, unspecified type  -     Home Sleep Study; Future    3. Obesity, Class III, BMI 40-49.9 (morbid obesity)  -     Home Sleep Study; Future    She has loud snoring and witnessed apneas and daytime sleepiness with trouble maintaining sleep at night.  I have discussed home sleep apnea testing (HSAT). HSAT ordered. Discussed if HSAT is negative or inconclusive will proceed with in lab sleep study.  Discussed recommendation for starting treatment with positive airway pressure if obstructive sleep apnea is present.   Patient is agreeable to starting treatment with PAP If she has MALGORZATA.  If she does not have MALGORZATA then I would recommend CBT for insomnia.     Obesity class III, patient's BMI is Body mass index is 41.87 kg/m².. I have discussed the relationship between weight and sleep apnea.There is direct correlation between weight and severity of sleep apnea.  Weight reduction is encouraged, as it may reduce the severity of sleep apnea.      I have also discussed with the patient the following  Untreated MALGORZATA is associated with increased risks of stroke, heart attack, heart failure, motor vehicle accidents.   Recommended no driving or operating machinery if feeling sleepy. Discussed options of taking naps and getting rides with other people.   Generally most people need about 7 to 9 hours of sleep per night.       FOLLOW UP  Return for Follow up after study, 31 to 90 days after PAP setup.  Patient was given instructions and counseling regarding her condition or for health maintenance advice. Please  see specific information pulled into the AVS if appropriate.         Patient's questions were answered.  Thank you for allowing me to participate in the care of this patient.  Dictated Utilizing Dragon Dictation. Please note that portions of this note were completed with a voice recognition program. Part of this note may be an electronic transcription/translation of spoken language to printed text using the Dragon Dictation System.      [facility]   Yair Lara DO  02/28/25  11:38 EST

## 2025-03-05 DIAGNOSIS — G47.19 EXCESSIVE DAYTIME SLEEPINESS: Primary | ICD-10-CM

## 2025-03-17 ENCOUNTER — OFFICE VISIT (OUTPATIENT)
Dept: PODIATRY | Facility: CLINIC | Age: 55
End: 2025-03-17
Payer: COMMERCIAL

## 2025-03-17 VITALS
TEMPERATURE: 98 F | WEIGHT: 236 LBS | SYSTOLIC BLOOD PRESSURE: 119 MMHG | DIASTOLIC BLOOD PRESSURE: 85 MMHG | OXYGEN SATURATION: 94 % | HEART RATE: 68 BPM | BODY MASS INDEX: 41.82 KG/M2

## 2025-03-17 DIAGNOSIS — R26.2 DIFFICULTY WALKING: ICD-10-CM

## 2025-03-17 DIAGNOSIS — M79.671 FOOT PAIN, BILATERAL: ICD-10-CM

## 2025-03-17 DIAGNOSIS — G62.9 NEUROPATHY: Primary | ICD-10-CM

## 2025-03-17 DIAGNOSIS — L60.0 ONYCHOCRYPTOSIS: ICD-10-CM

## 2025-03-17 DIAGNOSIS — L74.4 ANHIDROSIS: ICD-10-CM

## 2025-03-17 DIAGNOSIS — B35.1 ONYCHOMYCOSIS: ICD-10-CM

## 2025-03-17 DIAGNOSIS — M72.2 PLANTAR FASCIITIS: ICD-10-CM

## 2025-03-17 DIAGNOSIS — M79.672 FOOT PAIN, BILATERAL: ICD-10-CM

## 2025-03-17 RX ORDER — AMMONIUM LACTATE 12 G/100G
LOTION TOPICAL 2 TIMES DAILY
Qty: 225 G | Refills: 11 | Status: SHIPPED | OUTPATIENT
Start: 2025-03-17

## 2025-03-17 RX ORDER — TERBINAFINE HYDROCHLORIDE 250 MG/1
250 TABLET ORAL DAILY
Qty: 90 TABLET | Refills: 0 | Status: SHIPPED | OUTPATIENT
Start: 2025-03-17 | End: 2025-06-15

## 2025-03-17 NOTE — LETTER
March 17, 2025     TESSA Cerrato  [address] [place] [address]    Patient: Bianca Lara   YOB: 1970   Date of Visit: 3/17/2025       Dear TESSA Cerrato:    Thank you for referring Bianca Lara to me for evaluation. Below are the relevant portions of my assessment and plan of care.    Encounter Diagnosis and Orders:  Diagnoses and all orders for this visit:    1. Neuropathy (Primary)    2. Onychocryptosis    3. Onychomycosis  -     terbinafine (lamiSIL) 250 MG tablet; Take 1 tablet by mouth Daily for 90 days.  Dispense: 90 tablet; Refill: 0    4. Foot pain, bilateral    5. Anhidrosis  -     ammonium lactate (LAC-HYDRIN) 12 % lotion; Apply  topically to the appropriate area as directed 2 (Two) Times a Day.  Dispense: 225 g; Refill: 11    6. Plantar fasciitis  -     Ambulatory Referral For Orthotics    7. Difficulty walking        If you have questions, please do not hesitate to call me. I look forward to following [place] along with you.         Sincerely,        Richard Jones DPM        CC: No Recipients

## 2025-03-17 NOTE — PROGRESS NOTES
[facility] - PODIATRY    Today's Date: 03/17/25    Patient Name: Bianca Laar  MRN: [id number]  CSN: [id number]  PCP: Reina Putnam APRN, Last PCP Visit:  2/27/2025  Referring Provider: Reina Putnam APRN  Patient or patient representative verbalized consent for the use of Ambient Listening during the visit with  Richard Jones DPM for chart documentation. 3/17/2025  10:11 EDT  SUBJECTIVE     Chief Complaint   Patient presents with    Left Foot - Nail Problem, Establish Care     Referral from TES[facility] Cerrato  for fungus , nail problem. She uses Ammonium lactate on her feet, requesting insoles    Right Foot - Bunions, Establish Care, Nail Problem     Referral from [facility]ES[facility] Cerrato  for fungus, nail problem     HPI: Bianca Lara, a 54 y.o.female, presents to clinic for painful toenail and a diabetic foot evaluation.    New, Established, New Problem:  New  Location:  Toenails  Duration:   Greater than five years  Onset:  Gradual  Nature:  sore with palpation.  Aggravating factors:  Pain with shoe gear and ambulation.  Previous Treatment: Unable to trim their own toenails.    New, Established, New Problem:  SECOND PROBLEM  Location: Bilateral feet  Duration:   Greater than 1 month  Onset: Insidious  Nature: Dry skin  Stable, worsening, improving:  Worsening  Aggravating factors:     Previous Treatment:  Slowly worsening despite using OTC skin lotion.    Patient with history of plantar fasciitis.  Patient states over custom made orthotics worn out and needs a prescription for new ones.    Patient denies any fevers, chills, nausea, vomiting, shortness of breath, nor any other constitutional signs nor symptoms.    History of Present Illness  The patient presents for evaluation of onychomycosis, plantar fasciitis, and dry skin on her feet.    She has a history of podiatric consultations in [place]. She reports experiencing numbness, burning, and tingling sensations in  her feet. She has been previously diagnosed with ingrown toenails on her big toes. Upon removal of the nail polish, discoloration was observed. Despite previous attempts at removal, the nails have regrown with the same dark coloration. She has a history of fungal infections and has been treated with both oral and topical antifungal medications. She does not have diabetes and is not on insulin therapy. She is currently on Lyrica and utilizes arch supports for her plantar fasciitis.    She has been using dry skin lotion for the soles and heels of her feet. She has previously used ammonium lactate.    MEDICATIONS  Current: Lyrica, ammonium lactate      Past Medical History:   Diagnosis Date    ADHD (attention deficit hyperactivity disorder) 2024    Allergic     Allergic rhinitis     Anxiety 09/11/2018    Arthritis     Blood in stool     Chronic pain disorder 2008    Back pain    Condition not found     MEATAL STENOSIS, UNSPECIFIED    CTS (carpal tunnel syndrome) 2015    Depression 09/11/2018    Difficulty walking 2023    Dysmenorrhea     Gross hematuria     Headache     Headache, tension-type 2022    Helicobacter positive gastritis 05/26/2016    HL (hearing loss) 2020    Hyperlipidemia 2024    Hypertension     Irregular menses     Low back pain 07/09/2014    Microscopic hematuria 06/10/2014    Night sweats     Onychomycosis of toenail 07/09/2014    Pap smear for cervical cancer screening 05/16/2016    Peripheral neuropathy     Right sided abdominal pain 06/12/2014    Screening mammogram, encounter for 11/09/2018    Sleep apnea 2023    STD exposure 1993    chlamydia    Tobacco abuse     chronic    Vitamin D deficiency      Past Surgical History:   Procedure Laterality Date    BREAST BIOPSY Left 01/09/2012    CHOLECYSTECTOMY      COLONOSCOPY  08/15/2016    CYST REMOVAL      left wrist    CYSTOSCOPY  06/27/2014    Office cystoscopy w/ Dr. Goncalves     Family History   Problem Relation Age of Onset    Stomach cancer Mother      Alzheimer's disease Mother     Diabetes Mother     Dementia Mother     Anxiety disorder Mother     Leukemia Father     ADD / ADHD Father     Heart disease Sister     Hyperlipidemia Sister     Kidney disease Sister     Heart disease Brother     No Known Problems Maternal Aunt     No Known Problems Paternal Aunt     No Known Problems Maternal Uncle     No Known Problems Paternal Uncle     No Known Problems Maternal Grandfather     Cancer Maternal Grandmother     Leukemia Paternal Grandfather     No Known Problems Paternal Grandmother     No Known Problems Cousin     Stroke Other         AUNT;UNCLE    Heart disease Other         AUNT,UNCLE    Diabetes Other         AUNT,UNCLE    Arthritis Sister     Alcohol abuse Neg Hx     Bipolar disorder Neg Hx     Depression Neg Hx     Drug abuse Neg Hx     OCD Neg Hx     Paranoid behavior Neg Hx     Schizophrenia Neg Hx     Seizures Neg Hx     Self-Injurious Behavior  Neg Hx     Suicide Attempts Neg Hx      Social History     Socioeconomic History    Marital status:    Tobacco Use    Smoking status: Some Days     Current packs/day: 0.00     Average packs/day: 0.3 packs/day for 54.1 years (13.5 ttl pk-yrs)     Types: Cigarettes     Start date: 1989     Last attempt to quit: 2024     Years since quittin.8     Passive exposure: Past    Smokeless tobacco: Never    Tobacco comments:     Medication   Vaping Use    Vaping status: Never Used   Substance and Sexual Activity    Alcohol use: Not Currently     Alcohol/week: 7.0 standard drinks of alcohol     Types: 1 Cans of beer, 4 Shots of liquor, 2 Drinks containing 0.5 oz of alcohol per week     Comment: glass of wine every other day    Drug use: Yes     Types: Marijuana     Comment: daily    Sexual activity: Yes     Partners: Male     Birth control/protection: Condom     Allergies   Allergen Reactions    Azithromycin Nausea Only    Lisinopril Cough    Penicillins Unknown - Low Severity     Current Outpatient  Medications   Medication Sig Dispense Refill    ammonium lactate (LAC-HYDRIN) 12 % lotion Apply  topically to the appropriate area as directed As Needed for Irritation. 225 g 0    busPIRone (BUSPAR) 10 MG tablet Take 1 tablet by mouth 3 (Three) Times a Day. 90 tablet 2    ciclopirox (PENLAC) 8 % solution Apply  topically to the appropriate area as directed Every Night. 6 mL 1    DULoxetine (CYMBALTA) 60 MG capsule Take 1 capsule by mouth 2 (Two) Times a Day. 60 capsule 2    fluticasone (FLONASE) 50 MCG/ACT nasal spray Administer 2 sprays into the nostril(s) as directed by provider Daily. 16 g 5    hydrOXYzine (ATARAX) 25 MG tablet Take 1 tablet by mouth Every 6 (Six) Hours As Needed for Anxiety. 90 tablet 1    ibuprofen (ADVIL,MOTRIN) 600 MG tablet Take 1 tablet by mouth Every 8 (Eight) Hours As Needed for Mild Pain. 30 tablet 0    loratadine (Claritin) 10 MG tablet Take 1 tablet by mouth Daily. 90 tablet 3    losartan-hydrochlorothiazide (Hyzaar) 50-12.5 MG per tablet Take 1 tablet by mouth Daily. 90 tablet 1    OLANZapine-Samidorphan (Lybalvi) 10-10 MG tablet Take 1 tablet by mouth every night at bedtime. 28 tablet 0    omeprazole (priLOSEC) 40 MG capsule Take 1 capsule by mouth Daily. 90 capsule 1    oxybutynin XL (DITROPAN-XL) 10 MG 24 hr tablet Take 1 tablet by mouth Daily. 90 tablet 1    potassium chloride 10 MEQ CR tablet Take 1 tablet by mouth Daily. 90 tablet 1    pregabalin (LYRICA) 150 MG capsule Take 1 capsule by mouth 3 times a day.      valACYclovir (VALTREX) 500 MG tablet Take 1 tablet by mouth Daily. 30 tablet 0    ammonium lactate (LAC-HYDRIN) 12 % lotion Apply  topically to the appropriate area as directed 2 (Two) Times a Day. 225 g 11    terbinafine (lamiSIL) 250 MG tablet Take 1 tablet by mouth Daily for 90 days. 90 tablet 0     No current facility-administered medications for this visit.       OBJECTIVE     Vitals:    03/17/25 0956   BP: 119/85   Pulse: 68   Temp: 98 °F (36.7 °C)   SpO2: 94%        Body mass index is 41.82 kg/m².    Lab Results   Component Value Date    HGBA1C 6.10 (H) 12/18/2024       Lab Results   Component Value Date    GLUCOSE 82 02/27/2025    CALCIUM 9.8 02/27/2025     02/27/2025    K 3.8 02/27/2025    CO2 28.3 02/27/2025     02/27/2025    BUN 17 02/27/2025    CREATININE 0.92 02/27/2025    BCR 18.5 02/27/2025    ANIONGAP 8.7 02/27/2025       Patient seen in no apparent distress.      PHYSICAL EXAM:     Foot/Ankle Exam    GENERAL  Appearance:  appears stated age  Orientation:  AAOx3  Affect:  appropriate  Gait:  antalgic  Assistance:  cane use  Right shoe gear: casual shoe  Left shoe gear: casual shoe    VASCULAR     Right Foot Vascularity   Normal vascular exam    Dorsalis pedis:  2+  Posterior tibial:  2+  Skin temperature:  warm  Edema grading:  None  CFT:  < 3 seconds  Pedal hair growth:  Present  Varicosities:  none     Left Foot Vascularity   Normal vascular exam    Dorsalis pedis:  2+  Posterior tibial:  2+  Skin temperature:  warm  Edema grading:  None  CFT:  < 3 seconds  Pedal hair growth:  Present  Varicosities:  none     NEUROLOGIC     Right Foot Neurologic   Normal sensation    Light touch sensation: normal  Vibratory sensation: normal  Hot/Cold sensation: normal  Protective Sensation using Rolfe-Riki Monofilament:   Sites intact: 10  Sites tested: 10     Left Foot Neurologic   Normal sensation    Light touch sensation: normal  Vibratory sensation: normal  Hot/Cold sensation:  normal  Protective Sensation using Rolfe-Riki Monofilament:   Sites intact: 10  Sites tested: 10    MUSCLE STRENGTH     Right Foot Muscle Strength   Foot dorsiflexion:  4-  Foot plantar flexion:  4-  Foot inversion:  4-  Foot eversion:  4-     Left Foot Muscle Strength   Foot dorsiflexion:  4-  Foot plantar flexion:  4-  Foot inversion:  4-  Foot eversion:  4-    RANGE OF MOTION     Right Foot Range of Motion   Foot and ankle ROM within normal limits       Left Foot Range of  Motion   Foot and ankle ROM within normal limits      DERMATOLOGIC      Right Foot Dermatologic   Skin  Positive for dryness.   Nails  1.  Positive for elongated, onychomycosis, abnormal thickness, subungual debris and ingrown toenail.  2.  Positive for elongated, onychomycosis, abnormal thickness, subungual debris and ingrown toenail.  3.  Positive for elongated, onychomycosis, abnormal thickness, subungual debris and ingrown toenail.  4.  Positive for elongated, onychomycosis, abnormal thickness, subungual debris and ingrown toenail.  5.  Positive for elongated, onychomycosis, abnormal thickness, subungual debris and ingrown toenail.  Nails comment:  Toenails 1, 2, 3, 4, and 5     Left Foot Dermatologic   Skin  Positive for dryness.   Nails comment:  Toenails 1, 2, 3, 4, and 5  Nails  1.  Positive for elongated, onychomycosis, abnormal thickness, subungual debris and ingrown toenail.  2.  Positive for elongated, onychomycosis, abnormal thickness, subungual debris and ingrown toenail.  3.  Positive for elongated, onychomycosis, abnormal thickness, subungual debris and ingrown toenail.  4.  Positive for elongated, onychomycosis, abnormally thick, subungual debris and ingrown toenail.  5.  Positive for elongated, onychomycosis, abnormally thick, subungual debris and ingrown toenail.      Lab Results   Component Value Date    GLUCOSE 82 02/27/2025    BUN 17 02/27/2025    CREATININE 0.92 02/27/2025     02/27/2025    K 3.8 02/27/2025     02/27/2025    CALCIUM 9.8 02/27/2025    PROTEINTOT 7.3 02/27/2025    ALBUMIN 3.9 02/27/2025    ALT 17 02/27/2025    AST 18 02/27/2025    ALKPHOS 88 02/27/2025    BILITOT <0.2 02/27/2025    GLOB 3.4 02/27/2025    AGRATIO 1.1 02/27/2025    BCR 18.5 02/27/2025    ANIONGAP 8.7 02/27/2025    EGFR 74.1 02/27/2025         ASSESSMENT/PLAN     Diagnoses and all orders for this visit:    1. Neuropathy (Primary)    2. Onychocryptosis    3. Onychomycosis  -     terbinafine (lamiSIL) 250  MG tablet; Take 1 tablet by mouth Daily for 90 days.  Dispense: 90 tablet; Refill: 0    4. Foot pain, bilateral    5. Anhidrosis  -     ammonium lactate (LAC-HYDRIN) 12 % lotion; Apply  topically to the appropriate area as directed 2 (Two) Times a Day.  Dispense: 225 g; Refill: 11    6. Plantar fasciitis  -     Ambulatory Referral For Orthotics    7. Difficulty walking      Assessment & Plan  1. Onychomycosis.  A prescription for oral antifungal medication has been issued to [facility], to be taken for a duration of 3 months. She has been advised to abstain from using nail polish during this period and to apply skin lotion on her nails. The effectiveness of the treatment will be evaluated after the 3-month period.    2. Plantar fasciitis.  A prescription for custom orthotics has been provided to alleviate the symptoms associated with plantar fasciitis. She has been instructed to contact the local provider for fitting and fabrication.    3. Dry skin on feet.  She has been using ammonium lactate lotion for dry skin on the bottom of her feet and heels. Continued use of the lotion is recommended to maintain skin hydration.  Prescriptions written for custom-made orthotics.    Comprehensive lower extremity examination and evaluation was performed.    Discussed findings and treatment plan including risks, benefits, and treatment options with patient in detail. Patient agreed with treatment plan.    Medications and allergies reviewed.  Reviewed available blood glucose and HgB A1C lab values along with other pertinent labs.  These were discussed with the patient as to their importance of diabetic maintenance.    Toenails 1, 2, 3, 4, 5 on Right and 1, 2, 3, 4, 5 on Left were debrided with nail nippers then filed with a Miriam nail julio.  Patient tolerated procedure well without complications.    An After Visit Summary was printed and given to the patient at discharge, including (if requested) any available informative/educational  handouts regarding diagnosis, treatment, or medications. All questions were answered to patient/family satisfaction. Should symptoms fail to improve or worsen they agree to call or return to clinic or to go to the Emergency Department. Discussed the importance of following up with any needed screening tests/labs/specialist appointments and any requested follow-up recommended by me today. Importance of maintaining follow-up discussed and patient accepts that missed appointments can delay diagnosis and potentially lead to worsening of conditions.    Return in about 9 weeks (around 5/19/2025) for Toenail Care., or sooner if acute issues arise.    This document has been electronically signed by Richard Jones DPM on March 17, 2025 10:18 EDT

## 2025-03-20 ENCOUNTER — HOSPITAL ENCOUNTER (OUTPATIENT)
Dept: SLEEP MEDICINE | Facility: HOSPITAL | Age: 55
Discharge: HOME | End: 2025-03-20
Admitting: STUDENT IN AN ORGANIZED HEALTH CARE EDUCATION/TRAINING PROGRAM
Payer: COMMERCIAL

## 2025-03-20 DIAGNOSIS — G47.19 EXCESSIVE DAYTIME SLEEPINESS: ICD-10-CM

## 2025-03-20 PROCEDURE — G0399 HOME SLEEP TEST/TYPE 3 PORTA: HCPCS

## 2025-03-21 ENCOUNTER — OFFICE VISIT (OUTPATIENT)
Dept: PSYCHIATRY | Facility: CLINIC | Age: 55
End: 2025-03-21
Payer: COMMERCIAL

## 2025-03-21 VITALS
HEIGHT: 63 IN | BODY MASS INDEX: 41.96 KG/M2 | SYSTOLIC BLOOD PRESSURE: 127 MMHG | DIASTOLIC BLOOD PRESSURE: 99 MMHG | HEART RATE: 107 BPM | WEIGHT: 236.8 LBS | OXYGEN SATURATION: 100 %

## 2025-03-21 DIAGNOSIS — R06.83 SNORING: ICD-10-CM

## 2025-03-21 DIAGNOSIS — F51.05 INSOMNIA DUE TO MENTAL CONDITION: ICD-10-CM

## 2025-03-21 DIAGNOSIS — F41.1 GENERALIZED ANXIETY DISORDER: ICD-10-CM

## 2025-03-21 DIAGNOSIS — F33.1 MAJOR DEPRESSIVE DISORDER, RECURRENT EPISODE, MODERATE: Primary | ICD-10-CM

## 2025-03-21 DIAGNOSIS — F41.0 PANIC ATTACKS: ICD-10-CM

## 2025-03-21 RX ORDER — OLANZAPINE AND SAMIDORPHAN L-MALATE 10; 10 MG/1; MG/1
1 TABLET, FILM COATED ORAL
Qty: 30 TABLET | Refills: 2 | Status: SHIPPED | OUTPATIENT
Start: 2025-03-21 | End: 2025-03-21 | Stop reason: SDUPTHER

## 2025-03-21 RX ORDER — OLANZAPINE AND SAMIDORPHAN L-MALATE 10; 10 MG/1; MG/1
1 TABLET, FILM COATED ORAL
Qty: 28 TABLET | Refills: 0 | Status: SHIPPED | OUTPATIENT
Start: 2025-03-21 | End: 2025-03-21 | Stop reason: SDUPTHER

## 2025-03-21 RX ORDER — OLANZAPINE AND SAMIDORPHAN L-MALATE 10; 10 MG/1; MG/1
1 TABLET, FILM COATED ORAL
Qty: 28 TABLET | Refills: 0 | COMMUNITY
Start: 2025-03-21

## 2025-03-21 NOTE — PROGRESS NOTES
"Subjective   Bianca Lara is a 54 y.o. female who presents today for initial evaluation     Referring Provider:  No referring provider defined for this encounter.    Chief Complaint:  anxiety and depression    History of Present Illness:     2025: INITIAL VISIT Chart review:     Keith: On Lyrica 100 mg 3 times a day  Care Everywhere: a few non behavioral health notes     Psychotropic medication chart review:  Present:  Olanzapine 5 mg at night  Viibryd 40 mg a day     Previously:  Lamictal 25 mg, 50 mg a day  Viibryd 20 mg a day     EKG: none  Procedures: none  Head imaging: MRI of the brain in  shows no acute.  Ordered for hearing loss  Labs: May through 2024: Abnormal thyroid studies, some abnormalities on CMP including glucose 137 CO2 31 potassium 3.3; low vitamin D; abnormal CBC with platelets 494;  Initial Chart Review Notes: Patient seen by primary care .  Client was suicidal but then was taken into a back room and stated no plan.  She had increased Viibryd to 60 mg on her own.  No guns at home.  Because she was having so much back pain it is causing her depression.  Patient sees neurosurgery on the  for her back pain.  Patient was started on Zyprexa.     Chart Review By Dates:  2025: fam med, infusion, podiatry, sleep x2, unknown x3. Reassuring TSH cmp cbc, high lipids.    Plannin2025: ciro, sleep medicine consult. Switch olanzapine to lybalvi (not on opioids). Consider abilify, vraylar. Start buspar for MDD, TINO. Could be ADHD. Could be bipolar, need to get to know her better. Olanzapine really helped. 4w    VISITS/APPOINTMENTS (BELOW):    \"Yina\"    Iron infusions  R/O ADHD: [employer] school: good student, reader of the month, got in trouble for not completing one assignment, getting out of her seat \"trying to be nice and help others\"    2025:   In person interview:  \"I'm ok.\"  I'm not losing weight.  MH stable  \"I'm really great\"  MDD: " "stable  TINO: stable  Panic attacks: stable  Energy: stable  Concentration: stable  Insomnia: stable  AIMS if on antipsychotic: no abnl movements  Eating/Weight: 236 lbs  Refills: y  Substances: def  Therapy: n  Medication compliant: y  SE: n  No SI HI AVH.      02/14/2025:   In person.  Interview:  His/Her Story: \"The olanzapine helps.\"  Need to lose weight to do surgery  \"I'm a whole lot better, really. Anything would just break me before.\"  Daughter is waiting for me in the car  My daughter is always saying why do you have so many projects around the house that you haven't finished  I need a job, a place to live on my own  My back pain led to having to stop working this past year  I lost a kid in 1990, we don't know how, fetal demise at 36w  Fhx: denies ADHD, bipolar  Sleeping? Improving, I used to stay up all night long  Eating? Wg 6 lbs in 6w  Energy? Getting better since started iron infusions  Depression/Mood:  Depressed mood, poor energy, poor concentration, weight gain, psychomotor retardation or agitation, insomnia.  Seasonal pattern: james are worse  Severity: Moderate  Duration: decades  Anxiety:  Uncontrolled worrying, muscle tension, fatigue, poor concentration, feeling on edge, irritability, insomnia.  Severity: Moderate  Duration: decades  Panic attacks: yes  AIMS if on antipsychotic: denies abnl movements  Psych ROS: Positive for depression, anxiety.  Negative for psychosis and phi.  ADHD: possible  PTSD: def  No SI HI AVH.  Medication compliant: y    Access to Firearms: denies    PHQ-9 Depression Screening  PHQ-9 Total Score:     Little interest or pleasure in doing things?     Feeling down, depressed, or hopeless?     PHQ-2 Total Score     Trouble falling or staying asleep, or sleeping too much?     Feeling tired or having little energy?     Poor appetite or overeating?     Feeling bad about yourself - or that you are a failure or have let yourself or your family down?     Trouble concentrating " on things, such as reading the newspaper or watching television?     Moving or speaking so slowly that other people could have noticed? Or the opposite - being so fidgety or restless that you have been moving around a lot more than usual?     Thoughts that you would be better off dead, or of hurting yourself in some way?     PHQ-9 Total Score     If you checked off any problems, how difficult have these problems made it for you to do your work, take care of things at home, or get along with other people?              TINO-7       Past Surgical History:  Past Surgical History:   Procedure Laterality Date    BREAST BIOPSY Left 01/09/2012    CHOLECYSTECTOMY      COLONOSCOPY  08/15/2016    CYST REMOVAL      left wrist    CYSTOSCOPY  06/27/2014    Office cystoscopy w/ Dr. Goncalves    WRIST SURGERY  1986       Problem List:  Patient Active Problem List   Diagnosis    Allergic rhinitis    Anxiety and depression    Dysmenorrhea    Gross hematuria    Microscopic hematuria    Helicobacter pylori gastritis    Hematochezia    Irregular menses    Low back pain    Night sweats    Onychomycosis of toenail    Right sided abdominal pain    STD exposure    Stenosis of urinary meatus    Vitamin D deficiency    DDD (degenerative disc disease), lumbar    Primary hypertension    Lumbar disc disease with radiculopathy    Trochanteric bursitis of left hip    Left hip pain    Right hip pain    Osteoarthritis of both hips    Trochanteric bursitis of right hip    Iron deficiency    Malabsorption due to intolerance, not elsewhere classified       Allergy:   Allergies   Allergen Reactions    Azithromycin Nausea Only    Lisinopril Cough    Penicillins Unknown - Low Severity        Discontinued Medications:  Medications Discontinued During This Encounter   Medication Reason    OLANZapine-Samidorphan (Lybalvi) 10-10 MG tablet Reorder    OLANZapine-Samidorphan (Lybalvi) 10-10 MG tablet Reorder    OLANZapine-Samidorphan (Lybalvi) 10-10 MG tablet  Reorder       Current Medications:   Current Outpatient Medications   Medication Sig Dispense Refill    ammonium lactate (LAC-HYDRIN) 12 % lotion Apply  topically to the appropriate area as directed As Needed for Irritation. 225 g 0    ammonium lactate (LAC-HYDRIN) 12 % lotion Apply  topically to the appropriate area as directed 2 (Two) Times a Day. 225 g 11    busPIRone (BUSPAR) 10 MG tablet Take 1 tablet by mouth 3 (Three) Times a Day. 90 tablet 2    ciclopirox (PENLAC) 8 % solution Apply  topically to the appropriate area as directed Every Night. 6 mL 1    DULoxetine (CYMBALTA) 60 MG capsule Take 1 capsule by mouth 2 (Two) Times a Day. 60 capsule 2    fluticasone (FLONASE) 50 MCG/ACT nasal spray Administer 2 sprays into the nostril(s) as directed by provider Daily. 16 g 5    hydrOXYzine (ATARAX) 25 MG tablet Take 1 tablet by mouth Every 6 (Six) Hours As Needed for Anxiety. 90 tablet 1    ibuprofen (ADVIL,MOTRIN) 600 MG tablet Take 1 tablet by mouth Every 8 (Eight) Hours As Needed for Mild Pain. 30 tablet 0    loratadine (Claritin) 10 MG tablet Take 1 tablet by mouth Daily. 90 tablet 3    losartan-hydrochlorothiazide (Hyzaar) 50-12.5 MG per tablet Take 1 tablet by mouth Daily. 90 tablet 1    OLANZapine-Samidorphan (Lybalvi) 10-10 MG tablet Take 1 tablet by mouth every night at bedtime. 28 tablet 0    omeprazole (priLOSEC) 40 MG capsule Take 1 capsule by mouth Daily. 90 capsule 1    oxybutynin XL (DITROPAN-XL) 10 MG 24 hr tablet Take 1 tablet by mouth Daily. 90 tablet 1    potassium chloride 10 MEQ CR tablet Take 1 tablet by mouth Daily. 90 tablet 1    pregabalin (LYRICA) 150 MG capsule Take 1 capsule by mouth 3 times a day.      terbinafine (lamiSIL) 250 MG tablet Take 1 tablet by mouth Daily for 90 days. 90 tablet 0    valACYclovir (VALTREX) 500 MG tablet Take 1 tablet by mouth Daily. 30 tablet 0     No current facility-administered medications for this visit.       Past Medical History:  Past Medical History:    Diagnosis Date    ADHD (attention deficit hyperactivity disorder)     Allergic     Allergic rhinitis     Anxiety 2018    Arthritis     Arthritis of back 2020    Blood in stool     Cervical disc disorder     Chronic pain disorder 2008    Back pain    Condition not found     MEATAL STENOSIS, UNSPECIFIED    CTS (carpal tunnel syndrome) 2015    Dental disease 2020    Depression 2018    Difficulty walking     Dizziness     Dysmenorrhea     Gross hematuria     Headache     Headache, tension-type     Helicobacter positive gastritis 2016    Hip arthrosis     HL (hearing loss)     Hyperlipidemia     Hypertension     Injury of back     Irregular menses     Low back pain 2014    Low back strain 2017    Lumbosacral disc disease 2020    Microscopic hematuria 06/10/2014    Night sweats     Nosebleed     Onychomycosis of toenail 2014    Osteopenia     Pap smear for cervical cancer screening 2016    Peripheral neuropathy     Right sided abdominal pain 2014    Screening mammogram, encounter for 2018    Sleep apnea     STD exposure 1993    chlamydia    Thoracic disc disorder     Tobacco abuse     chronic    Vitamin D deficiency        Past Psychiatric History:  Began Treatment:   Diagnoses: unsure  Psychiatrist: denies  Therapist: Rober in   Admission History:Denies  Medication Trials:        Self Harm: Denies  Suicide Attempts:Denies   Psychosis, Anxiety, Depression: Denies    Substance Abuse History:   Types: cigs and mj  Withdrawal Symptoms:Denies  Longest Period Sober:Not Applicable   AA: Not applicable     Social History:  Martial Status:   Employed:No  Kids:Yes  House:Lives in a house   History: Denies    Social History     Socioeconomic History    Marital status:    Tobacco Use    Smoking status: Some Days     Current packs/day: 0.00     Average packs/day: 0.7 packs/day for 64.6 years (45.0 ttl  pk-yrs)     Types: Cigarettes     Start date: 1989     Last attempt to quit: 2024     Years since quittin.8     Passive exposure: Past    Smokeless tobacco: Never    Tobacco comments:     Medication   Vaping Use    Vaping status: Never Used   Substance and Sexual Activity    Alcohol use: Not Currently     Alcohol/week: 7.0 standard drinks of alcohol     Types: 1 Cans of beer, 4 Shots of liquor, 2 Drinks containing 0.5 oz of alcohol per week     Comment: glass of wine every other day    Drug use: Yes     Types: Marijuana     Comment: daily    Sexual activity: Yes     Partners: Male     Birth control/protection: Condom       Family History:   Suicide Attempts: Denies  Suicide Completions:Denies      Family History   Problem Relation Age of Onset    Stomach cancer Mother     Alzheimer's disease Mother     Diabetes Mother     Dementia Mother     Anxiety disorder Mother     Leukemia Father     ADD / ADHD Father     Heart disease Sister     Hyperlipidemia Sister     Kidney disease Sister     Heart disease Brother     No Known Problems Maternal Aunt     No Known Problems Paternal Aunt     No Known Problems Maternal Uncle     No Known Problems Paternal Uncle     No Known Problems Maternal Grandfather     Cancer Maternal Grandmother     Leukemia Paternal Grandfather     No Known Problems Paternal Grandmother     No Known Problems Cousin     Stroke Other         AUNT;UNCLE    Heart disease Other         AUNT,UNCLE    Diabetes Other         AUNT,UNCLE    Arthritis Sister     Alcohol abuse Neg Hx     Bipolar disorder Neg Hx     Depression Neg Hx     Drug abuse Neg Hx     OCD Neg Hx     Paranoid behavior Neg Hx     Schizophrenia Neg Hx     Seizures Neg Hx     Self-Injurious Behavior  Neg Hx     Suicide Attempts Neg Hx        Developmental History:     Childhood: Denies Abuse  High School:Completed  College: some, doing some now (medical coding)    Mental Status Exam  Appearance  : groomed, good eye contact, normal  "street clothes  Behavior  : pleasant and cooperative  Motor  : No abnormal, uses a cane  Speech  :normal rhythm, rate, volume, tone, not hyperverbal, not pressured, normal prosidy  Mood  : \"I'm straight.\"  Affect  : euthymic, mood congruent, good variability  Thought Content  : negative suicidal ideations, negative homicidal ideations, negative obsessions  Perceptions  : negative auditory hallucinations, negative visual hallucinations  Thought Process  : linear  Insight/Judgement  : Fair/fair  Cognition  : grossly intact  Attention   : intact      Review of Systems:  Review of Systems   Constitutional:  Positive for fatigue. Negative for diaphoresis.   HENT:  Negative for drooling.    Eyes:  Negative for visual disturbance.   Respiratory:  Negative for cough and shortness of breath.    Cardiovascular:  Negative for chest pain, palpitations and leg swelling.   Gastrointestinal:  Negative for nausea and vomiting.   Endocrine: Positive for cold intolerance and heat intolerance.   Genitourinary:  Negative for difficulty urinating.   Musculoskeletal:  Positive for joint swelling.   Allergic/Immunologic: Negative for immunocompromised state.   Neurological:  Positive for numbness. Negative for dizziness, seizures and speech difficulty.       Physical Exam:  Physical Exam    Vital Signs:   /99   Pulse 107   Ht 160 cm (63\")   Wt 107 kg (236 lb 12.8 oz)   SpO2 100%   BMI 41.95 kg/m²      Lab Results:   Office Visit on 02/27/2025   Component Date Value Ref Range Status    Total Cholesterol 02/27/2025 179  0 - 200 mg/dL Final    Triglycerides 02/27/2025 99  0 - 150 mg/dL Final    HDL Cholesterol 02/27/2025 53  40 - 60 mg/dL Final    LDL Cholesterol  02/27/2025 108 (H)  0 - 100 mg/dL Final    VLDL Cholesterol 02/27/2025 18  5 - 40 mg/dL Final    LDL/HDL Ratio 02/27/2025 2.00   Final    Glucose 02/27/2025 82  65 - 99 mg/dL Final    BUN 02/27/2025 17  6 - 20 mg/dL Final    Creatinine 02/27/2025 0.92  0.57 - 1.00 mg/dL " Final    Sodium 02/27/2025 138  136 - 145 mmol/L Final    Potassium 02/27/2025 3.8  3.5 - 5.2 mmol/L Final    Chloride 02/27/2025 101  98 - 107 mmol/L Final    CO2 02/27/2025 28.3  22.0 - 29.0 mmol/L Final    Calcium 02/27/2025 9.8  8.6 - 10.5 mg/dL Final    Total Protein 02/27/2025 7.3  6.0 - 8.5 g/dL Final    Albumin 02/27/2025 3.9  3.5 - 5.2 g/dL Final    ALT (SGPT) 02/27/2025 17  1 - 33 U/L Final    AST (SGOT) 02/27/2025 18  1 - 32 U/L Final    Alkaline Phosphatase 02/27/2025 88  39 - 117 U/L Final    Total Bilirubin 02/27/2025 <0.2  0.0 - 1.2 mg/dL Final    Globulin 02/27/2025 3.4  gm/dL Final    A/G Ratio 02/27/2025 1.1  g/dL Final    BUN/Creatinine Ratio 02/27/2025 18.5  7.0 - 25.0 Final    Anion Gap 02/27/2025 8.7  5.0 - 15.0 mmol/L Final    eGFR 02/27/2025 74.1  >60.0 mL/min/1.73 Final    WBC 02/27/2025 9.05  3.40 - 10.80 10*3/mm3 Final    RBC 02/27/2025 4.37  3.77 - 5.28 10*6/mm3 Final    Hemoglobin 02/27/2025 12.5  12.0 - 15.9 g/dL Final    Hematocrit 02/27/2025 39.2  34.0 - 46.6 % Final    MCV 02/27/2025 89.7  79.0 - 97.0 fL Final    MCH 02/27/2025 28.6  26.6 - 33.0 pg Final    MCHC 02/27/2025 31.9  31.5 - 35.7 g/dL Final    RDW 02/27/2025 13.9  12.3 - 15.4 % Final    RDW-SD 02/27/2025 46.0  37.0 - 54.0 fl Final    MPV 02/27/2025 9.6  6.0 - 12.0 fL Final    Platelets 02/27/2025 448  140 - 450 10*3/mm3 Final    TSH 02/27/2025 1.360  0.270 - 4.200 uIU/mL Final   Office Visit on 12/31/2024   Component Date Value Ref Range Status    Occult Blood, Fecal by Immunoassay 01/09/2025 Negative  Negative Final   Lab on 12/18/2024   Component Date Value Ref Range Status    Total Cholesterol 12/18/2024 176  0 - 200 mg/dL Final    Triglycerides 12/18/2024 98  0 - 150 mg/dL Final    HDL Cholesterol 12/18/2024 57  40 - 60 mg/dL Final    LDL Cholesterol  12/18/2024 101 (H)  0 - 100 mg/dL Final    VLDL Cholesterol 12/18/2024 18  5 - 40 mg/dL Final    LDL/HDL Ratio 12/18/2024 1.74   Final    Iron 12/18/2024 36 (L)  37 - 145  mcg/dL Final    Iron Saturation (TSAT) 12/18/2024 9 (L)  20 - 50 % Final    Transferrin 12/18/2024 272  200 - 360 mg/dL Final    TIBC 12/18/2024 405  298 - 536 mcg/dL Final    Ferritin 12/18/2024 106.10  13.00 - 150.00 ng/mL Final    Glucose 12/18/2024 97  65 - 99 mg/dL Final    BUN 12/18/2024 15  6 - 20 mg/dL Final    Creatinine 12/18/2024 0.79  0.57 - 1.00 mg/dL Final    Sodium 12/18/2024 136  136 - 145 mmol/L Final    Potassium 12/18/2024 4.0  3.5 - 5.2 mmol/L Final    Chloride 12/18/2024 98  98 - 107 mmol/L Final    CO2 12/18/2024 31.0 (H)  22.0 - 29.0 mmol/L Final    Calcium 12/18/2024 9.6  8.6 - 10.5 mg/dL Final    Total Protein 12/18/2024 7.4  6.0 - 8.5 g/dL Final    Albumin 12/18/2024 3.9  3.5 - 5.2 g/dL Final    ALT (SGPT) 12/18/2024 20  1 - 33 U/L Final    AST (SGOT) 12/18/2024 23  1 - 32 U/L Final    Alkaline Phosphatase 12/18/2024 120 (H)  39 - 117 U/L Final    Total Bilirubin 12/18/2024 <0.2  0.0 - 1.2 mg/dL Final    Globulin 12/18/2024 3.5  gm/dL Final    A/G Ratio 12/18/2024 1.1  g/dL Final    BUN/Creatinine Ratio 12/18/2024 19.0  7.0 - 25.0 Final    Anion Gap 12/18/2024 7.0  5.0 - 15.0 mmol/L Final    eGFR 12/18/2024 89.0  >60.0 mL/min/1.73 Final    TSH 12/18/2024 0.745  0.270 - 4.200 uIU/mL Final    Hemoglobin A1C 12/18/2024 6.10 (H)  4.80 - 5.60 % Final    WBC 12/18/2024 8.72  3.40 - 10.80 10*3/mm3 Final    RBC 12/18/2024 4.42  3.77 - 5.28 10*6/mm3 Final    Hemoglobin 12/18/2024 12.2  12.0 - 15.9 g/dL Final    Hematocrit 12/18/2024 39.6  34.0 - 46.6 % Final    MCV 12/18/2024 89.6  79.0 - 97.0 fL Final    MCH 12/18/2024 27.6  26.6 - 33.0 pg Final    MCHC 12/18/2024 30.8 (L)  31.5 - 35.7 g/dL Final    RDW 12/18/2024 15.6 (H)  12.3 - 15.4 % Final    RDW-SD 12/18/2024 51.5  37.0 - 54.0 fl Final    MPV 12/18/2024 8.8  6.0 - 12.0 fL Final    Platelets 12/18/2024 526 (H)  140 - 450 10*3/mm3 Final    Neutrophil % 12/18/2024 63.0  42.7 - 76.0 % Final    Lymphocyte % 12/18/2024 26.1  19.6 - 45.3 % Final     Monocyte % 12/18/2024 6.8  5.0 - 12.0 % Final    Eosinophil % 12/18/2024 2.3  0.3 - 6.2 % Final    Basophil % 12/18/2024 0.7  0.0 - 1.5 % Final    Immature Grans % 12/18/2024 1.1 (H)  0.0 - 0.5 % Final    Neutrophils, Absolute 12/18/2024 5.49  1.70 - 7.00 10*3/mm3 Final    Lymphocytes, Absolute 12/18/2024 2.28  0.70 - 3.10 10*3/mm3 Final    Monocytes, Absolute 12/18/2024 0.59  0.10 - 0.90 10*3/mm3 Final    Eosinophils, Absolute 12/18/2024 0.20  0.00 - 0.40 10*3/mm3 Final    Basophils, Absolute 12/18/2024 0.06  0.00 - 0.20 10*3/mm3 Final    Immature Grans, Absolute 12/18/2024 0.10 (H)  0.00 - 0.05 10*3/mm3 Final    nRBC 12/18/2024 0.0  0.0 - 0.2 /100 WBC Final   Admission on 11/14/2024, Discharged on 11/14/2024   Component Date Value Ref Range Status    QT Interval 11/14/2024 315  ms Final    QTC Interval 11/14/2024 415  ms Final    Glucose 11/14/2024 103 (H)  65 - 99 mg/dL Final    BUN 11/14/2024 15  6 - 20 mg/dL Final    Creatinine 11/14/2024 0.86  0.57 - 1.00 mg/dL Final    Sodium 11/14/2024 140  136 - 145 mmol/L Final    Potassium 11/14/2024 4.2  3.5 - 5.2 mmol/L Final    Chloride 11/14/2024 103  98 - 107 mmol/L Final    CO2 11/14/2024 27.4  22.0 - 29.0 mmol/L Final    Calcium 11/14/2024 9.4  8.6 - 10.5 mg/dL Final    Total Protein 11/14/2024 7.4  6.0 - 8.5 g/dL Final    Albumin 11/14/2024 4.0  3.5 - 5.2 g/dL Final    ALT (SGPT) 11/14/2024 33  1 - 33 U/L Final    AST (SGOT) 11/14/2024 18  1 - 32 U/L Final    Alkaline Phosphatase 11/14/2024 106  39 - 117 U/L Final    Total Bilirubin 11/14/2024 0.2  0.0 - 1.2 mg/dL Final    Globulin 11/14/2024 3.4  gm/dL Final    A/G Ratio 11/14/2024 1.2  g/dL Final    BUN/Creatinine Ratio 11/14/2024 17.4  7.0 - 25.0 Final    Anion Gap 11/14/2024 9.6  5.0 - 15.0 mmol/L Final    eGFR 11/14/2024 80.4  >60.0 mL/min/1.73 Final    HS Troponin T 11/14/2024 <6  <14 ng/L Final    Magnesium 11/14/2024 2.1  1.6 - 2.6 mg/dL Final    Color, UA 11/14/2024 Yellow  Yellow, Straw Final     Appearance, UA 11/14/2024 Clear  Clear Final    pH, UA 11/14/2024 <=5.0  5.0 - 8.0 Final    Specific Gravity, UA 11/14/2024 1.022  1.005 - 1.030 Final    Glucose, UA 11/14/2024 Negative  Negative Final    Ketones, UA 11/14/2024 Negative  Negative Final    Bilirubin, UA 11/14/2024 Negative  Negative Final    Blood, UA 11/14/2024 Negative  Negative Final    Protein, UA 11/14/2024 Negative  Negative Final    Leuk Esterase, UA 11/14/2024 Small (1+) (A)  Negative Final    Nitrite, UA 11/14/2024 Negative  Negative Final    Urobilinogen, UA 11/14/2024 0.2 E.U./dL  0.2 - 1.0 E.U./dL Final    Extra Tube 11/14/2024 Hold for add-ons.   Final    Auto resulted.    Extra Tube 11/14/2024 hold for add-on   Final    Auto resulted    Extra Tube 11/14/2024 Hold for add-ons.   Final    Auto resulted.    Extra Tube 11/14/2024 Hold for add-ons.   Final    Auto resulted    WBC 11/14/2024 10.13  3.40 - 10.80 10*3/mm3 Final    RBC 11/14/2024 4.38  3.77 - 5.28 10*6/mm3 Final    Hemoglobin 11/14/2024 12.4  12.0 - 15.9 g/dL Final    Hematocrit 11/14/2024 39.5  34.0 - 46.6 % Final    MCV 11/14/2024 90.2  79.0 - 97.0 fL Final    MCH 11/14/2024 28.3  26.6 - 33.0 pg Final    MCHC 11/14/2024 31.4 (L)  31.5 - 35.7 g/dL Final    RDW 11/14/2024 15.9 (H)  12.3 - 15.4 % Final    RDW-SD 11/14/2024 52.1  37.0 - 54.0 fl Final    MPV 11/14/2024 8.5  6.0 - 12.0 fL Final    Platelets 11/14/2024 459 (H)  140 - 450 10*3/mm3 Final    Neutrophil % 11/14/2024 63.0  42.7 - 76.0 % Final    Lymphocyte % 11/14/2024 25.9  19.6 - 45.3 % Final    Monocyte % 11/14/2024 8.4  5.0 - 12.0 % Final    Eosinophil % 11/14/2024 1.3  0.3 - 6.2 % Final    Basophil % 11/14/2024 0.8  0.0 - 1.5 % Final    Immature Grans % 11/14/2024 0.6 (H)  0.0 - 0.5 % Final    Neutrophils, Absolute 11/14/2024 6.39  1.70 - 7.00 10*3/mm3 Final    Lymphocytes, Absolute 11/14/2024 2.62  0.70 - 3.10 10*3/mm3 Final    Monocytes, Absolute 11/14/2024 0.85  0.10 - 0.90 10*3/mm3 Final    Eosinophils, Absolute  11/14/2024 0.13  0.00 - 0.40 10*3/mm3 Final    Basophils, Absolute 11/14/2024 0.08  0.00 - 0.20 10*3/mm3 Final    Immature Grans, Absolute 11/14/2024 0.06 (H)  0.00 - 0.05 10*3/mm3 Final    nRBC 11/14/2024 0.0  0.0 - 0.2 /100 WBC Final    RBC, UA 11/14/2024 None Seen  None Seen, 0-2 /HPF Final    WBC, UA 11/14/2024 6-10 (A)  None Seen, 0-2 /HPF Final    Bacteria, UA 11/14/2024 None Seen  None Seen /HPF Final    Squamous Epithelial Cells, UA 11/14/2024 13-20 (A)  None Seen, 0-2 /HPF Final    Hyaline Casts, UA 11/14/2024 None Seen  None Seen /LPF Final    Methodology 11/14/2024 Automated Microscopy   Final       EKG Results:  No orders to display       Imaging Results:  XR Spine Lumbar Complete With Flex & Ext    Result Date: 12/4/2024  Impression: There is no evidence of fracture. There is grade 1 anterolisthesis of L4 on L5 which does appear dynamic, likely mildly exaggerated in flexion. No additional listhesis or subluxation is evident. Spondylosis changes appear similar to prior MRI, likely most advanced at L4-5. The SI joints appear symmetric. Electronically Signed: Rio Garcia MD  12/4/2024 2:06 PM EST  Workstation ID: [id number]    XR Chest 1 View    Result Date: 11/14/2024  Impression: No acute cardiopulmonary process. Electronically Signed: Dhruv Chacko MD  11/14/2024 11:22 AM EST  Workstation ID: [id number]        Assessment & Plan   Diagnoses and all orders for this visit:    1. Major depressive disorder, recurrent episode, moderate (Primary)  -     Discontinue: OLANZapine-Samidorphan (Lybalvi) 10-10 MG tablet; Take 1 tablet by mouth every night at bedtime.  Dispense: 30 tablet; Refill: 2  -     Discontinue: OLANZapine-Samidorphan (Lybalvi) 10-10 MG tablet; Take 1 tablet by mouth every night at bedtime.  Dispense: 28 tablet; Refill: 0  -     OLANZapine-Samidorphan (Lybalvi) 10-10 MG tablet; Take 1 tablet by mouth every night at bedtime.  Dispense: 28 tablet; Refill: 0  -     Ambulatory Referral to  Behavioral Health    2. Generalized anxiety disorder  -     Discontinue: OLANZapine-Samidorphan (Lybalvi) 10-10 MG tablet; Take 1 tablet by mouth every night at bedtime.  Dispense: 30 tablet; Refill: 2  -     Discontinue: OLANZapine-Samidorphan (Lybalvi) 10-10 MG tablet; Take 1 tablet by mouth every night at bedtime.  Dispense: 28 tablet; Refill: 0  -     OLANZapine-Samidorphan (Lybalvi) 10-10 MG tablet; Take 1 tablet by mouth every night at bedtime.  Dispense: 28 tablet; Refill: 0  -     Ambulatory Referral to Behavioral Health    3. Insomnia due to mental condition  -     Discontinue: OLANZapine-Samidorphan (Lybalvi) 10-10 MG tablet; Take 1 tablet by mouth every night at bedtime.  Dispense: 30 tablet; Refill: 2  -     Discontinue: OLANZapine-Samidorphan (Lybalvi) 10-10 MG tablet; Take 1 tablet by mouth every night at bedtime.  Dispense: 28 tablet; Refill: 0  -     OLANZapine-Samidorphan (Lybalvi) 10-10 MG tablet; Take 1 tablet by mouth every night at bedtime.  Dispense: 28 tablet; Refill: 0  -     Ambulatory Referral to Behavioral Health    4. Panic attacks  -     Discontinue: OLANZapine-Samidorphan (Lybalvi) 10-10 MG tablet; Take 1 tablet by mouth every night at bedtime.  Dispense: 30 tablet; Refill: 2  -     Discontinue: OLANZapine-Samidorphan (Lybalvi) 10-10 MG tablet; Take 1 tablet by mouth every night at bedtime.  Dispense: 28 tablet; Refill: 0  -     OLANZapine-Samidorphan (Lybalvi) 10-10 MG tablet; Take 1 tablet by mouth every night at bedtime.  Dispense: 28 tablet; Refill: 0  -     Ambulatory Referral to Behavioral Health    5. Snoring        Visit Diagnoses:    ICD-10-CM ICD-9-CM   1. Major depressive disorder, recurrent episode, moderate  F33.1 296.32   2. Generalized anxiety disorder  F41.1 300.02   3. Insomnia due to mental condition  F51.05 300.9     327.02   4. Panic attacks  F41.0 300.01   5. Snoring  R06.83 786.09     03/21/2025: Sorted out some confusion re: meds for patient. Pt now understands  that the lybalvi replaces olanzapine. Stable, no changes. Gave her samples of lybalvi and sent in to the pharmacy. Wants to talk to a therapist to talk about self-esteem; referred to John. Rosario helped.    Acknowledged and normalized patient's thoughts, feelings, and concerns. Allowed patient to freely discuss and process issues, such as:  Anxiety and depression regarding family conflict/relationships.  Anxiety and depression regarding relationships.  ... using Rogerian psychotherapeutic techniques including unconditional positive regard, reflective listening, and demonstrating clear empathy, with the goal of ameliorating symptoms and maintaining, restoring, or improving self-esteem, adaptive skills, and ego or psychological functions (Aidan et al. 1991), the long-term goal of which is to develop a better, healthier perspective and help the patient bear their circumstances more easily.  Time (minutes) spent providing supportive psychotherapy: 16  (This time is exclusive to the therapy session and separate from the time spent on activities used to meet the criteria for the E/M service (history, exam, medical decision-making).)  Start: 1:29  Stop: 1:45  Functional status: mild impairment  Treatment plan: Medication management and supportive psychotherapy  Prognosis: good  Progress: stable  6w    02/14/2025: snores, sleep medicine consult. Switch olanzapine to lybalvi (not on opioids). Consider abilify, vraylar. Start buspar for MDD, TINO. Could be ADHD. Could be bipolar, need to get to know her better. Olanzapine really helped. 4w    PLAN:  Safety: No acute safety concerns  Therapy: None, possibly later  Risk Assessment: Risk of self-harm acutely is moderate.  Risk factors include anxiety disorder, mood disorder, and recent psychosocial stressors (pandemic). Protective factors include no family history, denies access to guns/weapons, no present SI, no history of suicide attempts or self-harm in the past, minimal  AODA, healthcare seeking, future orientation, willingness to engage in care.  Risk of self-harm chronically is also moderate, but could be further elevated in the event of treatment noncompliance and/or AODA.  Meds:  CONTINUE buspar 10 mg tid. Risks, benefits, alternatives discussed with patient including nausea, GI upset, mild sedation, falls risk.  Use care when operating vehicle, vessel, or machine. After discussion of these risks and benefits, the patient voiced understanding and agreed to proceed.  CONTINUE cymbalta 60 mg bid. Risks, benefits, alternatives discussed with patient including GI upset, nausea vomiting diarrhea, theoretical decrease of seizure threshold predisposing the patient to a slightly higher seizure risk, headaches, sexual dysfunction, serotonin syndrome, bleeding risk, increased suicidality in patients 24 years and younger, switching to phi/hypomania.  Also constipation and urinary retention.  After discussion of these risks and benefits, the patient voiced understanding and agreed to proceed.  CONTINUE lybalvi 10 mg qhs. Risks, benefits, alternatives discussed with patient including nausea and vomiting, GI upset, sedation, dizziness/falls risk, akathisia, hypotension, increased appetite, lowering of seizure threshold, theoretical risk of tardive dyskinesia, movement issues. Use care when operating vehicle, vessel, or machine. After discussion of these risks and benefits, the patient voiced understanding and agreed to proceed.  S/P:  Olanzapine 10 mg qhs. (Switched to lybalvi 2/25)  Labs: none    Patient screened positive for depression based on a PHQ-9 score of 16 on 2/14/2025. Follow-up recommendations include: Prescribed antidepressant medication treatment and Suicide Risk Assessment performed.           TREATMENT PLAN/GOALS: Continue supportive psychotherapy efforts and medications as indicated. Treatment and medication options discussed during today's visit. Patient acknowledged and  verbally consented to continue with current treatment plan and was educated on the importance of compliance with treatment and follow-up appointments.    MEDICATION ISSUES:  FEMI reviewed as expected.  Discussed medication options and treatment plan of prescribed medication as well as the risks, benefits, and side effects including potential falls, possible impaired driving and metabolic adversities among others. Patient is agreeable to call the office with any worsening of symptoms or onset of side effects. Patient is agreeable to call 911 or go to the nearest ER should he/she begin having SI/HI. No medication side effects or related complaints today.     MEDS ORDERED DURING VISIT:  New Medications Ordered This Visit   Medications    OLANZapine-Samidorphan (Lybalvi) 10-10 MG tablet     Sig: Take 1 tablet by mouth every night at bedtime.     Dispense:  28 tablet     Refill:  0     Lot Number?:   [id number]     Expiration Date?:   9/1/2026     Quantity:   28       Return in about 6 weeks (around 5/2/2025).         This document has been electronically signed by Flor Cantor MD  March 21, 2025 14:07 EDT    Dictated Utilizing Dragon Dictation: Part of this note may be an electronic transcription/translation of spoken language to printed text using the Dragon Dictation System.

## 2025-03-24 ENCOUNTER — TELEPHONE (OUTPATIENT)
Dept: PSYCHIATRY | Facility: CLINIC | Age: 55
End: 2025-03-24
Payer: COMMERCIAL

## 2025-03-24 NOTE — TELEPHONE ENCOUNTER
PA DENIAL RECEIVED FROM INSURANCE.    PT DOES NOT MEET THE NECESSARY DIAGNOSIS CRITERIA:  The member must have one of the following diagnoses:    1. Schizophrenia [a type of mental health condition].    2. Bipolar I [a type of mental health condition].   B. For Bipolar I Only:           PROVIDER PLEASE REVIEW AND ADVISE.

## 2025-03-25 NOTE — TELEPHONE ENCOUNTER
CALLED AND READ THE PATIENT THE PROVIDER'S MESSAGE VERBATIM.  PT HAS NO FURTHER QUESTIONS AT THIS TIME

## 2025-03-26 ENCOUNTER — TELEPHONE (OUTPATIENT)
Dept: NEUROSURGERY | Facility: CLINIC | Age: 55
End: 2025-03-26
Payer: COMMERCIAL

## 2025-03-26 DIAGNOSIS — G47.19 EXCESSIVE DAYTIME SLEEPINESS: Primary | ICD-10-CM

## 2025-03-26 NOTE — TELEPHONE ENCOUNTER
Patient left a voicemail requesting a call back. Contacted patient, she states she is having worsening pain. Scheduled follow up appointment with Dr. Ayala 4-1-25.

## 2025-03-27 ENCOUNTER — HOSPITAL ENCOUNTER (EMERGENCY)
Facility: HOSPITAL | Age: 55
Discharge: HOME | End: 2025-03-27
Attending: EMERGENCY MEDICINE
Payer: COMMERCIAL

## 2025-03-27 VITALS
RESPIRATION RATE: 18 BRPM | OXYGEN SATURATION: 98 % | BODY MASS INDEX: 40.95 KG/M2 | TEMPERATURE: 98.2 F | HEART RATE: 89 BPM | SYSTOLIC BLOOD PRESSURE: 132 MMHG | HEIGHT: 64 IN | DIASTOLIC BLOOD PRESSURE: 106 MMHG | WEIGHT: 239.86 LBS

## 2025-03-27 DIAGNOSIS — M54.42 LOW BACK PAIN DUE TO BILATERAL SCIATICA: Primary | ICD-10-CM

## 2025-03-27 DIAGNOSIS — M54.41 LOW BACK PAIN DUE TO BILATERAL SCIATICA: Primary | ICD-10-CM

## 2025-03-27 PROCEDURE — 96372 THER/PROPH/DIAG INJ SC/IM: CPT

## 2025-03-27 PROCEDURE — 25010000002 DEXAMETHASONE SODIUM PHOSPHATE 10 MG/ML SOLUTION

## 2025-03-27 PROCEDURE — 99282 EMERGENCY DEPT VISIT SF MDM: CPT

## 2025-03-27 RX ORDER — CYCLOBENZAPRINE HCL 10 MG
10 TABLET ORAL 2 TIMES DAILY PRN
Qty: 10 TABLET | Refills: 0 | Status: SHIPPED | OUTPATIENT
Start: 2025-03-27

## 2025-03-27 RX ORDER — METHYLPREDNISOLONE 4 MG/1
TABLET ORAL
Qty: 21 TABLET | Refills: 0 | Status: SHIPPED | OUTPATIENT
Start: 2025-03-27

## 2025-03-27 RX ORDER — DEXAMETHASONE SODIUM PHOSPHATE 10 MG/ML
10 INJECTION, SOLUTION INTRAMUSCULAR; INTRAVENOUS ONCE
Status: COMPLETED | OUTPATIENT
Start: 2025-03-27 | End: 2025-03-27

## 2025-03-27 RX ADMIN — DEXAMETHASONE SODIUM PHOSPHATE 10 MG: 10 INJECTION INTRAMUSCULAR; INTRAVENOUS at 19:10

## 2025-03-27 NOTE — ED PROVIDER NOTES
Time: 6:54 PM EDT  Date of encounter:  3/27/2025  Independent Historian/Clinical History and Information was obtained by:   Patient    History is limited by: N/A    Chief Complaint: Chronic low back pain      History of Present Illness:  Patient is a 54 y.o. year old female who presents to the emergency department for evaluation of low back pain that radiates down bilateral legs.  Patient states she has been dealing with chronic low back pain since 2007 from an injury and is scheduled to see orthopedic for surgery.  Patient denies saddle paresthesia, loss of bowel or bladder function.      Patient Care Team  Primary Care Provider: Reina Putnam APRN    Past Medical History:     Allergies   Allergen Reactions    Azithromycin Nausea Only    Lisinopril Cough    Penicillins Unknown - Low Severity     Past Medical History:   Diagnosis Date    ADHD (attention deficit hyperactivity disorder) 2024    Allergic     Allergic rhinitis     Anxiety 09/11/2018    Arthritis     Arthritis of back 2020    Blood in stool     Cervical disc disorder 2021    Chronic pain disorder 2008    Back pain    Condition not found     MEATAL STENOSIS, UNSPECIFIED    CTS (carpal tunnel syndrome) 2015    Dental disease 2020    Depression 09/11/2018    Difficulty walking 2023    Dizziness 2019    Dysmenorrhea     Gross hematuria     Headache     Headache, tension-type 2022    Helicobacter positive gastritis 05/26/2016    Hip arthrosis 2024    HL (hearing loss) 2020    Hyperlipidemia 2024    Hypertension     Injury of back     Irregular menses     Low back pain 07/09/2014    Low back strain 2017    Lumbosacral disc disease 2020    Microscopic hematuria 06/10/2014    Night sweats     Nosebleed     Onychomycosis of toenail 07/09/2014    Osteopenia     Pap smear for cervical cancer screening 05/16/2016    Peripheral neuropathy     Right sided abdominal pain 06/12/2014    Screening mammogram, encounter for 11/09/2018    Sleep apnea 2023    STD exposure  1993    chlamydia    Thoracic disc disorder 2021    Tobacco abuse     chronic    Vitamin D deficiency      Past Surgical History:   Procedure Laterality Date    BREAST BIOPSY Left 01/09/2012    CHOLECYSTECTOMY      COLONOSCOPY  08/15/2016    CYST REMOVAL      left wrist    CYSTOSCOPY  06/27/2014    Office cystoscopy w/ Dr. Goncalves    WRIST SURGERY  1986     Family History   Problem Relation Age of Onset    Stomach cancer Mother     Alzheimer's disease Mother     Diabetes Mother     Dementia Mother     Anxiety disorder Mother     Leukemia Father     ADD / ADHD Father     Heart disease Sister     Hyperlipidemia Sister     Kidney disease Sister     Heart disease Brother     No Known Problems Maternal Aunt     No Known Problems Paternal Aunt     No Known Problems Maternal Uncle     No Known Problems Paternal Uncle     No Known Problems Maternal Grandfather     Cancer Maternal Grandmother     Leukemia Paternal Grandfather     No Known Problems Paternal Grandmother     No Known Problems Cousin     Stroke Other         AUNT;UNCLE    Heart disease Other         AUNT,UNCLE    Diabetes Other         AUNT,UNCLE    Arthritis Sister     Alcohol abuse Neg Hx     Bipolar disorder Neg Hx     Depression Neg Hx     Drug abuse Neg Hx     OCD Neg Hx     Paranoid behavior Neg Hx     Schizophrenia Neg Hx     Seizures Neg Hx     Self-Injurious Behavior  Neg Hx     Suicide Attempts Neg Hx        Home Medications:  Prior to Admission medications    Medication Sig Start Date End Date Taking? Authorizing Provider   ammonium lactate (LAC-HYDRIN) 12 % lotion Apply  topically to the appropriate area as directed As Needed for Irritation. 12/18/24   Reina Putnam APRN   ammonium lactate (LAC-HYDRIN) 12 % lotion Apply  topically to the appropriate area as directed 2 (Two) Times a Day. 3/17/25   Richard Jones DPM   busPIRone (BUSPAR) 10 MG tablet Take 1 tablet by mouth 3 (Three) Times a Day. 2/14/25   Flor Cantor MD   ciclopirox  (PENLAC) 8 % solution Apply  topically to the appropriate area as directed Every Night. 11/18/24   Reina Putnam APRN   cyclobenzaprine (FLEXERIL) 10 MG tablet Take 1 tablet by mouth 2 (Two) Times a Day As Needed for Muscle Spasms for up to 10 doses. 3/27/25   Ibis Govea APRN   DULoxetine (CYMBALTA) 60 MG capsule Take 1 capsule by mouth 2 (Two) Times a Day. 2/27/25   Reina Putnam APRN   fluticasone (FLONASE) 50 MCG/ACT nasal spray Administer 2 sprays into the nostril(s) as directed by provider Daily. 12/31/24   Reina Putnam APRN   hydrOXYzine (ATARAX) 25 MG tablet Take 1 tablet by mouth Every 6 (Six) Hours As Needed for Anxiety. 1/23/25   Reina Putnam APRN   ibuprofen (ADVIL,MOTRIN) 600 MG tablet Take 1 tablet by mouth Every 8 (Eight) Hours As Needed for Mild Pain. 2/27/25   Reina Putnam APRN   loratadine (Claritin) 10 MG tablet Take 1 tablet by mouth Daily. 12/18/24   Reina Putnam APRN   losartan-hydrochlorothiazide (Hyzaar) 50-12.5 MG per tablet Take 1 tablet by mouth Daily. 1/23/25   Reina Putnam APRN   methylPREDNISolone (MEDROL) 4 MG dose pack Take as directed on package instructions. 3/27/25   Ibis Govea APRN   OLANZapine-Samidorphan (Lybalvi) 10-10 MG tablet Take 1 tablet by mouth every night at bedtime. 3/21/25   Flor Cantor MD   omeprazole (priLOSEC) 40 MG capsule Take 1 capsule by mouth Daily. 11/18/24   Reina Putnam APRN   oxybutynin XL (DITROPAN-XL) 10 MG 24 hr tablet Take 1 tablet by mouth Daily. 12/18/24   Reina Putnam APRN   potassium chloride 10 MEQ CR tablet Take 1 tablet by mouth Daily. 12/18/24   Reina Putnam APRN   pregabalin (LYRICA) 150 MG capsule Take 1 capsule by mouth 3 times a day. 11/6/24   Provider, MD Danish   terbinafine (lamiSIL) 250 MG tablet Take 1 tablet by mouth Daily for 90 days. 3/17/25 6/15/25  Richard Jones DPM   valACYclovir (VALTREX) 500 MG tablet Take 1 tablet by mouth Daily. 2/27/25   Reina Putnam, APRN     "    Social History:   Social History     Tobacco Use    Smoking status: Some Days     Current packs/day: 0.00     Average packs/day: 0.7 packs/day for 64.6 years (45.0 ttl pk-yrs)     Types: Cigarettes     Start date: 1989     Last attempt to quit: 2024     Years since quittin.8     Passive exposure: Past    Smokeless tobacco: Never    Tobacco comments:     Medication   Vaping Use    Vaping status: Never Used   Substance Use Topics    Alcohol use: Not Currently     Alcohol/week: 7.0 standard drinks of alcohol     Types: 1 Cans of beer, 4 Shots of liquor, 2 Drinks containing 0.5 oz of alcohol per week     Comment: glass of wine every other day    Drug use: Yes     Types: Marijuana     Comment: daily         Review of Systems:  Review of Systems   Respiratory: Negative.     Cardiovascular: Negative.    Musculoskeletal:  Positive for back pain.   Neurological: Negative.    All other systems reviewed and are negative.       Physical Exam:  BP (!) 132/106 (BP Location: Left arm, Patient Position: Sitting)   Pulse 89   Temp 98.2 °F (36.8 °C) (Oral)   Resp 18   Ht 162.6 cm (64\")   Wt 109 kg (239 lb 13.8 oz)   LMP  (LMP Unknown)   SpO2 98%   Breastfeeding No   BMI 41.17 kg/m²     Physical Exam  Vitals and nursing note reviewed.   Constitutional:       Appearance: Normal appearance. She is normal weight.   HENT:      Head: Normocephalic and atraumatic.   Cardiovascular:      Rate and Rhythm: Normal rate and regular rhythm.      Pulses: Normal pulses.      Heart sounds: Normal heart sounds.   Pulmonary:      Effort: Pulmonary effort is normal.      Breath sounds: Normal breath sounds.   Musculoskeletal:         General: Tenderness present.        Arms:       Cervical back: Normal range of motion and neck supple.        Legs:    Skin:     General: Skin is warm and dry.   Neurological:      Mental Status: She is alert and oriented to person, place, and time.   Psychiatric:         Mood and Affect: Mood " normal.                    Medical Decision Making:      Comorbidities that affect care:    Hypertension, Obesity    External Notes reviewed:    None      The following orders were placed and all results were independently analyzed by me:  No orders of the defined types were placed in this encounter.      Medications Given in the Emergency Department:  Medications   dexAMETHasone sodium phosphate injection 10 mg (has no administration in time range)        ED Course:         Labs:    Lab Results (last 24 hours)       ** No results found for the last 24 hours. **             Imaging:    No Radiology Exams Resulted Within Past 24 Hours      Differential Diagnosis and Discussion:    Back Pain: The patient presents with back pain. My differential diagnosis includes but is not limited to acute spinal epidural abscess, acute spinal epidural bleed, cauda equina syndrome, abdominal aortic aneurysm, aortic dissection, kidney stone, pyelonephritis, musculoskeletal back pain, spinal fracture, and osteoarthritis.     PROCEDURES:        No orders to display       Procedures    MDM  Number of Diagnoses or Management Options  Low back pain due to bilateral sciatica: established and worsening  Diagnosis management comments: The patient´s symptoms are consistent with musculoskeletal back pain. The patient is now resting comfortably, feels better, is alert, talkative, interactive and in no distress. The repeat examination is unremarkable and benign. The patient is neurologically intact and is ambulatory in the ED. The patient has no fever, no bowel or bladder incontinence, no saddle anesthesia, and is otherwise alert and well appearing. The history, physical exam, and diagnostics (if any) do not suggest the presence of acute spinal epidural abscess, acute spinal epidural bleed, cauda equina syndrome, abdominal aortic aneurysm, aortic dissection or other process requiring further testing, treatment or consultation in the emergency  department. The vital signs have been stable. The patient's condition is stable and appropriate for discharge. The patient will pursue further outpatient evaluation with the primary care physician or other designated for consulting position as indicated in the discharge instructions.     Risk of Complications, Morbidity, and/or Mortality  Presenting problems: low  Diagnostic procedures: low  Management options: low    Patient Progress  Patient progress: stable                       Patient Care Considerations:          Consultants/Shared Management Plan:    None    Social Determinants of Health:    Patient is independent, reliable, and has access to care.       Disposition and Care Coordination:    Discharged: The patient is suitable and stable for discharge with no need for consideration of admission.    I have explained the patient´s condition, diagnoses and treatment plan based on the information available to me at this time. I have answered questions and addressed any concerns. The patient has a good  understanding of the patient´s diagnosis, condition, and treatment plan as can be expected at this point. The vital signs have been stable. The patient´s condition is stable and appropriate for discharge from the emergency department.      The patient will pursue further outpatient evaluation with the primary care physician or other designated or consulting physician as outlined in the discharge instructions. They are agreeable to this plan of care and follow-up instructions have been explained in detail. The patient has received these instructions in written format and has expressed an understanding of the discharge instructions. The patient is aware that any significant change in condition or worsening of symptoms should prompt an immediate return to this or the closest emergency department or call to 911.    Final diagnoses:   Low back pain due to bilateral sciatica        ED Disposition       ED Disposition    Discharge    Condition   Stable    Comment   --               This medical record created using voice recognition software.             Ibis Govea, APRN  03/27/25 [id number]

## 2025-03-28 ENCOUNTER — TELEPHONE (OUTPATIENT)
Dept: ONCOLOGY | Facility: HOSPITAL | Age: 55
End: 2025-03-28
Payer: COMMERCIAL

## 2025-03-28 NOTE — TELEPHONE ENCOUNTER
Caller: Bianca Lara    Relationship to patient: Self    Best call back number:   Telephone Information:   Mobile [phone]     Chief complaint: WANTED TO COME IN EARLIER TO SEE IF THE IRON INFUSIONS HELPED, SHE SAID SHE IS FEELING FINE    Type of visit: LAB & F/U 2    Requested date:  CALL TO R/S     If rescheduling, when is the original appointment: 4/22

## 2025-04-01 ENCOUNTER — OFFICE VISIT (OUTPATIENT)
Dept: NEUROSURGERY | Facility: CLINIC | Age: 55
End: 2025-04-01
Payer: COMMERCIAL

## 2025-04-01 VITALS
SYSTOLIC BLOOD PRESSURE: 133 MMHG | HEIGHT: 64 IN | DIASTOLIC BLOOD PRESSURE: 85 MMHG | WEIGHT: 242.6 LBS | BODY MASS INDEX: 41.42 KG/M2

## 2025-04-01 DIAGNOSIS — M43.16 ANTEROLISTHESIS OF LUMBAR SPINE: ICD-10-CM

## 2025-04-01 DIAGNOSIS — M47.816 FACET ARTHRITIS OF LUMBAR REGION: Primary | ICD-10-CM

## 2025-04-01 RX ORDER — TRAMADOL HYDROCHLORIDE 50 MG/1
50 TABLET ORAL EVERY 8 HOURS PRN
Qty: 30 TABLET | Refills: 1 | Status: SHIPPED | OUTPATIENT
Start: 2025-04-01

## 2025-04-01 NOTE — PROGRESS NOTES
Bianca Lara is a 54 y.o. female that presents with Back Pain       History of Present Illness  The patient is a 54-year-old female who presents for evaluation of sciatica.    She reports experiencing pain, which she attributes to sciatica, radiating down her leg to the knee. The pain has not extended beyond the knee today, but she acknowledges that it has done so on previous occasions, even reaching the foot. She continues to use nicotine and is not diabetic. She has been struggling with weight loss and has recently started taking Saxenda in addition to her current medication, Lybalvi. Despite these efforts, she has not observed any significant weight loss. She recalls a similar situation in 2007 when she was advised to lose weight prior to surgery but was unable to do so and subsequently did not undergo the procedure. She believes that increased mobility would aid in weight loss but feels limited due to her current condition. She attempts to walk for half an hour but finds it challenging due to back pain.    SOCIAL HISTORY  She admits to using nicotine.    MEDICATIONS  Current: Saxenda, Lybalvi     Review of Systems   Musculoskeletal:  Positive for back pain and myalgias.        Vitals:    04/01/25 1253   BP: 133/85        She is awake and alert  Non labored breathing  No notable edema   SLR - (back pain)  Results  Imaging  X-rays show a very mild shift of L4 on L5, about 7 mm when bending back and 10.6 mm when bending forward, indicating some movement. There is also narrowing at L4-5 and L5-S1. MRI of the low back shows more readily visible narrowing at L4-5 and L5-S1.       Assessment and Plan {CC Problem List  Visit Diagnosis  ROS  Review (Popup)  Health Maintenance  Quality  BestPractice  Medications  SmartSets  SnapShot Encounters  Media :23}   Problem List Items Addressed This Visit    None  Visit Diagnoses         Facet arthritis of lumbar region-L4-5 most notably    -  Primary       Anterolisthesis of lumbar spine-L4-5              Assessment & Plan  1. Sciatica.  Her x-rays and MRI results indicate a slight forward shift of the L4 vertebra over the L5 vertebra, with a movement of approximately 3 mm. This is within the upper limit of normal range. However, there is also evidence of narrowing at the L4-L5 and L5-S1 levels, which could be contributing to her symptoms. A lumbar fusion surgery was discussed as a potential treatment option, but it was emphasized that her current BMI of 41.6 significantly increases her risk of postoperative infection. She was advised to reduce her BMI to below 40, ideally to 35 or less, to mitigate this risk. She was also informed about the importance of nicotine cessation in promoting bone healing post-surgery. She was encouraged to consult with Dr. Putnam regarding potential weight loss medications. If she is unable to exercise due to her condition, she should focus on dietary modifications for weight loss.    Follow-up  The patient will follow up in 4 weeks.     Follow Up {Instructions Charge Capture  Follow-up Communications :23}   No follow-ups on file.      Patient or patient representative verbalized consent for the use of Ambient Listening during the visit with  James Ayala MD for chart documentation. 4/1/2025  13:16 EDT

## 2025-04-03 ENCOUNTER — OFFICE VISIT (OUTPATIENT)
Dept: ONCOLOGY | Facility: HOSPITAL | Age: 55
End: 2025-04-03
Payer: COMMERCIAL

## 2025-04-03 ENCOUNTER — LAB (OUTPATIENT)
Dept: ONCOLOGY | Facility: HOSPITAL | Age: 55
End: 2025-04-03
Payer: COMMERCIAL

## 2025-04-03 ENCOUNTER — TELEPHONE (OUTPATIENT)
Dept: FAMILY MEDICINE CLINIC | Facility: CLINIC | Age: 55
End: 2025-04-03
Payer: COMMERCIAL

## 2025-04-03 VITALS
SYSTOLIC BLOOD PRESSURE: 138 MMHG | RESPIRATION RATE: 18 BRPM | OXYGEN SATURATION: 98 % | HEIGHT: 64 IN | TEMPERATURE: 98.2 F | HEART RATE: 88 BPM | DIASTOLIC BLOOD PRESSURE: 98 MMHG | BODY MASS INDEX: 41.11 KG/M2 | WEIGHT: 240.8 LBS

## 2025-04-03 DIAGNOSIS — K21.9 GASTROESOPHAGEAL REFLUX DISEASE WITHOUT ESOPHAGITIS: ICD-10-CM

## 2025-04-03 DIAGNOSIS — D75.839 THROMBOCYTOSIS, UNSPECIFIED: ICD-10-CM

## 2025-04-03 DIAGNOSIS — E61.1 IRON DEFICIENCY: Primary | ICD-10-CM

## 2025-04-03 DIAGNOSIS — E61.1 IRON DEFICIENCY: ICD-10-CM

## 2025-04-03 LAB
BASOPHILS # BLD AUTO: 0.08 10*3/MM3 (ref 0–0.2)
BASOPHILS NFR BLD AUTO: 0.6 % (ref 0–1.5)
DEPRECATED RDW RBC AUTO: 49.7 FL (ref 37–54)
EOSINOPHIL # BLD AUTO: 0.28 10*3/MM3 (ref 0–0.4)
EOSINOPHIL NFR BLD AUTO: 2.1 % (ref 0.3–6.2)
ERYTHROCYTE [DISTWIDTH] IN BLOOD BY AUTOMATED COUNT: 15.6 % (ref 12.3–15.4)
FERRITIN SERPL-MCNC: 382.4 NG/ML (ref 13–150)
FOLATE SERPL-MCNC: 12.2 NG/ML (ref 4.78–24.2)
HCT VFR BLD AUTO: 44.2 % (ref 34–46.6)
HGB BLD-MCNC: 14.6 G/DL (ref 12–15.9)
IMM GRANULOCYTES # BLD AUTO: 0.15 10*3/MM3 (ref 0–0.05)
IMM GRANULOCYTES NFR BLD AUTO: 1.1 % (ref 0–0.5)
IRON 24H UR-MRATE: 60 MCG/DL (ref 37–145)
IRON SATN MFR SERPL: 14 % (ref 20–50)
LYMPHOCYTES # BLD AUTO: 4.19 10*3/MM3 (ref 0.7–3.1)
LYMPHOCYTES NFR BLD AUTO: 31.4 % (ref 19.6–45.3)
MCH RBC QN AUTO: 28.9 PG (ref 26.6–33)
MCHC RBC AUTO-ENTMCNC: 33 G/DL (ref 31.5–35.7)
MCV RBC AUTO: 87.5 FL (ref 79–97)
MONOCYTES # BLD AUTO: 0.95 10*3/MM3 (ref 0.1–0.9)
MONOCYTES NFR BLD AUTO: 7.1 % (ref 5–12)
NEUTROPHILS NFR BLD AUTO: 57.7 % (ref 42.7–76)
NEUTROPHILS NFR BLD AUTO: 7.69 10*3/MM3 (ref 1.7–7)
NRBC BLD AUTO-RTO: 0 /100 WBC (ref 0–0.2)
PLAT MORPH BLD: NORMAL
PLATELET # BLD AUTO: 460 10*3/MM3 (ref 140–450)
PMV BLD AUTO: 9.2 FL (ref 6–12)
RBC # BLD AUTO: 5.05 10*6/MM3 (ref 3.77–5.28)
RBC MORPH BLD: NORMAL
TIBC SERPL-MCNC: 432 MCG/DL (ref 298–536)
TRANSFERRIN SERPL-MCNC: 290 MG/DL (ref 200–360)
VIT B12 BLD-MCNC: 540 PG/ML (ref 211–946)
WBC MORPH BLD: NORMAL
WBC NRBC COR # BLD AUTO: 13.34 10*3/MM3 (ref 3.4–10.8)

## 2025-04-03 PROCEDURE — 36415 COLL VENOUS BLD VENIPUNCTURE: CPT

## 2025-04-03 PROCEDURE — 84466 ASSAY OF TRANSFERRIN: CPT

## 2025-04-03 PROCEDURE — 83540 ASSAY OF IRON: CPT

## 2025-04-03 PROCEDURE — 85025 COMPLETE CBC W/AUTO DIFF WBC: CPT

## 2025-04-03 PROCEDURE — 85007 BL SMEAR W/DIFF WBC COUNT: CPT

## 2025-04-03 PROCEDURE — 82728 ASSAY OF FERRITIN: CPT

## 2025-04-03 PROCEDURE — 82607 VITAMIN B-12: CPT

## 2025-04-03 PROCEDURE — G0463 HOSPITAL OUTPT CLINIC VISIT: HCPCS | Performed by: NURSE PRACTITIONER

## 2025-04-03 PROCEDURE — 82746 ASSAY OF FOLIC ACID SERUM: CPT

## 2025-04-03 NOTE — PROGRESS NOTES
Chief Complaint/Reason for Referral:  Follow-up ( ELEVATED PLATELET COUNT)    Reina Putnam APRN Samuels, Amanda, APRN    Records Obtained:  Records of the patients history including those obtained from [facility] and patient information were reviewed and summarized in detail.    Subjective    History of Present Illness  The patient presents for a 2-month follow-up for iron deficiency anemia and thrombocytosis.    She received intravenous iron infusions with Ferrlecit, totaling 3 sessions, with the last one ending on 02/24/2025. Although 4 infusions were ordered, she only received 3 of the 4.  Reports she missed the first infusion that was scheduled. She reports no adverse reactions to the iron infusions but notes no significant improvement in her condition. She has a history of intolerance to oral iron supplements, which previously resulted in constipation and bloody stools due to hemorrhoids.  She continues to experience mild fatigue, which she attributes to her medication regimen.    She also reports persistent weight gain despite attempts at weight loss. She has not previously consulted a nutritionist and is seeking a referral. Her hemoglobin A1c level was last checked in December 2024 and appears to be pre-diabetic.  She has a family history of heart disease. Additionally, she reports back pain and has been advised by her physician to lose weight to reduce the risk of infection. She may be considering back surgery in the future if she can lose some weight. She has undergone a sleep study and is scheduled for an upper and lower GI procedure in May 2025. She has not been able to completed the sleep studies x 2 due to unable to sleep.     Supplemental Information  She had a colonoscopy in 2017 or 2018, which showed polyps. She was referred to Dr. Grajeda for a second opinion, but due to the COVID-19 pandemic, she was unable to go. She has ingrown hemorrhoids that were excised and the skin grew back over  them.    SOCIAL HISTORY  She does not drink alcohol. She quit smoking.    FAMILY HISTORY  Her mother had stomach cancer. She has a family history of heart disease.    MEDICATIONS  Ferrlecit    Results  Laboratory Studies  Chemistry panel from 02/27/2025 is essentially normal. Hemoglobin is normal at 12.5. White blood cell count is normal at 9.05. Platelet count improved from 526 to 448. No iron studies were repeated in February.        Oncology/Hematology History    No history exists.       Review of Systems   Constitutional:  Positive for fatigue and unexpected weight gain.   HENT: Negative.  Negative for hearing loss.    Eyes: Negative.    Respiratory: Negative.     Cardiovascular: Negative.    Gastrointestinal: Negative.    Genitourinary: Negative.    Musculoskeletal: Negative.    Allergic/Immunologic: Negative.    Neurological: Negative.    Hematological: Negative.    Psychiatric/Behavioral: Negative.     All other systems reviewed and are negative.      Current Outpatient Medications on File Prior to Visit   Medication Sig Dispense Refill    ammonium lactate (LAC-HYDRIN) 12 % lotion Apply  topically to the appropriate area as directed As Needed for Irritation. 225 g 0    ammonium lactate (LAC-HYDRIN) 12 % lotion Apply  topically to the appropriate area as directed 2 (Two) Times a Day. 225 g 11    busPIRone (BUSPAR) 10 MG tablet Take 1 tablet by mouth 3 (Three) Times a Day. 90 tablet 2    ciclopirox (PENLAC) 8 % solution Apply  topically to the appropriate area as directed Every Night. 6 mL 1    cyclobenzaprine (FLEXERIL) 10 MG tablet Take 1 tablet by mouth 2 (Two) Times a Day As Needed for Muscle Spasms for up to 10 doses. 10 tablet 0    DULoxetine (CYMBALTA) 60 MG capsule Take 1 capsule by mouth 2 (Two) Times a Day. 60 capsule 2    fluticasone (FLONASE) 50 MCG/ACT nasal spray Administer 2 sprays into the nostril(s) as directed by provider Daily. 16 g 5    hydrOXYzine (ATARAX) 25 MG tablet Take 1 tablet by  mouth Every 6 (Six) Hours As Needed for Anxiety. 90 tablet 1    ibuprofen (ADVIL,MOTRIN) 600 MG tablet Take 1 tablet by mouth Every 8 (Eight) Hours As Needed for Mild Pain. 30 tablet 0    loratadine (Claritin) 10 MG tablet Take 1 tablet by mouth Daily. 90 tablet 3    losartan-hydrochlorothiazide (Hyzaar) 50-12.5 MG per tablet Take 1 tablet by mouth Daily. 90 tablet 1    OLANZapine-Samidorphan (Lybalvi) 10-10 MG tablet Take 1 tablet by mouth every night at bedtime. 28 tablet 0    omeprazole (priLOSEC) 40 MG capsule Take 1 capsule by mouth Daily. 90 capsule 1    oxybutynin XL (DITROPAN-XL) 10 MG 24 hr tablet Take 1 tablet by mouth Daily. 90 tablet 1    potassium chloride 10 MEQ CR tablet Take 1 tablet by mouth Daily. 90 tablet 1    pregabalin (LYRICA) 150 MG capsule Take 1 capsule by mouth 3 times a day.      terbinafine (lamiSIL) 250 MG tablet Take 1 tablet by mouth Daily for 90 days. 90 tablet 0    traMADol (ULTRAM) 50 MG tablet Take 1 tablet by mouth Every 8 (Eight) Hours As Needed for Moderate Pain. 30 tablet 1    valACYclovir (VALTREX) 500 MG tablet Take 1 tablet by mouth Daily. 30 tablet 0    methylPREDNISolone (MEDROL) 4 MG dose pack Take as directed on package instructions. 21 tablet 0     No current facility-administered medications on file prior to visit.       Allergies   Allergen Reactions    Azithromycin Nausea Only    Lisinopril Cough    Penicillins Unknown - Low Severity     Past Medical History:   Diagnosis Date    ADHD (attention deficit hyperactivity disorder) 2024    Allergic     Allergic rhinitis     Anemia 10/10/24    Anxiety 09/11/2018    Arthritis     Arthritis of back 2020    Blood in stool     Cervical disc disorder 2021    Chronic pain disorder 2008    Back pain    Condition not found     MEATAL STENOSIS, UNSPECIFIED    CTS (carpal tunnel syndrome) 2015    Dental disease 2020    Depression 09/11/2018    Difficulty walking 2023    Dizziness 2019    Dysmenorrhea     Gross hematuria     Headache      Headache, tension-type     Helicobacter positive gastritis 2016    Hip arthrosis     HL (hearing loss)     Hyperlipidemia     Hypertension     Injury of back     Irregular menses     Low back pain 2014    Low back strain 2017    Lumbosacral disc disease     Microscopic hematuria 06/10/2014    Night sweats     Nosebleed     Onychomycosis of toenail 2014    Osteopenia     Pap smear for cervical cancer screening 2016    Peripheral neuropathy     Right sided abdominal pain 2014    Screening mammogram, encounter for 2018    Sleep apnea     STD exposure     chlamydia    Thoracic disc disorder     Tobacco abuse     chronic    Vitamin D deficiency      Past Surgical History:   Procedure Laterality Date    BREAST BIOPSY Left 2012    CHOLECYSTECTOMY      COLONOSCOPY  08/15/2016    CYST REMOVAL      left wrist    CYSTOSCOPY  2014    Office cystoscopy w/ Dr. Goncalves    EPIDURAL BLOCK      WRIST SURGERY       Social History     Socioeconomic History    Marital status:    Tobacco Use    Smoking status: Some Days     Current packs/day: 0.00     Average packs/day: 0.7 packs/day for 64.6 years (45.0 ttl pk-yrs)     Types: Cigarettes     Start date: 1989     Last attempt to quit: 2024     Years since quittin.8     Passive exposure: Past    Smokeless tobacco: Never    Tobacco comments:     Medication   Vaping Use    Vaping status: Never Used   Substance and Sexual Activity    Alcohol use: Not Currently     Alcohol/week: 7.0 standard drinks of alcohol     Types: 1 Cans of beer, 4 Shots of liquor, 2 Drinks containing 0.5 oz of alcohol per week     Comment: glass of wine every other day    Drug use: Yes     Types: Marijuana     Comment: daily    Sexual activity: Yes     Partners: Male     Birth control/protection: Condom     Family History   Problem Relation Age of Onset    Stomach cancer Mother     Alzheimer's disease Mother      Diabetes Mother     Dementia Mother     Anxiety disorder Mother     Leukemia Father     ADD / ADHD Father     Heart disease Sister     Hyperlipidemia Sister     Kidney disease Sister     Heart disease Brother     No Known Problems Maternal Aunt     No Known Problems Paternal Aunt     No Known Problems Maternal Uncle     No Known Problems Paternal Uncle     No Known Problems Maternal Grandfather     Cancer Maternal Grandmother     Leukemia Paternal Grandfather     No Known Problems Paternal Grandmother     No Known Problems Cousin     Stroke Other         AUNT;UNCLE    Heart disease Other         AUNT,UNCLE    Diabetes Other         AUNT,UNCLE    Arthritis Sister     Alcohol abuse Neg Hx     Bipolar disorder Neg Hx     Depression Neg Hx     Drug abuse Neg Hx     OCD Neg Hx     Paranoid behavior Neg Hx     Schizophrenia Neg Hx     Seizures Neg Hx     Self-Injurious Behavior  Neg Hx     Suicide Attempts Neg Hx      Immunization History   Administered Date(s) Administered    COVID-19 (MODERNA) 1st,2nd,3rd Dose Monovalent 01/07/2021, 02/04/2021, 10/28/2021    COVID-19 (MODERNA) BIVALENT 12+YRS 10/17/2022    COVID-19 (PFIZER) 12YRS+ (COMIRNATY) 12/19/2024    Fluzone (or Fluarix & Flulaval for VFC) >6mos 10/17/2022, 11/09/2023    Fluzone Quad >6mos (Multi-dose) 10/29/2020    Influenza Inj MDCK Preserative Free 09/17/2024    Influenza, Unspecified 10/17/2022    Pneumococcal Conjugate 20-Valent (PCV20) 05/31/2024       Tobacco Use: High Risk (4/3/2025)    Patient History     Smoking Tobacco Use: Some Days     Smokeless Tobacco Use: Never     Passive Exposure: Past       Objective     Physical Exam  Vitals and nursing note reviewed.   Constitutional:       Appearance: Normal appearance.   HENT:      Mouth/Throat:      Mouth: Mucous membranes are moist.   Eyes:      Extraocular Movements: Extraocular movements intact.   Cardiovascular:      Rate and Rhythm: Normal rate.   Pulmonary:      Effort: Pulmonary effort is normal.  "No respiratory distress.   Musculoskeletal:         General: Normal range of motion.      Cervical back: Normal range of motion and neck supple.   Skin:     General: Skin is warm and dry.   Neurological:      General: No focal deficit present.      Mental Status: She is alert and oriented to person, place, and time.   Psychiatric:         Mood and Affect: Mood normal.         Behavior: Behavior normal.         Thought Content: Thought content normal.         Judgment: Judgment normal.         Vitals:    04/03/25 0941   BP: 138/98   Pulse: 88   Resp: 18   Temp: 98.2 °F (36.8 °C)   TempSrc: Temporal   SpO2: 98%   Weight: 109 kg (240 lb 12.8 oz)   Height: 162.6 cm (64\")   PainSc: 0-No pain       Wt Readings from Last 3 Encounters:   04/03/25 109 kg (240 lb 12.8 oz)   04/01/25 110 kg (242 lb 9.6 oz)   03/27/25 109 kg (239 lb 13.8 oz)               ECOG score: 0         ECOG: (0) Fully Active - Able to Carry On All Pre-disease Performance Without Restriction  Fall Risk Assessment was completed, and patient is at low risk for falls.  PHQ-9 Total Score:         The patient is  experiencing fatigue. Fatigue score: 1    PT/OT Functional Screening: PT fx screen : No needs identified  Speech Functional Screening: Speech fx screen : No needs identified  Rehab to be ordered: Rehab to be ordered : No needs identified        Result Review :  The following data was reviewed by: TESSA Anand on 04/03/2025:  Lab Results   Component Value Date    HGB 12.5 02/27/2025    HCT 39.2 02/27/2025    MCV 89.7 02/27/2025     02/27/2025    WBC 9.05 02/27/2025    NEUTROABS 5.49 12/18/2024    LYMPHSABS 2.28 12/18/2024    MONOSABS 0.59 12/18/2024    EOSABS 0.20 12/18/2024    BASOSABS 0.06 12/18/2024     Lab Results   Component Value Date    GLUCOSE 82 02/27/2025    BUN 17 02/27/2025    CREATININE 0.92 02/27/2025     02/27/2025    K 3.8 02/27/2025     02/27/2025    CO2 28.3 02/27/2025    CALCIUM 9.8 02/27/2025    " "PROTEINTOT 7.3 02/27/2025    ALBUMIN 3.9 02/27/2025    BILITOT <0.2 02/27/2025    ALKPHOS 88 02/27/2025    AST 18 02/27/2025    ALT 17 02/27/2025     Lab Results   Component Value Date    Ferritin 382.40 (H) 04/03/2025     Lab Results   Component Value Date    IRON 60 04/03/2025    LABIRON 14 (L) 04/03/2025    TRANSFERRIN 290 04/03/2025    TIBC 432 04/03/2025     Lab Results   Component Value Date    FERRITIN 382.40 (H) 04/03/2025     No results found for: \"PSA\", \"CEA\", \"AFP\", \"\", \"\"         Assessment and Plan:  Diagnoses and all orders for this visit:    1. Iron deficiency (Primary)  -     Iron Profile; Future  -     Ferritin; Future  -     Vitamin B12; Future  -     Folate; Future  -     CBC & Differential; Future    2. Thrombocytosis, unspecified  -     Iron Profile; Future  -     Ferritin; Future  -     Vitamin B12; Future  -     Folate; Future  -     CBC & Differential; Future    3. Gastroesophageal reflux disease without esophagitis  -     Iron Profile; Future  -     Ferritin; Future  -     Vitamin B12; Future  -     Folate; Future  -     CBC & Differential; Future        Assessment & Plan  1. Iron Deficiency Anemia.  She received three out of four prescribed IV iron infusions with Ferrlecit, with the last infusion on 2/24/2025. Despite the infusions, she reports no noticeable improvement in symptoms. Her hemoglobin is normal at 12.5, and her platelet count has improved from 526 to 448. Iron studies were not done with the 2/27/2025 lab work. She is advised to recheck iron studies in three months. If symptoms persist or worsen, earlier evaluation may be necessary.    2. Thrombocytosis. Improved. Negative for Charly 2 V617F, exon 12-15, Carlos-R, and MPL mutations in 8/6/2024.   Her platelet count has improved and is now in the high normal range. Potential causes such as iron deficiency anemia, inflammation, or bleeding were discussed. She is advised to monitor her condition and repeat labs in three months " to reassess platelet levels. Plan to recheck lab work again in 3 months with CBC, iron profile, ferritin, folate and b-12.     3. Weight Gain.  She reports ongoing weight gain and difficulty losing weight. A referral to a nutritionist was recommended. She was also advised to consider consulting a weight loss specialist or exploring online weight loss programs. Dietary modifications, including a low-carb diet with increased protein intake, were suggested. The potential use of Wegovy injections was discussed, given her prediabetic status and family history of heart disease. Discussed to speak to her PCP regarding the weight loss.     Follow-up  The patient will follow up in 3 months with labs 1-2 days prior.     PROCEDURE  The patient received three intravenous iron infusions with Ferrlecit, with the last one ending on 02/24/2025.        I spent 30 minutes caring for Virginia on this date of service. This time includes time spent by me in the following activities:preparing for the visit, reviewing tests, obtaining and/or reviewing a separately obtained history, performing a medically appropriate examination and/or evaluation , counseling and educating the patient/family/caregiver, ordering medications, tests, or procedures, referring and communicating with other health care professionals , documenting information in the medical record, and independently interpreting results and communicating that information with the patient/family/caregiver      Patient was given instructions and counseling regarding her condition or for health maintenance advice. Please see specific information pulled into the AVS if appropriate.     TESSA Anand    4/3/2025    .tob    Patient or patient representative verbalized consent for the use of Ambient Listening during the visit with  TESSA Anand for chart documentation. 4/3/2025  10:30 EDT

## 2025-04-03 NOTE — TELEPHONE ENCOUNTER
Caller: Bianca Lara    Relationship to patient: Self    Best call back number: [phone]    Patient is needing: PATIENT CALLED REQUESTING IF TESSA REGAN COULD TAKE A LOOK AT THE RECENT BLOOD WORK SHE HAD DONE WITH ONCOLOGY. PATIENT STATES HER ONCOLOGIST TOLD HER WITH THIS RECENT LAB WORK SHE SHOULD BE ELIGIBLE TO START ZEPBOUND AS PREVIOUSLY DISCUSSED WITH PCP AND WOULD LIKE TO KNOW IF TESSA REGAN AGREES.

## 2025-04-04 DIAGNOSIS — G47.33 OSA (OBSTRUCTIVE SLEEP APNEA): Primary | ICD-10-CM

## 2025-04-04 NOTE — TELEPHONE ENCOUNTER
Called and notified pt that zepbound or any GLP1 weight loss agents are not covered by her insurance. Pt verified understand

## 2025-04-07 ENCOUNTER — TELEPHONE (OUTPATIENT)
Dept: SLEEP MEDICINE | Facility: HOSPITAL | Age: 55
End: 2025-04-07
Payer: COMMERCIAL

## 2025-04-07 NOTE — TELEPHONE ENCOUNTER
Tried to call patient about sleep study-mailbox full-could not leave message. Patient needs to be told results of sleep study, choose DME and make follow-up appointment.

## 2025-04-11 ENCOUNTER — TELEPHONE (OUTPATIENT)
Dept: NEUROSURGERY | Facility: CLINIC | Age: 55
End: 2025-04-11
Payer: COMMERCIAL

## 2025-04-18 NOTE — PROGRESS NOTES
"Well Woman Visit    CC: Scheduled annual well gyn visit  Chief Complaint   Patient presents with    Annual Exam       HPI:   54 y.o.   Social History     Substance and Sexual Activity   Sexual Activity Yes    Partners: Male    Birth control/protection: Condom       54-year-old female G5, P4 status post menopause here for annual exam.  No bleeding.  Last colonoscopy 2016 needs follow-up in .  Positive detrusor instability.  Needs mammogram.    Pt has no complaints today.    PCP: does manage PMHx and preventative labs  History: PMHx, Meds, Allergies, PSHx, Social Hx, and POBHx all reviewed and updated.    PHYSICAL EXAM:  /68   Pulse 83   Ht 162.6 cm (64\")   Wt 110 kg (242 lb)   LMP  (LMP Unknown)   Breastfeeding No   BMI 41.54 kg/m²  Not found.  Chaperone present  Chaperone present during breast and/or pelvic exam if performed.  General- NAD, alert and oriented, appropriate  Psych- Normal mood, good memory  Neck- No masses, no thyroid enlargement  CV- Regular rhythm, no murmurs  Resp- CTA to bases, no wheezes  Abdomen- Soft, non distended, non tender, no masses    Breast left-  Bilaterally symmetrical, no masses, non tender, no nipple discharge, no axillary or supraclavicular nodes palpable.    Breast right- Bilaterally symmetrical, no masses, non tender, no nipple discharge, no axillary or supraclavicular nodes palpable.      External genitalia- Normal female, no lesions  Urethra/meatus- Normal, no masses, non tender  Bladder- Normal, no masses, non tender  Vagina- Normal, no atrophy, no lesions, no discharge.      Cvx- Normal, no lesions, no discharge, No cervical motion tenderness  Uterus- Normal size, shape & consistency.  Non tender, mobile.  Adnexa- No mass, non tender  Anus/Rectum/Perineum- Normal appearance, no mass, good sphincter tone, no hemorrhoids, no prolapse, Hemoccult NEGATIVE    Lymphatic- No palpable neck, axillary, or groin nodes  Ext- No edema, no cyanosis    Skin- No " lesions, no rashes, no acanthosis nigricans      ASSESSMENT and PLAN:    Diagnoses and all orders for this visit:    1. Well woman exam with routine gynecological exam (Primary)  -     Mammo Screening Digital Tomosynthesis Bilateral With CAD; Future  -     IgP, Aptima HPV        Preventative:  BREAST HEALTH- Monthly self breast exam importance and how to reviewed. MMG and/or MRI (prn) reviewed per society guidelines and her individual history. Screen: Updated today  CERVICAL CANCER Screening- Reviewed current ASCCP guidelines for screening w and wo cotest HPV, age specific.  Screen: Updated today  COLON CANCER Screening- Reviewed current medical society guidelines and options.  Screen:  Already up to date  Follow up PCP/Specialist PMHx and/or Labs          She understands the importance of having any ordered tests to be performed in a timely fashion.  The risks of not performing them include, but are not limited to, advanced cancer stages, bone loss from osteoporosis and/or subsequent increase in morbidity and/or mortality.  She is encouraged to review her results online and/or contact or office if she has questions.     Follow Up:  Return in about 1 year (around 4/23/2026) for Annual physical.            Jane Sutherland,   04/23/2025    [facility] [address] [facility]  [address] [place] [address] [address] [place] [address]  Dept: [phone]  Dept Fax: [phone]  Loc: [phone]  Loc Fax: [phone]

## 2025-04-21 DIAGNOSIS — M51.362 DEGENERATION OF INTERVERTEBRAL DISC OF LUMBAR REGION WITH DISCOGENIC BACK PAIN AND LOWER EXTREMITY PAIN: ICD-10-CM

## 2025-04-21 DIAGNOSIS — F32.A ANXIETY AND DEPRESSION: ICD-10-CM

## 2025-04-21 DIAGNOSIS — F41.9 ANXIETY AND DEPRESSION: ICD-10-CM

## 2025-04-22 ENCOUNTER — TELEPHONE (OUTPATIENT)
Dept: PSYCHIATRY | Facility: CLINIC | Age: 55
End: 2025-04-22
Payer: COMMERCIAL

## 2025-04-22 RX ORDER — DULOXETIN HYDROCHLORIDE 60 MG/1
60 CAPSULE, DELAYED RELEASE ORAL 2 TIMES DAILY
Qty: 60 CAPSULE | Refills: 2 | OUTPATIENT
Start: 2025-04-22

## 2025-04-22 RX ORDER — HYDROXYZINE HYDROCHLORIDE 25 MG/1
25 TABLET, FILM COATED ORAL
Qty: 90 TABLET | Refills: 1 | OUTPATIENT
Start: 2025-04-22

## 2025-04-22 NOTE — TELEPHONE ENCOUNTER
PA DENIAL RECEIVED FROM INSURANCE.    BEHAVIORAL HEALTH - SCAN - PA DENIAL LYBALVI; YVON; 04/22/2025. (04/22/2025)

## 2025-04-22 NOTE — TELEPHONE ENCOUNTER
Will continue to use samples then. If diagnosis changes, we could re-send it through. Please let her know.

## 2025-04-23 ENCOUNTER — OFFICE VISIT (OUTPATIENT)
Dept: OBSTETRICS AND GYNECOLOGY | Age: 55
End: 2025-04-23
Payer: COMMERCIAL

## 2025-04-23 VITALS
HEART RATE: 83 BPM | BODY MASS INDEX: 41.32 KG/M2 | WEIGHT: 242 LBS | DIASTOLIC BLOOD PRESSURE: 68 MMHG | HEIGHT: 64 IN | SYSTOLIC BLOOD PRESSURE: 127 MMHG

## 2025-04-23 DIAGNOSIS — Z01.419 WELL WOMAN EXAM WITH ROUTINE GYNECOLOGICAL EXAM: Primary | ICD-10-CM

## 2025-04-23 PROCEDURE — G0123 SCREEN CERV/VAG THIN LAYER: HCPCS | Performed by: OBSTETRICS & GYNECOLOGY

## 2025-04-23 PROCEDURE — 87624 HPV HI-RISK TYP POOLED RSLT: CPT | Performed by: OBSTETRICS & GYNECOLOGY

## 2025-04-23 NOTE — TELEPHONE ENCOUNTER
Pt returned call.    I RELAYED OF PROVIDERS MESSAGE VERBATIM AND PT EXPRESSED UNDERSTANDING.    PT WILL CALL US AGAIN WHEN SHE NEEDS MORE SAMPLES.

## 2025-04-29 LAB
CYTOLOGIST CVX/VAG CYTO: NORMAL
CYTOLOGY CVX/VAG DOC CYTO: NORMAL
CYTOLOGY CVX/VAG DOC THIN PREP: NORMAL
DX ICD CODE: NORMAL
HPV I/H RISK 4 DNA CVX QL PROBE+SIG AMP: NEGATIVE
Lab: NORMAL
OTHER STN SPEC: NORMAL
SERVICE CMNT-IMP: NORMAL
STAT OF ADQ CVX/VAG CYTO-IMP: NORMAL

## 2025-05-01 ENCOUNTER — OFFICE VISIT (OUTPATIENT)
Dept: NEUROSURGERY | Facility: CLINIC | Age: 55
End: 2025-05-01
Payer: COMMERCIAL

## 2025-05-01 VITALS
WEIGHT: 240.7 LBS | SYSTOLIC BLOOD PRESSURE: 129 MMHG | DIASTOLIC BLOOD PRESSURE: 79 MMHG | BODY MASS INDEX: 41.09 KG/M2 | HEIGHT: 64 IN

## 2025-05-01 DIAGNOSIS — M54.41 CHRONIC MIDLINE LOW BACK PAIN WITH BILATERAL SCIATICA: ICD-10-CM

## 2025-05-01 DIAGNOSIS — M43.16 ANTEROLISTHESIS OF LUMBAR SPINE: ICD-10-CM

## 2025-05-01 DIAGNOSIS — G89.29 CHRONIC MIDLINE LOW BACK PAIN WITH BILATERAL SCIATICA: ICD-10-CM

## 2025-05-01 DIAGNOSIS — M47.816 FACET ARTHRITIS OF LUMBAR REGION: Primary | ICD-10-CM

## 2025-05-01 DIAGNOSIS — M71.30 SYNOVIAL CYST: ICD-10-CM

## 2025-05-01 DIAGNOSIS — M54.42 CHRONIC MIDLINE LOW BACK PAIN WITH BILATERAL SCIATICA: ICD-10-CM

## 2025-05-01 RX ORDER — OLANZAPINE 10 MG/1
10 TABLET, FILM COATED ORAL
COMMUNITY
Start: 2025-04-19

## 2025-05-01 NOTE — PROGRESS NOTES
Bianca Lara is a 54 y.o. female that presents with Back Pain       Back Pain      History of Present Illness  The patient is a 54-year-old female who presents for evaluation of back pain and weight management.    She reports a weight loss of 2 pounds, which she attributes to a reduction in portion sizes and a diet consisting of three meals and two snacks per day. She has been unable to obtain a prescription for Zepbound due to insurance issues. She does not have diabetes. Her A1c is 6.1. She has been unable to obtain a prescription for Mounjaro due to insurance issues. She has been off nicotine.    She experiences pain primarily in the posterior aspect of her thighs, with no radiation below the knees. She received injections last week, which typically provide relief for approximately 6 months. She reports that these injections have been effective in alleviating her back pain, allowing her to walk further distances.    SOCIAL HISTORY  She has been off nicotine.       Review of Systems   Musculoskeletal:  Positive for back pain.        Vitals:    05/01/25 1254   BP: 129/79        Physical Exam  Constitutional:       Comments: BMI 41.32   Cardiovascular:      Comments: No notable edema   Pulmonary:      Effort: Pulmonary effort is normal.   Neurological:      Mental Status: She is alert.      Motor: No weakness.      Comments: Walks with a cane   Psychiatric:         Mood and Affect: Mood normal.          Results  Laboratory Studies  A1c is 6.1.          Assessment and Plan {CC Problem List  Visit Diagnosis  ROS  Review (Popup)  Health Maintenance  Quality  BestPractice  Medications  SmartSets  SnapShot Encounters  Media :23}   Problem List Items Addressed This Visit       Low back pain     Other Visit Diagnoses         Facet arthritis of lumbar region-L4-5 most notably    -  Primary      Anterolisthesis of lumbar spine-L4-5          Synovial cyst-L5-S1, now resolved                Assessment &  Plan  1. Weight management.  Her current BMI is 41.3, with an ideal target of 35. She has achieved a commendable weight loss of 2 pounds since her last visit a month ago. She was advised to continue her current weight loss regimen, emphasizing the importance of portion control and incorporating a cheat day into her diet. The potential benefits of increasing fiber intake were discussed, including its role in promoting satiety, lowering cholesterol levels, and enhancing colon health. She was also encouraged to maintain a regular walking routine. It was reiterated that she should abstain from nicotine use for at least 12 weeks post-surgery to facilitate optimal blood supply establishment.    2. Back pain.  She reported that her back pain has subsided following the recent injections, which typically last about 6 months. The effectiveness of the injections was confirmed, and she was advised to monitor her symptoms and report any significant decline.    Follow-up  The patient will follow up in a few months.       Follow Up {Instructions Charge Capture  Follow-up Communications :23}   Return in about 3 months (around 8/1/2025).      Patient or patient representative verbalized consent for the use of Ambient Listening during the visit with  James Ayala MD for chart documentation. 5/1/2025  13:12 EDT

## 2025-05-05 ENCOUNTER — TELEMEDICINE (OUTPATIENT)
Dept: PSYCHIATRY | Facility: CLINIC | Age: 55
End: 2025-05-05
Payer: COMMERCIAL

## 2025-05-05 DIAGNOSIS — F12.90 MARIJUANA USE, CONTINUOUS: ICD-10-CM

## 2025-05-05 DIAGNOSIS — F41.1 GENERALIZED ANXIETY DISORDER: Primary | ICD-10-CM

## 2025-05-05 DIAGNOSIS — F33.1 MAJOR DEPRESSIVE DISORDER, RECURRENT EPISODE, MODERATE: ICD-10-CM

## 2025-05-05 PROCEDURE — 1160F RVW MEDS BY RX/DR IN RCRD: CPT | Performed by: SOCIAL WORKER

## 2025-05-05 PROCEDURE — 90791 PSYCH DIAGNOSTIC EVALUATION: CPT | Performed by: SOCIAL WORKER

## 2025-05-05 PROCEDURE — 1159F MED LIST DOCD IN RCRD: CPT | Performed by: SOCIAL WORKER

## 2025-05-05 NOTE — PROGRESS NOTES
"This provider is located at the [facility] (through [facility]), [address] [place] [address], [place] [address] using a secure [employer] Video Visit through [email or web address]. Patient is being seen remotely via telehealth at home address in [place] and stated they are in a secure environment for this session. The patient's condition being diagnosed/treated is appropriate for telemedicine. The provider identified herself as well as her credentials. The patient, and/or patients guardian, consent to be seen remotely, and when consent is given they understand that the consent allows for patient identifiable information to be sent to a third party as needed. They may refuse to be seen remotely at any time. The electronic data is encrypted and password protected, and the patient and/or guardian has been advised of the potential risks to privacy not withstanding such measures.     You have chosen to receive care through a telehealth visit.  Do you consent to use a video/audio connection for your medical care today? Yes    Subjective   Bianca Lara is a 54 y.o. female who presents today for initial evaluation  Patient stated she wants to \"get through my past now that everything has happened.\" Patient stated \"some days I need more assistance than my family or friends.\" Patient reported that \"a lot of things happened in my youth\" that are impacting patient's ability to trust others. Patient stated she has no desire to commit harm to anyone or herself. Patient stated she would like to process events that occurred such as \"I was touched and had conversations with adults that shouldn't have happened.\" Patient stated she would like to \"finally get it out.\" Patient stated she did inform her mother at the time \"but she could never see it.\" Patient stated at 17 patient  and moved away from home. Patient stated \"the problem didn't persist until I was .\" Patient stated it was her brother but " "she no longer associates with her brother. Patient reported that she did experience a still born birth of her oldest son. Patient stated he would have been 32 years old. Patient enrolled in beauty school \"to get my mind off him.\" Patient stated she did get her masters but never pursued that career. Patient stated 20 years later her mother passed away and that was impactful. Patient stated when her mother passed her siblings began to turn to patient for support. Patient stated \"I feel a bit better about my mom's death but it took a toll on me. I still have problems accepting she is gone.\"       Time In: 8:36  Time Out: 9:09  Name of PCP: Reina Putnam  Referral source: Pham Cantor    Chief Complaint:  anxiety, depression      Patient adamantly and convincingly denies current suicidal or homicidal ideation or perceptual disturbance.    Childhood Experiences:   Has patient experienced a major accident or tragic events as a child? no      Has patient experienced any other significant life events or trauma (such as verbal, physical, sexual abuse)? no      Significant Life Events:  Has patient been through or witnessed a divorce? yes  Patient stated she has gone through a divorce.     Has patient experienced a death / loss of relationship? yes  Patient's oldest child was still born at 38 weeks. Patient's mother passed away as well.     Has patient experienced a major accident or tragic events? no      Has patient experienced any other significant life events or trauma (such as verbal, physical, sexual abuse)? no    Social History:   Social History     Socioeconomic History    Marital status:    Tobacco Use    Smoking status: Some Days     Current packs/day: 0.00     Average packs/day: 0.7 packs/day for 64.6 years (45.0 ttl pk-yrs)     Types: Cigarettes     Start date: 1989     Last attempt to quit: 2024     Years since quittin.9     Passive exposure: Past    Smokeless tobacco: Never    Tobacco " "comments:     Medication   Vaping Use    Vaping status: Never Used   Substance and Sexual Activity    Alcohol use: Not Currently     Alcohol/week: 7.0 standard drinks of alcohol     Types: 1 Cans of beer, 4 Shots of liquor, 2 Drinks containing 0.5 oz of alcohol per week     Comment: glass of wine every other day    Drug use: Not Currently     Types: Marijuana     Comment: For major relief for pain    Sexual activity: Yes     Partners: Male     Birth control/protection: Condom     Marital Status: single    Patient's current living situation: Patient lives with her daughter     Support system: friends and children    Difficulty getting along with peers: yes, patient expressed \"unless I smoke.\" Patient stated smoking helps her to feel calm and not be \"quick to .\"     Difficulty making new friendships: no    Difficulty maintaining friendships: no    Close with family members: yes, patient expressed \"I am the baby girl so they all seem to look after me now.\" Patient reported when her mother initially passed away \"I was the one everyone called with their problems and seemed like I took her place.\"     Religous: yes, Jehovah's witness     Work History:  Highest level of education obtained: high school, some college    Ever been active duty in the ? no    Patient's Occupation: Unemployed, working to get disability    Describe patient's current and past work experience: Patient stated she has been able to maintain employment and get along well with co workers and management. Patient stated she injured her back in 2007 working for [employer] and is no longer able to work. Patient stated she has not worked for over a year and is in the process of trying to get approved for disability.       Legal History:  The patient has no significant history of legal issues.    Past Medical History:  Past Medical History:   Diagnosis Date    ADHD (attention deficit hyperactivity disorder) 2024    Allergic     Allergic rhinitis     Anemia " "10/10/24    Anxiety 09/11/2018    Arthritis     Arthritis of back 2020    Blood in stool     Cervical disc disorder 2021    Chronic pain disorder 2008    Back pain    Condition not found     MEATAL STENOSIS, UNSPECIFIED    CTS (carpal tunnel syndrome) 2015    Dental disease 2020    Depression 09/11/2018    Difficulty walking 2023    Dizziness 2019    Dysmenorrhea     Gross hematuria     Headache     Headache, tension-type 2022    Helicobacter positive gastritis 05/26/2016    Hip arthrosis 2024    HL (hearing loss) 2020    Hyperlipidemia 2024    Hypertension     Injury of back     Irregular menses     Low back pain 07/09/2014    Low back strain 2017    Lumbosacral disc disease 2020    Microscopic hematuria 06/10/2014    Night sweats     Nosebleed     Onychomycosis of toenail 07/09/2014    Osteopenia     Pap smear for cervical cancer screening 05/16/2016    Peripheral neuropathy     Right sided abdominal pain 06/12/2014    Screening mammogram, encounter for 11/09/2018    Sleep apnea 2023    STD exposure 1993    chlamydia    Thoracic disc disorder 2021    Tobacco abuse     chronic    Vitamin D deficiency        Past Surgical History:  Past Surgical History:   Procedure Laterality Date    BREAST BIOPSY Left 01/09/2012    CHOLECYSTECTOMY      COLONOSCOPY  08/15/2016    CYST REMOVAL      left wrist    CYSTOSCOPY  06/27/2014    Office cystoscopy w/ Dr. Goncalves    EPIDURAL BLOCK  2022    LAPAROSCOPIC CHOLECYSTECTOMY      On file    TUBAL ABDOMINAL LIGATION      On file    WRIST SURGERY  1986       Physical Exam:   not currently breastfeeding. There is no height or weight on file to calculate BMI.     History of prior treatment or hospitalization: Patient was referred by her PCP office to be assessed once. Patient stated she was feeling low and had feelings of \"wanting to harm anyone else.\" Patient stated the police were called due to patient's statements. Patient stated once her provider spoke with her and assessed patient's " mental stated the police were called away and patient was referred for treatment. Patient stated she has was in therapy for about 8 months and completed treatment.     Are there any significant health issues (current or past): No    History of seizures: no    Allergy:   Allergies   Allergen Reactions    Azithromycin Nausea Only    Lisinopril Cough    Penicillins Unknown - Low Severity        Current Medications:   Current Outpatient Medications   Medication Sig Dispense Refill    ammonium lactate (LAC-HYDRIN) 12 % lotion Apply  topically to the appropriate area as directed As Needed for Irritation. 225 g 0    ammonium lactate (LAC-HYDRIN) 12 % lotion Apply  topically to the appropriate area as directed 2 (Two) Times a Day. 225 g 11    busPIRone (BUSPAR) 10 MG tablet Take 1 tablet by mouth 3 (Three) Times a Day. 90 tablet 2    ciclopirox (PENLAC) 8 % solution Apply  topically to the appropriate area as directed Every Night. 6 mL 1    cyclobenzaprine (FLEXERIL) 10 MG tablet Take 1 tablet by mouth 2 (Two) Times a Day As Needed for Muscle Spasms for up to 10 doses. 10 tablet 0    DULoxetine (CYMBALTA) 60 MG capsule Take 1 capsule by mouth 2 (Two) Times a Day. 60 capsule 2    fluticasone (FLONASE) 50 MCG/ACT nasal spray Administer 2 sprays into the nostril(s) as directed by provider Daily. 16 g 5    hydrOXYzine (ATARAX) 25 MG tablet Take 1 tablet by mouth Every 6 (Six) Hours As Needed for Anxiety. 90 tablet 1    ibuprofen (ADVIL,MOTRIN) 600 MG tablet Take 1 tablet by mouth Every 8 (Eight) Hours As Needed for Mild Pain. 30 tablet 0    loratadine (Claritin) 10 MG tablet Take 1 tablet by mouth Daily. 90 tablet 3    losartan-hydrochlorothiazide (Hyzaar) 50-12.5 MG per tablet Take 1 tablet by mouth Daily. 90 tablet 1    OLANZapine (zyPREXA) 10 MG tablet Take 1 tablet by mouth every night at bedtime. (Patient not taking: Reported on 5/1/2025)      OLANZapine-Samidorphan (Lybalvi) 10-10 MG tablet Take 1 tablet by mouth every  night at bedtime. 28 tablet 0    omeprazole (priLOSEC) 40 MG capsule Take 1 capsule by mouth Daily. 90 capsule 1    oxybutynin XL (DITROPAN-XL) 10 MG 24 hr tablet Take 1 tablet by mouth Daily. 90 tablet 1    potassium chloride 10 MEQ CR tablet Take 1 tablet by mouth Daily. 90 tablet 1    pregabalin (LYRICA) 150 MG capsule Take 1 capsule by mouth 3 times a day.      terbinafine (lamiSIL) 250 MG tablet Take 1 tablet by mouth Daily for 90 days. 90 tablet 0    traMADol (ULTRAM) 50 MG tablet Take 1 tablet by mouth Every 8 (Eight) Hours As Needed for Moderate Pain. 30 tablet 1    valACYclovir (VALTREX) 500 MG tablet Take 1 tablet by mouth Daily. 30 tablet 0     No current facility-administered medications for this visit.       Lab Results:   Office Visit on 04/23/2025   Component Date Value Ref Range Status    Diagnosis 04/23/2025 Comment   Final    NEGATIVE FOR INTRAEPITHELIAL LESION OR MALIGNANCY.  THIS SPECIMEN WAS RESCREENED AS PART OF OUR  PROGRAM.    Specimen adequacy: 04/23/2025 Comment   Final    Satisfactory for evaluation.  Endocervical and/or squamous metaplastic  cells (endocervical component) are present.    Clinician Provided ICD-10: 04/23/2025 Comment   Final    Z01.419    Performed by: 04/23/2025 Comment   Final    Shannon Sacksteder, Cytologist ([facility])    QC reviewed by: 04/23/2025 Comment   Final    Neeta Mario, Supervisory Cytologist ([facility])    . 04/23/2025 .   Final    Note: 04/23/2025 Comment   Final    The Pap smear is a screening test designed to aid in the detection of  premalignant and malignant conditions of the uterine cervix.  It is not a  diagnostic procedure and should not be used as the sole means of detecting  cervical cancer.  Both false-positive and false-negative reports do occur.    Method: 04/23/2025 Comment   Final    This liquid based ThinPrep(R) pap test was screened with the  use of an image guided system.    HPV Aptima 04/23/2025 Negative  Negative Final    This  nucleic acid amplification test detects fourteen high-risk  HPV types (16,18,31,33,35,39,45,51,52,56,58,59,66,68) without  differentiation.       Family History:  Family History   Problem Relation Age of Onset    Stomach cancer Mother     Alzheimer's disease Mother     Diabetes Mother     Dementia Mother     Anxiety disorder Mother     Leukemia Father     ADD / ADHD Father     Heart disease Sister     Hyperlipidemia Sister     Kidney disease Sister     Heart disease Brother     No Known Problems Maternal Aunt     No Known Problems Paternal Aunt     No Known Problems Maternal Uncle     No Known Problems Paternal Uncle     No Known Problems Maternal Grandfather     Cancer Maternal Grandmother     Leukemia Paternal Grandfather     No Known Problems Paternal Grandmother     No Known Problems Cousin     Stroke Other         AUNT;UNCLE    Heart disease Other         AUNT,UNCLE    Diabetes Other         AUNT,UNCLE    Arthritis Sister     Hypertension Sister     Alcohol abuse Neg Hx     Bipolar disorder Neg Hx     Depression Neg Hx     Drug abuse Neg Hx     OCD Neg Hx     Paranoid behavior Neg Hx     Schizophrenia Neg Hx     Seizures Neg Hx     Self-Injurious Behavior  Neg Hx     Suicide Attempts Neg Hx        Problem List:  Patient Active Problem List   Diagnosis    Allergic rhinitis    Anxiety and depression    Dysmenorrhea    Gross hematuria    Microscopic hematuria    Helicobacter pylori gastritis    Hematochezia    Irregular menses    Low back pain    Night sweats    Onychomycosis of toenail    Right sided abdominal pain    STD exposure    Stenosis of urinary meatus    Vitamin D deficiency    DDD (degenerative disc disease), lumbar    Primary hypertension    Lumbar disc disease with radiculopathy    Trochanteric bursitis of left hip    Left hip pain    Right hip pain    Osteoarthritis of both hips    Trochanteric bursitis of right hip    Iron deficiency    Malabsorption due to intolerance, not elsewhere classified  "        History of Substance Use:   Patient answered yes  to experiencing two or more of the following problems related to substance use: using more than intended or over longer period than intended; difficulty quitting or cutting back use; spending a great deal of time obtaining, using, or recovering from using; craving or strong desire or urge to use;  work and/or school problems; financial problems; family problems; using in dangerous situations; physical or mental health problems; relapse; feelings of guilt or remorse about use; times when used and/or drank alone; needing to use more in order to achieve the desired effect; illness or withdrawal when stopping or cutting back use; using to relieve or avoid getting ill or developing withdrawal symptoms; and black outs and/or memory issues when using.      Patient reported that she smokes marijuana daily \"just to function.\"     Substance Age Frequency Amount Method Last use   Nicotine        Alcohol        Marijuana        Benzo        Pain Pills        Cocaine        Meth        Heroin        Suboxone        Synthetics/Other:            SUICIDE RISK ASSESSMENT/CSSRS  1. Does patient have thoughts of suicide? no  2. Does patient have intent for suicide? no  3. Does patient have a current plan for suicide? no  4. History of suicide attempts: no  5. Family history of suicide or attempts: no  6. History of violent behaviors towards others or property or thoughts of committing suicide: no  7. History of sexual aggression toward others: no  8. Access to firearms or weapons: no    PHQ-Score Total:  PHQ-9 Total Score: (Patient-Rptd) 8    TINO-7 Score Total:  Feeling nervous, anxious or on edge: (Patient-Rptd) Nearly every day  Not being able to stop or control worrying: (Patient-Rptd) Several days  Worrying too much about different things: (Patient-Rptd) Several days  Trouble Relaxing: (Patient-Rptd) Several days  Being so restless that it is hard to sit still: " (Patient-Rptd) Not at all  Feeling afraid as if something awful might happen: (Patient-Rptd) Several days  Becoming easily annoyed or irritable: (Patient-Rptd) Several days  TINO 7 Total Score: (Patient-Rptd) 8  If you checked any problems, how difficult have these problems made it for you to do your work, take care of things at home, or get along with other people: (Patient-Rptd) Very difficult      (Scales based on 0 - 10 with 10 being the worst)  Depression: 5 Anxiety: 7     Mental Status Exam:   Hygiene:   good  Cooperation:  Cooperative  Eye Contact:  Good  Psychomotor Behavior:  Appropriate  Affect:  Full range  Mood:  happy  Hopelessness: 7  Speech:  Normal  Thought Process:  Goal directed  Thought Content:  Mood congruent  Suicidal:  None  Homicidal:  None  Hallucinations:  None  Delusion:  None  Memory:  Intact  Orientation:  Person, Place, Time, and Situation  Reliability:  good  Insight:  Good  Judgement:  Good  Impulse Control:  Good    Impression/Formulation:    Patient appeared alert and oriented.  Patient is voluntarily requesting to begin outpatient therapy at [facility].  Patient is receptive to assistance with maintaining a stable lifestyle.  Patient presents with history of anxiety and depression.  Patient is agreeable to attend routine therapy sessions.  Patient expressed desire to maintain stability and participate in the therapeutic process.        Assessment & Plan   Diagnoses and all orders for this visit:    1. Generalized anxiety disorder (Primary)    2. Major depressive disorder, recurrent episode, moderate    3. Marijuana use, continuous        Visit Diagnoses:    ICD-10-CM ICD-9-CM   1. Generalized anxiety disorder  F41.1 300.02   2. Major depressive disorder, recurrent episode, moderate  F33.1 296.32   3. Marijuana use, continuous  F12.90 305.21        Functional Status: Moderate impairment     Prognosis: Good with Ongoing Treatment     Treatment  Plan: Continue supportive psychotherapy efforts and medications as indicated. Obtain release of information for current treatment team for continuity of care as needed. Patient will adhere to medication regimen as prescribed and report any side effects. Patient will contact this office, call 911 or present to the nearest emergency room should suicidal or homicidal ideations occur.    Short Term Goals: Patient will be compliant with medication, and patient will have no significant medication related side effects.  Patient will be engaged in psychotherapy as indicated.  Patient will report subjective improvement of symptoms.    Long Term Goals: To stabilize mood and treat/improve subjective symptoms, the patient will stay out of the hospital, the patient will be at an optimal level of functioning, and the patient will take all medications as prescribed.The patient verbalized understanding and agreement with goals that were mutually set.    Crisis Plan:    If symptoms/behaviors persist, patient will present to the nearest hospital for an assessment. Advised patient of [facility] 24/7 assessment services.         This document has been electronically signed by Vijay Malave LCSW  May 5, 2025 08:36 EDT    Part of this note may be an electronic transcription/translation of spoken language to printed text using the Dragon Dictation System.  Mode of Visit: Video  Location of patient: -HOME-  Location of provider: +HOME+  You have chosen to receive care through a telehealth visit.  The patient has signed the video visit consent form.  The visit included audio and video interaction. No technical issues occurred during this visit.

## 2025-05-08 ENCOUNTER — OFFICE VISIT (OUTPATIENT)
Dept: PSYCHIATRY | Facility: CLINIC | Age: 55
End: 2025-05-08
Payer: COMMERCIAL

## 2025-05-08 VITALS
BODY MASS INDEX: 41.02 KG/M2 | HEART RATE: 82 BPM | SYSTOLIC BLOOD PRESSURE: 133 MMHG | WEIGHT: 239 LBS | OXYGEN SATURATION: 97 % | DIASTOLIC BLOOD PRESSURE: 93 MMHG

## 2025-05-08 DIAGNOSIS — F41.0 PANIC ATTACKS: ICD-10-CM

## 2025-05-08 DIAGNOSIS — F31.76 BIPOLAR I DISORDER, MOST RECENT EPISODE DEPRESSED, IN FULL REMISSION: Primary | ICD-10-CM

## 2025-05-08 DIAGNOSIS — F41.1 GENERALIZED ANXIETY DISORDER: ICD-10-CM

## 2025-05-08 DIAGNOSIS — F12.90 MARIJUANA USE, CONTINUOUS: ICD-10-CM

## 2025-05-08 DIAGNOSIS — R06.83 SNORING: ICD-10-CM

## 2025-05-08 DIAGNOSIS — F51.05 INSOMNIA DUE TO MENTAL CONDITION: ICD-10-CM

## 2025-05-08 RX ORDER — OLANZAPINE AND SAMIDORPHAN L-MALATE 10; 10 MG/1; MG/1
1 TABLET, FILM COATED ORAL
Qty: 28 TABLET | Refills: 0 | COMMUNITY
Start: 2025-05-08

## 2025-05-08 RX ORDER — OLANZAPINE AND SAMIDORPHAN L-MALATE 10; 10 MG/1; MG/1
1 TABLET, FILM COATED ORAL
Qty: 30 TABLET | Refills: 5 | Status: SHIPPED | OUTPATIENT
Start: 2025-05-08

## 2025-05-08 RX ORDER — OLANZAPINE AND SAMIDORPHAN L-MALATE 10; 10 MG/1; MG/1
1 TABLET, FILM COATED ORAL
Qty: 28 TABLET | Refills: 0 | COMMUNITY
Start: 2025-05-08 | End: 2025-05-08 | Stop reason: SDUPTHER

## 2025-05-08 NOTE — PROGRESS NOTES
"Subjective   Bianca Lara is a 54 y.o. female who presents today for initial evaluation     Referring Provider:  No referring provider defined for this encounter.    Chief Complaint:  anxiety and depression    History of Present Illness:     Chart Review By Dates:  2025: Malave x1. Lybalvi not approved (not bipolar I or schizophrenia).  2025: fam med, infusion, podiatry, sleep x2, unknown x3. Reassuring TSH cmp cbc, high lipids.    Plannin2025: Sorted out some confusion re: meds for patient. Pt now understands that the lybalvi replaces olanzapine. Stable, no changes. Gave her samples of lybalvi and sent in to the pharmacy. Wants to talk to a therapist to talk about self-esteem; referred to John. Buspar helped.  2025: snlacey, sleep medicine consult. Switch olanzapine to lybalvi (not on opioids). Consider abilify, vraylar. Start buspar for MDD, TINO. Could be ADHD. Could be bipolar, need to get to know her better. Olanzapine really helped. 4w    VISITS/APPOINTMENTS (BELOW):    \"Yina\"    Iron infusions  R/O ADHD: [employer] school: good student, reader of the month, got in trouble for not completing one assignment, getting out of her seat \"trying to be nice and help others\"    2025:   In person interview:  \"I'm doing good.\"  MH stable  Doing well on lybalvi  Patient had trouble sleeping with AVH prior to being placed on lybalvi.  MDD: stable  TINO: stable  Panic attacks: stable  Energy: stable  Concentration: stable  Insomnia: stable  AIMS if on antipsychotic: no abnl movements  Eating/Weight: 239, 236 lbs  Refills: y  Substances: def  Therapy:   Medication compliant: y  SE: n  No SI HI AVH.      2025:   In person interview:  \"I'm ok.\"  I'm not losing weight.  MH stable  \"I'm really great\"  MDD: stable  TINO: stable  Panic attacks: stable  Energy: stable  Concentration: stable  Insomnia: stable  AIMS if on antipsychotic: no abnl movements  Eating/Weight: 236 " "lbs  Refills: y  Substances: def  Therapy: n  Medication compliant: y  SE: n  No SI HI AVH.        02/14/2025: INITIAL VISIT Chart review:     Keith: On Lyrica 100 mg 3 times a day  Care Everywhere: a few non behavioral health notes     Psychotropic medication chart review:  Present:  Olanzapine 5 mg at night  Viibryd 40 mg a day     Previously:  Lamictal 25 mg, 50 mg a day  Viibryd 20 mg a day     EKG: none  Procedures: none  Head imaging: MRI of the brain in 2020 shows no acute.  Ordered for hearing loss  Labs: May through September 2024: Abnormal thyroid studies, some abnormalities on CMP including glucose 137 CO2 31 potassium 3.3; low vitamin D; abnormal CBC with platelets 494;  Initial Chart Review Notes: Patient seen by primary care October 31.  Client was suicidal but then was taken into a back room and stated no plan.  She had increased Viibryd to 60 mg on her own.  No guns at home.  Because she was having so much back pain it is causing her depression.  Patient sees neurosurgery on the 12th for her back pain.  Patient was started on Zyprexa.     02/14/2025:   In person.  Interview:  His/Her Story: \"The olanzapine helps.\"  Need to lose weight to do surgery  \"I'm a whole lot better, really. Anything would just break me before.\"  Daughter is waiting for me in the car  My daughter is always saying why do you have so many projects around the house that you haven't finished  I need a job, a place to live on my own  My back pain led to having to stop working this past year  I lost a kid in 1990, we don't know how, fetal demise at 36w  Fhx: denies ADHD, bipolar  Sleeping? Improving, I used to stay up all night long  Eating? Wg 6 lbs in 6w  Energy? Getting better since started iron infusions  Depression/Mood:  Depressed mood, poor energy, poor concentration, weight gain, psychomotor retardation or agitation, insomnia.  Seasonal pattern: james are worse  Severity: Moderate  Duration: " decades  Anxiety:  Uncontrolled worrying, muscle tension, fatigue, poor concentration, feeling on edge, irritability, insomnia.  Severity: Moderate  Duration: decades  Panic attacks: yes  AIMS if on antipsychotic: denies abnl movements  Psych ROS: Positive for depression, anxiety.  Negative for psychosis and phi.  ADHD: possible  PTSD: def  No SI HI AVH.  Medication compliant: y    Access to Firearms: denies    PHQ-9 Depression Screening  PHQ-9 Total Score:     Little interest or pleasure in doing things?     Feeling down, depressed, or hopeless?     PHQ-2 Total Score     Trouble falling or staying asleep, or sleeping too much?     Feeling tired or having little energy?     Poor appetite or overeating?     Feeling bad about yourself - or that you are a failure or have let yourself or your family down?     Trouble concentrating on things, such as reading the newspaper or watching television?     Moving or speaking so slowly that other people could have noticed? Or the opposite - being so fidgety or restless that you have been moving around a lot more than usual?     Thoughts that you would be better off dead, or of hurting yourself in some way?     PHQ-9 Total Score     If you checked off any problems, how difficult have these problems made it for you to do your work, take care of things at home, or get along with other people?              TINO-7       Past Surgical History:  Past Surgical History:   Procedure Laterality Date    BREAST BIOPSY Left 01/09/2012    CHOLECYSTECTOMY      COLONOSCOPY  08/15/2016    CYST REMOVAL      left wrist    CYSTOSCOPY  06/27/2014    Office cystoscopy w/ Dr. Goncalves    EPIDURAL BLOCK  2022    LAPAROSCOPIC CHOLECYSTECTOMY      On file    TUBAL ABDOMINAL LIGATION      On file    WRIST SURGERY  1986       Problem List:  Patient Active Problem List   Diagnosis    Allergic rhinitis    Anxiety and depression    Dysmenorrhea    Gross hematuria    Microscopic hematuria    Helicobacter pylori  gastritis    Hematochezia    Irregular menses    Low back pain    Night sweats    Onychomycosis of toenail    Right sided abdominal pain    STD exposure    Stenosis of urinary meatus    Vitamin D deficiency    DDD (degenerative disc disease), lumbar    Primary hypertension    Lumbar disc disease with radiculopathy    Trochanteric bursitis of left hip    Left hip pain    Right hip pain    Osteoarthritis of both hips    Trochanteric bursitis of right hip    Iron deficiency    Malabsorption due to intolerance, not elsewhere classified       Allergy:   Allergies   Allergen Reactions    Azithromycin Nausea Only    Lisinopril Cough    Penicillins Unknown - Low Severity        Discontinued Medications:  Medications Discontinued During This Encounter   Medication Reason    OLANZapine (zyPREXA) 10 MG tablet Alternate therapy    OLANZapine-Samidorphan (Lybalvi) 10-10 MG tablet Reorder    OLANZapine-Samidorphan (Lybalvi) 10-10 MG tablet Reorder         Current Medications:   Current Outpatient Medications   Medication Sig Dispense Refill    ammonium lactate (LAC-HYDRIN) 12 % lotion Apply  topically to the appropriate area as directed As Needed for Irritation. 225 g 0    ammonium lactate (LAC-HYDRIN) 12 % lotion Apply  topically to the appropriate area as directed 2 (Two) Times a Day. 225 g 11    busPIRone (BUSPAR) 10 MG tablet Take 1 tablet by mouth 3 (Three) Times a Day. 90 tablet 2    ciclopirox (PENLAC) 8 % solution Apply  topically to the appropriate area as directed Every Night. 6 mL 1    cyclobenzaprine (FLEXERIL) 10 MG tablet Take 1 tablet by mouth 2 (Two) Times a Day As Needed for Muscle Spasms for up to 10 doses. 10 tablet 0    DULoxetine (CYMBALTA) 60 MG capsule Take 1 capsule by mouth 2 (Two) Times a Day. 60 capsule 2    fluticasone (FLONASE) 50 MCG/ACT nasal spray Administer 2 sprays into the nostril(s) as directed by provider Daily. 16 g 5    hydrOXYzine (ATARAX) 25 MG tablet Take 1 tablet by mouth Every 6 (Six)  Hours As Needed for Anxiety. 90 tablet 1    ibuprofen (ADVIL,MOTRIN) 600 MG tablet Take 1 tablet by mouth Every 8 (Eight) Hours As Needed for Mild Pain. 30 tablet 0    loratadine (Claritin) 10 MG tablet Take 1 tablet by mouth Daily. 90 tablet 3    losartan-hydrochlorothiazide (Hyzaar) 50-12.5 MG per tablet Take 1 tablet by mouth Daily. 90 tablet 1    OLANZapine-Samidorphan (Lybalvi) 10-10 MG tablet Take 1 tablet by mouth every night at bedtime. 30 tablet 5    omeprazole (priLOSEC) 40 MG capsule Take 1 capsule by mouth Daily. 90 capsule 1    oxybutynin XL (DITROPAN-XL) 10 MG 24 hr tablet Take 1 tablet by mouth Daily. 90 tablet 1    potassium chloride 10 MEQ CR tablet Take 1 tablet by mouth Daily. 90 tablet 1    pregabalin (LYRICA) 150 MG capsule Take 1 capsule by mouth 3 times a day.      terbinafine (lamiSIL) 250 MG tablet Take 1 tablet by mouth Daily for 90 days. 90 tablet 0    traMADol (ULTRAM) 50 MG tablet Take 1 tablet by mouth Every 8 (Eight) Hours As Needed for Moderate Pain. 30 tablet 1    valACYclovir (VALTREX) 500 MG tablet Take 1 tablet by mouth Daily. 30 tablet 0    OLANZapine-Samidorphan (Lybalvi) 10-10 MG tablet Take 1 tablet by mouth every night at bedtime. 28 tablet 0     No current facility-administered medications for this visit.       Past Medical History:  Past Medical History:   Diagnosis Date    ADHD (attention deficit hyperactivity disorder) 2024    Allergic     Allergic rhinitis     Anemia 10/10/24    Anxiety 09/11/2018    Arthritis     Arthritis of back 2020    Blood in stool     Cervical disc disorder 2021    Chronic pain disorder 2008    Back pain    Condition not found     MEATAL STENOSIS, UNSPECIFIED    CTS (carpal tunnel syndrome) 2015    Dental disease 2020    Depression 09/11/2018    Difficulty walking 2023    Dizziness 2019    Dysmenorrhea     Gross hematuria     Headache     Headache, tension-type 2022    Helicobacter positive gastritis 05/26/2016    Hip arthrosis 2024    HL (hearing  loss) 2020    Hyperlipidemia     Hypertension     Injury of back     Irregular menses     Low back pain 2014    Low back strain 2017    Lumbosacral disc disease 2020    Microscopic hematuria 06/10/2014    Night sweats     Nosebleed     Onychomycosis of toenail 2014    Osteopenia     Pap smear for cervical cancer screening 2016    Peripheral neuropathy     Right sided abdominal pain 2014    Screening mammogram, encounter for 2018    Sleep apnea     STD exposure     chlamydia    Thoracic disc disorder     Tobacco abuse     chronic    Vitamin D deficiency        Past Psychiatric History:  Began Treatment:   Diagnoses: unsure  Psychiatrist: denies  Therapist: Rober in   Admission History:Denies  Medication Trials:        Self Harm: Denies  Suicide Attempts:Denies   Psychosis, Anxiety, Depression: Denies    Substance Abuse History:   Types: cigs and mj  Withdrawal Symptoms:Denies  Longest Period Sober:Not Applicable   AA: Not applicable     Social History:  Martial Status:   Employed:No  Kids:Yes  House:Lives in a house   History: Denies    Social History     Socioeconomic History    Marital status:    Tobacco Use    Smoking status: Some Days     Current packs/day: 0.00     Average packs/day: 0.7 packs/day for 64.6 years (45.0 ttl pk-yrs)     Types: Cigarettes     Start date: 1989     Last attempt to quit: 2024     Years since quittin.9     Passive exposure: Past    Smokeless tobacco: Never    Tobacco comments:     Medication   Vaping Use    Vaping status: Never Used   Substance and Sexual Activity    Alcohol use: Not Currently     Alcohol/week: 7.0 standard drinks of alcohol     Types: 1 Cans of beer, 4 Shots of liquor, 2 Drinks containing 0.5 oz of alcohol per week     Comment: glass of wine every other day    Drug use: Not Currently     Types: Marijuana     Comment: For major relief for pain    Sexual activity: Yes      "Partners: Male     Birth control/protection: Condom, None       Family History:   Suicide Attempts: Denies  Suicide Completions:Denies      Family History   Problem Relation Age of Onset    Stomach cancer Mother     Alzheimer's disease Mother     Diabetes Mother     Dementia Mother     Anxiety disorder Mother     Leukemia Father     ADD / ADHD Father     Heart disease Sister     Hyperlipidemia Sister     Kidney disease Sister     Heart disease Brother     No Known Problems Maternal Aunt     No Known Problems Paternal Aunt     No Known Problems Maternal Uncle     No Known Problems Paternal Uncle     No Known Problems Maternal Grandfather     Cancer Maternal Grandmother     Leukemia Paternal Grandfather     No Known Problems Paternal Grandmother     No Known Problems Cousin     Stroke Other         AUNT;UNCLE    Heart disease Other         AUNT,UNCLE    Diabetes Other         AUNT,UNCLE    Arthritis Sister     Hypertension Sister     Alcohol abuse Neg Hx     Bipolar disorder Neg Hx     Depression Neg Hx     Drug abuse Neg Hx     OCD Neg Hx     Paranoid behavior Neg Hx     Schizophrenia Neg Hx     Seizures Neg Hx     Self-Injurious Behavior  Neg Hx     Suicide Attempts Neg Hx        Developmental History:     Childhood: Denies Abuse  High School:Completed  College: some, doing some now (medical coding)    Mental Status Exam  Appearance  : groomed, good eye contact, normal street clothes  Behavior  : pleasant and cooperative  Motor  : No abnormal, uses a cane  Speech  :normal rhythm, rate, volume, tone, not hyperverbal, not pressured, normal prosidy  Mood  : \"I'm doing great\"  Affect  : euthymic, mood congruent, good variability  Thought Content  : negative suicidal ideations, negative homicidal ideations, negative obsessions  Perceptions  : negative auditory hallucinations, negative visual hallucinations  Thought Process  : linear  Insight/Judgement  : Fair/fair  Cognition  : grossly intact  Attention   : " intact      Review of Systems:  Review of Systems   Constitutional: Negative.  Positive for fatigue. Negative for diaphoresis.   HENT: Negative.  Negative for drooling.    Eyes: Negative.  Negative for visual disturbance.   Respiratory: Negative.  Negative for cough and shortness of breath.    Cardiovascular: Negative.  Negative for chest pain, palpitations and leg swelling.   Gastrointestinal: Negative.  Negative for nausea and vomiting.   Endocrine: Negative.  Positive for cold intolerance and heat intolerance.   Genitourinary: Negative.  Negative for difficulty urinating.   Musculoskeletal: Negative.  Positive for joint swelling.   Skin: Negative.    Allergic/Immunologic: Negative.  Negative for immunocompromised state.   Neurological: Negative.  Positive for numbness. Negative for dizziness, seizures and speech difficulty.   Hematological: Negative.    Psychiatric/Behavioral: Negative.  Positive for confusion.        Physical Exam:  Physical Exam    Vital Signs:   /93   Pulse 82   Wt 108 kg (239 lb)   SpO2 97%   BMI 41.02 kg/m²      Lab Results:   Office Visit on 04/23/2025   Component Date Value Ref Range Status    Diagnosis 04/23/2025 Comment   Final    NEGATIVE FOR INTRAEPITHELIAL LESION OR MALIGNANCY.  THIS SPECIMEN WAS RESCREENED AS PART OF OUR  PROGRAM.    Specimen adequacy: 04/23/2025 Comment   Final    Satisfactory for evaluation.  Endocervical and/or squamous metaplastic  cells (endocervical component) are present.    Clinician Provided ICD-10: 04/23/2025 Comment   Final    Z01.419    Performed by: 04/23/2025 Comment   Final    Shannon Sacksteder, Cytologist ([facility])    QC reviewed by: 04/23/2025 Comment   Final    Neeta Mario, Supervisory Cytologist ([facility])    . 04/23/2025 .   Final    Note: 04/23/2025 Comment   Final    The Pap smear is a screening test designed to aid in the detection of  premalignant and malignant conditions of the uterine cervix.  It is not a  diagnostic  procedure and should not be used as the sole means of detecting  cervical cancer.  Both false-positive and false-negative reports do occur.    Method: 04/23/2025 Comment   Final    This liquid based ThinPrep(R) pap test was screened with the  use of an image guided system.    HPV Aptima 04/23/2025 Negative  Negative Final    This nucleic acid amplification test detects fourteen high-risk  HPV types (16,18,31,33,35,39,45,51,52,56,58,59,66,68) without  differentiation.   Lab on 04/03/2025   Component Date Value Ref Range Status    Iron 04/03/2025 60  37 - 145 mcg/dL Final    Iron Saturation (TSAT) 04/03/2025 14 (L)  20 - 50 % Final    Transferrin 04/03/2025 290  200 - 360 mg/dL Final    TIBC 04/03/2025 432  298 - 536 mcg/dL Final    Ferritin 04/03/2025 382.40 (H)  13.00 - 150.00 ng/mL Final    Vitamin B-12 04/03/2025 540  211 - 946 pg/mL Final    Folate 04/03/2025 12.20  4.78 - 24.20 ng/mL Final    WBC 04/03/2025 13.34 (H)  3.40 - 10.80 10*3/mm3 Final    RBC 04/03/2025 5.05  3.77 - 5.28 10*6/mm3 Final    Hemoglobin 04/03/2025 14.6  12.0 - 15.9 g/dL Final    Hematocrit 04/03/2025 44.2  34.0 - 46.6 % Final    MCV 04/03/2025 87.5  79.0 - 97.0 fL Final    MCH 04/03/2025 28.9  26.6 - 33.0 pg Final    MCHC 04/03/2025 33.0  31.5 - 35.7 g/dL Final    RDW 04/03/2025 15.6 (H)  12.3 - 15.4 % Final    RDW-SD 04/03/2025 49.7  37.0 - 54.0 fl Final    MPV 04/03/2025 9.2  6.0 - 12.0 fL Final    Platelets 04/03/2025 460 (H)  140 - 450 10*3/mm3 Final    Neutrophil % 04/03/2025 57.7  42.7 - 76.0 % Final    Lymphocyte % 04/03/2025 31.4  19.6 - 45.3 % Final    Monocyte % 04/03/2025 7.1  5.0 - 12.0 % Final    Eosinophil % 04/03/2025 2.1  0.3 - 6.2 % Final    Basophil % 04/03/2025 0.6  0.0 - 1.5 % Final    Immature Grans % 04/03/2025 1.1 (H)  0.0 - 0.5 % Final    Neutrophils, Absolute 04/03/2025 7.69 (H)  1.70 - 7.00 10*3/mm3 Final    Lymphocytes, Absolute 04/03/2025 4.19 (H)  0.70 - 3.10 10*3/mm3 Final    Monocytes, Absolute 04/03/2025  0.95 (H)  0.10 - 0.90 10*3/mm3 Final    Eosinophils, Absolute 04/03/2025 0.28  0.00 - 0.40 10*3/mm3 Final    Basophils, Absolute 04/03/2025 0.08  0.00 - 0.20 10*3/mm3 Final    Immature Grans, Absolute 04/03/2025 0.15 (H)  0.00 - 0.05 10*3/mm3 Final    nRBC 04/03/2025 0.0  0.0 - 0.2 /100 WBC Final    RBC Morphology 04/03/2025 Normal  Normal Final    WBC Morphology 04/03/2025 Normal  Normal Final    Platelet Morphology 04/03/2025 Normal  Normal Final   Office Visit on 02/27/2025   Component Date Value Ref Range Status    Total Cholesterol 02/27/2025 179  0 - 200 mg/dL Final    Triglycerides 02/27/2025 99  0 - 150 mg/dL Final    HDL Cholesterol 02/27/2025 53  40 - 60 mg/dL Final    LDL Cholesterol  02/27/2025 108 (H)  0 - 100 mg/dL Final    VLDL Cholesterol 02/27/2025 18  5 - 40 mg/dL Final    LDL/HDL Ratio 02/27/2025 2.00   Final    Glucose 02/27/2025 82  65 - 99 mg/dL Final    BUN 02/27/2025 17  6 - 20 mg/dL Final    Creatinine 02/27/2025 0.92  0.57 - 1.00 mg/dL Final    Sodium 02/27/2025 138  136 - 145 mmol/L Final    Potassium 02/27/2025 3.8  3.5 - 5.2 mmol/L Final    Chloride 02/27/2025 101  98 - 107 mmol/L Final    CO2 02/27/2025 28.3  22.0 - 29.0 mmol/L Final    Calcium 02/27/2025 9.8  8.6 - 10.5 mg/dL Final    Total Protein 02/27/2025 7.3  6.0 - 8.5 g/dL Final    Albumin 02/27/2025 3.9  3.5 - 5.2 g/dL Final    ALT (SGPT) 02/27/2025 17  1 - 33 U/L Final    AST (SGOT) 02/27/2025 18  1 - 32 U/L Final    Alkaline Phosphatase 02/27/2025 88  39 - 117 U/L Final    Total Bilirubin 02/27/2025 <0.2  0.0 - 1.2 mg/dL Final    Globulin 02/27/2025 3.4  gm/dL Final    A/G Ratio 02/27/2025 1.1  g/dL Final    BUN/Creatinine Ratio 02/27/2025 18.5  7.0 - 25.0 Final    Anion Gap 02/27/2025 8.7  5.0 - 15.0 mmol/L Final    eGFR 02/27/2025 74.1  >60.0 mL/min/1.73 Final    WBC 02/27/2025 9.05  3.40 - 10.80 10*3/mm3 Final    RBC 02/27/2025 4.37  3.77 - 5.28 10*6/mm3 Final    Hemoglobin 02/27/2025 12.5  12.0 - 15.9 g/dL Final     Hematocrit 02/27/2025 39.2  34.0 - 46.6 % Final    MCV 02/27/2025 89.7  79.0 - 97.0 fL Final    MCH 02/27/2025 28.6  26.6 - 33.0 pg Final    MCHC 02/27/2025 31.9  31.5 - 35.7 g/dL Final    RDW 02/27/2025 13.9  12.3 - 15.4 % Final    RDW-SD 02/27/2025 46.0  37.0 - 54.0 fl Final    MPV 02/27/2025 9.6  6.0 - 12.0 fL Final    Platelets 02/27/2025 448  140 - 450 10*3/mm3 Final    TSH 02/27/2025 1.360  0.270 - 4.200 uIU/mL Final   Office Visit on 12/31/2024   Component Date Value Ref Range Status    Occult Blood, Fecal by Immunoassay 01/09/2025 Negative  Negative Final   Lab on 12/18/2024   Component Date Value Ref Range Status    Total Cholesterol 12/18/2024 176  0 - 200 mg/dL Final    Triglycerides 12/18/2024 98  0 - 150 mg/dL Final    HDL Cholesterol 12/18/2024 57  40 - 60 mg/dL Final    LDL Cholesterol  12/18/2024 101 (H)  0 - 100 mg/dL Final    VLDL Cholesterol 12/18/2024 18  5 - 40 mg/dL Final    LDL/HDL Ratio 12/18/2024 1.74   Final    Iron 12/18/2024 36 (L)  37 - 145 mcg/dL Final    Iron Saturation (TSAT) 12/18/2024 9 (L)  20 - 50 % Final    Transferrin 12/18/2024 272  200 - 360 mg/dL Final    TIBC 12/18/2024 405  298 - 536 mcg/dL Final    Ferritin 12/18/2024 106.10  13.00 - 150.00 ng/mL Final    Glucose 12/18/2024 97  65 - 99 mg/dL Final    BUN 12/18/2024 15  6 - 20 mg/dL Final    Creatinine 12/18/2024 0.79  0.57 - 1.00 mg/dL Final    Sodium 12/18/2024 136  136 - 145 mmol/L Final    Potassium 12/18/2024 4.0  3.5 - 5.2 mmol/L Final    Chloride 12/18/2024 98  98 - 107 mmol/L Final    CO2 12/18/2024 31.0 (H)  22.0 - 29.0 mmol/L Final    Calcium 12/18/2024 9.6  8.6 - 10.5 mg/dL Final    Total Protein 12/18/2024 7.4  6.0 - 8.5 g/dL Final    Albumin 12/18/2024 3.9  3.5 - 5.2 g/dL Final    ALT (SGPT) 12/18/2024 20  1 - 33 U/L Final    AST (SGOT) 12/18/2024 23  1 - 32 U/L Final    Alkaline Phosphatase 12/18/2024 120 (H)  39 - 117 U/L Final    Total Bilirubin 12/18/2024 <0.2  0.0 - 1.2 mg/dL Final    Globulin 12/18/2024  3.5  gm/dL Final    A/G Ratio 12/18/2024 1.1  g/dL Final    BUN/Creatinine Ratio 12/18/2024 19.0  7.0 - 25.0 Final    Anion Gap 12/18/2024 7.0  5.0 - 15.0 mmol/L Final    eGFR 12/18/2024 89.0  >60.0 mL/min/1.73 Final    TSH 12/18/2024 0.745  0.270 - 4.200 uIU/mL Final    Hemoglobin A1C 12/18/2024 6.10 (H)  4.80 - 5.60 % Final    WBC 12/18/2024 8.72  3.40 - 10.80 10*3/mm3 Final    RBC 12/18/2024 4.42  3.77 - 5.28 10*6/mm3 Final    Hemoglobin 12/18/2024 12.2  12.0 - 15.9 g/dL Final    Hematocrit 12/18/2024 39.6  34.0 - 46.6 % Final    MCV 12/18/2024 89.6  79.0 - 97.0 fL Final    MCH 12/18/2024 27.6  26.6 - 33.0 pg Final    MCHC 12/18/2024 30.8 (L)  31.5 - 35.7 g/dL Final    RDW 12/18/2024 15.6 (H)  12.3 - 15.4 % Final    RDW-SD 12/18/2024 51.5  37.0 - 54.0 fl Final    MPV 12/18/2024 8.8  6.0 - 12.0 fL Final    Platelets 12/18/2024 526 (H)  140 - 450 10*3/mm3 Final    Neutrophil % 12/18/2024 63.0  42.7 - 76.0 % Final    Lymphocyte % 12/18/2024 26.1  19.6 - 45.3 % Final    Monocyte % 12/18/2024 6.8  5.0 - 12.0 % Final    Eosinophil % 12/18/2024 2.3  0.3 - 6.2 % Final    Basophil % 12/18/2024 0.7  0.0 - 1.5 % Final    Immature Grans % 12/18/2024 1.1 (H)  0.0 - 0.5 % Final    Neutrophils, Absolute 12/18/2024 5.49  1.70 - 7.00 10*3/mm3 Final    Lymphocytes, Absolute 12/18/2024 2.28  0.70 - 3.10 10*3/mm3 Final    Monocytes, Absolute 12/18/2024 0.59  0.10 - 0.90 10*3/mm3 Final    Eosinophils, Absolute 12/18/2024 0.20  0.00 - 0.40 10*3/mm3 Final    Basophils, Absolute 12/18/2024 0.06  0.00 - 0.20 10*3/mm3 Final    Immature Grans, Absolute 12/18/2024 0.10 (H)  0.00 - 0.05 10*3/mm3 Final    nRBC 12/18/2024 0.0  0.0 - 0.2 /100 WBC Final   Admission on 11/14/2024, Discharged on 11/14/2024   Component Date Value Ref Range Status    QT Interval 11/14/2024 315  ms Final    QTC Interval 11/14/2024 415  ms Final    Glucose 11/14/2024 103 (H)  65 - 99 mg/dL Final    BUN 11/14/2024 15  6 - 20 mg/dL Final    Creatinine 11/14/2024 0.86   0.57 - 1.00 mg/dL Final    Sodium 11/14/2024 140  136 - 145 mmol/L Final    Potassium 11/14/2024 4.2  3.5 - 5.2 mmol/L Final    Chloride 11/14/2024 103  98 - 107 mmol/L Final    CO2 11/14/2024 27.4  22.0 - 29.0 mmol/L Final    Calcium 11/14/2024 9.4  8.6 - 10.5 mg/dL Final    Total Protein 11/14/2024 7.4  6.0 - 8.5 g/dL Final    Albumin 11/14/2024 4.0  3.5 - 5.2 g/dL Final    ALT (SGPT) 11/14/2024 33  1 - 33 U/L Final    AST (SGOT) 11/14/2024 18  1 - 32 U/L Final    Alkaline Phosphatase 11/14/2024 106  39 - 117 U/L Final    Total Bilirubin 11/14/2024 0.2  0.0 - 1.2 mg/dL Final    Globulin 11/14/2024 3.4  gm/dL Final    A/G Ratio 11/14/2024 1.2  g/dL Final    BUN/Creatinine Ratio 11/14/2024 17.4  7.0 - 25.0 Final    Anion Gap 11/14/2024 9.6  5.0 - 15.0 mmol/L Final    eGFR 11/14/2024 80.4  >60.0 mL/min/1.73 Final    HS Troponin T 11/14/2024 <6  <14 ng/L Final    Magnesium 11/14/2024 2.1  1.6 - 2.6 mg/dL Final    Color, UA 11/14/2024 Yellow  Yellow, Straw Final    Appearance, UA 11/14/2024 Clear  Clear Final    pH, UA 11/14/2024 <=5.0  5.0 - 8.0 Final    Specific Gravity, UA 11/14/2024 1.022  1.005 - 1.030 Final    Glucose, UA 11/14/2024 Negative  Negative Final    Ketones, UA 11/14/2024 Negative  Negative Final    Bilirubin, UA 11/14/2024 Negative  Negative Final    Blood, UA 11/14/2024 Negative  Negative Final    Protein, UA 11/14/2024 Negative  Negative Final    Leuk Esterase, UA 11/14/2024 Small (1+) (A)  Negative Final    Nitrite, UA 11/14/2024 Negative  Negative Final    Urobilinogen, UA 11/14/2024 0.2 E.U./dL  0.2 - 1.0 E.U./dL Final    Extra Tube 11/14/2024 Hold for add-ons.   Final    Auto resulted.    Extra Tube 11/14/2024 hold for add-on   Final    Auto resulted    Extra Tube 11/14/2024 Hold for add-ons.   Final    Auto resulted.    Extra Tube 11/14/2024 Hold for add-ons.   Final    Auto resulted    WBC 11/14/2024 10.13  3.40 - 10.80 10*3/mm3 Final    RBC 11/14/2024 4.38  3.77 - 5.28 10*6/mm3 Final     Hemoglobin 11/14/2024 12.4  12.0 - 15.9 g/dL Final    Hematocrit 11/14/2024 39.5  34.0 - 46.6 % Final    MCV 11/14/2024 90.2  79.0 - 97.0 fL Final    MCH 11/14/2024 28.3  26.6 - 33.0 pg Final    MCHC 11/14/2024 31.4 (L)  31.5 - 35.7 g/dL Final    RDW 11/14/2024 15.9 (H)  12.3 - 15.4 % Final    RDW-SD 11/14/2024 52.1  37.0 - 54.0 fl Final    MPV 11/14/2024 8.5  6.0 - 12.0 fL Final    Platelets 11/14/2024 459 (H)  140 - 450 10*3/mm3 Final    Neutrophil % 11/14/2024 63.0  42.7 - 76.0 % Final    Lymphocyte % 11/14/2024 25.9  19.6 - 45.3 % Final    Monocyte % 11/14/2024 8.4  5.0 - 12.0 % Final    Eosinophil % 11/14/2024 1.3  0.3 - 6.2 % Final    Basophil % 11/14/2024 0.8  0.0 - 1.5 % Final    Immature Grans % 11/14/2024 0.6 (H)  0.0 - 0.5 % Final    Neutrophils, Absolute 11/14/2024 6.39  1.70 - 7.00 10*3/mm3 Final    Lymphocytes, Absolute 11/14/2024 2.62  0.70 - 3.10 10*3/mm3 Final    Monocytes, Absolute 11/14/2024 0.85  0.10 - 0.90 10*3/mm3 Final    Eosinophils, Absolute 11/14/2024 0.13  0.00 - 0.40 10*3/mm3 Final    Basophils, Absolute 11/14/2024 0.08  0.00 - 0.20 10*3/mm3 Final    Immature Grans, Absolute 11/14/2024 0.06 (H)  0.00 - 0.05 10*3/mm3 Final    nRBC 11/14/2024 0.0  0.0 - 0.2 /100 WBC Final    RBC, UA 11/14/2024 None Seen  None Seen, 0-2 /HPF Final    WBC, UA 11/14/2024 6-10 (A)  None Seen, 0-2 /HPF Final    Bacteria, UA 11/14/2024 None Seen  None Seen /HPF Final    Squamous Epithelial Cells, UA 11/14/2024 13-20 (A)  None Seen, 0-2 /HPF Final    Hyaline Casts, UA 11/14/2024 None Seen  None Seen /LPF Final    Methodology 11/14/2024 Automated Microscopy   Final       EKG Results:  No orders to display       Imaging Results:  XR Spine Lumbar Complete With Flex & Ext    Result Date: 12/4/2024  Impression: There is no evidence of fracture. There is grade 1 anterolisthesis of L4 on L5 which does appear dynamic, likely mildly exaggerated in flexion. No additional listhesis or subluxation is evident. Spondylosis  changes appear similar to prior MRI, likely most advanced at L4-5. The SI joints appear symmetric. Electronically Signed: Rio Garcia MD  12/4/2024 2:06 PM EST  Workstation ID: [id number]    XR Chest 1 View    Result Date: 11/14/2024  Impression: No acute cardiopulmonary process. Electronically Signed: Dhruv Chacko MD  11/14/2024 11:22 AM EST  Workstation ID: [id number]        Assessment & Plan   Diagnoses and all orders for this visit:    1. Bipolar I disorder, most recent episode depressed, in full remission (Primary)  -     OLANZapine-Samidorphan (Lybalvi) 10-10 MG tablet; Take 1 tablet by mouth every night at bedtime.  Dispense: 30 tablet; Refill: 5  -     OLANZapine-Samidorphan (Lybalvi) 10-10 MG tablet; Take 1 tablet by mouth every night at bedtime.  Dispense: 28 tablet; Refill: 0    2. Generalized anxiety disorder  -     Discontinue: OLANZapine-Samidorphan (Lybalvi) 10-10 MG tablet; Take 1 tablet by mouth every night at bedtime.  Dispense: 28 tablet; Refill: 0  -     OLANZapine-Samidorphan (Lybalvi) 10-10 MG tablet; Take 1 tablet by mouth every night at bedtime.  Dispense: 30 tablet; Refill: 5  -     OLANZapine-Samidorphan (Lybalvi) 10-10 MG tablet; Take 1 tablet by mouth every night at bedtime.  Dispense: 28 tablet; Refill: 0    3. Marijuana use, continuous    4. Insomnia due to mental condition  -     Discontinue: OLANZapine-Samidorphan (Lybalvi) 10-10 MG tablet; Take 1 tablet by mouth every night at bedtime.  Dispense: 28 tablet; Refill: 0  -     OLANZapine-Samidorphan (Lybalvi) 10-10 MG tablet; Take 1 tablet by mouth every night at bedtime.  Dispense: 30 tablet; Refill: 5  -     OLANZapine-Samidorphan (Lybalvi) 10-10 MG tablet; Take 1 tablet by mouth every night at bedtime.  Dispense: 28 tablet; Refill: 0    5. Panic attacks  -     Discontinue: OLANZapine-Samidorphan (Lybalvi) 10-10 MG tablet; Take 1 tablet by mouth every night at bedtime.  Dispense: 28 tablet; Refill: 0  -     OLANZapine-Samidorphan  (Lybalvi) 10-10 MG tablet; Take 1 tablet by mouth every night at bedtime.  Dispense: 30 tablet; Refill: 5  -     OLANZapine-Samidorphan (Lybalvi) 10-10 MG tablet; Take 1 tablet by mouth every night at bedtime.  Dispense: 28 tablet; Refill: 0    6. Snoring        Visit Diagnoses:    ICD-10-CM ICD-9-CM   1. Bipolar I disorder, most recent episode depressed, in full remission  F31.76 296.56   2. Generalized anxiety disorder  F41.1 300.02   3. Marijuana use, continuous  F12.90 305.21   4. Insomnia due to mental condition  F51.05 300.9     327.02   5. Panic attacks  F41.0 300.01   6. Snoring  R06.83 786.09     05/08/2025: Stable. No changes. Patient had trouble sleeping with AVH prior to being placed on lybalvi, which is consistent with bipolar I disorder. Re-sent in lybalvi with new dx code.    Acknowledged and normalized patient's thoughts, feelings, and concerns. Allowed patient to freely discuss and process issues, such as:  Anxiety and depression regarding family conflict/relationships.  Anxiety and depression regarding relationships.  ... using Rogerian psychotherapeutic techniques including unconditional positive regard, reflective listening, and demonstrating clear empathy, with the goal of ameliorating symptoms and maintaining, restoring, or improving self-esteem, adaptive skills, and ego or psychological functions (Aidan et al. 1991), the long-term goal of which is to develop a better, healthier perspective and help the patient bear their circumstances more easily.  Time (minutes) spent providing supportive psychotherapy: 10  (This time is exclusive to the therapy session and separate from the time spent on activities used to meet the criteria for the E/M service (history, exam, medical decision-making).)  Start: 3:10  Stop: 3:20  Functional status: mild impairment  Treatment plan: Medication management and supportive psychotherapy  Prognosis: good  Progress: stable  6w    03/21/2025: Sorted out some  confusion re: meds for patient. Pt now understands that the lybalvi replaces olanzapine. Stable, no changes. Gave her samples of lybalvi and sent in to the pharmacy. Wants to talk to a therapist to talk about self-esteem; referred to John. [place] helped.    02/14/2025: ciro, sleep medicine consult. Switch olanzapine to lybalvi (not on opioids). Consider abilify, vraylar. Start buspar for MDD, TINO. Could be ADHD. Could be bipolar, need to get to know her better. Olanzapine really helped. 4w    PLAN:  Safety: No acute safety concerns  Therapy: None, possibly later  Risk Assessment: Risk of self-harm acutely is moderate.  Risk factors include anxiety disorder, mood disorder, and recent psychosocial stressors (pandemic). Protective factors include no family history, denies access to guns/weapons, no present SI, no history of suicide attempts or self-harm in the past, minimal AODA, healthcare seeking, future orientation, willingness to engage in care.  Risk of self-harm chronically is also moderate, but could be further elevated in the event of treatment noncompliance and/or AODA.  Meds:  CONTINUE buspar 10 mg tid. Risks, benefits, alternatives discussed with patient including nausea, GI upset, mild sedation, falls risk.  Use care when operating vehicle, vessel, or machine. After discussion of these risks and benefits, the patient voiced understanding and agreed to proceed.  CONTINUE cymbalta 60 mg bid. Risks, benefits, alternatives discussed with patient including GI upset, nausea vomiting diarrhea, theoretical decrease of seizure threshold predisposing the patient to a slightly higher seizure risk, headaches, sexual dysfunction, serotonin syndrome, bleeding risk, increased suicidality in patients 24 years and younger, switching to phi/hypomania.  Also constipation and urinary retention.  After discussion of these risks and benefits, the patient voiced understanding and agreed to proceed.  CONTINUE lybalvi 10 mg  qhs. Risks, benefits, alternatives discussed with patient including nausea and vomiting, GI upset, sedation, dizziness/falls risk, akathisia, hypotension, increased appetite, lowering of seizure threshold, theoretical risk of tardive dyskinesia, movement issues. Use care when operating vehicle, vessel, or machine. After discussion of these risks and benefits, the patient voiced understanding and agreed to proceed.  S/P:  Olanzapine 10 mg qhs. (Switched to lybalvi 2/25)  Labs: none    Patient screened positive for depression based on a PHQ-9 score of 8 on 5/2/2025. Follow-up recommendations include: Prescribed antidepressant medication treatment and Suicide Risk Assessment performed.           TREATMENT PLAN/GOALS: Continue supportive psychotherapy efforts and medications as indicated. Treatment and medication options discussed during today's visit. Patient acknowledged and verbally consented to continue with current treatment plan and was educated on the importance of compliance with treatment and follow-up appointments.    MEDICATION ISSUES:  FEMI reviewed as expected.  Discussed medication options and treatment plan of prescribed medication as well as the risks, benefits, and side effects including potential falls, possible impaired driving and metabolic adversities among others. Patient is agreeable to call the office with any worsening of symptoms or onset of side effects. Patient is agreeable to call 911 or go to the nearest ER should he/she begin having SI/HI. No medication side effects or related complaints today.     MEDS ORDERED DURING VISIT:  New Medications Ordered This Visit   Medications    OLANZapine-Samidorphan (Lybalvi) 10-10 MG tablet     Sig: Take 1 tablet by mouth every night at bedtime.     Dispense:  30 tablet     Refill:  5     Lot Number?:   [id number]     Expiration Date?:   9/1/2026     Quantity:   28    OLANZapine-Samidorphan (Lybalvi) 10-10 MG tablet     Sig: Take 1 tablet by mouth every  night at bedtime.     Dispense:  28 tablet     Refill:  0     Lot Number?:   [id number]     Expiration Date?:   4/1/2027     Quantity:   28       Return in about 6 weeks (around 6/19/2025).         This document has been electronically signed by Flor Cantor MD  May 8, 2025 15:21 EDT    Dictated Utilizing Dragon Dictation: Part of this note may be an electronic transcription/translation of spoken language to printed text using the Dragon Dictation System.

## 2025-05-08 NOTE — TREATMENT PLAN
Anxiety:  3/10 progressing    Goals:  Patient will develop and implement behavioral and cognitive strategies to reduce anxiety and irrational fears, monthly, using Rogerian psychotherapy and CBT where appropriate.  Help patient explore past emotional issues in relation to present anxiety, monthly, until remission of symptoms, using Rogerian psychotherapy and CBT where appropriate.  Help patient develop an awareness of their cognitive and physical responses to anxiety, monthly, until remission of symptoms, using Rogerian psychotherapy and CBT where appropriate.          Depression:  2/10 progressing    Goals:  Patient will demonstrate the ability to initiate new constructive life skills outside of sessions on a consistent basis, monthly, using Rogerian psychotherapy and CBT where appropriate.  Assist patient in setting attainable activities of daily living goals, monthly, using Rogerian psychotherapy and CBT where appropriate.  Provide education about depression, monthly, using Rogerian psychotherapy and CBT where appropriate.  Assist patient in developing healthy coping strategies, monthly, using Rogerian psychotherapy and CBT where appropriate.    Rogerian psychotherapy and CBT will be used to help accomplish the above goals and manage depression and anxiety related to family conflict, medical conditions       I have discussed and reviewed this treatment plan with the patient.      Reviewed by Flor Cantor MD   05/08/2025

## 2025-05-09 ENCOUNTER — TELEPHONE (OUTPATIENT)
Dept: PSYCHIATRY | Facility: CLINIC | Age: 55
End: 2025-05-09
Payer: COMMERCIAL

## 2025-05-13 DIAGNOSIS — F41.0 PANIC ATTACKS: ICD-10-CM

## 2025-05-13 DIAGNOSIS — F33.1 MAJOR DEPRESSIVE DISORDER, RECURRENT EPISODE, MODERATE: ICD-10-CM

## 2025-05-13 DIAGNOSIS — F51.05 INSOMNIA DUE TO MENTAL CONDITION: ICD-10-CM

## 2025-05-13 DIAGNOSIS — M47.816 FACET ARTHRITIS OF LUMBAR REGION: ICD-10-CM

## 2025-05-13 DIAGNOSIS — F31.76 BIPOLAR I DISORDER, MOST RECENT EPISODE DEPRESSED, IN FULL REMISSION: ICD-10-CM

## 2025-05-13 DIAGNOSIS — F32.A ANXIETY AND DEPRESSION: ICD-10-CM

## 2025-05-13 DIAGNOSIS — F41.9 ANXIETY AND DEPRESSION: ICD-10-CM

## 2025-05-13 DIAGNOSIS — F41.1 GENERALIZED ANXIETY DISORDER: ICD-10-CM

## 2025-05-13 DIAGNOSIS — L74.4 ANHIDROSIS: ICD-10-CM

## 2025-05-13 DIAGNOSIS — M51.362 DEGENERATION OF INTERVERTEBRAL DISC OF LUMBAR REGION WITH DISCOGENIC BACK PAIN AND LOWER EXTREMITY PAIN: ICD-10-CM

## 2025-05-13 DIAGNOSIS — N32.81 OAB (OVERACTIVE BLADDER): ICD-10-CM

## 2025-05-13 DIAGNOSIS — I10 PRIMARY HYPERTENSION: ICD-10-CM

## 2025-05-13 RX ORDER — TRAMADOL HYDROCHLORIDE 50 MG/1
50 TABLET ORAL EVERY 8 HOURS PRN
Qty: 30 TABLET | Refills: 1 | Status: SHIPPED | OUTPATIENT
Start: 2025-05-13

## 2025-05-13 RX ORDER — OLANZAPINE AND SAMIDORPHAN L-MALATE 10; 10 MG/1; MG/1
1 TABLET, FILM COATED ORAL
Qty: 30 TABLET | Refills: 5 | OUTPATIENT
Start: 2025-05-13

## 2025-05-13 RX ORDER — BUSPIRONE HYDROCHLORIDE 10 MG/1
10 TABLET ORAL 3 TIMES DAILY
Qty: 90 TABLET | Refills: 5 | Status: SHIPPED | OUTPATIENT
Start: 2025-05-13

## 2025-05-13 RX ORDER — DULOXETIN HYDROCHLORIDE 60 MG/1
60 CAPSULE, DELAYED RELEASE ORAL 2 TIMES DAILY
Qty: 60 CAPSULE | Refills: 2 | Status: SHIPPED | OUTPATIENT
Start: 2025-05-13

## 2025-05-13 RX ORDER — OLANZAPINE AND SAMIDORPHAN L-MALATE 10; 10 MG/1; MG/1
1 TABLET, FILM COATED ORAL
Qty: 28 TABLET | Refills: 0 | COMMUNITY
Start: 2025-05-13

## 2025-05-13 RX ORDER — OXYBUTYNIN CHLORIDE 10 MG/1
10 TABLET, EXTENDED RELEASE ORAL DAILY
Qty: 90 TABLET | Refills: 1 | Status: SHIPPED | OUTPATIENT
Start: 2025-05-13

## 2025-05-13 RX ORDER — LOSARTAN POTASSIUM AND HYDROCHLOROTHIAZIDE 12.5; 5 MG/1; MG/1
1 TABLET ORAL DAILY
Qty: 90 TABLET | Refills: 1 | Status: SHIPPED | OUTPATIENT
Start: 2025-05-13

## 2025-05-13 NOTE — TELEPHONE ENCOUNTER
NEXT VISIT WITH PROVIDER   Appointment with Flor Cantor MD (06/30/2025)     LAST SEEN BY PROVIDER   Office Visit with Flor Cantor MD (05/08/2025)     LAST MED REFILL  OLANZapine-Samidorphan (Lybalvi) 10-10 MG tablet (05/08/2025)  OLANZapine-Samidorphan (Lybalvi) 10-10 MG tablet (05/08/2025)    TOO SOON FOR REFILLS     PROVIDER PLEASE ADVISE

## 2025-05-19 ENCOUNTER — OFFICE VISIT (OUTPATIENT)
Dept: GASTROENTEROLOGY | Facility: CLINIC | Age: 55
End: 2025-05-19
Payer: COMMERCIAL

## 2025-05-19 VITALS
DIASTOLIC BLOOD PRESSURE: 89 MMHG | RESPIRATION RATE: 22 BRPM | SYSTOLIC BLOOD PRESSURE: 109 MMHG | OXYGEN SATURATION: 99 % | WEIGHT: 239 LBS | BODY MASS INDEX: 41.02 KG/M2 | HEART RATE: 74 BPM

## 2025-05-19 DIAGNOSIS — R10.13 EPIGASTRIC PAIN: ICD-10-CM

## 2025-05-19 DIAGNOSIS — E61.1 LOW IRON: ICD-10-CM

## 2025-05-19 DIAGNOSIS — R12 HEARTBURN: ICD-10-CM

## 2025-05-19 DIAGNOSIS — F10.90 ALCOHOL USE: ICD-10-CM

## 2025-05-19 DIAGNOSIS — K62.5 RECTAL BLEEDING: Primary | ICD-10-CM

## 2025-05-19 NOTE — PROGRESS NOTES
Chief Complaint  Hypertension (Follow up)    Subjective          Bianca Lara is a 54 y.o. female who presents to [facility]    History of Present Illness  History of Present Illness  The patient presents for evaluation of anxiety, depression, and acid reflux.    She reports a significant improvement in her anxiety symptoms, attributing this to the effectiveness of her current medication regimen, which includes hydroxyzine and duloxetine. She confirms having sufficient supply of these medications.    Her acid reflux was exacerbated the previous night, which she believes was triggered by the consumption of French fries. She experienced severe stomach discomfort, to the extent that she was unable to consume her burger. However, she managed to eat a fish sandwich without any adverse effects. She also reports no excessive intake of caffeine. She is currently taking omeprazole for her reflux.    Claritin is controlling her allergies.     Patient reports compliance with potassium placement.      The PHQ has not been completed during this encounter.               Health Maintenance Due   Topic Date Due    TDAP/TD VACCINES (1 - Tdap) Never done    ZOSTER VACCINE (1 of 2) Never done    LUNG CANCER SCREENING  Never done    ANNUAL PHYSICAL  12/11/2024        Review of Systems   Constitutional:  Positive for fatigue. Negative for chills.   HENT:  Negative for congestion and sore throat.    Respiratory:  Negative for cough.    Cardiovascular:  Negative for chest pain.   Gastrointestinal:  Negative for abdominal pain, nausea and vomiting.   Genitourinary:  Negative for dysuria.   Musculoskeletal:  Positive for myalgias. Negative for neck pain.   Skin:  Negative for rash.   Neurological:  Positive for weakness, numbness and headaches.          Medical History: has a past medical history of ADHD (attention deficit hyperactivity disorder) (2024), Allergic, Allergic rhinitis, Anemia  (10/10/24), Anxiety (09/11/2018), Arthritis, Arthritis of back (2020), Blood in stool, Cervical disc disorder (2021), Chronic pain disorder (2008), Condition not found, CTS (carpal tunnel syndrome) (2015), Dental disease (2020), Depression (09/11/2018), Difficulty walking (2023), Dizziness (2019), Dysmenorrhea, Gross hematuria, Headache, Headache, tension-type (2022), Helicobacter positive gastritis (05/26/2016), Hip arthrosis (2024), HL (hearing loss) (2020), Hyperlipidemia (2024), Hypertension, Injury of back, Irregular menses, Low back pain (07/09/2014), Low back strain (2017), Lumbosacral disc disease (2020), Microscopic hematuria (06/10/2014), Night sweats, Nosebleed, Onychomycosis of toenail (07/09/2014), Osteopenia, Pap smear for cervical cancer screening (05/16/2016), Peripheral neuropathy, Right sided abdominal pain (06/12/2014), Screening mammogram, encounter for (11/09/2018), Sleep apnea (2023), STD exposure (1993), Thoracic disc disorder (2021), Tobacco abuse, and Vitamin D deficiency.     Surgical History: has a past surgical history that includes Breast biopsy (Left, 01/09/2012); Cholecystectomy; Colonoscopy (08/15/2016); Cyst Removal; Cystoscopy (06/27/2014); Wrist surgery (1986); Epidural block injection (2022); Tubal ligation; and Laparoscopic cholecystectomy.     Family History: family history includes ADD / ADHD in her father; Alzheimer's disease in her mother; Anxiety disorder in her mother; Arthritis in her sister; Cancer in her maternal grandmother; Dementia in her mother; Diabetes in her mother and another family member; Heart disease in her brother, sister, and another family member; Hyperlipidemia in her sister; Hypertension in her sister; Kidney disease in her sister; Leukemia in her father and paternal grandfather; No Known Problems in her cousin, maternal aunt, maternal grandfather, maternal uncle, paternal aunt, paternal grandmother, and paternal uncle; Stomach cancer in her mother;  Stroke in an other family member.     Social History: reports that she quit smoking about a year ago. Her smoking use included cigarettes. She started smoking about 35 years ago. She has a 45 pack-year smoking history. She has been exposed to tobacco smoke. She has never used smokeless tobacco. She reports that she does not currently use alcohol after a past usage of about 7.0 standard drinks of alcohol per week. She reports that she does not currently use drugs after having used the following drugs: Marijuana.    Allergies: Azithromycin, Lisinopril, and Penicillins      Current Outpatient Medications:     amitriptyline (ELAVIL) 25 MG tablet, 1 tablet Daily., Disp: , Rfl:     ammonium lactate (LAC-HYDRIN) 12 % lotion, Apply  topically to the appropriate area as directed 2 (Two) Times a Day., Disp: 225 g, Rfl: 11    busPIRone (BUSPAR) 10 MG tablet, Take 1 tablet by mouth 3 (Three) Times a Day., Disp: 90 tablet, Rfl: 5    ciclopirox (PENLAC) 8 % solution, Apply  topically to the appropriate area as directed Every Night., Disp: 6 mL, Rfl: 1    cyclobenzaprine (FLEXERIL) 10 MG tablet, Take 1 tablet by mouth 2 (Two) Times a Day As Needed for Muscle Spasms for up to 10 doses., Disp: 10 tablet, Rfl: 0    DULoxetine (CYMBALTA) 60 MG capsule, Take 1 capsule by mouth 2 (Two) Times a Day., Disp: 60 capsule, Rfl: 2    fluticasone (FLONASE) 50 MCG/ACT nasal spray, Administer 2 sprays into the nostril(s) as directed by provider Daily., Disp: 16 g, Rfl: 5    hydrOXYzine (ATARAX) 25 MG tablet, Take 1 tablet by mouth Every 6 (Six) Hours As Needed for Anxiety., Disp: 90 tablet, Rfl: 1    ibuprofen (ADVIL,MOTRIN) 600 MG tablet, Take 1 tablet by mouth Every 8 (Eight) Hours As Needed for Mild Pain., Disp: 30 tablet, Rfl: 0    loratadine (Claritin) 10 MG tablet, Take 1 tablet by mouth Daily., Disp: 90 tablet, Rfl: 3    losartan-hydrochlorothiazide (Hyzaar) 50-12.5 MG per tablet, Take 1 tablet by mouth Daily., Disp: 90 tablet, Rfl: 1     "OLANZapine-Samidorphan (Lybalvi) 10-10 MG tablet, Take 1 tablet by mouth every night at bedtime., Disp: 30 tablet, Rfl: 5    omeprazole (priLOSEC) 40 MG capsule, Take 1 capsule by mouth Daily., Disp: 90 capsule, Rfl: 1    oxybutynin XL (DITROPAN-XL) 10 MG 24 hr tablet, Take 1 tablet by mouth Daily., Disp: 90 tablet, Rfl: 1    potassium chloride 10 MEQ CR tablet, Take 1 tablet by mouth Daily., Disp: 90 tablet, Rfl: 1    traMADol (ULTRAM) 50 MG tablet, Take 1 tablet by mouth Every 8 (Eight) Hours As Needed for Moderate Pain., Disp: 30 tablet, Rfl: 1    polyethylene glycol (GoLYTELY) 236 g solution, Take per office instructions (Patient not taking: Reported on 5/21/2025), Disp: 4000 mL, Rfl: 0    terbinafine (lamiSIL) 250 MG tablet, Take 1 tablet by mouth Daily for 90 days. (Patient not taking: Reported on 5/21/2025), Disp: 90 tablet, Rfl: 0    valACYclovir (VALTREX) 500 MG tablet, Take 1 tablet by mouth Daily. (Patient not taking: Reported on 5/21/2025), Disp: 30 tablet, Rfl: 0      Immunization History   Administered Date(s) Administered    COVID-19 (MODERNA) 1st,2nd,3rd Dose Monovalent 01/07/2021, 02/04/2021, 10/28/2021    COVID-19 (MODERNA) BIVALENT 12+YRS 10/17/2022    COVID-19 (PFIZER) 12YRS+ (COMIRNATY) 12/19/2024    Fluzone (or Fluarix & Flulaval for VFC) >6mos 10/17/2022, 11/09/2023    Fluzone Quad >6mos (Multi-dose) 10/29/2020    Influenza Inj MDCK Preserative Free 09/17/2024    Influenza, Unspecified 10/17/2022    Pneumococcal Conjugate 20-Valent (PCV20) 05/31/2024         Objective       Vitals:    05/21/25 1328   BP: 116/88   Pulse: 90   Temp: 97.1 °F (36.2 °C)   TempSrc: Temporal   SpO2: 93%   Weight: 107 kg (235 lb 9.6 oz)   Height: 162.6 cm (64.02\")      Body mass index is 40.42 kg/m².   Wt Readings from Last 3 Encounters:   05/21/25 107 kg (235 lb 9.6 oz)   05/19/25 108 kg (239 lb)   05/08/25 108 kg (239 lb)      BP Readings from Last 3 Encounters:   05/21/25 116/88   05/19/25 109/89   05/08/25 133/93 "             Physical Exam  Vitals reviewed.   Constitutional:       Appearance: Normal appearance. She is well-developed.   HENT:      Head: Normocephalic and atraumatic.   Eyes:      Conjunctiva/sclera: Conjunctivae normal.      Pupils: Pupils are equal, round, and reactive to light.   Cardiovascular:      Rate and Rhythm: Normal rate and regular rhythm.      Heart sounds: Normal heart sounds. No murmur heard.  Pulmonary:      Effort: Pulmonary effort is normal.      Breath sounds: Normal breath sounds. No wheezing or rhonchi.   Abdominal:      General: Bowel sounds are normal. There is no distension.      Palpations: Abdomen is soft.      Tenderness: There is no abdominal tenderness.   Skin:     General: Skin is warm and dry.   Neurological:      Mental Status: She is alert and oriented to person, place, and time.   Psychiatric:         Mood and Affect: Mood and affect normal.         Behavior: Behavior normal.         Thought Content: Thought content normal.         Judgment: Judgment normal.         Physical Exam  Respiratory: Clear to auscultation, no wheezing, rales or rhonchi  Gastrointestinal: Soft, no tenderness, no distention, no masses  Extremities: No edema, no cyanosis      Result Review :   Results           Common labs          12/18/2024    17:04 2/27/2025    11:33 4/3/2025    09:46   Common Labs   Glucose 97  82     BUN 15  17     Creatinine 0.79  0.92     Sodium 136  138     Potassium 4.0  3.8     Chloride 98  101     Calcium 9.6  9.8     Albumin 3.9  3.9     Total Bilirubin <0.2  <0.2     Alkaline Phosphatase 120  88     AST (SGOT) 23  18     ALT (SGPT) 20  17     WBC 8.72  9.05  13.34    Hemoglobin 12.2  12.5  14.6    Hematocrit 39.6  39.2  44.2    Platelets 526  448  460    Total Cholesterol 176  179     Triglycerides 98  99     HDL Cholesterol 57  53     LDL Cholesterol  101  108     Hemoglobin A1C 6.10                       Assessment and Plan        Diagnoses and all orders for this  visit:    1. Primary hypertension (Primary)  -     potassium chloride 10 MEQ CR tablet; Take 1 tablet by mouth Daily.  Dispense: 90 tablet; Refill: 1    2. Anxiety and depression  Assessment & Plan:  Patient's depression is a recurrent episode that is moderate without psychosis. Depression is in partial remission and stable.    Plan:   Continue current medication therapy     Followup in 6 months.     Orders:  -     hydrOXYzine (ATARAX) 25 MG tablet; Take 1 tablet by mouth Every 6 (Six) Hours As Needed for Anxiety.  Dispense: 90 tablet; Refill: 1    3. Nicotine dependence with current use  -      CT Chest Low Dose Cancer Screening WO; Future    4. Seasonal allergic rhinitis due to pollen  -     loratadine (Claritin) 10 MG tablet; Take 1 tablet by mouth Daily.  Dispense: 90 tablet; Refill: 3    5. Gastroesophageal reflux disease without esophagitis  -     omeprazole (priLOSEC) 40 MG capsule; Take 1 capsule by mouth Daily.  Dispense: 90 capsule; Refill: 1        Assessment & Plan  1. Anxiety  - Anxiety appears to be in partial remission with current medications.  - Currently taking hydroxyzine and duloxetine.  - Discussed the effectiveness of medications in managing anxiety.  - Prescription refill for hydroxyzine has been provided.    2. Depression  - Depression seems to be in partial remission with current medications.  - Currently taking duloxetine.  - Discussed the effectiveness of medications in managing depression.  - No changes to current medication regimen.    3. Gastroesophageal Reflux Disease (GERD)  - Reported reflux symptoms after eating French fries.  - Discussed dietary triggers and management of reflux symptoms.  - Prescription refill for omeprazole has been provided.    4. Medication Management  - Refills for Claritin and potassium have been provided.  - Blood pressure readings are within the normal range today.  - Discussed the importance of continuing current medications.  - Orders placed for low-dose  CT scan for lung cancer screening and mammogram due in 07/2025.    5. Health Maintenance  - Blood pressure readings are within the normal range today.  - Discussed the need for pre-existing labs, including iron levels.  - Patient will get labs done at an outpatient lab.  - Follow-up scheduled in 6 months.    7.  History of iron deficiency anemia  - [place]aine labs as previously ordered.      Patient was given instructions and counseling regarding her condition or for health maintenance advice. Please see specific information pulled into the AVS if appropriate.     TESSA Cerrato    Patient or patient representative verbalized consent for the use of Ambient Listening during the visit with  TESSA Cerrato for chart documentation. 5/21/2025  13:44 EDT

## 2025-05-19 NOTE — PROGRESS NOTES
Patient Name: Bianca Lara   Visit Date: 05/19/2025   Patient ID: [id number]  Provider: TESSA Butler    Sex: female  Location:  Location Address:  Location Phone: [phone]  [place] [address] [phone]    YOB: 1970  Age: 54 y.o.   Primary Care Provider Reina Putnam APRN      Referring Provider: No ref. provider found        Chief Complaint  NEW PATIENT  (-Oncologist  suggested upper GI/ [facility] testing /)    History of Present Illness    History of Present Illness  The patient presents for evaluation of heartburn, blood in stool.    She was referred by her heme/onc for an upper and lower GI examination due to persistent heartburn and the presence of blood in her stool. She has been on omeprazole for heartburn management for approximately 2 years, which has effectively controlled her symptoms. She has discontinued ibuprofen use. She reports no unintentional weight loss.    She reports experiencing diarrhea since her cholecystectomy in 2011, with a recent recurrence of loose stools accompanied by blood upon wiping. Her bowel movements are typically confined to the morning hours, Giles #5. She also experiences intermittent abdominal pain, particularly postprandial discomfort in the mid-abdomen.    She underwent iron infusions for a duration of 6 weeks earlier this year due to anemia, which resulted in some improvement. Hgb appears normal.     Past Surgical History: The patient had a cholecystectomy in 2011.    SOCIAL HISTORY  Alcohol: Drinks alcohol, approximately a shot every other day and more on weekends.      LAST EGD colonoscopy 1/4/2021: Schatzki's ring found, nonerosive esophagitis, small hiatal hernia, diffuse erythema in the antrum--increased mucosal eosinophils otherwise negative, normal duodenum; adequate prep, 2 small HP polyps in the sigmoid completely removed, grade 1 internal hemorrhoids Anusol was given, random colon biopsies negative     Past  Medical History:   Diagnosis Date    ADHD (attention deficit hyperactivity disorder) 2024    Allergic     Allergic rhinitis     Anemia 10/10/24    Anxiety 09/11/2018    Arthritis     Arthritis of back 2020    Blood in stool     Cervical disc disorder 2021    Chronic pain disorder 2008    Back pain    Condition not found     MEATAL STENOSIS, UNSPECIFIED    CTS (carpal tunnel syndrome) 2015    Dental disease 2020    Depression 09/11/2018    Difficulty walking 2023    Dizziness 2019    Dysmenorrhea     Gross hematuria     Headache     Headache, tension-type 2022    Helicobacter positive gastritis 05/26/2016    Hip arthrosis 2024    HL (hearing loss) 2020    Hyperlipidemia 2024    Hypertension     Injury of back     Irregular menses     Low back pain 07/09/2014    Low back strain 2017    Lumbosacral disc disease 2020    Microscopic hematuria 06/10/2014    Night sweats     Nosebleed     Onychomycosis of toenail 07/09/2014    Osteopenia     Pap smear for cervical cancer screening 05/16/2016    Peripheral neuropathy     Right sided abdominal pain 06/12/2014    Screening mammogram, encounter for 11/09/2018    Sleep apnea 2023    STD exposure 1993    chlamydia    Thoracic disc disorder 2021    Tobacco abuse     chronic    Vitamin D deficiency        Past Surgical History:   Procedure Laterality Date    BREAST BIOPSY Left 01/09/2012    CHOLECYSTECTOMY      COLONOSCOPY  08/15/2016    CYST REMOVAL      left wrist    CYSTOSCOPY  06/27/2014    Office cystoscopy w/ Dr. Goncalves    EPIDURAL BLOCK  2022    LAPAROSCOPIC CHOLECYSTECTOMY      On file    TUBAL ABDOMINAL LIGATION      On file    WRIST SURGERY  1986       Allergies   Allergen Reactions    Azithromycin Nausea Only    Lisinopril Cough    Penicillins Unknown - Low Severity       Family History   Problem Relation Age of Onset    Stomach cancer Mother     Alzheimer's disease Mother     Diabetes Mother     Dementia Mother     Anxiety disorder Mother     Leukemia Father     ADD /  ADHD Father     Heart disease Sister     Hyperlipidemia Sister     Kidney disease Sister     Heart disease Brother     No Known Problems Maternal Aunt     No Known Problems Paternal Aunt     No Known Problems Maternal Uncle     No Known Problems Paternal Uncle     No Known Problems Maternal Grandfather     Cancer Maternal Grandmother     Leukemia Paternal Grandfather     No Known Problems Paternal Grandmother     No Known Problems Cousin     Stroke Other         AUNT;UNCLE    Heart disease Other         AUNT,UNCLE    Diabetes Other         AUNT,UNCLE    Arthritis Sister     Hypertension Sister     Alcohol abuse Neg Hx     Bipolar disorder Neg Hx     Depression Neg Hx     Drug abuse Neg Hx     OCD Neg Hx     Paranoid behavior Neg Hx     Schizophrenia Neg Hx     Seizures Neg Hx     Self-Injurious Behavior  Neg Hx     Suicide Attempts Neg Hx         Social History     Tobacco Use    Smoking status: Some Days     Current packs/day: 0.00     Average packs/day: 0.7 packs/day for 64.6 years (45.0 ttl pk-yrs)     Types: Cigarettes     Start date: 1989     Last attempt to quit: 2024     Years since quittin.9     Passive exposure: Past    Smokeless tobacco: Never    Tobacco comments:     Medication   Vaping Use    Vaping status: Never Used   Substance Use Topics    Alcohol use: Not Currently     Alcohol/week: 7.0 standard drinks of alcohol     Types: 1 Cans of beer, 4 Shots of liquor, 2 Drinks containing 0.5 oz of alcohol per week     Comment: glass of wine every other day    Drug use: Not Currently     Types: Marijuana     Comment: For major relief for pain       Objective     Vital Signs:   /89   Pulse 74   Resp 22   Wt 108 kg (239 lb)   SpO2 99%   BMI 41.02 kg/m²       Physical Exam  Constitutional:       General: The patient is not in acute distress.     Appearance: Normal appearance.   HENT:      Head: Normocephalic and atraumatic.      Nose: Nose normal.   Pulmonary:      Effort: Pulmonary  effort is normal. No respiratory distress.   Abdominal:      General: Abdomen is flat.      Palpations: Abdomen is soft. There is no mass.      Tenderness: There is no abdominal tenderness. There is no guarding.   Musculoskeletal:      Cervical back: Neck supple.      Right lower leg: No edema.      Left lower leg: No edema.   Skin:     General: Skin is warm and dry.   Neurological:      General: No focal deficit present.      Mental Status: The patient is alert and oriented to person, place, and time.      Gait: Gait normal.   Psychiatric:         Mood and Affect: Mood normal.         Speech: Speech normal.         Behavior: Behavior normal.         Thought Content: Thought content normal.     Result Review :   The following data was reviewed by: TESSA Butler on 05/19/2025:    CBC w/diff          12/18/2024    17:04 2/27/2025    11:33 4/3/2025    09:46   CBC w/Diff   WBC 8.72  9.05  13.34    RBC 4.42  4.37  5.05    Hemoglobin 12.2  12.5  14.6    Hematocrit 39.6  39.2  44.2    MCV 89.6  89.7  87.5    MCH 27.6  28.6  28.9    MCHC 30.8  31.9  33.0    RDW 15.6  13.9  15.6    Platelets 526  448  460    Neutrophil Rel % 63.0   57.7    Immature Granulocyte Rel % 1.1   1.1    Lymphocyte Rel % 26.1   31.4    Monocyte Rel % 6.8   7.1    Eosinophil Rel % 2.3   2.1    Basophil Rel % 0.7   0.6      CMP          11/14/2024    10:42 12/18/2024    17:04 2/27/2025    11:33   CMP   Glucose 103  97  82    BUN 15  15  17    Creatinine 0.86  0.79  0.92    EGFR 80.4  89.0  74.1    Sodium 140  136  138    Potassium 4.2  4.0  3.8    Chloride 103  98  101    Calcium 9.4  9.6  9.8    Total Protein 7.4  7.4  7.3    Albumin 4.0  3.9  3.9    Globulin 3.4  3.5  3.4    Total Bilirubin 0.2  <0.2  <0.2    Alkaline Phosphatase 106  120  88    AST (SGOT) 18  23  18    ALT (SGPT) 33  20  17    Albumin/Globulin Ratio 1.2  1.1  1.1    BUN/Creatinine Ratio 17.4  19.0  18.5    Anion Gap 9.6  7.0  8.7        Iron Profile   Iron   Date  Value Ref Range Status   04/03/2025 60 37 - 145 mcg/dL Final     TIBC   Date Value Ref Range Status   04/03/2025 432 298 - 536 mcg/dL Final     Iron Saturation (TSAT)   Date Value Ref Range Status   04/03/2025 14 (L) 20 - 50 % Final     Transferrin   Date Value Ref Range Status   04/03/2025 290 200 - 360 mg/dL Final     Ferritin   Ferritin   Date Value Ref Range Status   04/03/2025 382.40 (H) 13.00 - 150.00 ng/mL Final               Assessment and Plan    Diagnoses and all orders for this visit:    1. Rectal bleeding (Primary)  -     Case Request; Standing  -     Case Request    2. Heartburn  -     Case Request; Standing  -     Case Request    3. Epigastric pain  -     Case Request; Standing  -     Case Request    4. Low iron  -     Case Request; Standing  -     Case Request    5. Alcohol use  -      Liver; Future    Other orders  -     polyethylene glycol (GoLYTELY) 236 g solution; Take per office instructions  Dispense: 4000 mL; Refill: 0  -     Implement Anesthesia orders day of procedure.; Standing  -     Follow Anesthesia Guidelines / Protocol; Future  -     Verify NPO; Standing  -     Verify bowel prep was successful; Standing  -     Give tap water enema if bowel prep was insufficient; Standing  -     Obtain Informed Consent; Standing          Assessment & Plan  1. Heartburn:  - Continue omeprazole as it effectively controls symptoms.  - Avoid NSAIDs such as Advil, Aleve, ibuprofen, Motrin, and Excedrin.  - Use Tylenol or tramadol for pain relief.    2. Blood in stool:  - Order upper and lower endoscopy to investigate heartburn and blood in stool.   - Procedures to be performed at the hospital under sedation.  - Require a  home post-procedure.  - Follow a clear liquid diet the day before the procedure.  - Drink a prep the night before the procedure.  - Risks include <1% chance of bleeding, infection, and perforation.    3. Alcohol use  - Order liver ultrasound to check for potential issues, including  fatty liver.  - Advise abstinence from alcohol to prevent further complications recommended.            Follow Up   Return if symptoms worsen or fail to improve.    Patient or patient representative verbalized consent for the use of Ambient Listening during the visit with  TESSA Butler for chart documentation. 5/19/2025  10:06 EDT    Patient was given instructions and counseling regarding her condition or for health maintenance advice. Please see specific information pulled into the AVS if appropriate.

## 2025-05-20 ENCOUNTER — PATIENT ROUNDING (BHMG ONLY) (OUTPATIENT)
Dept: GASTROENTEROLOGY | Facility: CLINIC | Age: 55
End: 2025-05-20
Payer: COMMERCIAL

## 2025-05-20 NOTE — PROGRESS NOTES
5/20/2025      Hello, may I speak with Bianca Lara     My name is Breezy. I am calling from [facility]. I show that you had a recent visit with TESSA Dodson.    Before we get started may I verify your date of birth? 1970    I am calling to officially welcome you to our practice and ask about your recent visit. Is this a good time to talk? No, I did not leave VM since there is no ROSALINA on file.     Tell me about your visit with us. What things went well?    We strive to ensure that we protect your safety and privacy. Is there anything we could have done to improve this during your visit?        We're always looking for ways to make our patients' experiences even better. Do you have recommendations on ways we may improve?    Overall were you satisfied with your first visit to our practice?    I appreciate you taking the time to speak with me today. Is there anything else I can do for you?    I am glad to hear that you had a very good visit and I appreciate you taking the time to provide feedback on this call. We would greatly appreciate you filling out a survey if you receive one in the mail, email or text. This is a great opportunity to provide any additional feedback that you may think of after this call as well.       Thank you, and have a great day.

## 2025-05-21 ENCOUNTER — OFFICE VISIT (OUTPATIENT)
Dept: FAMILY MEDICINE CLINIC | Facility: CLINIC | Age: 55
End: 2025-05-21
Payer: COMMERCIAL

## 2025-05-21 VITALS
BODY MASS INDEX: 40.22 KG/M2 | WEIGHT: 235.6 LBS | TEMPERATURE: 97.1 F | HEART RATE: 90 BPM | OXYGEN SATURATION: 93 % | SYSTOLIC BLOOD PRESSURE: 116 MMHG | DIASTOLIC BLOOD PRESSURE: 88 MMHG | HEIGHT: 64 IN

## 2025-05-21 DIAGNOSIS — F32.A ANXIETY AND DEPRESSION: ICD-10-CM

## 2025-05-21 DIAGNOSIS — J30.1 SEASONAL ALLERGIC RHINITIS DUE TO POLLEN: ICD-10-CM

## 2025-05-21 DIAGNOSIS — F17.200 NICOTINE DEPENDENCE WITH CURRENT USE: ICD-10-CM

## 2025-05-21 DIAGNOSIS — I10 PRIMARY HYPERTENSION: Primary | ICD-10-CM

## 2025-05-21 DIAGNOSIS — K21.9 GASTROESOPHAGEAL REFLUX DISEASE WITHOUT ESOPHAGITIS: ICD-10-CM

## 2025-05-21 DIAGNOSIS — F41.9 ANXIETY AND DEPRESSION: ICD-10-CM

## 2025-05-21 PROCEDURE — 1126F AMNT PAIN NOTED NONE PRSNT: CPT | Performed by: NURSE PRACTITIONER

## 2025-05-21 PROCEDURE — 1159F MED LIST DOCD IN RCRD: CPT | Performed by: NURSE PRACTITIONER

## 2025-05-21 PROCEDURE — 99214 OFFICE O/P EST MOD 30 MIN: CPT | Performed by: NURSE PRACTITIONER

## 2025-05-21 PROCEDURE — 3074F SYST BP LT 130 MM HG: CPT | Performed by: NURSE PRACTITIONER

## 2025-05-21 PROCEDURE — 1160F RVW MEDS BY RX/DR IN RCRD: CPT | Performed by: NURSE PRACTITIONER

## 2025-05-21 PROCEDURE — 3079F DIAST BP 80-89 MM HG: CPT | Performed by: NURSE PRACTITIONER

## 2025-05-21 RX ORDER — POTASSIUM CHLORIDE 750 MG/1
10 TABLET, EXTENDED RELEASE ORAL DAILY
Qty: 90 TABLET | Refills: 1 | Status: SHIPPED | OUTPATIENT
Start: 2025-05-21

## 2025-05-21 RX ORDER — LORATADINE 10 MG/1
10 TABLET ORAL DAILY
Qty: 90 TABLET | Refills: 3 | Status: SHIPPED | OUTPATIENT
Start: 2025-05-21

## 2025-05-21 RX ORDER — OMEPRAZOLE 40 MG/1
40 CAPSULE, DELAYED RELEASE ORAL DAILY
Qty: 90 CAPSULE | Refills: 1 | Status: SHIPPED | OUTPATIENT
Start: 2025-05-21

## 2025-05-21 RX ORDER — HYDROXYZINE HYDROCHLORIDE 25 MG/1
25 TABLET, FILM COATED ORAL EVERY 6 HOURS PRN
Qty: 90 TABLET | Refills: 1 | Status: SHIPPED | OUTPATIENT
Start: 2025-05-21

## 2025-05-21 NOTE — PATIENT INSTRUCTIONS
Chronic Back Pain  Chronic back pain is back pain that lasts longer than 3 months. The cause of your back pain may not be known. Some common causes include:  Wear and tear (degenerative disease) of the bones, disks, or tissues that connect bones to each other (ligaments) in your back.  Inflammation and stiffness in your back (arthritis).  If you have chronic back pain, you may have times when the pain is more intense (flare-ups). You can also learn to manage the pain with home care.  Follow these instructions at home:  Watch for any changes in your symptoms. Take these actions to help with your pain:  Managing pain and stiffness         If told, put ice on the painful area. You may be told to apply ice for the first 24-48 hours after a flare-up starts.  Put ice in a plastic bag.  Place a towel between your skin and the bag.  Leave the ice on for 20 minutes, 2-3 times per day.  If told, apply heat to the affected area as often as told by your health care provider. Use the heat source that your provider recommends, such as a moist heat pack or a heating pad.  Place a towel between your skin and the heat source.  Leave the heat on for 20-30 minutes.  If your skin turns bright red, remove the ice or heat right away to prevent skin damage. The risk of damage is higher if you cannot feel pain, heat, or cold.  Try soaking in a warm tub.  Activity              Avoid bending and other activities that make the pain worse.  Have good posture when you stand or sit.  When you stand, keep your upper back and neck straight, with your shoulders pulled back. Avoid slouching.  When you sit, keep your back straight. Relax your shoulders. Do not round your shoulders or pull them backward.  Do not sit or  one place for too long.  Take brief periods of rest during the day. This will reduce your pain. Resting in a lying or standing position is often better than sitting to rest.  When you rest for longer periods, mix in some mild  activity or stretching between periods of rest. This will help to prevent stiffness and pain.  Get regular exercise. Ask your provider what activities are safe for you.  You may have to avoid lifting. Ask your provider how much you can safely lift. If you do lift, always use the right technique. This means you should:  Bend your knees.  Keep the load close to your body.  Avoid twisting.  Medicines  Take over-the-counter and prescription medicines only as told by your provider.  You may need to take medicines for pain and inflammation. These may be taken by mouth or put on the skin. You may also be given muscle relaxants.  Ask your provider if the medicine prescribed to you:  Requires you to avoid driving or using machinery.  Can cause constipation. You may need to take these actions to prevent or treat constipation:  Drink enough fluid to keep your pee (urine) pale yellow.  Take over-the-counter or prescription medicines.  Eat foods that are high in fiber, such as beans, whole grains, and fresh fruits and vegetables.  Limit foods that are high in fat and processed sugars, such as fried or sweet foods.  General instructions    Sleep on a firm mattress in a comfortable position. Try lying on your side with your knees slightly bent. If you lie on your back, put a pillow under your knees.  Do not use any products that contain nicotine or tobacco. These products include cigarettes, chewing tobacco, and vaping devices, such as e-cigarettes. If you need help quitting, ask your provider.  Contact a health care provider if:  You have pain that does not get better with rest or medicine.  You have new pain.  You have a fever.  You lose weight quickly.  You have trouble doing your normal activities.  You feel weak or numb in one or both of your legs or feet.  Get help right away if:  You are not able to control when you pee or poop.  You have severe back pain and:  Nausea or vomiting.  Pain in your chest or abdomen.  Shortness  of breath.  You faint.  These symptoms may be an emergency. Get help right away. Call 911.  Do not wait to see if the symptoms will go away.  Do not drive yourself to the hospital.  This information is not intended to replace advice given to you by your health care provider. Make sure you discuss any questions you have with your health care provider.  Document Revised: 08/07/2023 Document Reviewed: 08/07/2023  [employer] Patient Education © 2024 [employer]Generalized Anxiety Disorder, Adult  Generalized anxiety disorder (TINO) is a mental health condition. Unlike normal worries, anxiety related to TINO is not triggered by a specific event. These worries do not fade or get better with time. TINO interferes with relationships, work, and school.  TINO symptoms can vary from mild to severe. People with severe TINO can have intense waves of anxiety with physical symptoms that are similar to panic attacks.  What are the causes?  The exact cause of TINO is not known, but the following are believed to have an impact:  Differences in natural brain chemicals.  Genes passed down from parents to children.  Differences in the way threats are perceived.  Development and stress during childhood.  Personality.  What increases the risk?  The following factors may make you more likely to develop this condition:  Being female.  Having a family history of anxiety disorders.  Being very shy.  Experiencing very stressful life events, such as the death of a loved one.  Having a very stressful family environment.  What are the signs or symptoms?  People with TINO often worry excessively about many things in their lives, such as their health and family. Symptoms may also include:  Mental and emotional symptoms:  Worrying excessively about natural disasters.  Fear of being late.  Difficulty concentrating.  Fears that others are judging your performance.  Physical symptoms:  Fatigue.  Headaches, muscle tension, muscle twitches, trembling, or  feeling shaky.  Feeling like your heart is pounding or beating very fast.  Feeling out of breath or like you cannot take a deep breath.  Having trouble falling asleep or staying asleep, or experiencing restlessness.  Sweating.  Nausea, diarrhea, or irritable bowel syndrome (IBS).  Behavioral symptoms:  Experiencing erratic moods or irritability.  Avoidance of new situations.  Avoidance of people.  Extreme difficulty making decisions.  How is this diagnosed?  This condition is diagnosed based on your symptoms and medical history. You will also have a physical exam. Your health care provider may perform tests to rule out other possible causes of your symptoms.  To be diagnosed with TINO, a person must have anxiety that:  Is out of his or her control.  Affects several different aspects of his or her life, such as work and relationships.  Causes distress that makes him or her unable to take part in normal activities.  Includes at least three symptoms of TINO, such as restlessness, fatigue, trouble concentrating, irritability, muscle tension, or sleep problems.  Before your health care provider can confirm a diagnosis of TINO, these symptoms must be present more days than they are not, and they must last for 6 months or longer.  How is this treated?  This condition may be treated with:  Medicine. Antidepressant medicine is usually prescribed for long-term daily control. Anti-anxiety medicines may be added in severe cases, especially when panic attacks occur.  Talk therapy (psychotherapy). Certain types of talk therapy can be helpful in treating TINO by providing support, education, and guidance. Options include:  Cognitive behavioral therapy (CBT). People learn coping skills and self-calming techniques to ease their physical symptoms. They learn to identify unrealistic thoughts and behaviors and to replace them with more appropriate thoughts and behaviors.  Acceptance and commitment therapy (ACT). This treatment teaches  people how to be mindful as a way to cope with unwanted thoughts and feelings.  Biofeedback. This process trains you to manage your body's response (physiological response) through breathing techniques and relaxation methods. You will work with a therapist while machines are used to monitor your physical symptoms.  Stress management techniques. These include yoga, meditation, and exercise.  A mental health specialist can help determine which treatment is best for you. Some people see improvement with one type of therapy. However, other people require a combination of therapies.  Follow these instructions at home:  Lifestyle  Maintain a consistent routine and schedule.  Anticipate stressful situations. Create a plan and allow extra time to work with your plan.  Practice stress management or self-calming techniques that you have learned from your therapist or your health care provider.  Exercise regularly and spend time outdoors.  Eat a healthy diet that includes plenty of vegetables, fruits, whole grains, low-fat dairy products, and lean protein.  Do not eat a lot of foods that are high in fat, added sugar, or salt (sodium).  Drink plenty of water.  Avoid alcohol. Alcohol can increase anxiety.  Avoid caffeine and certain over-the-counter cold medicines. These may make you feel worse. Ask your pharmacist which medicines to avoid.  General instructions  Take over-the-counter and prescription medicines only as told by your health care provider.  Understand that you are likely to have setbacks. Accept this and be kind to yourself as you persist to take better care of yourself.  Anticipate stressful situations. Create a plan and allow extra time to work with your plan.  Recognize and accept your accomplishments, even if you  them as small.  Spend time with people who care about you.  Keep all follow-up visits. This is important.  Where to find more information  National Lawtell of Mental Health:  www.[facility].nih.gov  Substance Abuse and Mental Health Services: www.samhsa.gov  Contact a health care provider if:  Your symptoms do not get better.  Your symptoms get worse.  You have signs of depression, such as:  A persistently sad or irritable mood.  Loss of enjoyment in activities that used to bring you michelle.  Change in weight or eating.  Changes in sleeping habits.  Get help right away if:  You have thoughts about hurting yourself or others.  If you ever feel like you may hurt yourself or others, or have thoughts about taking your own life, get help right away. Go to your nearest emergency department or:  Call your local emergency services (911 in the [place]  Call a suicide crisis helpline, such as the National Suicide Prevention Lifeline at 1-319.168.7878 or 309 in the [place] This is open 24 hours a day in the [place]  If you’re a :  Call 988 and press 1. This is open 24 hours a day.  Text the Veterans Crisis Line at 376744.  Summary  Generalized anxiety disorder (TINO) is a mental health condition that involves worry that is not triggered by a specific event.  People with TINO often worry excessively about many things in their lives, such as their health and family.  TINO may cause symptoms such as restlessness, trouble concentrating, sleep problems, frequent sweating, nausea, diarrhea, headaches, and trembling or muscle twitching.  A mental health specialist can help determine which treatment is best for you. Some people see improvement with one type of therapy. However, other people require a combination of therapies.  This information is not intended to replace advice given to you by your health care provider. Make sure you discuss any questions you have with your health care provider.  Document Revised: 08/01/2024 Document Reviewed: 04/10/2022  [employer] Patient Education © 2024 [employer]

## 2025-05-21 NOTE — ASSESSMENT & PLAN NOTE
Patient's depression is a recurrent episode that is moderate without psychosis. Depression is in partial remission and stable.    Plan:   Continue current medication therapy     Followup in 6 months.

## 2025-06-11 ENCOUNTER — HOSPITAL ENCOUNTER (OUTPATIENT)
Dept: ULTRASOUND IMAGING | Facility: HOSPITAL | Age: 55
Discharge: HOME | End: 2025-06-11
Payer: COMMERCIAL

## 2025-06-11 ENCOUNTER — HOSPITAL ENCOUNTER (OUTPATIENT)
Dept: CT IMAGING | Facility: HOSPITAL | Age: 55
Discharge: HOME | End: 2025-06-11
Payer: COMMERCIAL

## 2025-06-11 DIAGNOSIS — F17.200 NICOTINE DEPENDENCE WITH CURRENT USE: ICD-10-CM

## 2025-06-11 DIAGNOSIS — F10.90 ALCOHOL USE: ICD-10-CM

## 2025-06-11 PROCEDURE — 71271 CT THORAX LUNG CANCER SCR C-: CPT

## 2025-06-11 PROCEDURE — 76705 ECHO EXAM OF ABDOMEN: CPT

## 2025-06-11 NOTE — PAT
Reviewed the following with patient.    Arrival time of 0730.    Must have  over 18 for transportation home post procedure. Come to [facility], entrance [address].     Education provided on laxative administration; bowel prep to be taken in two doses. Reviewed diet instructions for day prior to procedure. Only plain, unflavored water after midnight until two hours prior to arrival time.     Do not take any morning medications on the day of the procedure. Instead bring all prescribed medication and inhalers to the hospital the morning of the procedure. May take any nebulizer treatments or inhalers the morning of the procedure.     Plan to be here 3-4 hours.   Do not bring any valuables to hospital with you. Call Endo department for any questions.     Pt verbalized understanding of instructions.

## 2025-06-19 ENCOUNTER — ANESTHESIA EVENT (OUTPATIENT)
Dept: GASTROENTEROLOGY | Facility: HOSPITAL | Age: 55
End: 2025-06-19
Payer: COMMERCIAL

## 2025-06-19 NOTE — ANESTHESIA PREPROCEDURE EVALUATION
Anesthesia Evaluation     Patient summary reviewed and Nursing notes reviewed   NPO Solid Status: > 8 hours  NPO Liquid Status: > 2 hours           Airway   Mallampati: II  TM distance: <3 FB  Neck ROM: full  No difficulty expected and Large neck circumference  Dental    (+) upper dentures and lower dentures    Comment: Dentures removed prior to procedure     Pulmonary - normal exam    breath sounds clear to auscultation  (+) a smoker (current) Former, Smoked day of surgery, cigarettes and vape,sleep apnea on CPAP  Cardiovascular - normal exam  Exercise tolerance: good (4-7 METS) (Ambulates with cane )    ECG reviewed  Rhythm: regular  Rate: normal    (+) hypertension well controlled, hyperlipidemia      Neuro/Psych  (+) headaches, dizziness/light headedness, numbness, psychiatric history Anxiety, Depression and ADHD  GI/Hepatic/Renal/Endo    (+) obesity, morbid obesity, GI bleeding resolved    Musculoskeletal     Abdominal   (+) obese    Abdomen: soft.   Substance History   (+) drug use (marijuana daily)     OB/GYN negative ob/gyn ROS         Other   arthritis,     ROS/Med Hx Other: Rectal bleeding, heartburn    EKG 11/14/24: ,   Sinus tachycardia  Probable  left atrial enlargement  Abnormal R-wave progression, early transition  Left ventricular hypertrophy  No previous ECG available for comparison    Had beans and sausage for supper last night at 1800               Anesthesia Plan    ASA 3     general   total IV anesthesia  (Total IV Anesthesia    Patient understands anesthesia not responsible for dental damage.      Discussed risks with pt including aspiration, allergic reactions, apnea, advanced airway placement. Pt verbalized understanding. All questions answered.     )  intravenous induction     Anesthetic plan, risks, benefits, and alternatives have been provided, discussed and informed consent has been obtained with: patient.  Pre-procedure education provided  Plan discussed with CRNA.    CODE STATUS:

## 2025-06-20 ENCOUNTER — TELEPHONE (OUTPATIENT)
Dept: GASTROENTEROLOGY | Facility: CLINIC | Age: 55
End: 2025-06-20
Payer: COMMERCIAL

## 2025-06-20 ENCOUNTER — ANESTHESIA (OUTPATIENT)
Dept: GASTROENTEROLOGY | Facility: HOSPITAL | Age: 55
End: 2025-06-20
Payer: COMMERCIAL

## 2025-06-20 ENCOUNTER — HOSPITAL ENCOUNTER (OUTPATIENT)
Facility: HOSPITAL | Age: 55
Setting detail: HOSPITAL OUTPATIENT SURGERY
Discharge: HOME | End: 2025-06-20
Attending: INTERNAL MEDICINE | Admitting: INTERNAL MEDICINE
Payer: COMMERCIAL

## 2025-06-20 VITALS
HEART RATE: 92 BPM | WEIGHT: 240.74 LBS | DIASTOLIC BLOOD PRESSURE: 77 MMHG | OXYGEN SATURATION: 97 % | TEMPERATURE: 97.8 F | BODY MASS INDEX: 42.66 KG/M2 | SYSTOLIC BLOOD PRESSURE: 102 MMHG | RESPIRATION RATE: 20 BRPM | HEIGHT: 63 IN

## 2025-06-20 DIAGNOSIS — E61.1 LOW IRON: ICD-10-CM

## 2025-06-20 DIAGNOSIS — R10.13 EPIGASTRIC PAIN: ICD-10-CM

## 2025-06-20 DIAGNOSIS — R12 HEARTBURN: ICD-10-CM

## 2025-06-20 DIAGNOSIS — K62.5 RECTAL BLEEDING: ICD-10-CM

## 2025-06-20 PROCEDURE — 25810000003 LACTATED RINGERS PER 1000 ML

## 2025-06-20 PROCEDURE — 25010000002 LIDOCAINE PF 2% 2 % SOLUTION: Performed by: NURSE ANESTHETIST, CERTIFIED REGISTERED

## 2025-06-20 PROCEDURE — 25010000002 GLYCOPYRROLATE 0.2 MG/ML SOLUTION: Performed by: NURSE ANESTHETIST, CERTIFIED REGISTERED

## 2025-06-20 PROCEDURE — 45378 DIAGNOSTIC COLONOSCOPY: CPT | Performed by: INTERNAL MEDICINE

## 2025-06-20 PROCEDURE — 25010000002 PROPOFOL 10 MG/ML EMULSION: Performed by: NURSE ANESTHETIST, CERTIFIED REGISTERED

## 2025-06-20 PROCEDURE — 43239 EGD BIOPSY SINGLE/MULTIPLE: CPT | Performed by: INTERNAL MEDICINE

## 2025-06-20 PROCEDURE — 25010000002 FENTANYL CITRATE (PF) 50 MCG/ML SOLUTION: Performed by: NURSE ANESTHETIST, CERTIFIED REGISTERED

## 2025-06-20 PROCEDURE — 88305 TISSUE EXAM BY PATHOLOGIST: CPT | Performed by: INTERNAL MEDICINE

## 2025-06-20 RX ORDER — PROPOFOL 10 MG/ML
VIAL (ML) INTRAVENOUS AS NEEDED
Status: DISCONTINUED | OUTPATIENT
Start: 2025-06-20 | End: 2025-06-20 | Stop reason: SURG

## 2025-06-20 RX ORDER — LIDOCAINE HYDROCHLORIDE 20 MG/ML
INJECTION, SOLUTION EPIDURAL; INFILTRATION; INTRACAUDAL; PERINEURAL AS NEEDED
Status: DISCONTINUED | OUTPATIENT
Start: 2025-06-20 | End: 2025-06-20 | Stop reason: SURG

## 2025-06-20 RX ORDER — FENTANYL CITRATE 50 UG/ML
INJECTION, SOLUTION INTRAMUSCULAR; INTRAVENOUS AS NEEDED
Status: DISCONTINUED | OUTPATIENT
Start: 2025-06-20 | End: 2025-06-20 | Stop reason: SURG

## 2025-06-20 RX ORDER — GLYCOPYRROLATE 0.2 MG/ML
INJECTION INTRAMUSCULAR; INTRAVENOUS AS NEEDED
Status: DISCONTINUED | OUTPATIENT
Start: 2025-06-20 | End: 2025-06-20 | Stop reason: SURG

## 2025-06-20 RX ORDER — SODIUM CHLORIDE, SODIUM LACTATE, POTASSIUM CHLORIDE, CALCIUM CHLORIDE 600; 310; 30; 20 MG/100ML; MG/100ML; MG/100ML; MG/100ML
30 INJECTION, SOLUTION INTRAVENOUS CONTINUOUS
Status: DISCONTINUED | OUTPATIENT
Start: 2025-06-20 | End: 2025-06-20 | Stop reason: HOSPADM

## 2025-06-20 RX ADMIN — GLYCOPYRROLATE 0.2 MG: 0.2 INJECTION INTRAMUSCULAR; INTRAVENOUS at 09:37

## 2025-06-20 RX ADMIN — FENTANYL CITRATE 50 MCG: 50 INJECTION, SOLUTION INTRAMUSCULAR; INTRAVENOUS at 09:41

## 2025-06-20 RX ADMIN — LIDOCAINE HYDROCHLORIDE 80 MG: 20 INJECTION, SOLUTION EPIDURAL; INFILTRATION; INTRACAUDAL; PERINEURAL at 09:37

## 2025-06-20 RX ADMIN — SODIUM CHLORIDE, POTASSIUM CHLORIDE, SODIUM LACTATE AND CALCIUM CHLORIDE 30 ML/HR: 600; 310; 30; 20 INJECTION, SOLUTION INTRAVENOUS at 09:02

## 2025-06-20 RX ADMIN — PROPOFOL 100 MG: 10 INJECTION, EMULSION INTRAVENOUS at 09:37

## 2025-06-20 RX ADMIN — PROPOFOL 250 MCG/KG/MIN: 10 INJECTION, EMULSION INTRAVENOUS at 09:37

## 2025-06-20 RX ADMIN — FENTANYL CITRATE 50 MCG: 50 INJECTION, SOLUTION INTRAMUSCULAR; INTRAVENOUS at 09:35

## 2025-06-20 NOTE — ANESTHESIA POSTPROCEDURE EVALUATION
Patient: Bianca Lara    Procedure Summary       Date: 06/20/25 Room / Location: [facility] ENDOSCOPY 4 / [facility] ENDOSCOPY    Anesthesia Start: 0932 Anesthesia Stop: 1025    Procedures:       ESOPHAGOGASTRODUODENOSCOPY AND COLONOSCOPY      ESOPHAGOGASTRODUODENOSCOPY AND COLONOSCOPY Diagnosis:       Rectal bleeding      Heartburn      Epigastric pain      Low iron      (Rectal bleeding [K62.5])      (Heartburn [R12])      (Epigastric pain [R10.13])      (Low iron [E61.1])    Surgeons: Adrián Gonzalez MD Provider: Danielito Benavidez CRNA    Anesthesia Type: general ASA Status: 3            Anesthesia Type: general    Vitals  Vitals Value Taken Time   /77 06/20/25 10:39   Temp 36.6 °C (97.8 °F) 06/20/25 10:39   Pulse 89 06/20/25 10:41   Resp 20 06/20/25 10:39   SpO2 97 % 06/20/25 10:39   Vitals shown include unfiled device data.        Post Anesthesia Care and Evaluation    Post-procedure mental status: acceptable.  Pain management: satisfactory to patient    Airway patency: patent  Anesthetic complications: No anesthetic complications    Cardiovascular status: acceptable  Respiratory status: acceptable    Comments: Per chart review

## 2025-06-20 NOTE — H&P
Pre Procedure History & Physical    Chief Complaint:   Gastric pain and blood in stool and anemia    Subjective     HPI:   Gastric pain blood in the stool    Past Medical History:   Past Medical History:   Diagnosis Date    ADHD (attention deficit hyperactivity disorder) 2024    Allergic     Allergic rhinitis     Anemia 10/10/24    Anxiety 09/11/2018    Arthritis     Arthritis of back 2020    Blood in stool     Cervical disc disorder 2021    Chronic pain disorder 2008    Back pain    Condition not found     MEATAL STENOSIS, UNSPECIFIED    CTS (carpal tunnel syndrome) 2015    Dental disease 2020    Depression 09/11/2018    Difficulty walking 2023    Dizziness 2019    Dysmenorrhea     Gross hematuria     Headache     Headache, tension-type 2022    Helicobacter positive gastritis 05/26/2016    Hip arthrosis 2024    HL (hearing loss) 2020    Hyperlipidemia 2024    Hypertension     Injury of back     Irregular menses     Low back pain 07/09/2014    Low back strain 2017    Lumbosacral disc disease 2020    Microscopic hematuria 06/10/2014    Night sweats     Nosebleed     Onychomycosis of toenail 07/09/2014    Osteopenia     Pap smear for cervical cancer screening 05/16/2016    Peripheral neuropathy     Right sided abdominal pain 06/12/2014    Screening mammogram, encounter for 11/09/2018    Sleep apnea 2023    STD exposure 1993    chlamydia    Thoracic disc disorder 2021    Tobacco abuse     chronic    Vitamin D deficiency        Past Surgical History:  Past Surgical History:   Procedure Laterality Date    BREAST BIOPSY Left 01/09/2012    CHOLECYSTECTOMY      COLONOSCOPY  08/15/2016    CYST REMOVAL      left wrist    CYSTOSCOPY  06/27/2014    Office cystoscopy w/ Dr. Goncalves    EPIDURAL BLOCK  2022    LAPAROSCOPIC CHOLECYSTECTOMY      On file    TUBAL ABDOMINAL LIGATION      On file    WRIST SURGERY  1986       Family History:  Family History   Problem Relation Age of Onset    Stomach cancer Mother     Alzheimer's disease  Mother     Diabetes Mother     Dementia Mother     Anxiety disorder Mother     Leukemia Father     ADD / ADHD Father     Heart disease Sister     Hyperlipidemia Sister     Kidney disease Sister     Heart disease Brother     No Known Problems Maternal Aunt     No Known Problems Paternal Aunt     No Known Problems Maternal Uncle     No Known Problems Paternal Uncle     No Known Problems Maternal Grandfather     Cancer Maternal Grandmother     Leukemia Paternal Grandfather     No Known Problems Paternal Grandmother     No Known Problems Cousin     Stroke Other         AUNT;UNCLE    Heart disease Other         AUNT,UNCLE    Diabetes Other         AUNT,UNCLE    Arthritis Sister     Hypertension Sister     Alcohol abuse Neg Hx     Bipolar disorder Neg Hx     Depression Neg Hx     Drug abuse Neg Hx     OCD Neg Hx     Paranoid behavior Neg Hx     Schizophrenia Neg Hx     Seizures Neg Hx     Self-Injurious Behavior  Neg Hx     Suicide Attempts Neg Hx        Social History:   reports that she quit smoking about 12 months ago. Her smoking use included cigarettes. She started smoking about 35 years ago. She has a 45 pack-year smoking history. She has been exposed to tobacco smoke. She has never used smokeless tobacco. She reports that she does not currently use alcohol after a past usage of about 7.0 standard drinks of alcohol per week. She reports that she does not currently use drugs after having used the following drugs: Marijuana.    Medications:   Medications Prior to Admission   Medication Sig Dispense Refill Last Dose/Taking    amitriptyline (ELAVIL) 25 MG tablet 1 tablet Daily.       ammonium lactate (LAC-HYDRIN) 12 % lotion Apply  topically to the appropriate area as directed 2 (Two) Times a Day. 225 g 11     busPIRone (BUSPAR) 10 MG tablet Take 1 tablet by mouth 3 (Three) Times a Day. 90 tablet 5     ciclopirox (PENLAC) 8 % solution Apply  topically to the appropriate area as directed Every Night. 6 mL 1      cyclobenzaprine (FLEXERIL) 10 MG tablet Take 1 tablet by mouth 2 (Two) Times a Day As Needed for Muscle Spasms for up to 10 doses. 10 tablet 0     DULoxetine (CYMBALTA) 60 MG capsule Take 1 capsule by mouth 2 (Two) Times a Day. 60 capsule 2     fluticasone (FLONASE) 50 MCG/ACT nasal spray Administer 2 sprays into the nostril(s) as directed by provider Daily. 16 g 5     hydrOXYzine (ATARAX) 25 MG tablet Take 1 tablet by mouth Every 6 (Six) Hours As Needed for Anxiety. 90 tablet 1     ibuprofen (ADVIL,MOTRIN) 600 MG tablet Take 1 tablet by mouth Every 8 (Eight) Hours As Needed for Mild Pain. 30 tablet 0     loratadine (Claritin) 10 MG tablet Take 1 tablet by mouth Daily. 90 tablet 3     losartan-hydrochlorothiazide (Hyzaar) 50-12.5 MG per tablet Take 1 tablet by mouth Daily. 90 tablet 1     OLANZapine-Samidorphan (Lybalvi) 10-10 MG tablet Take 1 tablet by mouth every night at bedtime. 30 tablet 5     omeprazole (priLOSEC) 40 MG capsule Take 1 capsule by mouth Daily. 90 capsule 1     oxybutynin XL (DITROPAN-XL) 10 MG 24 hr tablet Take 1 tablet by mouth Daily. 90 tablet 1     polyethylene glycol (GoLYTELY) 236 g solution Take per office instructions (Patient not taking: Reported on 5/21/2025) 4000 mL 0     potassium chloride 10 MEQ CR tablet Take 1 tablet by mouth Daily. 90 tablet 1     traMADol (ULTRAM) 50 MG tablet Take 1 tablet by mouth Every 8 (Eight) Hours As Needed for Moderate Pain. 30 tablet 1     valACYclovir (VALTREX) 500 MG tablet Take 1 tablet by mouth Daily. (Patient not taking: Reported on 5/21/2025) 30 tablet 0        Allergies:  Azithromycin, Lisinopril, and Penicillins        Objective     Weight 109 kg (240 lb 11.9 oz), not currently breastfeeding.    Physical Exam   Constitutional: Pt is oriented to person, place, and time and well-developed, well-nourished, and in no distress.   Mouth/Throat: Oropharynx is clear and moist.   Neck: Normal range of motion.   Cardiovascular: Normal rate, regular rhythm  and normal heart sounds.    Pulmonary/Chest: Effort normal and breath sounds normal.   Abdominal: Soft. Nontender  Skin: Skin is warm and dry.   Psychiatric: Mood, memory, affect and judgment normal.     Assessment & Plan     Diagnosis:  Epigastric pain abdominal    Anticipated Surgical Procedure:  EGD colonoscopy    The risks, benefits, and alternatives of this procedure have been discussed with the patient or the responsible party- the patient understands and agrees to proceed.

## 2025-06-20 NOTE — TELEPHONE ENCOUNTER
ENDO RECONCILIATION    Verify source of procedure(s): Office visit w/ Gisselle Sharma on 5/19/25    TIME OUT-CONFIRM CORRECT PROCEDURE: EGD & Colonoscopy    ENDO RECONCILIATION COMPLETE

## 2025-06-23 LAB
CYTO UR: NORMAL
LAB AP CASE REPORT: NORMAL
LAB AP CLINICAL INFORMATION: NORMAL
PATH REPORT.FINAL DX SPEC: NORMAL
PATH REPORT.GROSS SPEC: NORMAL

## 2025-06-24 ENCOUNTER — RESULTS FOLLOW-UP (OUTPATIENT)
Dept: GASTROENTEROLOGY | Facility: HOSPITAL | Age: 55
End: 2025-06-24
Payer: COMMERCIAL

## 2025-06-25 NOTE — TELEPHONE ENCOUNTER
Spoke to patient and informed of TES[facility] Dodson result note and recommendations.  Verified patient understanding.     Patient denies any family history of colon cancer.    Educated on intestinal metaplasia provided:  metaplasia is the transition of cells into a different type of cell, in this case we see intestinal cells in the esophagus/stomach.  This is a normal cell; however, it is not in its normal place.      With esophageal intestinal metaplasia, it is thought to be caused by long-term/chronic exposure to acid reflux. In many cases we control this by medications and/or lifestyle changes:  avoid trigger foods, do not lay down after eating for at least 3 hours, elevate HOB by 6-8 inches, lose extra abdominal weight.  Patient verbalized understanding.     Intestinal metaplasia can be associated with an increased risk of cancer.  If the cell becomes abnormal, it is a very slow process of cellular change.     Advised patient to reach out to our office with any GI needs.    Care Gap updated:  1 year EGD recall placed  10 year colon recall placed

## 2025-07-11 ENCOUNTER — TELEPHONE (OUTPATIENT)
Dept: NEUROSURGERY | Facility: CLINIC | Age: 55
End: 2025-07-11
Payer: COMMERCIAL

## 2025-07-11 ENCOUNTER — TELEPHONE (OUTPATIENT)
Dept: ONCOLOGY | Facility: HOSPITAL | Age: 55
End: 2025-07-11
Payer: COMMERCIAL

## 2025-07-11 ENCOUNTER — TELEPHONE (OUTPATIENT)
Dept: ONCOLOGY | Facility: HOSPITAL | Age: 55
End: 2025-07-11

## 2025-07-11 NOTE — TELEPHONE ENCOUNTER
Caller: Bianca Lara    Relationship to patient: Self    Best call back number: [phone]    Chief complaint: NEEDING TO RESCHEDULE MISSED APPT, IS GOING TO GET HER LABS DONE TODAY [facility] AFTER 3PM TODAY     Type of visit: FOLLOW UP 2    Requested date: ANY DAY      If rescheduling, when is the original appointment: 07/09     Additional notes:[facility] UNABLE TO SCHEDULE

## 2025-07-11 NOTE — TELEPHONE ENCOUNTER
Caller: [place]    Relationship: SELF    Best call back number: [phone]      What form or medical record are you requesting: LETTER STATING THAT THE PATIENT CANNOT WORK D/T HER BACK ISSUES- IT SHOULD SAY THAT SHE CANNOT GO WORK RIGHT NOW & NOT FOR THE NEXT 6 MONTHS    Who is requesting this form or medical record from you: EMPLOYER    How would you like to receive the form or medical records (pick-up, mail, fax):       Timeframe paperwork needed: ASAP    Additional notes: PLEASE CALL THE PATIENT WHEN THE LETTER IS DONE SO THAT SHE CAN COME PICK IT UP

## 2025-07-14 ENCOUNTER — HOSPITAL ENCOUNTER (OUTPATIENT)
Dept: MAMMOGRAPHY | Facility: HOSPITAL | Age: 55
Discharge: HOME | End: 2025-07-14
Admitting: OBSTETRICS & GYNECOLOGY
Payer: COMMERCIAL

## 2025-07-14 ENCOUNTER — TELEPHONE (OUTPATIENT)
Dept: ONCOLOGY | Facility: HOSPITAL | Age: 55
End: 2025-07-14
Payer: COMMERCIAL

## 2025-07-14 DIAGNOSIS — Z01.419 WELL WOMAN EXAM WITH ROUTINE GYNECOLOGICAL EXAM: ICD-10-CM

## 2025-07-14 PROCEDURE — 77067 SCR MAMMO BI INCL CAD: CPT

## 2025-07-14 PROCEDURE — 77063 BREAST TOMOSYNTHESIS BI: CPT

## 2025-07-18 ENCOUNTER — LAB (OUTPATIENT)
Dept: LAB | Facility: HOSPITAL | Age: 55
End: 2025-07-18
Payer: COMMERCIAL

## 2025-07-18 ENCOUNTER — LAB (OUTPATIENT)
Facility: HOSPITAL | Age: 55
End: 2025-07-18
Payer: COMMERCIAL

## 2025-07-18 DIAGNOSIS — K21.9 GASTROESOPHAGEAL REFLUX DISEASE WITHOUT ESOPHAGITIS: ICD-10-CM

## 2025-07-18 DIAGNOSIS — D75.839 THROMBOCYTOSIS, UNSPECIFIED: ICD-10-CM

## 2025-07-18 DIAGNOSIS — E61.1 IRON DEFICIENCY: ICD-10-CM

## 2025-07-18 LAB
ALBUMIN SERPL-MCNC: 4.2 G/DL (ref 3.5–5.2)
ALBUMIN/GLOB SERPL: 1.2 G/DL
ALP SERPL-CCNC: 107 U/L (ref 39–117)
ALT SERPL W P-5'-P-CCNC: 19 U/L (ref 1–33)
ANION GAP SERPL CALCULATED.3IONS-SCNC: 12.1 MMOL/L (ref 5–15)
AST SERPL-CCNC: 22 U/L (ref 1–32)
BASOPHILS # BLD AUTO: 0.06 10*3/MM3 (ref 0–0.2)
BASOPHILS NFR BLD AUTO: 0.9 % (ref 0–1.5)
BILIRUB SERPL-MCNC: 0.2 MG/DL (ref 0–1.2)
BUN SERPL-MCNC: 13.4 MG/DL (ref 6–20)
BUN/CREAT SERPL: 16.3 (ref 7–25)
CALCIUM SPEC-SCNC: 9.9 MG/DL (ref 8.6–10.5)
CHLORIDE SERPL-SCNC: 100 MMOL/L (ref 98–107)
CO2 SERPL-SCNC: 24.9 MMOL/L (ref 22–29)
CREAT SERPL-MCNC: 0.82 MG/DL (ref 0.57–1)
DEPRECATED RDW RBC AUTO: 48.5 FL (ref 37–54)
EGFRCR SERPLBLD CKD-EPI 2021: 85.1 ML/MIN/1.73
EOSINOPHIL # BLD AUTO: 0.25 10*3/MM3 (ref 0–0.4)
EOSINOPHIL NFR BLD AUTO: 3.5 % (ref 0.3–6.2)
ERYTHROCYTE [DISTWIDTH] IN BLOOD BY AUTOMATED COUNT: 14.8 % (ref 12.3–15.4)
ERYTHROCYTE [SEDIMENTATION RATE] IN BLOOD: 38 MM/HR (ref 0–30)
FERRITIN SERPL-MCNC: 312.5 NG/ML (ref 13–150)
FOLATE SERPL-MCNC: 7.24 NG/ML (ref 4.78–24.2)
GLOBULIN UR ELPH-MCNC: 3.4 GM/DL
GLUCOSE SERPL-MCNC: 103 MG/DL (ref 65–99)
HCT VFR BLD AUTO: 44.9 % (ref 34–46.6)
HGB BLD-MCNC: 14.4 G/DL (ref 12–15.9)
IMM GRANULOCYTES # BLD AUTO: 0.02 10*3/MM3 (ref 0–0.05)
IMM GRANULOCYTES NFR BLD AUTO: 0.3 % (ref 0–0.5)
IRON 24H UR-MRATE: 76 MCG/DL (ref 37–145)
IRON SATN MFR SERPL: 21 % (ref 20–50)
LYMPHOCYTES # BLD AUTO: 2.55 10*3/MM3 (ref 0.7–3.1)
LYMPHOCYTES NFR BLD AUTO: 36.2 % (ref 19.6–45.3)
MCH RBC QN AUTO: 28.5 PG (ref 26.6–33)
MCHC RBC AUTO-ENTMCNC: 32.1 G/DL (ref 31.5–35.7)
MCV RBC AUTO: 88.9 FL (ref 79–97)
MONOCYTES # BLD AUTO: 0.71 10*3/MM3 (ref 0.1–0.9)
MONOCYTES NFR BLD AUTO: 10.1 % (ref 5–12)
NEUTROPHILS NFR BLD AUTO: 3.46 10*3/MM3 (ref 1.7–7)
NEUTROPHILS NFR BLD AUTO: 49 % (ref 42.7–76)
NRBC BLD AUTO-RTO: 0 /100 WBC (ref 0–0.2)
PLATELET # BLD AUTO: 453 10*3/MM3 (ref 140–450)
PMV BLD AUTO: 9.1 FL (ref 6–12)
POTASSIUM SERPL-SCNC: 3.8 MMOL/L (ref 3.5–5.2)
PROT SERPL-MCNC: 7.6 G/DL (ref 6–8.5)
RBC # BLD AUTO: 5.05 10*6/MM3 (ref 3.77–5.28)
SODIUM SERPL-SCNC: 137 MMOL/L (ref 136–145)
TIBC SERPL-MCNC: 365 MCG/DL (ref 298–536)
TRANSFERRIN SERPL-MCNC: 245 MG/DL (ref 200–360)
VIT B12 BLD-MCNC: 494 PG/ML (ref 211–946)
WBC NRBC COR # BLD AUTO: 7.05 10*3/MM3 (ref 3.4–10.8)

## 2025-07-18 PROCEDURE — 85652 RBC SED RATE AUTOMATED: CPT

## 2025-07-18 PROCEDURE — 82746 ASSAY OF FOLIC ACID SERUM: CPT

## 2025-07-18 PROCEDURE — 82607 VITAMIN B-12: CPT

## 2025-07-18 PROCEDURE — 82728 ASSAY OF FERRITIN: CPT

## 2025-07-18 PROCEDURE — 85025 COMPLETE CBC W/AUTO DIFF WBC: CPT

## 2025-07-18 PROCEDURE — 36415 COLL VENOUS BLD VENIPUNCTURE: CPT

## 2025-07-18 PROCEDURE — 83540 ASSAY OF IRON: CPT

## 2025-07-18 PROCEDURE — 80053 COMPREHEN METABOLIC PANEL: CPT

## 2025-07-18 PROCEDURE — 84466 ASSAY OF TRANSFERRIN: CPT

## 2025-07-28 ENCOUNTER — TELEPHONE (OUTPATIENT)
Dept: PSYCHIATRY | Facility: CLINIC | Age: 55
End: 2025-07-28

## 2025-08-05 ENCOUNTER — OFFICE VISIT (OUTPATIENT)
Dept: NEUROSURGERY | Facility: CLINIC | Age: 55
End: 2025-08-05
Payer: COMMERCIAL

## 2025-08-05 VITALS
BODY MASS INDEX: 41.02 KG/M2 | DIASTOLIC BLOOD PRESSURE: 84 MMHG | SYSTOLIC BLOOD PRESSURE: 116 MMHG | HEIGHT: 63 IN | WEIGHT: 231.5 LBS

## 2025-08-05 DIAGNOSIS — M54.42 CHRONIC MIDLINE LOW BACK PAIN WITH BILATERAL SCIATICA: ICD-10-CM

## 2025-08-05 DIAGNOSIS — M47.816 FACET ARTHRITIS OF LUMBAR REGION: ICD-10-CM

## 2025-08-05 DIAGNOSIS — G89.29 CHRONIC MIDLINE LOW BACK PAIN WITH BILATERAL SCIATICA: ICD-10-CM

## 2025-08-05 DIAGNOSIS — M43.16 ANTEROLISTHESIS OF LUMBAR SPINE: Primary | ICD-10-CM

## 2025-08-05 DIAGNOSIS — M54.41 CHRONIC MIDLINE LOW BACK PAIN WITH BILATERAL SCIATICA: ICD-10-CM

## 2025-08-05 RX ORDER — GABAPENTIN 300 MG/1
300 CAPSULE ORAL 3 TIMES DAILY
Qty: 90 CAPSULE | Refills: 1 | Status: SHIPPED | OUTPATIENT
Start: 2025-08-05

## 2025-08-13 ENCOUNTER — TELEPHONE (OUTPATIENT)
Dept: ONCOLOGY | Facility: HOSPITAL | Age: 55
End: 2025-08-13
Payer: COMMERCIAL

## 2025-08-18 DIAGNOSIS — E61.1 IRON DEFICIENCY: ICD-10-CM

## 2025-08-18 DIAGNOSIS — J30.1 SEASONAL ALLERGIC RHINITIS DUE TO POLLEN: ICD-10-CM

## 2025-08-18 DIAGNOSIS — I10 PRIMARY HYPERTENSION: ICD-10-CM

## 2025-08-18 DIAGNOSIS — D75.839 THROMBOCYTOSIS, UNSPECIFIED: ICD-10-CM

## 2025-08-18 RX ORDER — LOSARTAN POTASSIUM AND HYDROCHLOROTHIAZIDE 12.5; 5 MG/1; MG/1
1 TABLET ORAL DAILY
Qty: 90 TABLET | Refills: 1 | Status: SHIPPED | OUTPATIENT
Start: 2025-08-18

## 2025-08-18 RX ORDER — LORATADINE 10 MG/1
10 TABLET ORAL DAILY
Qty: 30 TABLET | Refills: 26 | Status: SHIPPED | OUTPATIENT
Start: 2025-08-18

## 2025-08-27 RX ORDER — FERROUS GLUCONATE 324(38)MG
1 TABLET ORAL 3 TIMES WEEKLY
Qty: 12 TABLET | Refills: 9 | OUTPATIENT
Start: 2025-08-27

## 2025-08-29 ENCOUNTER — OFFICE VISIT (OUTPATIENT)
Dept: ONCOLOGY | Facility: HOSPITAL | Age: 55
End: 2025-08-29
Payer: COMMERCIAL

## 2025-08-29 VITALS
OXYGEN SATURATION: 98 % | RESPIRATION RATE: 20 BRPM | SYSTOLIC BLOOD PRESSURE: 138 MMHG | BODY MASS INDEX: 40.43 KG/M2 | DIASTOLIC BLOOD PRESSURE: 94 MMHG | HEART RATE: 85 BPM | TEMPERATURE: 98.2 F | WEIGHT: 228.2 LBS | HEIGHT: 63 IN

## 2025-08-29 DIAGNOSIS — E61.1 IRON DEFICIENCY: ICD-10-CM

## 2025-08-29 DIAGNOSIS — D75.839 THROMBOCYTOSIS, UNSPECIFIED: Primary | ICD-10-CM

## 2025-08-29 RX ORDER — CLINDAMYCIN HYDROCHLORIDE 300 MG/1
CAPSULE ORAL
COMMUNITY
Start: 2025-08-26

## (undated) DEVICE — SOL IRRG H2O PL/BG 1000ML STRL

## (undated) DEVICE — Device

## (undated) DEVICE — SINGLE-USE BIOPSY FORCEPS: Brand: RADIAL JAW 4

## (undated) DEVICE — BLCK/BITE BLOX WO/DENTL/RIM W/STRAP 54F

## (undated) DEVICE — THE STERILE LIGHT HANDLE COVER IS USED WITH STERIS SURGICAL LIGHTING AND VISUALIZATION SYSTEMS.

## (undated) DEVICE — LINER SURG CANSTR SXN S/RIGD 1500CC

## (undated) DEVICE — SOLIDIFIER LIQLOC PLS 1500CC BT

## (undated) DEVICE — CONN JET HYDRA H20 AUXILIARY DISP

## (undated) DEVICE — DEFENDO AIR WATER SUCTION AND BIOPSY VALVE KIT FOR  OLYMPUS: Brand: DEFENDO AIR/WATER/SUCTION AND BIOPSY VALVE